# Patient Record
Sex: MALE | Race: WHITE | NOT HISPANIC OR LATINO | Employment: OTHER | URBAN - METROPOLITAN AREA
[De-identification: names, ages, dates, MRNs, and addresses within clinical notes are randomized per-mention and may not be internally consistent; named-entity substitution may affect disease eponyms.]

---

## 2019-10-01 ENCOUNTER — APPOINTMENT (OUTPATIENT)
Dept: RADIOLOGY | Facility: MEDICAL CENTER | Age: 79
End: 2019-10-01
Attending: EMERGENCY MEDICINE
Payer: COMMERCIAL

## 2019-10-01 ENCOUNTER — HOSPITAL ENCOUNTER (EMERGENCY)
Facility: MEDICAL CENTER | Age: 79
End: 2019-10-01
Attending: EMERGENCY MEDICINE
Payer: COMMERCIAL

## 2019-10-01 VITALS
HEIGHT: 73 IN | TEMPERATURE: 98.2 F | RESPIRATION RATE: 16 BRPM | DIASTOLIC BLOOD PRESSURE: 43 MMHG | WEIGHT: 275 LBS | HEART RATE: 70 BPM | SYSTOLIC BLOOD PRESSURE: 100 MMHG | OXYGEN SATURATION: 98 % | BODY MASS INDEX: 36.45 KG/M2

## 2019-10-01 DIAGNOSIS — S51.019A SKIN TEAR OF ELBOW WITHOUT COMPLICATION, UNSPECIFIED LATERALITY, INITIAL ENCOUNTER: ICD-10-CM

## 2019-10-01 DIAGNOSIS — S01.81XA FACIAL LACERATION, INITIAL ENCOUNTER: ICD-10-CM

## 2019-10-01 LAB — POC BREATHALIZER: 0.15 PERCENT (ref 0–0.01)

## 2019-10-01 PROCEDURE — 304217 HCHG IRRIGATION SYSTEM

## 2019-10-01 PROCEDURE — 304999 HCHG REPAIR-SIMPLE/INTERMED LEVEL 1

## 2019-10-01 PROCEDURE — 70450 CT HEAD/BRAIN W/O DYE: CPT

## 2019-10-01 PROCEDURE — 303747 HCHG EXTRA SUTURE

## 2019-10-01 PROCEDURE — 12013 RPR F/E/E/N/L/M 2.6-5.0 CM: CPT

## 2019-10-01 PROCEDURE — 99284 EMERGENCY DEPT VISIT MOD MDM: CPT

## 2019-10-01 PROCEDURE — A9270 NON-COVERED ITEM OR SERVICE: HCPCS

## 2019-10-01 PROCEDURE — 302970 POC BREATHALIZER: Performed by: EMERGENCY MEDICINE

## 2019-10-01 PROCEDURE — 70486 CT MAXILLOFACIAL W/O DYE: CPT

## 2019-10-01 PROCEDURE — 303353 HCHG DERMABOND SKIN ADHESIVE

## 2019-10-01 PROCEDURE — 700102 HCHG RX REV CODE 250 W/ 637 OVERRIDE(OP)

## 2019-10-01 PROCEDURE — 36415 COLL VENOUS BLD VENIPUNCTURE: CPT

## 2019-10-01 PROCEDURE — 700111 HCHG RX REV CODE 636 W/ 250 OVERRIDE (IP): Performed by: EMERGENCY MEDICINE

## 2019-10-01 PROCEDURE — 306637 HCHG MISC ORTHO ITEM RC 0274

## 2019-10-01 PROCEDURE — 90715 TDAP VACCINE 7 YRS/> IM: CPT | Performed by: EMERGENCY MEDICINE

## 2019-10-01 PROCEDURE — 700101 HCHG RX REV CODE 250: Performed by: EMERGENCY MEDICINE

## 2019-10-01 PROCEDURE — 90471 IMMUNIZATION ADMIN: CPT

## 2019-10-01 RX ORDER — AMOXICILLIN 250 MG
2 CAPSULE ORAL DAILY
COMMUNITY
End: 2019-12-23

## 2019-10-01 RX ORDER — ACETAMINOPHEN 500 MG
1000 TABLET ORAL 3 TIMES DAILY PRN
COMMUNITY
End: 2019-12-23

## 2019-10-01 RX ORDER — LEVOTHYROXINE SODIUM 0.05 MG/1
50 TABLET ORAL
Status: SHIPPED | COMMUNITY
End: 2023-04-18

## 2019-10-01 RX ORDER — BUMETANIDE 1 MG/1
1 TABLET ORAL DAILY
COMMUNITY
End: 2019-12-23

## 2019-10-01 RX ORDER — ACETAMINOPHEN 325 MG/1
650 TABLET ORAL ONCE
Status: COMPLETED | OUTPATIENT
Start: 2019-10-01 | End: 2019-10-01

## 2019-10-01 RX ORDER — MAGNESIUM OXIDE 420 MG/1
420 TABLET ORAL 2 TIMES DAILY
COMMUNITY
End: 2021-06-21

## 2019-10-01 RX ORDER — ATORVASTATIN CALCIUM 40 MG/1
40 TABLET, FILM COATED ORAL EVERY EVENING
Status: SHIPPED | COMMUNITY
End: 2023-04-18

## 2019-10-01 RX ORDER — LIDOCAINE HYDROCHLORIDE 20 MG/ML
20 INJECTION, SOLUTION INFILTRATION; PERINEURAL ONCE
Status: COMPLETED | OUTPATIENT
Start: 2019-10-01 | End: 2019-10-01

## 2019-10-01 RX ORDER — SPIRONOLACTONE 25 MG/1
50 TABLET ORAL DAILY
COMMUNITY
End: 2019-12-23

## 2019-10-01 RX ORDER — BACLOFEN 10 MG/1
20 TABLET ORAL 3 TIMES DAILY PRN
Status: ON HOLD | COMMUNITY
End: 2019-10-30

## 2019-10-01 RX ORDER — ALLOPURINOL 100 MG/1
100 TABLET ORAL DAILY
Status: SHIPPED | COMMUNITY
End: 2023-04-18

## 2019-10-01 RX ORDER — TRAZODONE HYDROCHLORIDE 100 MG/1
200 TABLET ORAL
COMMUNITY
End: 2019-12-23

## 2019-10-01 RX ORDER — CLOPIDOGREL BISULFATE 75 MG/1
75 TABLET ORAL DAILY
COMMUNITY
End: 2021-06-21

## 2019-10-01 RX ORDER — PANTOPRAZOLE SODIUM 40 MG/1
40 TABLET, DELAYED RELEASE ORAL DAILY
COMMUNITY
End: 2019-12-23

## 2019-10-01 RX ADMIN — ACETAMINOPHEN 650 MG: 325 TABLET, FILM COATED ORAL at 18:32

## 2019-10-01 RX ADMIN — CLOSTRIDIUM TETANI TOXOID ANTIGEN (FORMALDEHYDE INACTIVATED), CORYNEBACTERIUM DIPHTHERIAE TOXOID ANTIGEN (FORMALDEHYDE INACTIVATED), BORDETELLA PERTUSSIS TOXOID ANTIGEN (GLUTARALDEHYDE INACTIVATED), BORDETELLA PERTUSSIS FILAMENTOUS HEMAGGLUTININ ANTIGEN (FORMALDEHYDE INACTIVATED), BORDETELLA PERTUSSIS PERTACTIN ANTIGEN, AND BORDETELLA PERTUSSIS FIMBRIAE 2/3 ANTIGEN 0.5 ML: 5; 2; 2.5; 5; 3; 5 INJECTION, SUSPENSION INTRAMUSCULAR at 17:52

## 2019-10-01 RX ADMIN — LIDOCAINE HYDROCHLORIDE 20 ML: 20 INJECTION, SOLUTION INFILTRATION; PERINEURAL at 18:54

## 2019-10-01 SDOH — HEALTH STABILITY: MENTAL HEALTH: HOW OFTEN DO YOU HAVE A DRINK CONTAINING ALCOHOL?: 2-3 TIMES A WEEK

## 2019-10-01 SDOH — HEALTH STABILITY: MENTAL HEALTH: HOW MANY STANDARD DRINKS CONTAINING ALCOHOL DO YOU HAVE ON A TYPICAL DAY?: 3 OR 4

## 2019-10-01 SDOH — HEALTH STABILITY: MENTAL HEALTH: HOW OFTEN DO YOU HAVE 6 OR MORE DRINKS ON ONE OCCASION?: NEVER

## 2019-10-01 ASSESSMENT — LIFESTYLE VARIABLES
DO YOU DRINK ALCOHOL: NO
DOES PATIENT WANT TO STOP DRINKING: NO

## 2019-10-02 NOTE — ED NOTES
The patient has been provided with discharge education and information.  The patient was also provided with instructions on follow up care and return precautions.  The patient verbalizes understanding of discharge instructions, follow up care, and return precautions.  All questions have been answered.  .  NAD, A/Ox4, good color and appropriate at time of discharge.  Patient ambulated steady gait out of department.      Wounds dressed and educated on management. Patient verbally understood instructions. Denies any questions.

## 2019-10-02 NOTE — ED TRIAGE NOTES
BIB EMS with   Chief Complaint   Patient presents with   • T-5000 GLF   • Head Laceration   • Arm Pain     bilateral   Tripped and fell leaving a bar. States 3 [place]. Skin tears to bilateral forearms, bilateral hand pain, bilateral shoulder abrasions, and large hematoma/laceration to forehead. Denies neck pain.      On Eliquis and Plavix.

## 2019-10-02 NOTE — ED NOTES
Met with pt at bedside. Pt unable to recall his medications. Placed call to pts home pharmacy to verify  Current medications. No antibiotics noted on profile. Pt  Confirmed last doses  Taken.  Home pharmacy [facility]

## 2019-10-02 NOTE — ED PROVIDER NOTES
ED Provider Note    CHIEF COMPLAINT  Chief Complaint   Patient presents with   • T-5000 GLF   • Head Laceration   • Arm Pain     bilateral       HPI  Kedar Woods is a 79 y.o. male who presents to emerge from today with head laceration, ground-level fall.  Patient states he was in a bar had 3 [place]'s went out of the bar missed a step and fell and in his face causing injury no loss of consciousness he has a laceration to his forehead he is on blood thinner of Eliquis.  He was about to get into his truck to drive home he has skin tears to both hands and left arm.  Facial pain and head pain no neck pain no chest pain or shortness of breath or other complaints at this time.    REVIEW OF SYSTEMS  See HPI for further details. All other systems are negative.      PAST MEDICAL HISTORY  Past Medical History:   Diagnosis Date   • Hypertension        FAMILY HISTORY  History reviewed. No pertinent family history.    SOCIAL HISTORY  Social History     Socioeconomic History   • Marital status:      Spouse name: Not on file   • Number of children: Not on file   • Years of education: Not on file   • Highest education level: Not on file   Occupational History   • Not on file   Social Needs   • Financial resource strain: Not on file   • Food insecurity:     Worry: Not on file     Inability: Not on file   • Transportation needs:     Medical: Not on file     Non-medical: Not on file   Tobacco Use   • Smoking status: Never Smoker   • Smokeless tobacco: Never Used   Substance and Sexual Activity   • Alcohol use: Yes     Frequency: 2-3 times a week     Drinks per session: 3 or 4     Binge frequency: Never   • Drug use: Never   • Sexual activity: Not on file   Lifestyle   • Physical activity:     Days per week: Not on file     Minutes per session: Not on file   • Stress: Not on file   Relationships   • Social connections:     Talks on phone: Not on file     Gets together: Not on file     Attends Pent[employer]alism service:  "Not on file     Active member of club or organization: Not on file     Attends meetings of clubs or organizations: Not on file     Relationship status: Not on file   • Intimate partner violence:     Fear of current or ex partner: Not on file     Emotionally abused: Not on file     Physically abused: Not on file     Forced sexual activity: Not on file   Other Topics Concern   • Not on file   Social History Narrative   • Not on file       SURGICAL HISTORY  No past surgical history on file.    CURRENT MEDICATIONS  Home Medications     Reviewed by Nayan Luna (Pharmacy Tech) on 10/01/19 at 1716  Med List Status: Complete   Medication Last Dose Status   acetaminophen (TYLENOL) 500 MG Tab 9/30/2019 Active   allopurinol (ZYLOPRIM) 100 MG Tab 10/1/2019 Active   apixaban (ELIQUIS) 5mg Tab 10/1/2019 Active   atorvastatin (LIPITOR) 40 MG Tab 10/1/2019 Active   baclofen (LIORESAL) 10 MG Tab 10/1/2019 Active   bumetanide (BUMEX) 1 MG Tab 10/1/2019 Active   clopidogrel (PLAVIX) 75 MG Tab 10/1/2019 Active   cyanocobalamin (VITAMIN B12) 1000 MCG Tab 10/1/2019 Active   levothyroxine (SYNTHROID) 50 MCG Tab 10/1/2019 Active   Magnesium Oxide 420 MG Tab 10/1/2019 Active   metFORMIN (GLUCOPHAGE) 500 MG Tab 10/1/2019 Active   pantoprazole (PROTONIX) 40 MG Tablet Delayed Response 10/1/2019 Active   senna-docusate (PERICOLACE OR SENOKOT S) 8.6-50 MG Tab 10/1/2019 Active   spironolactone (ALDACTONE) 25 MG Tab 10/1/2019 Active   traZODone (DESYREL) 100 MG Tab 9/30/2019 Active   vitamin D (CHOLECALCIFEROL) 1000 UNIT Tab 10/1/2019 Active                ALLERGIES  No Known Allergies    PHYSICAL EXAM  VITAL SIGNS: /43   Pulse 70   Temp 36.8 °C (98.2 °F) (Temporal)   Resp 16   Ht 1.854 m (6' 1\")   Wt 124.7 kg (275 lb)   SpO2 98%   BMI 36.28 kg/m²  Room air O2: 98    Constitutional: GCS of 15  HENT: Forehead stellate laceration noted with bleeding abrasions across the left side of his face  Eyes: PERRLA, EOMI, Conjunctiva " normal, No discharge.   Neck: Soft and supple full range of motion  Cardiovascular: Normal heart rate, Normal rhythm, No murmurs, No rubs, No gallops.   Thorax & Lungs: Normal breath sounds, No respiratory distress, No wheezing, No chest tenderness.   Abdomen: Bowel sounds normal, Soft, No tenderness, No masses, No pulsatile masses.   Skin: Warm, Dry, No erythema, No rash.  4 cm forehead laceration depth this to the subcutaneous tissue stellate in nature.  Multiple skin tears bilateral hands and left arm.  Back: Full range of motion no deformities  Extremities: Intact distal pulses, No edema, No tenderness, No cyanosis, No clubbing.   Musculoskeletal: Patient is amatory moves upper and lower extremities without difficulty  Neurologic: Alert & oriented x 3, Normal motor function, Normal sensory function, No focal deficits noted.     RADIOLOGY/PROCEDURES  CT-HEAD W/O   Final Result      No acute intracranial abnormality is identified.      Atrophy      There are periventricular and subcortical white matter changes present.  This finding is nonspecific and could be from previous small vessel ischemia, demyelination, or gliosis.      Chronic appearing nasal bone fracture on the left.      Frontal scalp hematoma and laceration.      CT-MAXILLOFACIAL W/O   Final Result      Chronic left left nasal bone fracture. No acute maxillofacial fractures.            COURSE & MEDICAL DECISION MAKING  Pertinent Labs & Imaging studies reviewed. (See chart for details) patient is on blood thinner Eliquis with CT scan of the head was obtained there is no evidence of acute bleed or other acute process facial CT scan also done because of injuries that occurred and this was negative for acute process.  Discussed not on use of alcohol with his blood thinner and not driving under the influence of alcohol sutures out in 5 to 7 days discussed signs of infection which include but not limited to redness, swelling, discharge or fever.  Tetanus  was made up-to-date patient given bacitracin ointment to use daily.  Verbalizes understand instructions need for follow-up.  Patient was kept to is awake alert ambulatory and sober.          PROCEDURE NOTE #1; repair 4 cm facial laceration by ER physician; patient injected local anesthetic of 2% Xylocaine total of 3.5 cc.  Wound over forehead stellate was cleansed irrigated with copious muscle sterile saline solution approximately 500 cc by ER physician.  Using sterile technique and under sterile conditions sutures were applied of a 4×0 nylon thread total of 6 interrupted sutures bacitracin dressing.  Patient tolerated procedure well      PROCEDURE NOTE #2; repair of skin tears on bilateral hands and left forearm by ER physician.  Area was cleansed and irrigated dried under sterile conditions Dermabond was placed over the skin tears with good closure of the wounds dressing was applied patient tired procedure well.    FINAL IMPRESSION  1.  Acute 4 cm facial laceration  2.  Multiple skin tears  3.  Multiple abrasions secondary to fall  4.  Alcohol intoxication  5.        Electronically signed by: Elpidio Blanchard, 10/2/2019

## 2019-10-23 ENCOUNTER — HOSPITAL ENCOUNTER (OUTPATIENT)
Facility: MEDICAL CENTER | Age: 79
End: 2019-10-30
Attending: EMERGENCY MEDICINE | Admitting: INTERNAL MEDICINE
Payer: COMMERCIAL

## 2019-10-23 ENCOUNTER — APPOINTMENT (OUTPATIENT)
Dept: RADIOLOGY | Facility: MEDICAL CENTER | Age: 79
End: 2019-10-23
Attending: EMERGENCY MEDICINE
Payer: COMMERCIAL

## 2019-10-23 ENCOUNTER — APPOINTMENT (OUTPATIENT)
Dept: RADIOLOGY | Facility: MEDICAL CENTER | Age: 79
End: 2019-10-23
Attending: STUDENT IN AN ORGANIZED HEALTH CARE EDUCATION/TRAINING PROGRAM
Payer: COMMERCIAL

## 2019-10-23 DIAGNOSIS — R41.82 ALTERED MENTAL STATUS, UNSPECIFIED ALTERED MENTAL STATUS TYPE: ICD-10-CM

## 2019-10-23 DIAGNOSIS — R41.0 DELIRIUM: ICD-10-CM

## 2019-10-23 DIAGNOSIS — R29.6 MULTIPLE FALLS: Primary | ICD-10-CM

## 2019-10-23 DIAGNOSIS — R60.9 PERIPHERAL EDEMA: ICD-10-CM

## 2019-10-23 DIAGNOSIS — R79.89 ELEVATED TROPONIN: ICD-10-CM

## 2019-10-23 DIAGNOSIS — I87.2 CHRONIC VENOUS STASIS DERMATITIS OF BOTH LOWER EXTREMITIES: ICD-10-CM

## 2019-10-23 DIAGNOSIS — R79.89 ELEVATED BRAIN NATRIURETIC PEPTIDE (BNP) LEVEL: ICD-10-CM

## 2019-10-23 DIAGNOSIS — D64.9 ANEMIA, UNSPECIFIED TYPE: ICD-10-CM

## 2019-10-23 PROBLEM — I25.10 CORONARY ARTERY DISEASE INVOLVING NATIVE CORONARY ARTERY: Status: ACTIVE | Noted: 2019-10-23

## 2019-10-23 PROBLEM — I10 ESSENTIAL HYPERTENSION: Status: ACTIVE | Noted: 2019-10-23

## 2019-10-23 PROBLEM — R40.4 ALTERED LEVEL OF CONSCIOUSNESS: Status: ACTIVE | Noted: 2019-10-23

## 2019-10-23 PROBLEM — I48.11 LONGSTANDING PERSISTENT ATRIAL FIBRILLATION (HCC): Status: ACTIVE | Noted: 2019-10-23

## 2019-10-23 PROBLEM — M1A.9XX0 CHRONIC GOUT: Status: ACTIVE | Noted: 2019-10-23

## 2019-10-23 PROBLEM — E11.9 TYPE 2 DIABETES MELLITUS (HCC): Status: ACTIVE | Noted: 2019-10-23

## 2019-10-23 PROBLEM — E78.5 HYPERLIPIDEMIA: Status: ACTIVE | Noted: 2019-10-23

## 2019-10-23 PROBLEM — I27.20 PULMONARY HYPERTENSION (HCC): Status: ACTIVE | Noted: 2019-10-23

## 2019-10-23 PROBLEM — Z95.0 PACEMAKER: Status: ACTIVE | Noted: 2019-10-23

## 2019-10-23 PROBLEM — I35.0 AORTIC VALVE STENOSIS: Status: ACTIVE | Noted: 2019-10-23

## 2019-10-23 PROBLEM — I48.92 ATRIAL FLUTTER, CHRONIC (HCC): Status: ACTIVE | Noted: 2019-10-23

## 2019-10-23 LAB
ALBUMIN SERPL BCP-MCNC: 3.8 G/DL (ref 3.2–4.9)
ALBUMIN/GLOB SERPL: 1.2 G/DL
ALP SERPL-CCNC: 66 U/L (ref 30–99)
ALT SERPL-CCNC: 9 U/L (ref 2–50)
AMMONIA PLAS-SCNC: 26 UMOL/L (ref 11–45)
AMPHET UR QL SCN: NEGATIVE
ANION GAP SERPL CALC-SCNC: 11 MMOL/L (ref 0–11.9)
APPEARANCE UR: CLEAR
AST SERPL-CCNC: 23 U/L (ref 12–45)
BARBITURATES UR QL SCN: NEGATIVE
BASOPHILS # BLD AUTO: 1 % (ref 0–1.8)
BASOPHILS # BLD: 0.08 K/UL (ref 0–0.12)
BENZODIAZ UR QL SCN: NEGATIVE
BILIRUB SERPL-MCNC: 0.6 MG/DL (ref 0.1–1.5)
BILIRUB UR QL STRIP.AUTO: NEGATIVE
BLOOD CULTURE HOLD CXBCH: NORMAL
BUN SERPL-MCNC: 33 MG/DL (ref 8–22)
BZE UR QL SCN: NEGATIVE
CALCIUM SERPL-MCNC: 9.2 MG/DL (ref 8.5–10.5)
CANNABINOIDS UR QL SCN: NEGATIVE
CHLORIDE SERPL-SCNC: 102 MMOL/L (ref 96–112)
CO2 SERPL-SCNC: 26 MMOL/L (ref 20–33)
COLOR UR: YELLOW
CREAT SERPL-MCNC: 1.4 MG/DL (ref 0.5–1.4)
EKG IMPRESSION: NORMAL
EKG IMPRESSION: NORMAL
EOSINOPHIL # BLD AUTO: 0.04 K/UL (ref 0–0.51)
EOSINOPHIL NFR BLD: 0.5 % (ref 0–6.9)
ERYTHROCYTE [DISTWIDTH] IN BLOOD BY AUTOMATED COUNT: 55.5 FL (ref 35.9–50)
ETHANOL BLD-MCNC: 0 G/DL
FOLATE SERPL-MCNC: 7 NG/ML
GLOBULIN SER CALC-MCNC: 3.2 G/DL (ref 1.9–3.5)
GLUCOSE BLD-MCNC: 110 MG/DL (ref 65–99)
GLUCOSE BLD-MCNC: 67 MG/DL (ref 65–99)
GLUCOSE BLD-MCNC: 81 MG/DL (ref 65–99)
GLUCOSE SERPL-MCNC: 97 MG/DL (ref 65–99)
GLUCOSE UR STRIP.AUTO-MCNC: NEGATIVE MG/DL
HCT VFR BLD AUTO: 34 % (ref 42–52)
HGB BLD-MCNC: 10.9 G/DL (ref 14–18)
IMM GRANULOCYTES # BLD AUTO: 0.04 K/UL (ref 0–0.11)
IMM GRANULOCYTES NFR BLD AUTO: 0.5 % (ref 0–0.9)
KETONES UR STRIP.AUTO-MCNC: NEGATIVE MG/DL
LEUKOCYTE ESTERASE UR QL STRIP.AUTO: NEGATIVE
LYMPHOCYTES # BLD AUTO: 1.57 K/UL (ref 1–4.8)
LYMPHOCYTES NFR BLD: 19.5 % (ref 22–41)
MAGNESIUM SERPL-MCNC: 1.7 MG/DL (ref 1.5–2.5)
MCH RBC QN AUTO: 31.3 PG (ref 27–33)
MCHC RBC AUTO-ENTMCNC: 32.1 G/DL (ref 33.7–35.3)
MCV RBC AUTO: 97.7 FL (ref 81.4–97.8)
METHADONE UR QL SCN: NEGATIVE
MICRO URNS: NORMAL
MONOCYTES # BLD AUTO: 0.79 K/UL (ref 0–0.85)
MONOCYTES NFR BLD AUTO: 9.8 % (ref 0–13.4)
NEUTROPHILS # BLD AUTO: 5.52 K/UL (ref 1.82–7.42)
NEUTROPHILS NFR BLD: 68.7 % (ref 44–72)
NITRITE UR QL STRIP.AUTO: NEGATIVE
NRBC # BLD AUTO: 0 K/UL
NRBC BLD-RTO: 0 /100 WBC
NT-PROBNP SERPL IA-MCNC: 6016 PG/ML (ref 0–125)
OPIATES UR QL SCN: NEGATIVE
OXYCODONE UR QL SCN: NEGATIVE
PCP UR QL SCN: NEGATIVE
PH UR STRIP.AUTO: 5 [PH] (ref 5–8)
PLATELET # BLD AUTO: 349 K/UL (ref 164–446)
PMV BLD AUTO: 10.3 FL (ref 9–12.9)
POTASSIUM SERPL-SCNC: 4.7 MMOL/L (ref 3.6–5.5)
PROPOXYPH UR QL SCN: NEGATIVE
PROT SERPL-MCNC: 7 G/DL (ref 6–8.2)
PROT UR QL STRIP: NEGATIVE MG/DL
RBC # BLD AUTO: 3.48 M/UL (ref 4.7–6.1)
RBC UR QL AUTO: NEGATIVE
SODIUM SERPL-SCNC: 139 MMOL/L (ref 135–145)
SP GR UR STRIP.AUTO: 1.01
TREPONEMA PALLIDUM IGG+IGM AB [PRESENCE] IN SERUM OR PLASMA BY IMMUNOASSAY: NON REACTIVE
TROPONIN T SERPL-MCNC: 41 NG/L (ref 6–19)
TROPONIN T SERPL-MCNC: 42 NG/L (ref 6–19)
TROPONIN T SERPL-MCNC: 44 NG/L (ref 6–19)
TSH SERPL DL<=0.005 MIU/L-ACNC: 0.9 UIU/ML (ref 0.38–5.33)
UROBILINOGEN UR STRIP.AUTO-MCNC: 0.2 MG/DL
VIT B12 SERPL-MCNC: 700 PG/ML (ref 211–911)
WBC # BLD AUTO: 8 K/UL (ref 4.8–10.8)

## 2019-10-23 PROCEDURE — 700102 HCHG RX REV CODE 250 W/ 637 OVERRIDE(OP): Performed by: STUDENT IN AN ORGANIZED HEALTH CARE EDUCATION/TRAINING PROGRAM

## 2019-10-23 PROCEDURE — 84425 ASSAY OF VITAMIN B-1: CPT

## 2019-10-23 PROCEDURE — G0378 HOSPITAL OBSERVATION PER HR: HCPCS

## 2019-10-23 PROCEDURE — 83735 ASSAY OF MAGNESIUM: CPT

## 2019-10-23 PROCEDURE — 82962 GLUCOSE BLOOD TEST: CPT

## 2019-10-23 PROCEDURE — 36415 COLL VENOUS BLD VENIPUNCTURE: CPT

## 2019-10-23 PROCEDURE — 99219 PR INITIAL OBSERVATION CARE,LEVL II: CPT | Performed by: INTERNAL MEDICINE

## 2019-10-23 PROCEDURE — 82607 VITAMIN B-12: CPT

## 2019-10-23 PROCEDURE — 82746 ASSAY OF FOLIC ACID SERUM: CPT

## 2019-10-23 PROCEDURE — 93005 ELECTROCARDIOGRAM TRACING: CPT | Performed by: EMERGENCY MEDICINE

## 2019-10-23 PROCEDURE — 80053 COMPREHEN METABOLIC PANEL: CPT

## 2019-10-23 PROCEDURE — 84443 ASSAY THYROID STIM HORMONE: CPT

## 2019-10-23 PROCEDURE — A9270 NON-COVERED ITEM OR SERVICE: HCPCS | Performed by: STUDENT IN AN ORGANIZED HEALTH CARE EDUCATION/TRAINING PROGRAM

## 2019-10-23 PROCEDURE — 71045 X-RAY EXAM CHEST 1 VIEW: CPT

## 2019-10-23 PROCEDURE — 86780 TREPONEMA PALLIDUM: CPT

## 2019-10-23 PROCEDURE — 84484 ASSAY OF TROPONIN QUANT: CPT | Mod: 91

## 2019-10-23 PROCEDURE — 83880 ASSAY OF NATRIURETIC PEPTIDE: CPT

## 2019-10-23 PROCEDURE — 80307 DRUG TEST PRSMV CHEM ANLYZR: CPT

## 2019-10-23 PROCEDURE — 99220 PR INITIAL OBSERVATION CARE,LEVL III: CPT | Mod: GC | Performed by: INTERNAL MEDICINE

## 2019-10-23 PROCEDURE — 700111 HCHG RX REV CODE 636 W/ 250 OVERRIDE (IP): Performed by: EMERGENCY MEDICINE

## 2019-10-23 PROCEDURE — 85025 COMPLETE CBC W/AUTO DIFF WBC: CPT

## 2019-10-23 PROCEDURE — A9270 NON-COVERED ITEM OR SERVICE: HCPCS | Performed by: EMERGENCY MEDICINE

## 2019-10-23 PROCEDURE — 93005 ELECTROCARDIOGRAM TRACING: CPT

## 2019-10-23 PROCEDURE — 81003 URINALYSIS AUTO W/O SCOPE: CPT | Mod: XU

## 2019-10-23 PROCEDURE — 82140 ASSAY OF AMMONIA: CPT

## 2019-10-23 PROCEDURE — 700102 HCHG RX REV CODE 250 W/ 637 OVERRIDE(OP): Performed by: EMERGENCY MEDICINE

## 2019-10-23 PROCEDURE — 70450 CT HEAD/BRAIN W/O DYE: CPT

## 2019-10-23 RX ORDER — OMEPRAZOLE 20 MG/1
40 CAPSULE, DELAYED RELEASE ORAL DAILY
Status: DISCONTINUED | OUTPATIENT
Start: 2019-10-24 | End: 2019-10-30 | Stop reason: HOSPADM

## 2019-10-23 RX ORDER — THIAMINE MONONITRATE (VIT B1) 100 MG
100 TABLET ORAL DAILY
Status: DISCONTINUED | OUTPATIENT
Start: 2019-10-23 | End: 2019-10-30 | Stop reason: HOSPADM

## 2019-10-23 RX ORDER — TRAZODONE HYDROCHLORIDE 50 MG/1
200 TABLET ORAL
Status: DISCONTINUED | OUTPATIENT
Start: 2019-10-23 | End: 2019-10-23

## 2019-10-23 RX ORDER — ALLOPURINOL 100 MG/1
200 TABLET ORAL DAILY
Status: DISCONTINUED | OUTPATIENT
Start: 2019-10-24 | End: 2019-10-30 | Stop reason: HOSPADM

## 2019-10-23 RX ORDER — ACETAMINOPHEN 325 MG/1
650 TABLET ORAL EVERY 6 HOURS PRN
Status: DISCONTINUED | OUTPATIENT
Start: 2019-10-23 | End: 2019-10-25

## 2019-10-23 RX ORDER — CLOPIDOGREL BISULFATE 75 MG/1
75 TABLET ORAL DAILY
Status: DISCONTINUED | OUTPATIENT
Start: 2019-10-24 | End: 2019-10-30 | Stop reason: HOSPADM

## 2019-10-23 RX ORDER — BACLOFEN 10 MG/1
5 TABLET ORAL 3 TIMES DAILY PRN
Status: DISCONTINUED | OUTPATIENT
Start: 2019-10-23 | End: 2019-10-24

## 2019-10-23 RX ORDER — FUROSEMIDE 10 MG/ML
40 INJECTION INTRAMUSCULAR; INTRAVENOUS ONCE
Status: COMPLETED | OUTPATIENT
Start: 2019-10-23 | End: 2019-10-23

## 2019-10-23 RX ORDER — POLYETHYLENE GLYCOL 3350 17 G/17G
1 POWDER, FOR SOLUTION ORAL
Status: DISCONTINUED | OUTPATIENT
Start: 2019-10-23 | End: 2019-10-30 | Stop reason: HOSPADM

## 2019-10-23 RX ORDER — BISACODYL 10 MG
10 SUPPOSITORY, RECTAL RECTAL
Status: DISCONTINUED | OUTPATIENT
Start: 2019-10-23 | End: 2019-10-30 | Stop reason: HOSPADM

## 2019-10-23 RX ORDER — ATORVASTATIN CALCIUM 40 MG/1
40 TABLET, FILM COATED ORAL DAILY
Status: DISCONTINUED | OUTPATIENT
Start: 2019-10-24 | End: 2019-10-30 | Stop reason: HOSPADM

## 2019-10-23 RX ORDER — SPIRONOLACTONE 25 MG/1
50 TABLET ORAL DAILY
Status: DISCONTINUED | OUTPATIENT
Start: 2019-10-24 | End: 2019-10-30 | Stop reason: HOSPADM

## 2019-10-23 RX ORDER — BUMETANIDE 1 MG/1
1 TABLET ORAL DAILY
Status: DISCONTINUED | OUTPATIENT
Start: 2019-10-24 | End: 2019-10-30 | Stop reason: HOSPADM

## 2019-10-23 RX ORDER — AMOXICILLIN 250 MG
2 CAPSULE ORAL 2 TIMES DAILY
Status: DISCONTINUED | OUTPATIENT
Start: 2019-10-23 | End: 2019-10-23

## 2019-10-23 RX ORDER — LEVOTHYROXINE SODIUM 0.05 MG/1
50 TABLET ORAL
Status: DISCONTINUED | OUTPATIENT
Start: 2019-10-24 | End: 2019-10-30 | Stop reason: HOSPADM

## 2019-10-23 RX ORDER — CHOLECALCIFEROL (VITAMIN D3) 125 MCG
1000 CAPSULE ORAL DAILY
Status: DISCONTINUED | OUTPATIENT
Start: 2019-10-24 | End: 2019-10-30 | Stop reason: HOSPADM

## 2019-10-23 RX ADMIN — FUROSEMIDE 40 MG: 10 INJECTION, SOLUTION INTRAMUSCULAR; INTRAVENOUS at 09:12

## 2019-10-23 RX ADMIN — ACETAMINOPHEN 650 MG: 325 TABLET, FILM COATED ORAL at 13:40

## 2019-10-23 RX ADMIN — APIXABAN 5 MG: 5 TABLET, FILM COATED ORAL at 14:57

## 2019-10-23 RX ADMIN — Medication 100 MG: at 13:40

## 2019-10-23 RX ADMIN — NITROGLYCERIN 0.5 INCH: 20 OINTMENT TOPICAL at 09:12

## 2019-10-23 ASSESSMENT — COGNITIVE AND FUNCTIONAL STATUS - GENERAL
WALKING IN HOSPITAL ROOM: A LOT
DRESSING REGULAR UPPER BODY CLOTHING: A LITTLE
SUGGESTED CMS G CODE MODIFIER DAILY ACTIVITY: CK
SUGGESTED CMS G CODE MODIFIER MOBILITY: CK
MOVING FROM LYING ON BACK TO SITTING ON SIDE OF FLAT BED: A LITTLE
CLIMB 3 TO 5 STEPS WITH RAILING: A LOT
HELP NEEDED FOR BATHING: A LITTLE
MOBILITY SCORE: 16
DRESSING REGULAR LOWER BODY CLOTHING: A LITTLE
PERSONAL GROOMING: A LITTLE
DAILY ACTIVITIY SCORE: 19
MOVING TO AND FROM BED TO CHAIR: A LITTLE
STANDING UP FROM CHAIR USING ARMS: A LITTLE
TOILETING: A LITTLE
TURNING FROM BACK TO SIDE WHILE IN FLAT BAD: A LITTLE

## 2019-10-23 ASSESSMENT — ENCOUNTER SYMPTOMS
DIZZINESS: 1
ORTHOPNEA: 0
CONSTIPATION: 0
NAUSEA: 0
TINGLING: 1
DIARRHEA: 0
ABDOMINAL PAIN: 0
EYE PAIN: 0
CLAUDICATION: 0
CHILLS: 0
PALPITATIONS: 0
COUGH: 0
FALLS: 1
FEVER: 0
SHORTNESS OF BREATH: 0
VOMITING: 0
HEARTBURN: 0
BLOOD IN STOOL: 0
HEMOPTYSIS: 0
BRUISES/BLEEDS EASILY: 1
WEAKNESS: 1
SENSORY CHANGE: 1

## 2019-10-23 ASSESSMENT — LIFESTYLE VARIABLES
AVERAGE NUMBER OF DAYS PER WEEK YOU HAVE A DRINK CONTAINING ALCOHOL: 6
CONSUMPTION TOTAL: POSITIVE
HOW MANY TIMES IN THE PAST YEAR HAVE YOU HAD 5 OR MORE DRINKS IN A DAY: 300
TOTAL SCORE: 0
TOTAL SCORE: 0
EVER HAD A DRINK FIRST THING IN THE MORNING TO STEADY YOUR NERVES TO GET RID OF A HANGOVER: NO
TOTAL SCORE: 0
EVER FELT BAD OR GUILTY ABOUT YOUR DRINKING: NO
ALCOHOL_USE: YES
HAVE PEOPLE ANNOYED YOU BY CRITICIZING YOUR DRINKING: NO
EVER_SMOKED: NEVER
HAVE YOU EVER FELT YOU SHOULD CUT DOWN ON YOUR DRINKING: NO
ON A TYPICAL DAY WHEN YOU DRINK ALCOHOL HOW MANY DRINKS DO YOU HAVE: 6

## 2019-10-23 ASSESSMENT — PATIENT HEALTH QUESTIONNAIRE - PHQ9
2. FEELING DOWN, DEPRESSED, IRRITABLE, OR HOPELESS: NOT AT ALL
SUM OF ALL RESPONSES TO PHQ9 QUESTIONS 1 AND 2: 0
1. LITTLE INTEREST OR PLEASURE IN DOING THINGS: NOT AT ALL

## 2019-10-23 NOTE — CONSULTS
"[facility] Consult.   Patient Name: Kedar Woods  Patient Age, Gender: 79 y.o., male  Date of Consult:10/23/2019      Name of Requesting Consultant(s): No att. providers found  Consultant: Tripp Cervantes M.D.   Reason for Consult: Delirium    Chief Complaint:   Chief Complaint   Patient presents with   • Numbness     All over body numbness upon waking up   • Shortness of Breath     Shortness of breath starting this morning upon waking up.   • Dizziness     DIzziness starting this morning    • Hypotension     Hypotensive for REMSA en route to hospital       History of Present Illness:  Kedar is a 79 y.o. male PMH of cardiac disease, bilateral knee osteoarthritis, lower extremity venous stasis with previous lower extremity venous stasis wounds, [facility] presents after his son called EMS due to issues with confusion.  Patient is a very limited historian due to some disorganized thinking however he is very loquacious however repetitive.  History is obtained from medical chart, medical staff.  Speaking with medical staff it appears the patient's neighbor is familiar with him and noted some issues with illogical conversations, repetitive conversations, since the patient had a fall earlier this month.  Upon record review it appears patient had a fall after taking 3 shots from a bar.  Patient does not normally drink, however he does have a history of binge drinking multiple years ago.  At that time he received some stitches for his head laceration and was sent home.  Again the neighbor notes that the patient is not quite been himself since that time and the patient's son, who is in [place] called EMS to have the patient admitted to the hospital.  The patient himself tells me that he is here to get \"sliced and dies\".  He tells me that he is waiting for bilateral knee operation.  He refers to seem to be surgery in [place] for his heart.  He tells me that he has bilateral " "lower extremity numbness from his mid ankle thighs down with the pain getting worse.  He [facility] to me [facility] however he missed his last appointment.  He says that they give him Unna boots.  The patient tells me that he has not chest pain, no lightheadedness with position changes, no shortness of breath, no nausea vomiting, does not feel constipated.  He feels that he is urinated multiple times in attempt to urinate into a urinal so much of her interview.  Does not appear that he was successful.    Other than the fall 1 month ago the patient denies any history of falls, says he can repeat his ADLs and IADLs and continues to be adherent with his medications.  I have discussed with his feet and he tells me that he is due to issues and that the wound therapy people also give him a hard time.  He made multiple comments about how financially well off he is.  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    No light headedness  SH: quit smoking in , 3 packs/day for 14 yrs  EtOH: use to binge drink, denies alcohol use since  int eh begiinning of year month at \"[employer]\" Denies other drug use  FH: father, brother and mother all have heart disease patient could not recall age except dad  at 72 years, father had heart disease     ROS:A 14 sytem ROS is negative other than as stated above in HPI.     Past Medical History:   Diagnosis Date   • Hypertension        Current Facility-Administered Medications:   •  [DISCONTINUED] senna-docusate (PERICOLACE or SENOKOT S) 8.6-50 MG per tablet 2 Tab, 2 Tab, Oral, BID **AND** polyethylene glycol/lytes (MIRALAX) PACKET 1 Packet, 1 Packet, Oral, QDAY PRN **AND** magnesium hydroxide (MILK OF MAGNESIA) suspension 30 mL, 30 mL, Oral, QDAY PRN **AND** bisacodyl (DULCOLAX) suppository 10 mg, 10 mg, Rectal, QDAY PRN, Katelyn HERNANDES" STUART Ramirez  •  acetaminophen (TYLENOL) tablet 650 mg, 650 mg, Oral, Q6HRS PRN, Katelyn Ramirez M.D., 650 mg at 10/23/19 1340  •  [START ON 10/24/2019] allopurinol (ZYLOPRIM) tablet 200 mg, 200 mg, Oral, DAILY, Katelyn Ramirez M.D.  •  [START ON 10/24/2019] atorvastatin (LIPITOR) tablet 40 mg, 40 mg, Oral, DAILY, Katelyn Ramirez M.D.  •  [START ON 10/24/2019] cyanocobalamin (VITAMIN B-12) tablet 1,000 mcg, 1,000 mcg, Oral, DAILY, Katelyn Ramirez M.D.  •  [START ON 10/24/2019] levothyroxine (SYNTHROID) tablet 50 mcg, 50 mcg, Oral, AM ES, Katelyn Ramirez M.D.  •  [START ON 10/24/2019] magnesium oxide (MAG-OX) tablet 400 mg, 400 mg, Oral, DAILY, Katelyn Ramirez M.D.  •  [START ON 10/24/2019] omeprazole (PRILOSEC) capsule 40 mg, 40 mg, Oral, DAILY, Katelyn Ramirez M.D.  •  [START ON 10/24/2019] vitamin D (cholecalciferol) tablet 2,000 Units, 2,000 Units, Oral, DAILY, Katelyn Ramirez M.D.  •  [START ON 10/24/2019] clopidogrel (PLAVIX) tablet 75 mg, 75 mg, Oral, DAILY, Katelyn Ramirez M.D.  •  apixaban (ELIQUIS) tablet 5 mg, 5 mg, Oral, BID, Katelyn Ramirez M.D.  •  thiamine tablet 100 mg, 100 mg, Oral, DAILY, Katelyn Ramirez M.D., 100 mg at 10/23/19 1340  •  insulin lispro (HUMALOG) injection 1-6 Units, 1-6 Units, Subcutaneous, 4X/DAY ACHS **AND** Accu-Chek ACHS, , , Q AC AND BEDTIME(S) **AND** NOTIFY MD and PharmD, , , Once **AND** glucose 4 g chewable tablet 16 g, 16 g, Oral, Q15 MIN PRN **AND** DEXTROSE 10% BOLUS 250 mL, 250 mL, Intravenous, Q15 MIN PRN, Nu Enrique M.D.    Current Outpatient Medications:   •  traZODone (DESYREL) 100 MG Tab, Take 200 mg by mouth every bedtime., Disp: , Rfl:   •  acetaminophen (TYLENOL) 500 MG Tab, Take 1,000 mg by mouth 3 times a day as needed for Mild Pain., Disp: , Rfl:   •  allopurinol (ZYLOPRIM) 100 MG Tab, Take 200 mg by mouth every day., Disp: , Rfl:   •  apixaban (ELIQUIS) 5mg Tab, Take 5 mg by mouth 2 Times a Day., Disp: , Rfl:   •  atorvastatin (LIPITOR) 40 MG Tab, Take 40 mg by  mouth every day., Disp: , Rfl:   •  baclofen (LIORESAL) 10 MG Tab, Take 20 mg by mouth 3 times a day as needed., Disp: , Rfl:   •  bumetanide (BUMEX) 1 MG Tab, Take 1 mg by mouth every day., Disp: , Rfl:   •  vitamin D (CHOLECALCIFEROL) 1000 UNIT Tab, Take 2,000 Units by mouth every day., Disp: , Rfl:   •  clopidogrel (PLAVIX) 75 MG Tab, Take 75 mg by mouth every day., Disp: , Rfl:   •  cyanocobalamin (VITAMIN B12) 1000 MCG Tab, Take 1,000 mcg by mouth every day., Disp: , Rfl:   •  senna-docusate (PERICOLACE OR SENOKOT S) 8.6-50 MG Tab, Take 2 Tabs by mouth every day., Disp: , Rfl:   •  levothyroxine (SYNTHROID) 50 MCG Tab, Take 50 mcg by mouth Every morning on an empty stomach., Disp: , Rfl:   •  Magnesium Oxide 420 MG Tab, Take 420 mg by mouth every day., Disp: , Rfl:   •  metFORMIN (GLUCOPHAGE) 500 MG Tab, Take 500 mg by mouth 2 times a day., Disp: , Rfl:   •  pantoprazole (PROTONIX) 40 MG Tablet Delayed Response, Take 40 mg by mouth every day., Disp: , Rfl:   •  spironolactone (ALDACTONE) 25 MG Tab, Take 50 mg by mouth every day., Disp: , Rfl:   Social History     Socioeconomic History   • Marital status:      Spouse name: Not on file   • Number of children: Not on file   • Years of education: Not on file   • Highest education level: Not on file   Occupational History   • Not on file   Social Needs   • Financial resource strain: Not on file   • Food insecurity:     Worry: Not on file     Inability: Not on file   • Transportation needs:     Medical: Not on file     Non-medical: Not on file   Tobacco Use   • Smoking status: Never Smoker   • Smokeless tobacco: Never Used   Substance and Sexual Activity   • Alcohol use: Yes     Frequency: 2-3 times a week     Drinks per session: 3 or 4     Binge frequency: Never   • Drug use: Never   • Sexual activity: Not on file   Lifestyle   • Physical activity:     Days per week: Not on file     Minutes per session: Not on file   • Stress: Not on file   Relationships   •  "Social connections:     Talks on phone: Not on file     Gets together: Not on file     Attends Jew service: Not on file     Active member of club or organization: Not on file     Attends meetings of clubs or organizations: Not on file     Relationship status: Not on file   • Intimate partner violence:     Fear of current or ex partner: Not on file     Emotionally abused: Not on file     Physically abused: Not on file     Forced sexual activity: Not on file   Other Topics Concern   • Not on file   Social History Narrative   • Not on file     No family history on file.  No past surgical history on file.      Physical Exam:  BP (!) 116/38   Pulse (!) 42   Temp 36.3 °C (97.4 °F) (Temporal)   Resp (!) 21   Ht 1.854 m (6' 1\")   Wt 97.5 kg (215 lb)   SpO2 95%   BMI 28.37 kg/m²   General: No acute distress alert, oriented to self, month, place, not oriented to situation or year  Cardiovascular: Irregular rhythm and rate, 2+ radial and DP pulses bilaterally, no JVD, 1-2+ lower extremity pitting edema,  Extremities: Arthritic changes noted to hands bilaterally, venous stasis of the lower legs bilaterally, feet do not appear well-groomed as there is a black substance, likely from issues throughout most of his toes. Left shoulder painful ROM and a bruise is noted.   Abdomen: Soft nontender nondistended  Lungs: Clear to auscultation bilaterally, normal effort  Delirium Screen: Negative for inattention patient was able see days a week backwards, positive for disorganized thinking, no obvious ALOC or fluctuations  Psych: Tangential, repetitive, normal mood and affect  Neurological: Cranial nerves II through XII intact, no pronator drift, distal lower extremity strength intact, equivical babinski bilaterally.   Head: Appears to be having healing black eyes bilaterally, well-healed midline forehead scar  Vision Screen: Grossly  Hearing Screen: Grossly normal  Back: no pain to C, T or L spine  Labs: CBC, CMP, UDS, " troponin, BNP reviewed  Imaging: Chest x-ray, CT head reviewed  EKG: EKG within the ER chart, reviewed, telemetry within the room reviewed    Assessment: 79-year-old male with a recent history of fall presents with what appears to be an attenuated delirium.      Plan:  Attenuated delirium  -Patient does not meet full DSM-V criteria, during my exam, for delirium as he had showed normal attention, however he is demonstrating marked disorganized thinking with regards to repetitive thoughts, illogical reasoning, and difficulty forming new memories.  Patient was able to know his location, which suggest that he is aware and has insight into his location.  I was unable to convince him about not needing knee surgeries and he was unable to obtain insight into my concerns about his memory or cardiac issues.  -Given abnormal EKG, recent heart procedures would recommend to rule out cardiac event,   -trend nd the troponin, would get echocardiogram and if able [facility]  -checking TSH, this could cause bradycardia if he has not been taking.   -Given what appears to be a primary neuropsychiatric symptoms and the patient's history of cardiovascular disease and seems reasonable to also get an MRI of the brain to rule out a subacute stroke.  -Recommend medication reconciliation, baclofen can cause an interesting withdrawal syndrome and if the patient suffered a postconcussive syndrome and subsequently stopped taking medications we could be seeing a withdrawal syndrome along with postconcussive syndrome  -If the patient does not appear to have an acute cardiovascular event and no obvious medication issues are present, consider EEG as the patient's repetition could be a sign of subclinical seizure.  -Patient does not show any focal signs to suggest acute infection. Not consistent with alcohol withdrawal.  -Given patient's difficulty urinating recommend bladder scan to r/o urinary retention, but also received  diuretic         Interventions to be considered in all patients in order to minimize the risk of delirium.   -do not disturb patient (vitals or lab draws) between the hours of 10 PM and 6 AM.  -ideally the patient should not sleep during the day and we should avoid day time naps.   -up in chair for meals  -ambulate at least three times daily, as able  -watch for constipation  -timed voiding - ask patient is they would like to go to the bathroom q 2-3 hours, except during the do not disturb hours.   -remove all necessary lines (central lines, peripheral IVs, feeding tubes, fuens catheters)  -unless the patient has shown harm to self or others I would recommend against use of restraints - either chemical or physical (antipsychotics)   -minimize polypharmacy, do not dose medication during sleep hours    Medication adherence - questionable due to the above  -INR and/or TEG could help us know if patient was taking his apixiban or clopidogrel, however normal tests does rule in non adherence    SOB  -improved by my exam, CTM    CAD  -[facility]  -on bumex, statin, apixban,   -on nitro patch, consider holding given lack of CP and improved SOB    Hypothyroidism  -checking TSH with reflex t4    LE numbness  -may be related to venous stasis, no spinal process tenderness  -spinal process may be a cause, but no focal signs with other parts of neuro exam  -on b12 making deficiency unlikley  -consider a1c    Left shoulder pain  Bilateral knee pain  -consider xray to assess for osteoarthritis  -recommend APAP 1000 mg TID, consider low dose narcotic 2.5 mg oxycodone if more pain control is needed  -recommend PT/OT    CKD III vs ADINA  -trend creatinine  -got IV diuretic  -bladder scan to r/o bladder outlet obstruction    LE venous stasis  -received some diuretic in ER  -CTM    Gout  -on home allopurinol    DM  -consider a1c  -on SSI, watch for hypoglcyemia      Thank you for this interesting consult. We will continue to  follow this patient along with you.       Tripp Cervantes M.D.  Geriatric Attending  [facility]  Available Monday-Friday 8:00-5:00 (excudling holidays)    This note was partially dictated with voice recognition software, for any confusion please do not hesitate to contact me.       Direct Links to Patient Handouts:    CDC Healthy Aging  NIH National [place] on Aging  [facility] Patient Resources    Links that can be Cut and Paste into your browser:    Healthy Aging  www.healthinaging.org  Staying Active  www.activeandhealthy.[place].gov.au  Geriatrics Online   [email or web address]

## 2019-10-23 NOTE — H&P
Internal Medicine Admitting History and Physical    Note Author: Katelyn Ramirez M.D.       Name Kedar Woods     1940   Age/Sex 79 y.o. male   MRN [id number]   Code Status FULL     After 5PM or if no immediate response to page, please call for cross-coverage  Attending/Team: Dr. Gloria See Patient List for primary contact information  Call [phone] to page    1st Call - Day Intern (R1):   Dr. Ramirez 2nd Call - Day Sr. Resident (R2/R3):   Dr. Enrique       Chief Complaint:   Altered Level of Conciousness    HPI:  Kedar Woods is an 79 y.o. male with a past history of aortic stenosis, PAH, gout, GERD, HLD, hypertension, T2DM, b12 deficiency and Hypothyroidism who was brought into the ED after a fall at home. The patient lives in an apartment by himself. According to his son and neighbor he is usually able to complete his ADLs/IADLs and drive himself around independently. On 10/1/19 he suffered a fall at home in which he hit his head and had to have a laceration sewn up in the ED. CT at that time was negative for an acute bleed. According to the patient's neighbor and friend, since that fall he has had more circular thinking and has been acting not like his usual self. Today he comes into the ED with complaints that he has felt lightheaded and this caused him to fall and hit his head once again. He denies any other complaints, but does have very circular thinking and is focused on [facility] . He denies any other acute complaints.     Review of Systems   Constitutional: Negative for chills and fever.   HENT: Negative for ear pain.    Eyes: Negative for pain.   Respiratory: Negative for cough, hemoptysis and shortness of breath.    Cardiovascular: Positive for chest pain and leg swelling. Negative for palpitations, orthopnea and claudication.   Gastrointestinal: Negative for abdominal pain, blood in stool,  constipation, diarrhea, heartburn, melena, nausea and vomiting.   Genitourinary: Negative for dysuria.   Musculoskeletal: Positive for falls and joint pain.   Skin: Negative for rash.   Neurological: Positive for dizziness, tingling, sensory change and weakness.   Endo/Heme/Allergies: Bruises/bleeds easily.       Past Medical History  Severe aortic stenosis - due for replacement 11/5  Pulmonary artery hypertension   Gout  HLD  HTN  Atrial Fibrillation  T2DM  B12 deficiency   History of alcohol use - clean for many years, but with intermittent drinking     Past Surgical History:  Previous joint surgeries     Current Outpatient Medications:  Home Medications     Reviewed by Nayan De La Torre (Pharmacy Tech) on 10/23/19 at 1032  Med List Status: Complete   Medication Last Dose Status   acetaminophen (TYLENOL) 500 MG Tab 10/23/2019 Active   allopurinol (ZYLOPRIM) 100 MG Tab 10/23/2019 Active   apixaban (ELIQUIS) 5mg Tab 10/23/2019 Active   atorvastatin (LIPITOR) 40 MG Tab 10/23/2019 Active   baclofen (LIORESAL) 10 MG Tab 10/23/2019 Active   bumetanide (BUMEX) 1 MG Tab 10/23/2019 Active   clopidogrel (PLAVIX) 75 MG Tab 10/23/2019 Active   cyanocobalamin (VITAMIN B12) 1000 MCG Tab 10/23/2019 Active   levothyroxine (SYNTHROID) 50 MCG Tab 10/23/2019 Active   Magnesium Oxide 420 MG Tab 10/23/2019 Active   metFORMIN (GLUCOPHAGE) 500 MG Tab 10/23/2019 Active   pantoprazole (PROTONIX) 40 MG Tablet Delayed Response 10/23/2019 Active   senna-docusate (PERICOLACE OR SENOKOT S) 8.6-50 MG Tab 10/23/2019 Active   spironolactone (ALDACTONE) 25 MG Tab 10/23/2019 Active   traZODone (DESYREL) 100 MG Tab 10/22/2019 Active   vitamin D (CHOLECALCIFEROL) 1000 UNIT Tab 10/23/2019 Active              Medication Allergy/Sensitivities:  Allergies   Allergen Reactions   • Codeine Nausea     Family History   Family history of cardiac disease in his brother    Social History   Social History     Socioeconomic History   • Marital status:     Tobacco Use   • Smoking status: Never Smoker   • Smokeless tobacco: Never Used   Substance and Sexual Activity   • Alcohol use: Yes     Frequency: 2-3 times a week     Drinks per session: 3 or 4     Binge frequency: Never   • Drug use: Never     Living situation: Lives by  Himself, has a son nearby and is checked on by a neighbor   PCP : Kb Khan M.D.    Physical Exam     Vitals:    10/23/19 0720 10/23/19 0734 10/23/19 0800 10/23/19 0831   BP: (!) 100/28 (!) 105/28 (!) 121/35 (!) 108/37   Pulse: 69 (!) 42 (!) 40 (!) 49   Resp: 16 13 19   Temp:  36.3 °C (97.4 °F)     TempSrc:  Temporal     SpO2: 94% 99% 91%    Weight:       Height:         Body mass index is 28.37 kg/m².  O2 therapy: Pulse Oximetry: 91 %    Physical Exam   Constitutional:   Disheveled, lying back in bed. No acute distress.    HENT:   Head: Normocephalic.   Mouth/Throat: Oropharynx is clear and moist. No oropharyngeal exudate.   Midline forehead well healing sewn laceration   Eyes: Right eye exhibits no discharge. Left eye exhibits no discharge. No scleral icterus.   Neck: Neck supple. No JVD present. No tracheal deviation present. No thyromegaly present.   Pulmonary/Chest: Effort normal and breath sounds normal. No respiratory distress. He has no wheezes. He has no rales.   Abdominal: Soft. He exhibits no distension. There is no tenderness. There is no rebound and no guarding.   Genitourinary: Rectum normal. Rectal exam shows guaiac negative stool.   Genitourinary Comments: There is bruising to the scrotum and perineum   Musculoskeletal: He exhibits no edema, tenderness or deformity.   Chronic venous stasis changes to BLLE   Lymphadenopathy:     He has no cervical adenopathy.   Neurological: He is alert.   Skin: Skin is warm and dry. No rash noted. No erythema.   Scattered bruises     Data Review   Lab Data Review:  Recent Results (from the past 24 hour(s))   EKG    Collection Time: 10/23/19  7:19 AM   Result Value Ref Range    Report        [facility] Emergency Dept.    Test Date:  2019-10-23  Pt Name:    JAMISON ZAYAS           Department: ER  MRN:        [id number]                      Room:       RD 02  Gender:     Male                         Technician: [id number]  :        1940                   Requested By:ER TRIAGE PROTOCOL  Order #:    [id number]                    Reading MD:    Measurements  Intervals                                Axis  Rate:       57                           P:  LA:                                      QRS:        -44  QRSD:       155                          T:          27  QT:         527  QTc:        514    Interpretive Statements  Atrial fibrillation  RBBB and LAFB  No previous ECG available for comparison     CBC WITH DIFFERENTIAL    Collection Time: 10/23/19  7:28 AM   Result Value Ref Range    WBC 8.0 4.8 - 10.8 K/uL    RBC 3.48 (L) 4.70 - 6.10 M/uL    Hemoglobin 10.9 (L) 14.0 - 18.0 g/dL    Hematocrit 34.0 (L) 42.0 - 52.0 %    MCV 97.7 81.4 - 97.8 fL    MCH 31.3 27.0 - 33.0 pg    MCHC 32.1 (L) 33.7 - 35.3 g/dL    RDW 55.5 (H) 35.9 - 50.0 fL    Platelet Count 349 164 - 446 K/uL    MPV 10.3 9.0 - 12.9 fL    Neutrophils-Polys 68.70 44.00 - 72.00 %    Lymphocytes 19.50 (L) 22.00 - 41.00 %    Monocytes 9.80 0.00 - 13.40 %    Eosinophils 0.50 0.00 - 6.90 %    Basophils 1.00 0.00 - 1.80 %    Immature Granulocytes 0.50 0.00 - 0.90 %    Nucleated RBC 0.00 /100 WBC    Neutrophils (Absolute) 5.52 1.82 - 7.42 K/uL    Lymphs (Absolute) 1.57 1.00 - 4.80 K/uL    Monos (Absolute) 0.79 0.00 - 0.85 K/uL    Eos (Absolute) 0.04 0.00 - 0.51 K/uL    Baso (Absolute) 0.08 0.00 - 0.12 K/uL    Immature Granulocytes (abs) 0.04 0.00 - 0.11 K/uL    NRBC (Absolute) 0.00 K/uL   Comp Metabolic Panel    Collection Time: 10/23/19  7:28 AM   Result Value Ref Range    Sodium 139 135 - 145 mmol/L    Potassium 4.7 3.6 - 5.5 mmol/L    Chloride 102 96 - 112 mmol/L    Co2 26 20 - 33 mmol/L    Anion Gap 11.0 0.0 - 11.9    Glucose  97 65 - 99 mg/dL    Bun 33 (H) 8 - 22 mg/dL    Creatinine 1.40 0.50 - 1.40 mg/dL    Calcium 9.2 8.5 - 10.5 mg/dL    AST(SGOT) 23 12 - 45 U/L    ALT(SGPT) 9 2 - 50 U/L    Alkaline Phosphatase 66 30 - 99 U/L    Total Bilirubin 0.6 0.1 - 1.5 mg/dL    Albumin 3.8 3.2 - 4.9 g/dL    Total Protein 7.0 6.0 - 8.2 g/dL    Globulin 3.2 1.9 - 3.5 g/dL    A-G Ratio 1.2 g/dL   TROPONIN    Collection Time: 10/23/19  7:28 AM   Result Value Ref Range    Troponin T 41 (H) 6 - 19 ng/L   DIAGNOSTIC ALCOHOL    Collection Time: 10/23/19  7:28 AM   Result Value Ref Range    Diagnostic Alcohol 0.00 0.00 g/dL   proBrain Natriuretic Peptide, NT    Collection Time: 10/23/19  7:28 AM   Result Value Ref Range    NT-proBNP 6016 (H) 0 - 125 pg/mL   ESTIMATED GFR    Collection Time: 10/23/19  7:28 AM   Result Value Ref Range    GFR If  59 (A) >60 mL/min/1.73 m 2    GFR If Non African American 49 (A) >60 mL/min/1.73 m 2   FOLATE    Collection Time: 10/23/19  7:28 AM   Result Value Ref Range    Folate -Folic Acid 7.0 >4.0 ng/mL   T.PALLIDUM AB EIA    Collection Time: 10/23/19  7:28 AM   Result Value Ref Range    Syphilis, Treponemal Qual Non Reactive Non Reactive   VITAMIN B12    Collection Time: 10/23/19  7:28 AM   Result Value Ref Range    Vitamin B12 -True Cobalamin 700 211 - 911 pg/mL   Blood Culture,Hold    Collection Time: 10/23/19  7:28 AM   Result Value Ref Range    Blood Culture Hold Collected    URINALYSIS    Collection Time: 10/23/19  8:25 AM   Result Value Ref Range    Color Yellow     Character Clear     Specific Gravity 1.008 <1.035    Ph 5.0 5.0 - 8.0    Glucose Negative Negative mg/dL    Ketones Negative Negative mg/dL    Protein Negative Negative mg/dL    Bilirubin Negative Negative    Urobilinogen, Urine 0.2 Negative    Nitrite Negative Negative    Leukocyte Esterase Negative Negative    Occult Blood Negative Negative    Micro Urine Req see below    URINE DRUG SCREEN    Collection Time: 10/23/19  8:25 AM   Result  Value Ref Range    Amphetamines Urine Negative Negative    Barbiturates Negative Negative    Benzodiazepines Negative Negative    Cocaine Metabolite Negative Negative    Methadone Negative Negative    Opiates Negative Negative    Oxycodone Negative Negative    Phencyclidine -Pcp Negative Negative    Propoxyphene Negative Negative    Cannabinoid Metab Negative Negative   EKG    Collection Time: 10/23/19  9:31 AM   Result Value Ref Range    Report       [facility] Emergency Dept.    Test Date:  2019-10-23  Pt Name:    JAMISON ZAYAS           Department: ER  MRN:        [id number]                      Room:        02  Gender:     Male                         Technician: [id number]  :        1940                   Requested By:LUIS HARVEY  Order #:    [id number]                    Reading MD: LUIS HARVEY DO    Measurements  Intervals                                Axis  Rate:       125                          P:          0  AK:         136                          QRS:        -74  QRSD:       254                          T:          84  QT:         532  QTc:        768    Interpretive Statements  VENTRICULAR-PACED COMPLEXES  NO FURTHER RHYTHM ANALYSIS ATTEMPTED DUE TO PACED RHYTHM  NONSPECIFIC IVCD WITH LAD  Compared to ECG 10/23/2019 07:19:10  Intraventricular conduction delay now present  Atrial fibrillation no longer present  Left anterior fascicular block no longer present  Right bundle-branch block  no longer present    Electronically Signed On 10- 9:33:52 PDT by LUIS HARVEY DO         Imaging/Procedures Review:    Independant Imaging Review: Completed  CT-HEAD W/O   Final Result      1.  Mild cerebral atrophy. Age-appropriate.   2.  White matter lucencies consistent with microvascular ischemic change.   3.  No acute findings and no significant change since 10/1/2019.      DX-CHEST-PORTABLE (1 VIEW)   Final Result      No acute cardiopulmonary disease evident.         EKG:    EKG Independent Review: Completed  QTc: 514, HR: 57, Atrial Fibrillation, BBB, no ST/T changes    Records reviewed and summarized in current documentation :  Yes  [employer] teaching service handout given to patient:  No         Assessment/Plan     * Altered level of consciousness  Assessment & Plan  The patient is presenting with circular thoughts and increased falls that seem to have increased in the past 3 weeks according to his neighbor. CT head has been negative for an acute bleed. We will work him up for reversible causes of confusion and hold medications that may be contributing to his presentation.     Plan  -Geriatric consult, we appreciate their input  -Holding trazodone to see if this is contributing to his symptoms  -Resuming baclofen as this could be a withdrawal syndrome  -TSH, RPR, B12, Folate, UDS, Ammonia  -ETOH and UDS were negative     Pacemaker  Assessment & Plan  He had a MICRA transcatheter RV p[facility] . [facility] the [facility] and showed 52% of the time for his HR to be 40-50 without any arrhythmias or other events at that time.     Plan  -Continue to monitor HR of 40 as this appears to be his baseline    Aortic valve stenosis  Assessment & Plan  Patient with severe aortic valve stenosis. He [facility] which showed the severe stenosis and proximal 90% LAD artery stenosis. Recommendations were to followup at the [facility] for complex stenting and a TAVR. The patient is presumably pending this procedure on 11/5.     Anemia  Assessment & Plan  Slight, with Hgb of 10.9. MCV 97.7. KARISHMA is negative. According to records he does have a history of b12 deficiency anemia which is likely the cause today.     Plan  -Monitoring H&H    Pulmonary hypertension (HCC)- (present on admission)  Assessment & Plan  Moderate pulmonary hypertension [facility] heart [facility] 7[facility]     Plan:  -RV pressure 51/13, PAH pressure 54/18 with a mean of  32    Coronary artery disease involving native coronary artery  Assessment & Plan  Proximal 90% lesion [facility]. Patient is pending complex stenting procedure 11/5/19.    Plan  -We will continue his regular cardiac medications inpatient, his med list has been confrimed  -Troponin slightly elevated with nonischemic EKG, we will trend this due to his disease to ensure it isn't rising   -Echocardiogram  -Telemetry monitoring    Atrial flutter, chronic (HCC)- (present on admission)  Assessment & Plan  Patient with longstanding atrial flutter with slow ventricular response due to intrinsic AV node disease. His HR is usually in the 40s per records.     Plan  -Conitnue Apixiban   -Telemetry      Anticipated Hospital stay: Observation admit    Quality Measures  Quality-Core Measures   Reviewed items::  EKG reviewed, Labs reviewed, Medications reviewed and Radiology images reviewed  Rosales catheter::  No Rosales  DVT: Apixiban.  Ulcer Prophylaxis::  Yes    PCP: Kb Khan M.D.

## 2019-10-23 NOTE — ASSESSMENT & PLAN NOTE
Patient with severe aortic valve stenosis. He [facility] which showed the severe stenosis and proximal 90% LAD artery stenosis. Recommendations were to followup at the [facility] for complex stenting and a TAVR. The patient is presumably pending this procedure on 11/5. This may need to be rescheduled because of his new SNF placement, patient is aware and will make the appropriate arrangements.

## 2019-10-23 NOTE — ASSESSMENT & PLAN NOTE
-Hgb has been stable. Anemia appears chronic. He does have a history of vitamin B12 deficiency and is on replacement. B12 on admission was normal. No further workup indicated at this time as it is stable.

## 2019-10-23 NOTE — ASSESSMENT & PLAN NOTE
Proximal 90% lesion [facility]. Patient is pending complex stenting procedure 11/5/19.    Plan  -We will continue his regular cardiac medications inpatient, his med list has been confrimed  -Troponin trend showed no elevation, EKG without acute ischemic changes    -Echocardiogram is similar to prior when [facility] to [facility]. No acute changes

## 2019-10-23 NOTE — ASSESSMENT & PLAN NOTE
He had a MICRA transcatheter RV pacer [facility] . [facility] check was 9/[facility] the [facility] and showed 52% of the time for his HR to be 40-50 without any arrhythmias or other events at that time.     Plan  -Continue to monitor HR of 40 as this appears to be his baseline

## 2019-10-23 NOTE — ED PROVIDER NOTES
ED Provider Note    ED Provider Note    Primary care provider: Kb Khan M.D.  Means of arrival: [facility]   History obtained from: Patient, chart review, and son Bravo    History limited by: Patient's mental capacity     CHIEF COMPLAINT  Chief Complaint   Patient presents with   • Numbness     All over body numbness upon waking up   • Shortness of Breath     Shortness of breath starting this morning upon waking up.   • Dizziness     DIzziness starting this morning    • Hypotension     Hypotensive for [facility] en route to hospital       HPI  Kedar Woods is a 79 y.o.  who presents to the Emergency Department via [facility] for numbness of his right leg and some shortness of breath. Per chart review, patient had a GLF on 10/1/19, CT head was negative, CT maxillofacial was negative, had a 4 cm facial laceration that was repaired by ED physician. Given that patient is a poor historian, called the son, Bravo, medical power of  ( [phone]) to ask for clarification. Per the son, [facility] and about a month ago had stenting done. Per the son he has been incoherent since his fall earlier this month, not really remembering things he usually remembers and getting confused very easily. Patient is due to have cardiac surgery for valve repair beginning of November. Son also states that he has recently lost a lot of weight. Per the son, Mr Woods lives by himself, cares for his ADLs and IADLs, also drives himself places. Patient reportedly will have commitments and forgets to go. Forgot what he had for dinner last night.       Patient endorses right leg numbness starting at the hip and some shortness of breath. He also endorses one episode of vomiting on the way to the ED while in the ambulance.   He denies any chest pain, nausea, diarrhea, constipation, abdominal pain, dysuria.     REVIEW OF SYSTEMS  ROS    PAST MEDICAL HISTORY   has a past medical history of Hypertension.    SURGICAL  "HISTORY  patient denies any surgical history    SOCIAL HISTORY  Social History     Tobacco Use   • Smoking status: Never Smoker   • Smokeless tobacco: Never Used   Substance Use Topics   • Alcohol use: Yes     Frequency: 2-3 times a week     Drinks per session: 3 or 4     Binge frequency: Never   • Drug use: Never      Social History     Substance and Sexual Activity   Drug Use Never       FAMILY HISTORY  No family history on file.    CURRENT MEDICATIONS  Home Medications    **Home medications have not yet been reviewed for this encounter**         ALLERGIES  No Known Allergies    PHYSICAL EXAM  VITAL SIGNS: BP (!) 108/37   Pulse (!) 49   Temp 36.3 °C (97.4 °F) (Temporal)   Resp 19   Ht 1.854 m (6' 1\")   Wt 97.5 kg (215 lb)   SpO2 91%   BMI 28.37 kg/m²   Vitals reviewed.  Constitutional: Patient is oriented to person, place, and time. NAD, disheveled.    Head: Normocephalic and atraumatic.   Ears: Normal external ears bilaterally.   Mouth/Throat: Oropharynx is clear and moist, no exudates. Very poor dentition.    Eyes: Conjunctivae are mildly icteric. Pupils are equal, round, and reactive to light.   Neck: Normal range of motion. Neck supple.  Cardiovascular:Distant heart sounds, irregular rate and irregular rhythm. Paced.    Pulmonary/Chest: Effort normal and breath sounds normal. No respiratory distress, course breath sounds.   Abdominal: Soft. Bowel sounds are normal. There is no tenderness. No rebound or guarding, or peritoneal signs. No CVA tenderness.  Musculoskeletal: 3+ pitting edema noted BL LE with venous stasis changes from knees down with peeling skin and erythema.   Lymphadenopathy: No cervical adenopathy.   Neurological: Poor historian, no focal deficits.   Skin: Skin is warm and dry. Erythema noted BL LE. No pallor.   Psychiatric: Patient has a normal mood and affect.     LABS  All labs reviewed by me.    EKG Interpretation#1  Interpreted by me    Rhythm: Irregular   Rate: 57  Axis: " normal  Ectopy: PVCs  Conduction:  RBBB, LAFB   ST Segments: no acute change  T Waves: no acute change  Q Waves: none    Clinical Impression: No prior EKG for comparison.  Irregular rhythm, multiple PVCs.  Right bundle branch block.      EKG Interpretation#2    Interpreted by me    Rhythm: normal sinus   Rate: normal  Axis: normal  Ectopy: none  Conduction: normal  ST Segments: no acute change  T Waves: no acute change  Q Waves: none    Clinical Impression: no acute changes and normal EKG      RADIOLOGY  CT-HEAD W/O   Final Result      1.  Mild cerebral atrophy. Age-appropriate.   2.  White matter lucencies consistent with microvascular ischemic change.   3.  No acute findings and no significant change since 10/1/2019.      DX-CHEST-PORTABLE (1 VIEW)    (Results Pending)     The radiologist's interpretation of all radiological studies have been reviewed by me.    COURSE & MEDICAL DECISION MAKING  Pertinent Labs & Imaging studies reviewed. (See chart for details)    Obtained and reviewed past medical records.  Prior to this admission to the hospital, last seen in 2006.    7:29 AM - Patient seen and examined at bedside. Given patient's recent mental status changes, CBC, CMP, CXR, UA, diagnostic alcohol, and CT head were ordered to evaluate his symptoms. The differential diagnoses include but are not limited to: altered mental status, failure to thrive, cardiac etiology given recent stenting with pending valve surgery, encephalopathy     8:40 AM - CBC remarkable for hemoglobin of 10.9, WBC WNL at 8.0, CMP with BUN of 33, GFR of 49, otherwise WNL. Troponin of 41 with BNP of 6016. UA WNL. Ordered a one time dose of lasix 40 mg IV and nitroglycerin ointment.      9:19 AM: CT Head with no acute changes from 10/1/19 CT Head    9:40 AM discussed with R internal medicine residents regarding patient's presenting symptoms as well as my discussion with patient's son and friend who was at the bedside earlier.  Patient presents  with some confusion as apparently, been present over approximately the last 3 to 4 weeks.  CT scan shows no acute changes.  Chest x-ray reviewed by myself shows no overt fluid overload or heart failure although it is not yet formally read by radiology.  Patient has an elevated BNP and elevated troponin.  They are made aware, the patient is due for valve replacement November 5 and [facility].  They have some access to [facility] records and may be able to review this information.  Little historical information is available from the patient.  They agree to admit the patient to their service.    Patient is admitted in stable but guarded condition peer      FINAL IMPRESSION  1. Altered mental status, unspecified altered mental status type    2. Elevated brain natriuretic peptide (BNP) level    3. Elevated troponin    4. Anemia, unspecified type    5. Multiple falls    6. Peripheral edema

## 2019-10-23 NOTE — ED NOTES
Medication reconciliation updated and complete per pt at bedside & pt home pharmacy  Allergies have been verified and updated  No oral ABX within the last 14 days  [facility]

## 2019-10-23 NOTE — ED TRIAGE NOTES
"Patient arrives via [facility]. Initially called out for \"all over body numbness\" starting this morning upon waking up. Patient also reports dizziness, lightheadedness, and shortness of breath all noticed upon waking up this morning. Patient reports he last felt 100% normal last night before bed. Denies chest pain. Patient hypotensive for [facility] dropping from low 100s to 80's. BG 80. Irregular heart rhythm noted on monitor, EKG ordered.         Patient has GLF yesterday and seen here for stitches.         "

## 2019-10-23 NOTE — ASSESSMENT & PLAN NOTE
Moderate pulmonary hypertension [facility]     Plan:  -RV pressure 51/13, PAH pressure 54/18 with a mean of 32

## 2019-10-23 NOTE — ED NOTES
"After pt came back from CT he started acting different, keeps repeating the same phrases, \"I'm going out hard kid, my brother went out hard too,\" and \"I'm not going to make it kid.\"  Repeat EKG completed.  "

## 2019-10-23 NOTE — ASSESSMENT & PLAN NOTE
The patient presented with circular thoughts and increased falls that seem to have increased in the past 3 weeks according to his neighbor and son. Two sets of CT head were negative for an acute / subacute bleed. Workup for reversible causes was unrevealing. No urinary retention, labs have been normal. TSH, RPR, B12, Folate, UDS, ETOH Ammonia are within normal limits. MRI showed only moderate atrophy.     Patient is now more alert with more logical thinking. It is possible that the patient does have underlying dementia and was not taking his medications correctly at home, now being inpatient with scheduled medications he has improved. There also may be a component to a concussion after the patient's fall.     Plan:  -PT/OT recommended SNF placement. Patient has been accepted and will be discharged there today.   -Symptoms are likely chronic as he is now more back to baseline. He will need cognition evaluation for dementia outpatient.

## 2019-10-23 NOTE — H&P
Senior Admit note      This is a 79 years old male [facility].  History mainly obtained from chart review and friend (neighbor).  Patient normally lives alone by himself, does all his ADLs, drives by himself, was in his usual health up until 3 weeks ago when he had a mechanical fall about 3 weeks ago.  Since then his mental status has changed -he is alert partially oriented, but his concentration level/memory seems to have changed.  Patient is a very poor historian -symptoms could not be assessed.     Per chart review, after a GLF on 10/01 CT head was negative, CT maxillofacial was negative, had a 4 cm facial laceration that was repaired by ED physician.  He was also noted to be intoxicated, BAL was slightly elevated -Per patient's neighbor he normally does not drink but that day he had 3 shots.    In the ER today he was noted to be bradycardic, irregular rhythm, soft blood pressure, normal respiratory rate.  Labs are grossly normal except elevated BNP and troponin.  Grossly normal electrolytes, negative BAL. CT scan shows no acute change, chest x-ray did not show any overt fluid overload/acute process.    Off note - per patient's neighbor he has had a negative cath few weeks ago which was done as a preparation of 'valve replacement surgery' which is to be done in November.     Physical Exam   Constitutional:   Elderly male lying flat in bed, able to participate in conversation without any difficulty. Appears incoherent - saying the jarrett thing again and again. States we are going to 'slice and dice' his heart valves.    HENT:   Head: Normocephalic.   Several abrasions, no open wound   Eyes: Pupils are equal, round, and reactive to light. EOM are normal.   Neck: Neck supple.   Cardiovascular: Bradycardia present.   No murmur heard.  Irregular rhythm, with PVCs    Pulmonary/Chest: Effort normal and breath sounds normal. No respiratory distress. He has no wheezes. He has no rales.   Abdominal: Soft. Bowel sounds are normal. He exhibits distension. There is no tenderness. There is no rebound.   Musculoskeletal: He exhibits no edema.   Neurological:   Awake, appears oriented - can tell name / where he is and date but incoherent. Moving all 4 extremity normally   Skin: Skin is warm.   Bilateral venous stasis changes     Assessment and plan:  #Altered mental status  #Elevated troponin:  #Elevated BNP:   79-year-old male with little known past medical history, not obtainable by patient due to current condition, presents with altered mental status which per neighbor started about 3 weeks ago after he had a ground-level fall. CT head this time didn't show any subdural.   - Cause of altered mental status unclear at this point.  Can be metabolic vs cardiac origin given his prior cardiac history vs thiamine/B12 deficiency vs thyroid hormone disorder vs polypharmacy? No obvious source of infection so far.   - Called Geriatrics (Dr Cervantes) for further evaluation of AMS in a 80 y/o patient   - [facility] to gather more information about the patient  - BAL negative on admission, will monitor for withdrawal. However per friend doesn't drink that much.   - Holding Trazodone, Metformin, Baclofen until more information is available   - Monitor in telemetry, will call cardiology if any rhythm changes   - TSH, B12, Thiamine  - Trend Troponin and EKG x 2. Will get Echo.   -  If all negative will consider getting MRI brain     For Full H&P see Dr. 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 He was referred to  for TAVR.

## 2019-10-23 NOTE — ASSESSMENT & PLAN NOTE
Patient with longstanding atrial flutter with slow ventricular response due to intrinsic AV node disease. His HR is usually in the 40s per records.   -Conitnue Apixiban

## 2019-10-24 PROBLEM — I87.2 CHRONIC VENOUS STASIS DERMATITIS OF BOTH LOWER EXTREMITIES: Status: ACTIVE | Noted: 2019-10-24

## 2019-10-24 PROBLEM — R41.82 ALTERED MENTAL STATUS: Status: ACTIVE | Noted: 2019-10-23

## 2019-10-24 LAB
ANION GAP SERPL CALC-SCNC: 9 MMOL/L (ref 0–11.9)
BUN SERPL-MCNC: 30 MG/DL (ref 8–22)
CALCIUM SERPL-MCNC: 9.2 MG/DL (ref 8.5–10.5)
CHLORIDE SERPL-SCNC: 100 MMOL/L (ref 96–112)
CO2 SERPL-SCNC: 28 MMOL/L (ref 20–33)
CREAT SERPL-MCNC: 1.23 MG/DL (ref 0.5–1.4)
ERYTHROCYTE [DISTWIDTH] IN BLOOD BY AUTOMATED COUNT: 55 FL (ref 35.9–50)
EST. AVERAGE GLUCOSE BLD GHB EST-MCNC: 114 MG/DL
GLUCOSE BLD-MCNC: 114 MG/DL (ref 65–99)
GLUCOSE BLD-MCNC: 116 MG/DL (ref 65–99)
GLUCOSE BLD-MCNC: 145 MG/DL (ref 65–99)
GLUCOSE BLD-MCNC: 96 MG/DL (ref 65–99)
GLUCOSE SERPL-MCNC: 97 MG/DL (ref 65–99)
HBA1C MFR BLD: 5.6 % (ref 0–5.6)
HCT VFR BLD AUTO: 34.2 % (ref 42–52)
HGB BLD-MCNC: 11.1 G/DL (ref 14–18)
MCH RBC QN AUTO: 31.1 PG (ref 27–33)
MCHC RBC AUTO-ENTMCNC: 32.5 G/DL (ref 33.7–35.3)
MCV RBC AUTO: 95.8 FL (ref 81.4–97.8)
PLATELET # BLD AUTO: 350 K/UL (ref 164–446)
PMV BLD AUTO: 11.1 FL (ref 9–12.9)
POTASSIUM SERPL-SCNC: 3.9 MMOL/L (ref 3.6–5.5)
RBC # BLD AUTO: 3.57 M/UL (ref 4.7–6.1)
SODIUM SERPL-SCNC: 137 MMOL/L (ref 135–145)
WBC # BLD AUTO: 9.2 K/UL (ref 4.8–10.8)

## 2019-10-24 PROCEDURE — 51798 US URINE CAPACITY MEASURE: CPT

## 2019-10-24 PROCEDURE — 82962 GLUCOSE BLOOD TEST: CPT | Mod: 91

## 2019-10-24 PROCEDURE — 84425 ASSAY OF VITAMIN B-1: CPT

## 2019-10-24 PROCEDURE — 83036 HEMOGLOBIN GLYCOSYLATED A1C: CPT

## 2019-10-24 PROCEDURE — 96365 THER/PROPH/DIAG IV INF INIT: CPT

## 2019-10-24 PROCEDURE — 99285 EMERGENCY DEPT VISIT HI MDM: CPT

## 2019-10-24 PROCEDURE — 96374 THER/PROPH/DIAG INJ IV PUSH: CPT

## 2019-10-24 PROCEDURE — 700102 HCHG RX REV CODE 250 W/ 637 OVERRIDE(OP): Performed by: STUDENT IN AN ORGANIZED HEALTH CARE EDUCATION/TRAINING PROGRAM

## 2019-10-24 PROCEDURE — A9270 NON-COVERED ITEM OR SERVICE: HCPCS | Performed by: STUDENT IN AN ORGANIZED HEALTH CARE EDUCATION/TRAINING PROGRAM

## 2019-10-24 PROCEDURE — 36415 COLL VENOUS BLD VENIPUNCTURE: CPT

## 2019-10-24 PROCEDURE — 96366 THER/PROPH/DIAG IV INF ADDON: CPT

## 2019-10-24 PROCEDURE — 96375 TX/PRO/DX INJ NEW DRUG ADDON: CPT

## 2019-10-24 PROCEDURE — G0378 HOSPITAL OBSERVATION PER HR: HCPCS

## 2019-10-24 PROCEDURE — 97162 PT EVAL MOD COMPLEX 30 MIN: CPT

## 2019-10-24 PROCEDURE — 85027 COMPLETE CBC AUTOMATED: CPT

## 2019-10-24 PROCEDURE — 700111 HCHG RX REV CODE 636 W/ 250 OVERRIDE (IP): Performed by: STUDENT IN AN ORGANIZED HEALTH CARE EDUCATION/TRAINING PROGRAM

## 2019-10-24 PROCEDURE — 80048 BASIC METABOLIC PNL TOTAL CA: CPT

## 2019-10-24 PROCEDURE — 97166 OT EVAL MOD COMPLEX 45 MIN: CPT

## 2019-10-24 PROCEDURE — 99225 PR SUBSEQUENT OBSERVATION CARE,LEVEL II: CPT | Performed by: INTERNAL MEDICINE

## 2019-10-24 PROCEDURE — 99226 PR SUBSEQUENT OBSERVATION CARE,LEVEL III: CPT | Mod: GC | Performed by: INTERNAL MEDICINE

## 2019-10-24 RX ORDER — MAGNESIUM SULFATE HEPTAHYDRATE 40 MG/ML
2 INJECTION, SOLUTION INTRAVENOUS ONCE
Status: COMPLETED | OUTPATIENT
Start: 2019-10-24 | End: 2019-10-24

## 2019-10-24 RX ORDER — TRAZODONE HYDROCHLORIDE 50 MG/1
100 TABLET ORAL
Status: DISCONTINUED | OUTPATIENT
Start: 2019-10-24 | End: 2019-10-26

## 2019-10-24 RX ORDER — BACLOFEN 10 MG/1
5 TABLET ORAL 3 TIMES DAILY
Status: DISCONTINUED | OUTPATIENT
Start: 2019-10-24 | End: 2019-10-26

## 2019-10-24 RX ADMIN — BACLOFEN 5 MG: 10 TABLET ORAL at 17:56

## 2019-10-24 RX ADMIN — SPIRONOLACTONE 50 MG: 25 TABLET ORAL at 05:36

## 2019-10-24 RX ADMIN — MAGNESIUM SULFATE IN WATER 2 G: 40 INJECTION, SOLUTION INTRAVENOUS at 09:32

## 2019-10-24 RX ADMIN — CYANOCOBALAMIN TAB 500 MCG 1000 MCG: 500 TAB at 05:36

## 2019-10-24 RX ADMIN — ALLOPURINOL 200 MG: 100 TABLET ORAL at 05:36

## 2019-10-24 RX ADMIN — ATORVASTATIN CALCIUM 40 MG: 40 TABLET, FILM COATED ORAL at 05:44

## 2019-10-24 RX ADMIN — APIXABAN 5 MG: 5 TABLET, FILM COATED ORAL at 05:36

## 2019-10-24 RX ADMIN — Medication 100 MG: at 05:36

## 2019-10-24 RX ADMIN — OMEPRAZOLE 40 MG: 20 CAPSULE, DELAYED RELEASE ORAL at 05:36

## 2019-10-24 RX ADMIN — VITAMIN D, TAB 1000IU (100/BT) 2000 UNITS: 25 TAB at 05:36

## 2019-10-24 RX ADMIN — CLOPIDOGREL BISULFATE 75 MG: 75 TABLET ORAL at 05:36

## 2019-10-24 RX ADMIN — APIXABAN 5 MG: 5 TABLET, FILM COATED ORAL at 17:56

## 2019-10-24 RX ADMIN — TRAZODONE HYDROCHLORIDE 100 MG: 50 TABLET ORAL at 22:22

## 2019-10-24 RX ADMIN — ACETAMINOPHEN 650 MG: 325 TABLET, FILM COATED ORAL at 05:44

## 2019-10-24 RX ADMIN — BUMETANIDE 1 MG: 1 TABLET ORAL at 05:44

## 2019-10-24 RX ADMIN — Medication 400 MG: at 05:36

## 2019-10-24 RX ADMIN — LEVOTHYROXINE SODIUM 50 MCG: 50 TABLET ORAL at 05:36

## 2019-10-24 RX ADMIN — BACLOFEN 5 MG: 10 TABLET ORAL at 13:08

## 2019-10-24 ASSESSMENT — ENCOUNTER SYMPTOMS
CONSTIPATION: 0
BLOOD IN STOOL: 0
HEMOPTYSIS: 0
FALLS: 1
FEVER: 0
PND: 0
HEADACHES: 0
PALPITATIONS: 0
NECK PAIN: 0
CHILLS: 0
ORTHOPNEA: 0
DIZZINESS: 0
SPUTUM PRODUCTION: 0
ABDOMINAL PAIN: 0
DIARRHEA: 0
NAUSEA: 0
VOMITING: 0
SPEECH CHANGE: 0
COUGH: 0
SHORTNESS OF BREATH: 0
HEARTBURN: 0
CLAUDICATION: 0
EYE PAIN: 0

## 2019-10-24 ASSESSMENT — COGNITIVE AND FUNCTIONAL STATUS - GENERAL
WALKING IN HOSPITAL ROOM: A LITTLE
SUGGESTED CMS G CODE MODIFIER DAILY ACTIVITY: CK
TOILETING: A LITTLE
DRESSING REGULAR UPPER BODY CLOTHING: A LITTLE
STANDING UP FROM CHAIR USING ARMS: A LITTLE
DRESSING REGULAR LOWER BODY CLOTHING: A LOT
SUGGESTED CMS G CODE MODIFIER MOBILITY: CL
CLIMB 3 TO 5 STEPS WITH RAILING: A LOT
MOVING TO AND FROM BED TO CHAIR: UNABLE
DAILY ACTIVITIY SCORE: 17
HELP NEEDED FOR BATHING: A LOT
PERSONAL GROOMING: A LITTLE
TURNING FROM BACK TO SIDE WHILE IN FLAT BAD: A LITTLE
MOVING FROM LYING ON BACK TO SITTING ON SIDE OF FLAT BED: UNABLE
MOBILITY SCORE: 13

## 2019-10-24 ASSESSMENT — GAIT ASSESSMENTS
GAIT LEVEL OF ASSIST: CONTACT GUARD ASSIST
DEVIATION: BRADYKINETIC;DECREASED HEEL STRIKE;DECREASED TOE OFF
ASSISTIVE DEVICE: FRONT WHEEL WALKER
DISTANCE (FEET): 50

## 2019-10-24 ASSESSMENT — ACTIVITIES OF DAILY LIVING (ADL): TOILETING: UNABLE TO DETERMINE AT THIS TIME

## 2019-10-24 NOTE — THERAPY
"Occupational Therapy Evaluation completed.   Functional Status:  Max A LB dressing, Min A UB dressing, Min A standing grooming, Mod A initial sit stand w/ FWW, MIn A toilet txf and functional mobility w/ FWW  Plan of Care: Will benefit from Occupational Therapy 3 times per week  Discharge Recommendations:  Equipment: Will Continue to Assess for Equipment Needs. Post-acute therapy: Recommend post-acute placement for additional occupational therapy services prior to discharge home. Patient can tolerate post-acute therapies at a 5x/week frequency.    See \"Rehab Therapy-Acute\" Patient Summary Report for complete documentation.      Pt is a 80 y/o male who presents to acute due to AMS. Pt very confused w/ poor attention, however, was easily redirectable to functional tasks. Pt required Max a to don shoes at EOB, would initiate but not attend task. Pt performed functional mobility to bathroom where he performed toileting and oral care w/ min A, A primarily for balance. Throughout session pt reports that he wants to give all wound care nurses 3 million dollars for their services. Pt demo'd impaired safety, functional mobility, activity tolerance, attention, and overall cognition impacting functional independence. Will follow for Acute OT services. Recommend post acute placement at this time.   "

## 2019-10-24 NOTE — ASSESSMENT & PLAN NOTE
Patient with chronic venous stasis changes and wounds to BLLE. This was confirmed in his records. [facility]     Plan  -Wound care will continue upon discharge at the [facility]

## 2019-10-24 NOTE — PROGRESS NOTES
"Geriatric Progress Note:  10/24/2019  Chief Complaint   Patient presents with   • Numbness     All over body numbness upon waking up   • Shortness of Breath     Shortness of breath starting this morning upon waking up.   • Dizziness     DIzziness starting this morning    • Hypotension     Hypotensive for [facility] en route to hospital     HPI: 80 y/o Male with a PMH of CVD presents with confusion 3 weeks after a fall a/w with head trauma.     S:He thought I was the anesthesiologist that was going to \"pass the gas\". He was pleasant, was repetitive in many statements including naming his genitals, naming his heart valves and stent.   Bilaeral LE numbness present, has been present for years. Continues to have knee pain, no shoulder pain  +SOB today. Improved on second visit  Restarted on half dose home trazodone and trailing baclofen  Patient comments on his wealth and appearances of nursing staff consistently through the last two visits.     Does not appear to having significant MI, echo pending.     ROS: a 14 pt ROS was negative other than as stated above.     O:BP (!) 163/55   Pulse 60   Temp 36.5 °C (97.7 °F) (Temporal)   Resp 12   Ht 1.854 m (6' 1\")   Wt 105.3 kg (232 lb 2.3 oz)   SpO2 100%   BMI 30.63 kg/m²   Gen: alert, oriented to person, place and date   CV: Riley rate; 2+ radial and DP pulses bilaterally, no JVD  Lungs: normal effort, CAB  Abd: soft non tender non distended  Ext: arthritic changes noted to hand bilaterally.   Delrium screen: negative for inattention - able to say days of week backwards with minimal issue. He does demonstrate disorganized thinking.   Psych: tangential, repetitive with stories    Labs/Imaging/EKG: cbc stable, bmp with mild improvement in Cr, TSH and b12 normal, RPR negative, troponin peaked at 44    Assessment: 80 y/o Male with a PMH of CVD presents with attenuated delirium 3 weeks after a fall a/w with head trauma.     Plan:   Attenuated Delirium  Cognitive Impairment  -no " real changes in cognition from yesterday, continues with some, but limited insight, similar stories to yesterday  -trialing resuming low dose baclofen to assess for withdrawal component, continue only if helps with cognition or symptoms.   -unclear if etoh is playing a role, primary team is getting b1 level.   -unlikely to have other nutritional deficiency resulting in a dementia syndrome such as copper, zinc or niacin though patient endorsed a great deal of weight loss previously.   -getting MRI, echo pending    SOB  -improved on repeat exam. May have mild volume overload? received IV lasix on admission; on home bumex.  -ECHO pending; on anticoagulant    CAD   -on bumex, statin, apixban,     Weight loss  -consider nutrition consult.     Hypothyroidism   -TSH normal, continue supplement    LE numbness   -may be related to venous stasis, no spinal process tenderness   -spinal process may be a cause, but no focal signs with other parts of neuro exam   -on b12 making deficiency unlikley   -consider a1c     Left shoulder pain   Bilateral knee pain   -improving   -consider xray to assess for osteoarthritis   -recommend APAP 1000 mg TID, consider low dose narcotic 2.5 mg oxycodone if more pain control is needed   -recommend PT/OT     CKD III vs ADINA   -trend creatinine with mild improvement  -got IV diuretic   -bladder scan w/o e/o obstruction.     Gout   -on home allopurinol     DM   -consider a1c   -on SSI, watch for hypoglcyemia       We will continue to follow that patient along with you on Monday if the patient is still admitted.   Unfortunately we do not have holiday, weekend or night time coverage. Dr. Lee to be on service    Tripp Cervantes M.D.  Geriatric Physician   Can be reached at extension: [phone]  Monday - Friday 8-5      This note was partially complete completed using voice recognition software.  I did my best to correct errors prior to submitting this note, but for any concerns please do not hesitate to  contact me.

## 2019-10-24 NOTE — PROGRESS NOTES
Received bedside report from flex RN, pt care assumed, VSS, pt assessment complete. Pt AAOx2, pt c/o leg pain at this time. No signs of acute distress noted at this time. POC discussed with pt and verbalizes no questions. Pt denies any additional needs at this time. Bed in lowest position, bed alarm on, pt educated on fall risk and verbalized understanding, call light within reach, hourly rounding initiated.

## 2019-10-24 NOTE — PROGRESS NOTES
FS 67. Pt is asymptomatic. Gary crackers, apple sauce and 2 packets of sugar with OJ given. Follow up BS 81. OJ with 4 packets of sugar given to pt.

## 2019-10-24 NOTE — DISCHARGE PLANNING
"Anticipated Discharge Disposition: TBD.    Action: Assessment done. Pt speaks of grandiose lifestyle, knowing people in the mob, all who have taken care of him are getting $3 million. Pt repeats things over and over. States he has four , etc. This writer spoke to son, Bravo, on the phone, and Bravo is currently driving up here from [place], [place]. Bravo will be staying in [place] and will be at [facility] tomorrow, 10/24/19. This writer told pt his son was driving up from [place], and pt states,\"yes, I am flying him up first class\", this writer said again, \"no, your son is driving in his own vehicle from [place] to [place]\", pt hesitates a few seconds then states, \"yes, you're right\".     Barriers to Discharge: Unknown etiology of altered mental status, medical clearance.    Plan: Notify Team Red of sons planned arrival to [facility] tomorrow.    "

## 2019-10-24 NOTE — PROGRESS NOTES
2 RN skin check with Maddy SAAVEDRA.    Excoriation and redness to bilateral chest folds and periarea. Bruising to sacrum and buttocks. BLE are discolored and red. Multiple small open wounds to LLE. Toenails are dusky, dry and long.

## 2019-10-24 NOTE — CARE PLAN
Problem: Safety  Goal: Will remain free from falls  Outcome: PROGRESSING AS EXPECTED  Patient's risk for injury and falls assessed. Appropriate safety precautions in place. Patient educated to utilize call light for needs. Patient verbalizes understanding.       Problem: Infection  Goal: Will remain free from infection  Outcome: PROGRESSING AS EXPECTED  Patient WBC within normal limits. Open wounds noted on patient, assessed and cleansed. Patient does not complain of SOB. Patient vital signs are stable.

## 2019-10-24 NOTE — PROGRESS NOTES
Internal Medicine Interval Note  Note Author: Katelyn Ramirez M.D.     Name Kedar Woods     1940   Age/Sex 79 y.o. male   MRN [id number]   Code Status FULL     After 5PM or if no immediate response to page, please call for cross-coverage  Attending/Team: Dr. Gloria See Patient List for primary contact information  Call [phone] to page    1st Call - Day Intern (R1):   Dr. Ramirez 2nd Call - Day Sr. Resident (R2/R3):   Dr. Enrique     Reason for interval visit   Altered Mental Status     Interval Problem Daily Status Update   -Patient slept well last night without any acute events   -Continues to have circular thoughts and rambling speech this morning. Now pending brain MRI.   -Due to recent frequent falls, will have PT/OT evaluate for appropriateness of post discharge placement      Review of Systems   Constitutional: Negative for chills and fever.   HENT: Negative for ear pain.    Eyes: Negative for pain.   Respiratory: Negative for cough, hemoptysis, sputum production and shortness of breath.    Cardiovascular: Negative for chest pain, palpitations, orthopnea, claudication and PND.   Gastrointestinal: Negative for abdominal pain, blood in stool, constipation, diarrhea, heartburn, melena, nausea and vomiting.   Genitourinary: Negative for urgency.   Musculoskeletal: Positive for falls. Negative for neck pain.   Skin: Negative for rash.   Neurological: Negative for dizziness, speech change and headaches.     Disposition/Barriers to discharge:   None currently    Consultants/Specialty  Geriatric Consult  PCP: Kb Khan M.D.    Quality Measures  Quality-Core Measures   Rosales catheter::  No Rosales  DVT: Apixiban.  Ulcer Prophylaxis::  No      Physical Exam     Vitals:    10/23/19 1600 10/23/19 2000 10/23/19 2300 10/24/19 0400   BP: 141/48 111/50 130/49 136/48   Pulse: (!) 43 (!) 42 (!) 48 (!) 40   Resp: 14 18 18 18   Temp: 36.6 °C (97.8 °F) 36.3 °C (97.3 °F) 36.4 °C (97.5 °F) 36.5 °C  (97.7 °F)   TempSrc: Temporal Temporal Temporal Temporal   SpO2: 96% 97% 98% 98%   Weight: 103.6 kg (228 lb 6.3 oz) 105.3 kg (232 lb 2.3 oz)     Height:         Body mass index is 30.63 kg/m². Weight: 105.3 kg (232 lb 2.3 oz)     Oxygen Therapy:  Pulse Oximetry: 98 %, O2 (LPM): 0, O2 Delivery: None (Room Air)    Physical Exam   Constitutional: No distress.   Disheveled   HENT:   Head: Normocephalic and atraumatic.   Eyes: Right eye exhibits no discharge. Left eye exhibits no discharge. No scleral icterus.   Neck: No JVD present. No tracheal deviation present.   Cardiovascular: Regular rhythm.   Bradycardic   Pulmonary/Chest: Effort normal and breath sounds normal. No respiratory distress. He has no wheezes. He has no rales.   Abdominal: Soft. He exhibits no distension. There is no tenderness.   Musculoskeletal: He exhibits tenderness (Tenderness where small wounds are on BLLE). He exhibits no edema.   Chronic venous stasis changes   Neurological: He is alert.   Circular thoughts, rambling speech. Talks over you.    Skin: Skin is warm and dry. No rash noted.   Nursing note and vitals reviewed.    Assessment/Plan     * Altered mental status  Assessment & Plan  The patient is presenting with circular thoughts and increased falls that seem to have increased in the past 3 weeks according to his neighbor. CT head has been negative for an acute bleed. Workup for reversible causes thus far has been unrevealing. No urinary retention, labs have been normal. As patient has continued symptoms today, we will order a brain MRI.     Plan:   -Geriatric consult, we appreciate their input  -TSH, RPR, B12, Folate, UDS, ETOH Ammonia are within normal limits  -No change with resuming baclofen or holding trazodone. Will resume trazodone today.   -PT/OT evaluation due to recent falls to assess for the need for post discharge placement    Pacemaker  Assessment & Plan  He had a MICRA transcatheter RV p[facility]  . [facility] check was 9/2019 at the [facility] and showed 52% of the time for his HR to be 40-50 without any arrhythmias or other events at that time.     Plan  -Continue to monitor HR of 40 as this appears to be his baseline    Aortic valve stenosis  Assessment & Plan  Patient with severe aortic valve stenosis. He underwent a heart [facility] in [facility] the [facility] which showed the severe stenosis and proximal 90% LAD artery stenosis. Recommendations were to followup at the [facility] for complex stenting and a TAVR. The patient is presumably pending this procedure on 11/5.     Pulmonary hypertension (HCC)- (present on admission)  Assessment & Plan  Moderate pulmonary hypertension seen on heart cath at the [facility] 7/2019     Plan:  -RV pressure 51/13, PAH pressure 54/18 with a mean of 32    Chronic venous stasis dermatitis of both lower extremities  Assessment & Plan  Patient with chronic venous stasis changes and wounds to BLLE. This was confirmed in his records. [facility].     Plan  -Wound care inpatient  -Monitoring    Anemia  Assessment & Plan  Slight, with Hgb of 10.9 on admission, improved to 11.1 today. MCV high. KARISHMA is negative. According to records he does have a history of b12 deficiency anemia which is likely the cause.     Plan  -Monitoring H&H, it has been stable. Do not suspect acute bleed.     Coronary artery disease involving native coronary artery  Assessment & Plan  Proximal 90% lesion [facility] 7[facility]. Patient is pending complex stenting procedure 11/5/19.    Plan  -We will continue his regular cardiac medications inpatient, his med list has been confrimed  -Troponin slightly elevated with nonischemic EKG, we will trend this due to his disease to ensure it isn't rising   -Echocardiogram  -Telemetry monitoring    Atrial flutter, chronic (HCC)- (present on admission)  Assessment & Plan  Patient with longstanding atrial flutter with slow ventricular response due to intrinsic AV node  disease. His HR is usually in the 40s per records.     Plan  -Conitnue Apixiban   -Telemetry

## 2019-10-24 NOTE — CARE PLAN
Problem: Safety  Goal: Will remain free from falls  Outcome: PROGRESSING AS EXPECTED   RN will provide patient education regarding fall risk  RN will provide patient education on the use of his call light  Patient will demonstrate how to use call light  Patient will verbalize appropriate times to use call light    Problem: Knowledge Deficit  Goal: Knowledge of disease process/condition, treatment plan, diagnostic tests, and medications will improve  Outcome: PROGRESSING AS EXPECTED   Pt updated on POC, tests, and medications. Pt verbalizes understanding and has no further questions at this time. Pt educated on calling for any more questions.

## 2019-10-24 NOTE — PROGRESS NOTES
Bedside report received. Patient sitting up in bed. RN assessed pain; patient stated pain present BLE, no interventions available currently from MAR, RN to offer nonpharmacological pain intervention as appropriate. Call light within reach. Need for bed alarm assessed; patient is at moderate risk for falls, precautions in place as appropriate.

## 2019-10-25 ENCOUNTER — APPOINTMENT (OUTPATIENT)
Dept: CARDIOLOGY | Facility: MEDICAL CENTER | Age: 79
End: 2019-10-25
Attending: STUDENT IN AN ORGANIZED HEALTH CARE EDUCATION/TRAINING PROGRAM
Payer: COMMERCIAL

## 2019-10-25 LAB
ANION GAP SERPL CALC-SCNC: 10 MMOL/L (ref 0–11.9)
BUN SERPL-MCNC: 26 MG/DL (ref 8–22)
CALCIUM SERPL-MCNC: 9.3 MG/DL (ref 8.5–10.5)
CHLORIDE SERPL-SCNC: 100 MMOL/L (ref 96–112)
CO2 SERPL-SCNC: 28 MMOL/L (ref 20–33)
CREAT SERPL-MCNC: 1.2 MG/DL (ref 0.5–1.4)
ERYTHROCYTE [DISTWIDTH] IN BLOOD BY AUTOMATED COUNT: 56.7 FL (ref 35.9–50)
GLUCOSE BLD-MCNC: 100 MG/DL (ref 65–99)
GLUCOSE BLD-MCNC: 120 MG/DL (ref 65–99)
GLUCOSE BLD-MCNC: 123 MG/DL (ref 65–99)
GLUCOSE BLD-MCNC: 97 MG/DL (ref 65–99)
GLUCOSE SERPL-MCNC: 114 MG/DL (ref 65–99)
HCT VFR BLD AUTO: 38.5 % (ref 42–52)
HGB BLD-MCNC: 12 G/DL (ref 14–18)
LV EJECT FRACT  99904: 60
LV EJECT FRACT MOD 2C 99903: 54.23
LV EJECT FRACT MOD 4C 99902: 58.53
LV EJECT FRACT MOD BP 99901: 56.48
MCH RBC QN AUTO: 30.6 PG (ref 27–33)
MCHC RBC AUTO-ENTMCNC: 31.2 G/DL (ref 33.7–35.3)
MCV RBC AUTO: 98.2 FL (ref 81.4–97.8)
PLATELET # BLD AUTO: 358 K/UL (ref 164–446)
PMV BLD AUTO: 10.8 FL (ref 9–12.9)
POTASSIUM SERPL-SCNC: 4 MMOL/L (ref 3.6–5.5)
RBC # BLD AUTO: 3.92 M/UL (ref 4.7–6.1)
SODIUM SERPL-SCNC: 138 MMOL/L (ref 135–145)
WBC # BLD AUTO: 9.9 K/UL (ref 4.8–10.8)

## 2019-10-25 PROCEDURE — 700102 HCHG RX REV CODE 250 W/ 637 OVERRIDE(OP): Performed by: STUDENT IN AN ORGANIZED HEALTH CARE EDUCATION/TRAINING PROGRAM

## 2019-10-25 PROCEDURE — 82962 GLUCOSE BLOOD TEST: CPT

## 2019-10-25 PROCEDURE — 99225 PR SUBSEQUENT OBSERVATION CARE,LEVEL II: CPT | Mod: GC | Performed by: INTERNAL MEDICINE

## 2019-10-25 PROCEDURE — 80048 BASIC METABOLIC PNL TOTAL CA: CPT

## 2019-10-25 PROCEDURE — 36415 COLL VENOUS BLD VENIPUNCTURE: CPT

## 2019-10-25 PROCEDURE — A9270 NON-COVERED ITEM OR SERVICE: HCPCS | Performed by: STUDENT IN AN ORGANIZED HEALTH CARE EDUCATION/TRAINING PROGRAM

## 2019-10-25 PROCEDURE — 93306 TTE W/DOPPLER COMPLETE: CPT

## 2019-10-25 PROCEDURE — 93306 TTE W/DOPPLER COMPLETE: CPT | Mod: 26 | Performed by: INTERNAL MEDICINE

## 2019-10-25 PROCEDURE — G0378 HOSPITAL OBSERVATION PER HR: HCPCS

## 2019-10-25 PROCEDURE — 85027 COMPLETE CBC AUTOMATED: CPT

## 2019-10-25 RX ORDER — ACETAMINOPHEN 325 MG/1
650 TABLET ORAL 3 TIMES DAILY
Status: DISCONTINUED | OUTPATIENT
Start: 2019-10-25 | End: 2019-10-30 | Stop reason: HOSPADM

## 2019-10-25 RX ADMIN — CLOPIDOGREL BISULFATE 75 MG: 75 TABLET ORAL at 05:55

## 2019-10-25 RX ADMIN — LEVOTHYROXINE SODIUM 50 MCG: 50 TABLET ORAL at 05:55

## 2019-10-25 RX ADMIN — BUMETANIDE 1 MG: 1 TABLET ORAL at 06:00

## 2019-10-25 RX ADMIN — ACETAMINOPHEN 650 MG: 325 TABLET, FILM COATED ORAL at 00:19

## 2019-10-25 RX ADMIN — ATORVASTATIN CALCIUM 40 MG: 40 TABLET, FILM COATED ORAL at 05:55

## 2019-10-25 RX ADMIN — Medication 400 MG: at 05:55

## 2019-10-25 RX ADMIN — ACETAMINOPHEN 650 MG: 325 TABLET, FILM COATED ORAL at 17:02

## 2019-10-25 RX ADMIN — APIXABAN 5 MG: 5 TABLET, FILM COATED ORAL at 17:02

## 2019-10-25 RX ADMIN — SPIRONOLACTONE 50 MG: 25 TABLET ORAL at 05:55

## 2019-10-25 RX ADMIN — BACLOFEN 5 MG: 10 TABLET ORAL at 12:58

## 2019-10-25 RX ADMIN — POLYETHYLENE GLYCOL 3350 1 PACKET: 17 POWDER, FOR SOLUTION ORAL at 17:01

## 2019-10-25 RX ADMIN — APIXABAN 5 MG: 5 TABLET, FILM COATED ORAL at 05:55

## 2019-10-25 RX ADMIN — TRAZODONE HYDROCHLORIDE 100 MG: 50 TABLET ORAL at 21:47

## 2019-10-25 RX ADMIN — CYANOCOBALAMIN TAB 500 MCG 1000 MCG: 500 TAB at 05:55

## 2019-10-25 RX ADMIN — Medication 100 MG: at 05:55

## 2019-10-25 RX ADMIN — ALLOPURINOL 200 MG: 100 TABLET ORAL at 05:55

## 2019-10-25 RX ADMIN — ACETAMINOPHEN 650 MG: 325 TABLET, FILM COATED ORAL at 08:59

## 2019-10-25 RX ADMIN — BACLOFEN 5 MG: 10 TABLET ORAL at 05:55

## 2019-10-25 RX ADMIN — VITAMIN D, TAB 1000IU (100/BT) 2000 UNITS: 25 TAB at 05:55

## 2019-10-25 RX ADMIN — BACLOFEN 5 MG: 10 TABLET ORAL at 17:02

## 2019-10-25 RX ADMIN — OMEPRAZOLE 40 MG: 20 CAPSULE, DELAYED RELEASE ORAL at 06:00

## 2019-10-25 ASSESSMENT — ENCOUNTER SYMPTOMS
PALPITATIONS: 0
NECK PAIN: 0
HEMOPTYSIS: 0
PND: 0
HEARTBURN: 0
FALLS: 1
DIARRHEA: 0
SPUTUM PRODUCTION: 0
CLAUDICATION: 0
SPEECH CHANGE: 0
NAUSEA: 0
BLOOD IN STOOL: 0
COUGH: 0
CHILLS: 0
EYE PAIN: 0
ORTHOPNEA: 0
SHORTNESS OF BREATH: 0
DIZZINESS: 0
HEADACHES: 0
FEVER: 0
VOMITING: 0
ABDOMINAL PAIN: 0
CONSTIPATION: 0

## 2019-10-25 NOTE — PROGRESS NOTES
Patient repeatedly getting out of bed without assistance. Bed alarm in place. Lap belt placed - patient demonstrated ability to remove independently. Education provided on falls protocol and to call for assistance.

## 2019-10-25 NOTE — PROGRESS NOTES
Internal Medicine Interval Note  Note Author: Nu Enrique M.D.     Name Kedar Woods     1940   Age/Sex 79 y.o. male   MRN [id number]   Code Status FULL     After 5PM or if no immediate response to page, please call for cross-coverage  Attending/Team: Dr. Gloria See Patient List for primary contact information  Call [phone] to page    1st Call - Day Intern (R1):   Dr. Ramirez 2nd Call - Day Sr. Resident (R2/R3):   Dr. Enrique     Reason for interval visit   Altered Mental Status     Interval Problem Daily Status Update   -No acute overnight events.  Vital signs stable.  Discontinue telemetry.  -MRI brain still pending, thiamine level pending  -Had detailed discussion with son and daughter-in-law today.  Patient had some altered mental status before the fall which happened 3-4 weeks ago however it was not as prominent as it has been after the fall.  His memory is not severely impaired he could remember his name, where he is, recognized family members however could not tell the months backwards, 3 word recall test was 2/3.   -We will continue current management.     Review of Systems   Constitutional: Negative for chills and fever.   HENT: Negative for ear pain.    Eyes: Negative for pain.   Respiratory: Negative for cough, hemoptysis, sputum production and shortness of breath.    Cardiovascular: Negative for chest pain, palpitations, orthopnea, claudication and PND.   Gastrointestinal: Negative for abdominal pain, blood in stool, constipation, diarrhea, heartburn, melena, nausea and vomiting.   Genitourinary: Negative for urgency.   Musculoskeletal: Positive for falls. Negative for neck pain.   Skin: Negative for rash.   Neurological: Negative for dizziness, speech change and headaches.     Disposition/Barriers to discharge:   None currently    Consultants/Specialty  Geriatric Consult  PCP: Kb Khan M.D.    Quality Measures  Quality-Core Measures   Rosales catheter::  No  Bobby  DVT: Apixiban.  Ulcer Prophylaxis::  No      Physical Exam     Vitals:    10/25/19 0019 10/25/19 0400 10/25/19 0800 10/25/19 1156   BP: 128/43 128/55 127/53 126/50   Pulse: (!) 50 82 (!) 50 (!) 50   Resp: 16 18 12 14   Temp: 36.4 °C (97.5 °F) 36.4 °C (97.5 °F) 36.6 °C (97.8 °F) 36.4 °C (97.5 °F)   TempSrc: Temporal Temporal Temporal Temporal   SpO2: 96% 97% 100% 94%   Weight:       Height:         Body mass index is 30.31 kg/m². Weight: 104.2 kg (229 lb 11.5 oz)     Oxygen Therapy:  Pulse Oximetry: 94 %, O2 Delivery: None (Room Air)    Physical Exam   Constitutional: No distress.   Disheveled   HENT:   Head: Normocephalic and atraumatic.   Eyes: Right eye exhibits no discharge. Left eye exhibits no discharge. No scleral icterus.   Neck: No JVD present. No tracheal deviation present.   Cardiovascular: Regular rhythm.   Bradycardic   Pulmonary/Chest: Effort normal and breath sounds normal. No respiratory distress. He has no wheezes. He has no rales.   Abdominal: Soft. He exhibits no distension. There is no tenderness.   Musculoskeletal: He exhibits tenderness (Tenderness where small wounds are on BLLE). He exhibits no edema.   Chronic venous stasis changes   Neurological: He is alert.   Circular thoughts, not completely oriented.  3 word recall test 2/3  No focal signs   Skin: Skin is warm and dry. No rash noted.   Nursing note and vitals reviewed.    Assessment/Plan     * Altered mental status  Assessment & Plan  The patient is presenting with circular thoughts and increased falls that seem to have increased in the past 3 weeks according to his neighbor and son.  Only relatable thing that they can think of is the fall. Two sets of CT head has been negative for an acute / subacute bleed. Workup for reversible causes thus far has been unrevealing. No urinary retention, labs have been normal. TSH, RPR, B12, Folate, UDS, ETOH Ammonia are within normal limits  -MRI brain has been ordered, pending  -Thiamine level  pending  -Geriatric following, we appreciate their input  - PT/OT recommended placement on discharge -will follow.  - If all reversible causes are negative (waiting for MRI and thiamine)- symptoms might have been chronic, became more evident after the fall.  Likely will need cognition evaluation dementia.    Pacemaker  Assessment & Plan  He had a MICRA transcatheter [facility] and showed 52% of the time for his HR to be 40-50 without any arrhythmias or other events at that time.     Plan  -Continue to monitor HR of 40 as this appears to be his baseline    Aortic valve stenosis  Assessment & Plan  Patient with severe aortic valve stenosis. He [facility] which showed the severe stenosis and proximal 90% LAD artery stenosis. Recommendations were to followup at the [facility] for complex stenting and a TAVR. The patient is presumably pending this procedure on 11/5.     Pulmonary hypertension (HCC)- (present on admission)  Assessment & Plan  Moderate pulmonary hypertension seen on heart [facility] 7[facility]2019     Plan:  -RV pressure 51/13, PAH pressure 54/18 with a mean of 32    Chronic venous stasis dermatitis of both lower extremities  Assessment & Plan  Patient with chronic venous stasis changes and wounds to BLLE. This was confirmed in his records. [facility].     Plan  -Wound care inpatient  -Monitoring    Anemia  Assessment & Plan  -Looks like clonic, has history of vitamin B12 deficiency.  However B12 on admission was normal.  -We will continue to monitor    Coronary artery disease involving native coronary artery  Assessment & Plan  Proximal 90% lesion [facility]. Patient is pending complex stenting procedure 11/5/19.    Plan  -We will continue his regular cardiac medications inpatient, his med list has been confrimed  -Troponin slightly elevated with nonischemic EKG, we  will trend this due to his disease to ensure it isn't rising   -Echocardiogram ordered, pending    Atrial flutter, chronic (HCC)- (present on admission)  Assessment & Plan  Patient with longstanding atrial flutter with slow ventricular response due to intrinsic AV node disease. His HR is usually in the 40s per records.   -Conitnue Apixiban

## 2019-10-25 NOTE — DISCHARGE PLANNING
Son, Bravo, here from [place] and gave this writer AD and DPOA papers which [facility] scanned into system. Dr. Brown notified of sons arrival.

## 2019-10-25 NOTE — CARE PLAN
Problem: Safety  Goal: Will remain free from injury  Outcome: PROGRESSING AS EXPECTED  Note:   Educated patient on use of call light, no slip socks on, bed lowest position. All needs attended to. Patient verbalized understanding however does not call for assistance. Will continue to reorient and educate patient. Staff alerted to patient risk, signage in place, bed/chair alarms in use, lap belt in use and patient demonstrates removal.      Problem: Communication  Goal: The ability to communicate needs accurately and effectively will improve  Outcome: PROGRESSING SLOWER THAN EXPECTED  Note:   Patient educated on medications, procedures and use of call light. Patient will continue to verbalize and improve on education and communication in the plan of care. Patient requires frequent reorientation, does not call for assistance. Will continue to reorient and educate patient.

## 2019-10-25 NOTE — CARE PLAN
Problem: Safety  Goal: Will remain free from falls  Outcome: PROGRESSING AS EXPECTED  Patient's risk for injury and falls assessed. Appropriate safety precautions in place. Patient educated to utilize call light for needs. Patient verbalizes understanding.       Problem: Knowledge Deficit  Goal: Knowledge of disease process/condition, treatment plan, diagnostic tests, and medications will improve  Outcome: PROGRESSING AS EXPECTED  Patient is educated of disease process and condition. Treatment team has included patient in plan of care. All medications indications and side effects are explained. Patient is encouraged to ask questions. Patient indicates understanding.

## 2019-10-25 NOTE — DISCHARGE PLANNING
Anticipated Discharge Disposition: TBD.    Action: Chart review complete. Security called during the night for inappropriate behaviors with nursing staff and attemps at getting out of bed.     Barriers to Discharge: Altered mental status, behaviors, medical clearance.    Plan: Page Dr. Enrique when pts son arrives at bedside today from [place], [place].

## 2019-10-25 NOTE — PROGRESS NOTES
Patient a&ox3 all night but showing signs suggesting short term memory loss. Such as asking RN 5 times in 10 minutes what state RN is from.

## 2019-10-25 NOTE — PROGRESS NOTES
"Patient being highly inappropriate with staff. Patient making comments like, \"My dicks name is Romario\" \"The nurses are all blonde with nice asses\"    Security at bedside  "

## 2019-10-26 LAB
GLUCOSE BLD-MCNC: 116 MG/DL (ref 65–99)
GLUCOSE BLD-MCNC: 125 MG/DL (ref 65–99)
GLUCOSE BLD-MCNC: 142 MG/DL (ref 65–99)
GLUCOSE BLD-MCNC: 159 MG/DL (ref 65–99)
GLUCOSE BLD-MCNC: 91 MG/DL (ref 65–99)
VIT B1 BLD-MCNC: 90 NMOL/L (ref 70–180)

## 2019-10-26 PROCEDURE — 97161 PT EVAL LOW COMPLEX 20 MIN: CPT

## 2019-10-26 PROCEDURE — G0378 HOSPITAL OBSERVATION PER HR: HCPCS

## 2019-10-26 PROCEDURE — A9270 NON-COVERED ITEM OR SERVICE: HCPCS | Performed by: STUDENT IN AN ORGANIZED HEALTH CARE EDUCATION/TRAINING PROGRAM

## 2019-10-26 PROCEDURE — 82962 GLUCOSE BLOOD TEST: CPT | Mod: 91

## 2019-10-26 PROCEDURE — 700102 HCHG RX REV CODE 250 W/ 637 OVERRIDE(OP): Performed by: STUDENT IN AN ORGANIZED HEALTH CARE EDUCATION/TRAINING PROGRAM

## 2019-10-26 PROCEDURE — 99225 PR SUBSEQUENT OBSERVATION CARE,LEVEL II: CPT | Mod: GC | Performed by: INTERNAL MEDICINE

## 2019-10-26 PROCEDURE — 29580 STRAPPING UNNA BOOT: CPT

## 2019-10-26 RX ORDER — TRAZODONE HYDROCHLORIDE 50 MG/1
50 TABLET ORAL
Status: DISCONTINUED | OUTPATIENT
Start: 2019-10-26 | End: 2019-10-28

## 2019-10-26 RX ADMIN — ACETAMINOPHEN 650 MG: 325 TABLET, FILM COATED ORAL at 00:09

## 2019-10-26 RX ADMIN — TRAZODONE HYDROCHLORIDE 50 MG: 50 TABLET ORAL at 21:13

## 2019-10-26 RX ADMIN — ALLOPURINOL 200 MG: 100 TABLET ORAL at 05:41

## 2019-10-26 RX ADMIN — BACLOFEN 5 MG: 10 TABLET ORAL at 05:47

## 2019-10-26 RX ADMIN — BUMETANIDE 1 MG: 1 TABLET ORAL at 05:47

## 2019-10-26 RX ADMIN — APIXABAN 5 MG: 5 TABLET, FILM COATED ORAL at 17:09

## 2019-10-26 RX ADMIN — ACETAMINOPHEN 650 MG: 325 TABLET, FILM COATED ORAL at 23:41

## 2019-10-26 RX ADMIN — ACETAMINOPHEN 650 MG: 325 TABLET, FILM COATED ORAL at 15:22

## 2019-10-26 RX ADMIN — Medication 100 MG: at 05:47

## 2019-10-26 RX ADMIN — CLOPIDOGREL BISULFATE 75 MG: 75 TABLET ORAL at 05:47

## 2019-10-26 RX ADMIN — APIXABAN 5 MG: 5 TABLET, FILM COATED ORAL at 05:41

## 2019-10-26 RX ADMIN — VITAMIN D, TAB 1000IU (100/BT) 2000 UNITS: 25 TAB at 05:41

## 2019-10-26 RX ADMIN — OMEPRAZOLE 40 MG: 20 CAPSULE, DELAYED RELEASE ORAL at 05:41

## 2019-10-26 RX ADMIN — LEVOTHYROXINE SODIUM 50 MCG: 50 TABLET ORAL at 05:41

## 2019-10-26 RX ADMIN — Medication 400 MG: at 05:41

## 2019-10-26 RX ADMIN — SPIRONOLACTONE 50 MG: 25 TABLET ORAL at 05:47

## 2019-10-26 RX ADMIN — ACETAMINOPHEN 650 MG: 325 TABLET, FILM COATED ORAL at 07:48

## 2019-10-26 RX ADMIN — ATORVASTATIN CALCIUM 40 MG: 40 TABLET, FILM COATED ORAL at 05:41

## 2019-10-26 RX ADMIN — CYANOCOBALAMIN TAB 500 MCG 1000 MCG: 500 TAB at 05:47

## 2019-10-26 ASSESSMENT — ENCOUNTER SYMPTOMS
HEADACHES: 0
WEAKNESS: 0
FEVER: 0
SHORTNESS OF BREATH: 0
SENSORY CHANGE: 0
VOMITING: 0
CONSTIPATION: 0
NAUSEA: 0
PALPITATIONS: 0
DIZZINESS: 0
DIARRHEA: 0
COUGH: 0
CHILLS: 0
ABDOMINAL PAIN: 0
HEMOPTYSIS: 0
BLOOD IN STOOL: 0
BACK PAIN: 0
EYE PAIN: 0

## 2019-10-26 NOTE — CARE PLAN
Problem: Safety  Goal: Will remain free from injury  Outcome: PROGRESSING AS EXPECTED  Note:   Interventions in place. Educated on risk. Will continue to monitor.      Problem: Infection  Goal: Will remain free from infection  Outcome: PROGRESSING AS EXPECTED  Note:   No signs or symptoms of infection. Will continue to monitor.

## 2019-10-26 NOTE — PROGRESS NOTES
Handoff report received from night shift nurse. Pt care assumed. Pt is currently resting in bed. POC discussed with Pt and Pt verbalizes no questions at this time. Pt is AAOx2-4, on Ra, pt is medical, and VSS. Call light and belongings within reach, bed in lowest and locked position, and Pt educated on use of call light. Will continue to monitor.

## 2019-10-26 NOTE — PROGRESS NOTES
Internal Medicine Interval Note  Note Author: Katelyn Ramirez M.D.     Name Kedar Woods     1940   Age/Sex 79 y.o. male   MRN [id number]   Code Status FULL     After 5PM or if no immediate response to page, please call for cross-coverage  Attending/Team: Dr. Gloria - Red Team See Patient List for primary contact information  Call [phone] to page    1st Call - Day Intern (R1):   Dr. Ramirez 2nd Call - Day Sr. Resident (R2/R3):   Dr. Enrique     Reason for interval visit    Altered Mental Status     Interval Problem Daily Status Update    -No acute overnight events. Vital signs stable.   -Patient continues to have no change in mental status this AM  -MRI brain still pending, thiamine level was WNL  -We will continue current management.     Review of Systems   Constitutional: Negative for chills and fever.   HENT: Negative for ear pain.    Eyes: Negative for pain.   Respiratory: Negative for cough, hemoptysis and shortness of breath.    Cardiovascular: Negative for chest pain, palpitations and leg swelling.   Gastrointestinal: Negative for abdominal pain, blood in stool, constipation, diarrhea, melena, nausea and vomiting.   Genitourinary: Negative for dysuria.   Musculoskeletal: Negative for back pain.   Skin: Negative for rash.   Neurological: Negative for dizziness, sensory change, weakness and headaches.   All other systems reviewed and are negative.      Disposition/Barriers to discharge:   Continued evaluation, no barriers currently    Consultants/Specialty  Geriatrics  PCP: Kb Khan M.D.    Quality Measures  Quality-Core Measures   Rosales catheter::  No Rosales  DVT: Apixiban.  Ulcer Prophylaxis::  No      Physical Exam     Vitals:    10/25/19 1156 10/25/19 1554 10/25/19 1910 10/26/19 0315   BP: 126/50 140/55 110/53 131/50   Pulse: (!) 50 (!) 50 (!) 49 (!) 45   Resp: 14 14 20 18   Temp: 36.4 °C (97.5 °F) 36.6 °C (97.9 °F) 37.1 °C (98.7 °F) 36.4 °C (97.6 °F)   TempSrc: Temporal  Temporal Temporal Temporal   SpO2: 94% 100% 99% 97%   Weight:   104.2 kg (229 lb 11.5 oz)    Height:         Body mass index is 30.31 kg/m². Weight: 104.2 kg (229 lb 11.5 oz)  Oxygen Therapy:  Pulse Oximetry: 97 %, O2 (LPM): 0, O2 Delivery: None (Room Air)    Physical Exam   Constitutional: No distress.   HENT:   Head: Normocephalic and atraumatic.   Well healing midline forehead laceration   Eyes: Right eye exhibits no discharge. Left eye exhibits no discharge. No scleral icterus.   Neck: No JVD present. No tracheal deviation present.   Cardiovascular: Regular rhythm.   No murmur heard.  Bradycardic   Pulmonary/Chest: Effort normal and breath sounds normal. No respiratory distress. He has no wheezes. He has no rales.   Abdominal: Soft. He exhibits no distension. There is no tenderness.   Musculoskeletal: He exhibits no edema.   Neurological: He is alert. No cranial nerve deficit.   Skin: Skin is warm and dry. No rash noted.   Nursing note and vitals reviewed.      Assessment/Plan     * Altered mental status  Assessment & Plan  The patient is presenting with circular thoughts and increased falls that seem to have increased in the past 3 weeks according to his neighbor and son.  Only relatable thing that they can think of is the fall. Two sets of CT head has been negative for an acute / subacute bleed. Workup for reversible causes thus far has been unrevealing. No urinary retention, labs have been normal. TSH, RPR, B12, Folate, UDS, ETOH Ammonia are within normal limits    -MRI brain has been ordered, pending  -Thiamine level WNL  -Geriatric following, we appreciate their input  - PT/OT recommended placement on discharge - will follow.  - If all reversible causes are negative (waiting for MRI) - symptoms might have been chronic, became more evident after the fall.  Likely will need cognition evaluation for dementia.    Chronic venous stasis dermatitis of both lower extremities  Assessment & Plan  Patient with chronic  venous stasis changes and wounds to BLLE. This was confirmed in his records. [facility]     Plan  -Wound care inpatient  -Monitoring    Aortic valve stenosis  Assessment & Plan  Patient with severe aortic valve stenosis. He [facility] which showed the severe stenosis and proximal 90% LAD artery stenosis. Recommendations were to followup at the [facility] for complex stenting and a TAVR. The patient is presumably pending this procedure on 11/5.     Pulmonary hypertension (HCC)- (present on admission)  Assessment & Plan  Moderate pulmonary hypertension seen on heart cath [facility] the [facility] 7[facility]2019     Plan:  -RV pressure 51/13, PAH pressure 54/18 with a mean of 32    Anemia  Assessment & Plan  -Hgb has been stable. Anemia appears chronic. He does have a history of vitamin B12 deficiency and is on replacement. However, B12 on admission was normal.   -We will continue to monitor    Coronary artery disease involving native coronary artery  Assessment & Plan  Proximal 90% lesion [facility]. Patient is pending complex stenting procedure 11/5/19.    Plan  -We will continue his regular cardiac medications inpatient, his med list has been confrimed  -Troponin trend showed no elevation, EKG without acute ischemic changes    -Echocardiogram is similar to prior when [facility] to [facility]. No acute changes     Pacemaker  Assessment & Plan  He had a MICRA transcatheter RV pacer [facility] . [facility] check [facility] and showed 52% of the time for his HR to be 40-50 without any arrhythmias or other events at that time.     Plan  -Continue to monitor HR of 40 as this appears to be his baseline    Atrial flutter, chronic (HCC)- (present on admission)  Assessment & Plan  Patient with longstanding atrial flutter with slow ventricular response due to intrinsic AV node disease. His HR is usually in the 40s per records.   -Conitnue Apixiban

## 2019-10-27 LAB
GLUCOSE BLD-MCNC: 106 MG/DL (ref 65–99)
GLUCOSE BLD-MCNC: 117 MG/DL (ref 65–99)
GLUCOSE BLD-MCNC: 125 MG/DL (ref 65–99)
GLUCOSE BLD-MCNC: 144 MG/DL (ref 65–99)

## 2019-10-27 PROCEDURE — 700102 HCHG RX REV CODE 250 W/ 637 OVERRIDE(OP): Performed by: STUDENT IN AN ORGANIZED HEALTH CARE EDUCATION/TRAINING PROGRAM

## 2019-10-27 PROCEDURE — 82962 GLUCOSE BLOOD TEST: CPT

## 2019-10-27 PROCEDURE — G0378 HOSPITAL OBSERVATION PER HR: HCPCS

## 2019-10-27 PROCEDURE — 99225 PR SUBSEQUENT OBSERVATION CARE,LEVEL II: CPT | Mod: GC | Performed by: INTERNAL MEDICINE

## 2019-10-27 PROCEDURE — 700101 HCHG RX REV CODE 250: Performed by: STUDENT IN AN ORGANIZED HEALTH CARE EDUCATION/TRAINING PROGRAM

## 2019-10-27 PROCEDURE — A9270 NON-COVERED ITEM OR SERVICE: HCPCS | Performed by: STUDENT IN AN ORGANIZED HEALTH CARE EDUCATION/TRAINING PROGRAM

## 2019-10-27 RX ORDER — LIDOCAINE HYDROCHLORIDE 20 MG/ML
JELLY TOPICAL 2 TIMES DAILY PRN
Status: COMPLETED | OUTPATIENT
Start: 2019-10-27 | End: 2019-10-27

## 2019-10-27 RX ORDER — LIDOCAINE HYDROCHLORIDE 20 MG/ML
JELLY TOPICAL 2 TIMES DAILY PRN
Status: DISCONTINUED | OUTPATIENT
Start: 2019-10-27 | End: 2019-10-27

## 2019-10-27 RX ORDER — POLYETHYLENE GLYCOL 3350 17 G/17G
1 POWDER, FOR SOLUTION ORAL 2 TIMES DAILY
Status: DISCONTINUED | OUTPATIENT
Start: 2019-10-27 | End: 2019-10-30 | Stop reason: HOSPADM

## 2019-10-27 RX ADMIN — SPIRONOLACTONE 50 MG: 25 TABLET ORAL at 05:36

## 2019-10-27 RX ADMIN — LEVOTHYROXINE SODIUM 50 MCG: 50 TABLET ORAL at 05:35

## 2019-10-27 RX ADMIN — POLYETHYLENE GLYCOL 3350 1 PACKET: 17 POWDER, FOR SOLUTION ORAL at 17:01

## 2019-10-27 RX ADMIN — ACETAMINOPHEN 650 MG: 325 TABLET, FILM COATED ORAL at 08:27

## 2019-10-27 RX ADMIN — ALLOPURINOL 200 MG: 100 TABLET ORAL at 05:36

## 2019-10-27 RX ADMIN — BUMETANIDE 1 MG: 1 TABLET ORAL at 05:37

## 2019-10-27 RX ADMIN — APIXABAN 5 MG: 5 TABLET, FILM COATED ORAL at 17:01

## 2019-10-27 RX ADMIN — Medication 100 MG: at 05:35

## 2019-10-27 RX ADMIN — Medication 400 MG: at 05:36

## 2019-10-27 RX ADMIN — ATORVASTATIN CALCIUM 40 MG: 40 TABLET, FILM COATED ORAL at 05:36

## 2019-10-27 RX ADMIN — ACETAMINOPHEN 650 MG: 325 TABLET, FILM COATED ORAL at 23:44

## 2019-10-27 RX ADMIN — LIDOCAINE HYDROCHLORIDE 1 APPLICATION: 20 JELLY TOPICAL at 15:25

## 2019-10-27 RX ADMIN — VITAMIN D, TAB 1000IU (100/BT) 2000 UNITS: 25 TAB at 05:36

## 2019-10-27 RX ADMIN — ACETAMINOPHEN 650 MG: 325 TABLET, FILM COATED ORAL at 17:01

## 2019-10-27 RX ADMIN — CYANOCOBALAMIN TAB 500 MCG 1000 MCG: 500 TAB at 05:35

## 2019-10-27 RX ADMIN — TRAZODONE HYDROCHLORIDE 50 MG: 50 TABLET ORAL at 20:24

## 2019-10-27 RX ADMIN — APIXABAN 5 MG: 5 TABLET, FILM COATED ORAL at 05:35

## 2019-10-27 RX ADMIN — POLYETHYLENE GLYCOL 3350 1 PACKET: 17 POWDER, FOR SOLUTION ORAL at 09:45

## 2019-10-27 RX ADMIN — OMEPRAZOLE 40 MG: 20 CAPSULE, DELAYED RELEASE ORAL at 05:37

## 2019-10-27 RX ADMIN — CLOPIDOGREL BISULFATE 75 MG: 75 TABLET ORAL at 05:36

## 2019-10-27 ASSESSMENT — ENCOUNTER SYMPTOMS
HEADACHES: 0
PALPITATIONS: 0
ABDOMINAL PAIN: 0
BACK PAIN: 0
SPUTUM PRODUCTION: 0
COUGH: 0
BLOOD IN STOOL: 0
DIARRHEA: 0
VOMITING: 0
SHORTNESS OF BREATH: 0
EYE PAIN: 0
CHILLS: 0
LOSS OF CONSCIOUSNESS: 0
DIZZINESS: 0
NAUSEA: 0
CONSTIPATION: 0
FEVER: 0

## 2019-10-27 NOTE — DISCHARGE SUMMARY
Internal Medicine Discharge Summary  Note Author: Tyler Lane M.D. / Nu Enrique M.D.       Name Kedar Woods 1940   Age/Sex 79 y.o. male   MRN [id number]         Admit Date:  10/23/2019       Discharge Date:   10/30/2019    Service:   [facility] Internal Medicine Red Team  Attending Physician(s):   Zunilda Gloria       Senior Resident(s):   Domingo / Klaus  [place]coe Resident(s):   James  PCP: Kb Khan M.D.      Primary Diagnosis:   Altered mental status    Secondary Diagnoses:                Principal Problem:    Altered mental status POA: Unknown  Active Problems:    Aortic valve stenosis POA: Unknown    Chronic venous stasis dermatitis of both lower extremities POA: Unknown    Chronic gout POA: Yes    Pulmonary hypertension (HCC) POA: Yes    Hyperlipidemia POA: Yes    Essential hypertension POA: Yes    Type 2 diabetes mellitus (HCC) POA: Unknown    Atrial flutter, chronic (HCC) POA: Yes    Pacemaker POA: Unknown    Coronary artery disease involving native coronary artery POA: Unknown    Anemia POA: Unknown  Resolved Problems:    * No resolved hospital problems. *      Hospital Summary (Brief Narrative):       This is a 79 years old [facility] on 10/24/2019 for altered mental status. On admission he was confused and history was mainly taken per chart review and neighbor. Patient lives alone by himself, normally does all his ADLs, drives by himself, was in his usual health up until 3 weeks ago when he had a mechanical fall about 3 weeks ago. Since then his mental status has changed - he is alert partially oriented, but his concentration level/memory seems to have changed.  CT scan of head after the fall on 10/01 and on this admission on 10/14 didn't show any change / bleeding. Workup for reversible causes has been unrevealing. No urinary retention, electrolytes have been normal. TSH, RPR, Thiamine, B12,  Folate, UDS, ETOH, Ammonia were within normal limits. Echocardiogram showd normal LV function with severe AS (he is scheduled for TAVR in  in November) His home medications were resumed after cross checking with [facility] medication list with few modifications (Baclofen was held, Trazodone was decreased in dose). MRI head showed within normal limits for age. PT/OT evaluated the patient and recommended post-acute placement. His condition is likely due to progressing dementia which got evident after the fall, he might also have post fall concussion.     Patient discharged to [facility].    Patient /Hospital Summary (Details -- Problem Oriented) :          * Altered mental status  Assessment & Plan  The patient presented with circular thoughts and increased falls that seem to have increased in the past 3 weeks according to his neighbor and son. Two sets of CT head were negative for an acute / subacute bleed. Workup for reversible causes was unrevealing. No urinary retention, labs have been normal. TSH, RPR, B12, Folate, UDS, ETOH Ammonia are within normal limits. MRI showed only moderate atrophy.     Patient is now more alert with more logical thinking. It is possible that the patient does have underlying dementia and was not taking his medications correctly at home, now being inpatient with scheduled medications he has improved. There also may be a component to a concussion after the patient's fall.     Plan:  -PT/OT recommended SNF placement. Patient has been accepted and will be discharged there today.   -Symptoms are likely chronic as he is now more back to baseline. He will need cognition evaluation for dementia outpatient.     Chronic venous stasis dermatitis of both lower extremities  Assessment & Plan  Patient with chronic venous stasis changes and wounds to BLLE. This was confirmed in his records. [facility]     Plan  -Wound care will continue upon discharge at the  [facility]    Aortic valve stenosis  Assessment & Plan  Patient with severe aortic valve stenosis. He [facility] which showed the severe stenosis and proximal 90% LAD artery stenosis. Recommendations were to followup at the [facility] for complex stenting and a TAVR. The patient is presumably pending this procedure on 11/5. This may need to be rescheduled because of his new SNF placement, patient is aware and will make the appropriate arrangements.    Pulmonary hypertension (HCC)  Assessment & Plan  Moderate pulmonary hypertension seen on heart [facility] 7[facility]2019     Plan:  -RV pressure 51/13, PAH pressure 54/18 with a mean of 32    Anemia  Assessment & Plan  -Hgb has been stable. Anemia appears chronic. He does have a history of vitamin B12 deficiency and is on replacement. B12 on admission was normal. No further workup indicated at this time as it is stable.       Coronary artery disease involving native coronary artery  Assessment & Plan  Proximal 90% lesion [facility]. Patient is pending complex stenting procedure 11/5/19.    Plan  -We will continue his regular cardiac medications inpatient, his med list has been confrimed  -Troponin trend showed no elevation, EKG without acute ischemic changes    -Echocardiogram is similar to prior when compared to [facility] records. No acute changes     Pacemaker  Assessment & Plan  He had a MICRA transcatheter RV pacer [facility] . Last p[facility] check was 9/[facility] the [facility] and showed 52% of the time for his HR to be 40-50 without any arrhythmias or other events at that time.     Plan  -Continue to monitor HR of 40 as this appears to be his baseline    Atrial flutter, chronic ([facility])  Assessment & Plan  Patient with longstanding atrial flutter with slow ventricular response due to intrinsic AV node disease. His HR is usually in the 40s per records.   -Conitnue Apixiban         Consultants:     Geriatrics    Procedures:         none    Imaging/ Testing:      MR-BRAIN-W/O   Final Result      MRI of the brain without contrast within normal limits for age with moderate atrophy and moderate white matter changes.      EC-ECHOCARDIOGRAM COMPLETE W/O CONT   Final Result      CT-HEAD W/O   Final Result      1.  Mild cerebral atrophy. Age-appropriate.   2.  White matter lucencies consistent with microvascular ischemic change.   3.  No acute findings and no significant change since 10/1/2019.      DX-CHEST-PORTABLE (1 VIEW)   Final Result      No acute cardiopulmonary disease evident.            Discharge Medications:         Medication Reconciliation: Completed       Medication List      CONTINUE taking these medications      Instructions   acetaminophen 500 MG Tabs  Commonly known as:  TYLENOL   Take 1,000 mg by mouth 3 times a day as needed for Mild Pain.  Dose:  1,000 mg     allopurinol 100 MG Tabs  Commonly known as:  ZYLOPRIM   Take 200 mg by mouth every day.  Dose:  200 mg     atorvastatin 40 MG Tabs  Commonly known as:  LIPITOR   Take 40 mg by mouth every day.  Dose:  40 mg     bumetanide 1 MG Tabs  Commonly known as:  BUMEX   Take 1 mg by mouth every day.  Dose:  1 mg     clopidogrel 75 MG Tabs  Commonly known as:  PLAVIX   Take 75 mg by mouth every day.  Dose:  75 mg     cyanocobalamin 1000 MCG Tabs  Commonly known as:  VITAMIN B12   Take 1,000 mcg by mouth every day.  Dose:  1,000 mcg     ELIQUIS 5mg Tabs  Generic drug:  apixaban   Take 5 mg by mouth 2 Times a Day.  Dose:  5 mg     levothyroxine 50 MCG Tabs  Commonly known as:  SYNTHROID   Take 50 mcg by mouth Every morning on an empty stomach.  Dose:  50 mcg     Magnesium Oxide 420 MG Tabs   Take 420 mg by mouth every day.  Dose:  420 mg     metFORMIN 500 MG Tabs  Commonly known as:  GLUCOPHAGE   Take 500 mg by mouth 2 times a day.  Dose:  500 mg     pantoprazole 40 MG Tbec  Commonly known as:  PROTONIX   Take 40 mg by mouth every day.  Dose:  40 mg     senna-docusate 8.6-50 MG  Tabs  Commonly known as:  PERICOLACE or SENOKOT S   Take 2 Tabs by mouth every day.  Dose:  2 Tab     spironolactone 25 MG Tabs  Commonly known as:  ALDACTONE   Take 50 mg by mouth every day.  Dose:  50 mg     traZODone 100 MG Tabs  Commonly known as:  DESYREL   Take 200 mg by mouth every bedtime.  Dose:  200 mg     vitamin D 1000 UNIT Tabs  Commonly known as:  cholecalciferol   Take 2,000 Units by mouth every day.  Dose:  2,000 Units        STOP taking these medications    baclofen 10 MG Tabs  Commonly known as:  LIORESAL            Can use .DISCHARGEMEDSLIST if going to another facility         Disposition:  [facility]    Diet:   Diabetic, as tolerated    Activity:   As tolerated    Instructions:         The patient was instructed to return to the ER in the event of worsening symptoms. I have counseled the patient on the importance of compliance and the patient has agreed to proceed with all medical recommendations and follow up plan indicated above.   The patient understands that all medications come with benefits and risks. Risks may include permanent injury or death and these risks can be minimized with close reassessment and monitoring.        Primary Care Provider:      Discharge summary faxed to primary care provider:  Deferred  Copy of discharge summary given to the patient: Deferred      Follow up appointment details :      No future appointments.  No follow-up provider specified.      Pending Studies:        none    Time spent on discharge day patient visit, preparing discharge paperwork and arranging for patient follow up.    Summary of follow up issues:   -management of chronic conditions    Discharge Time (Minutes) :    50  Hospital Course Type:  Inpatient Stay >2 midnights      Condition on Discharge    ______________________________________________________________________    Interval history/exam for day of discharge:    -No acute overnight events. Vital signs  stable.   -Patient again appears more clear headed this AM. Likely this is his baseline.   -Medically cleared. Has been accepted to the [facility] for SNF care and rehab. Will go today.     Physical Exam   Constitutional: He is oriented to person, place, and time and well-developed, well-nourished, and in no distress. No distress.   HENT:   Head: Normocephalic and atraumatic.   Well healing midline sutured laceration   Eyes: Right eye exhibits no discharge. Left eye exhibits no discharge. No scleral icterus.   Neck: No JVD present. No tracheal deviation present.   Cardiovascular: Normal rate and regular rhythm.   No murmur heard.  Pulmonary/Chest: Effort normal and breath sounds normal. No respiratory distress. He has no wheezes. He has no rales.   Abdominal: Soft. He exhibits no distension. There is no tenderness.   Musculoskeletal: He exhibits no edema.   Neurological: He is alert and oriented to person, place, and time. No cranial nerve deficit.   Skin: Skin is warm and dry. No rash noted.   Nursing note and vitals reviewed.      Most Recent Labs:    Lab Results   Component Value Date/Time    WBC 9.9 10/25/2019 03:24 AM    RBC 3.92 (L) 10/25/2019 03:24 AM    HEMOGLOBIN 12.0 (L) 10/25/2019 03:24 AM    HEMATOCRIT 38.5 (L) 10/25/2019 03:24 AM    MCV 98.2 (H) 10/25/2019 03:24 AM    MCH 30.6 10/25/2019 03:24 AM    MCHC 31.2 (L) 10/25/2019 03:24 AM    MPV 10.8 10/25/2019 03:24 AM    NEUTSPOLYS 68.70 10/23/2019 07:28 AM    LYMPHOCYTES 19.50 (L) 10/23/2019 07:28 AM    MONOCYTES 9.80 10/23/2019 07:28 AM    EOSINOPHILS 0.50 10/23/2019 07:28 AM    BASOPHILS 1.00 10/23/2019 07:28 AM      Lab Results   Component Value Date/Time    SODIUM 134 (L) 10/28/2019 03:37 AM    POTASSIUM 4.5 10/28/2019 03:37 AM    CHLORIDE 99 10/28/2019 03:37 AM    CO2 26 10/28/2019 03:37 AM    GLUCOSE 115 (H) 10/28/2019 03:37 AM    BUN 27 (H) 10/28/2019 03:37 AM    CREATININE 1.01 10/28/2019 03:37 AM      Lab Results   Component Value Date/Time     ALTSGPT 9 10/23/2019 07:28 AM    ASTSGOT 23 10/23/2019 07:28 AM    ALKPHOSPHAT 66 10/23/2019 07:28 AM    TBILIRUBIN 0.6 10/23/2019 07:28 AM    ALBUMIN 3.8 10/23/2019 07:28 AM    GLOBULIN 3.2 10/23/2019 07:28 AM     No results found for: PROTHROMBTM, INR

## 2019-10-27 NOTE — WOUND TEAM
[facility]  Inpatient Services  Initial Wound and Skin Care Evaluation    Admission Date:  10/23/2019   HPI, PMH, SH: Reviewed Pt came to ED with AMS and is being worked up for cause.  Pt has aortic valve stenosis and pulmonary HTN  Unit where seen by Wound Team: [id number]    WOUND CONSULT RELATED TO:  Left leg wound    SUBJECTIVE:   pt reports that [facility].      Self Report / Pain Level:  No report of pain       OBJECTIVE:  Pt in bed, A to lift legs, wound LOWELL    WOUND TYPE, LOCATION, CHARACTERISTICS (Pressure ulcers: location, stage, POA or date identified)        Wound 10/23/19 Venous Ulcer Leg (Active)   Wound Image   10/24/2019  8:00 AM   Site Assessment Clean;Dry;Intact;Pink 10/26/2019  8:00 PM   Tiffanie-wound Assessment Clean;Dry;Intact;Blanchable erythema 10/26/2019  8:00 PM   Margins Defined edges 10/26/2019  8:00 PM   Wound Length (cm) 1.5 cm 10/26/2019  5:00 PM   Wound Width (cm) 0.5 cm 10/26/2019  5:00 PM   Wound Surface Area (cm^2) 0.75 cm^2 10/26/2019  5:00 PM   Tunneling 0 cm 10/26/2019  5:00 PM   Undermining 0 cm 10/26/2019  5:00 PM   Closure Open to air 10/26/2019  8:00 PM   Drainage Amount None 10/26/2019  8:00 PM   Drainage Description Serosanguineous 10/26/2019  5:00 PM   Non-staged Wound Description Partial thickness 10/26/2019  8:00 PM   Treatments Site care;Cleansed 10/26/2019  5:00 PM   Cleansing Approved Wound Cleanser 10/26/2019  5:00 PM   Dressing Options Unna Boot 10/26/2019  5:00 PM   Dressing Changed Changed 10/26/2019  5:00 PM   Dressing Change Frequency Every 72 hrs 10/26/2019  5:00 PM   NEXT Dressing Change  10/29/19 10/26/2019  5:00 PM   NEXT Weekly Photo (Inpatient Only) 11/02/19 10/26/2019  5:00 PM   WOUND NURSE ONLY - Odor None 10/26/2019  5:00 PM   WOUND NURSE ONLY - Pulses Left;2+;DP;PT 10/26/2019  5:00 PM   WOUND NURSE ONLY - Exposed Structures None 10/26/2019  5:00 PM   WOUND NURSE ONLY - Tissue Type and Percentage  dark red 10/26/2019  5:00 PM        Vascular: 10/26/19 by hand held doppler bilateral ankle pulses -     Dorsal Pedal pulses:  B 2+ multiphasic  Posterior tib pulses:  B 2+ multiphasic    DOMENIC:     nt    Lab Values:    WBC:     @LABLAST(WBC)@  AIC:      Lab Results   Component Value Date/Time    HBA1C 5.6 10/24/2019 03:05 AM         Culture:   Completed nt    INTERVENTIONS BY WOUND TEAM:  Wound cleansed with saline.  Legs cleaned with damp cloth.  Unna boot applied loosely to left leg.  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    Factors affecting wound healing:  Cognitive decline, obesity, CVI, hx of wounds.    Goals:  Steady decrease in wound area and depth weekly     NURSING PLAN OF CARE ORDERS (X):    Dressing changes: See Dressing Care orders:   X  Skin care: See Skin Care orders:   Rectal tube care: See Rectal Tube Care orders:   Other orders:    RSKIN: CURRENT (X) ORDERED (O)  Q shift Spike:  X  Q shift pressure point assessments:  X  Pressure redistribution mattress        Waffle overlay O  SIL      Bariatric SIL      Bariatric foam        Heel float boots       Heels floated on pillows  X    Barrier wipes      Barrier Cream      Barrier paste      Sacral silicone dressing      Silicone O2 tubing      Anchorfast      Trach with Optifoam split foam       Waffle cushion      Rectal tube or BMS      Antifungal tx    Turn q 2 hours   X  Up to chair     Ambulate   PT/OT     Dietician      PO     TF   TPN   NPO   # days   Other       WOUND TEAM PLAN OF CARE (X):   NPWT change 3 x week:        Dressing changes by wound team:    X for unna boot on left leg   Follow up as needed:     _ nursing to manager right leg  Other (explain):    Anticipated discharge plans (X):  SNF:      X pt may need wound care for right leg upon discharge     Home Care:            Outpatient Wound Center:            Self Care:            Other:

## 2019-10-27 NOTE — CARE PLAN
Problem: Safety  Goal: Will remain free from injury  Outcome: PROGRESSING AS EXPECTED  Note:   Educated on risks. Interventions in place. Will continue to monitor.      Problem: Pain Management  Goal: Pain level will decrease to patient's comfort goal  Outcome: PROGRESSING AS EXPECTED  Note:   Pain has been in control per patient. Will continue to monitor and medicate appropriately.

## 2019-10-27 NOTE — PROGRESS NOTES
Handoff report received from night shift nurse. Pt care assumed. Pt is currently resting in bed. POC discussed with Pt and Pt verbalizes no questions at this time. Pt is AAOx4 but confused, on Ra, pt is medical, and VSS. Call light and belongings within reach, bed in lowest and locked position, and Pt educated on use of call light. Will continue to monitor.

## 2019-10-27 NOTE — PROGRESS NOTES
Internal Medicine Interval Note  Note Author: Katelyn Ramirez M.D.     Name Kedar Woods     1940   Age/Sex 79 y.o. male   MRN [id number]   Code Status FULL     After 5PM or if no immediate response to page, please call for cross-coverage  Attending/Team: Dr. Gloria - Red Team See Patient List for primary contact information  Call [phone] to page    1st Call - Day Intern (R1):   Dr. Ramirez 2nd Call - Day Sr. Resident (R2/R3):   Dr. Enrique     Reason for interval visit   Altered Mental Status     Interval Problem Daily Status Update   -No acute overnight events. Vital signs stable.   -Patient appears to be me more clear headed this AM with logical thought processes and is without circular thoughts. Remembers feeling confused recently.   -MRI brain scheduled for Monday at 9:30 AM so that pacemaker rep can be there     Review of Systems   Constitutional: Negative for chills and fever.   HENT: Negative for ear pain.    Eyes: Negative for pain.   Respiratory: Negative for cough, sputum production and shortness of breath.    Cardiovascular: Negative for chest pain, palpitations and leg swelling.   Gastrointestinal: Negative for abdominal pain, blood in stool, constipation, diarrhea, melena, nausea and vomiting.   Genitourinary: Negative for dysuria.   Musculoskeletal: Negative for back pain.   Skin: Negative for rash.   Neurological: Negative for dizziness, loss of consciousness and headaches.   All other systems reviewed and are negative.    Disposition/Barriers to discharge:   Continued inpatient evaluation and treatment    Consultants/Specialty  None    PCP: Kb Khan M.D.    Quality Measures  Quality-Core Measures   Rosales catheter::  No Rosales  DVT: Apixiban.  Ulcer Prophylaxis::  No    Physical Exam       Vitals:    10/26/19 0850 10/26/19 1629 10/26/19 2200 10/27/19 0400   BP: 125/51 139/58 123/63 116/59   Pulse: (!) 54 (!) 53 (!) 54 (!) 51   Resp: 18 16 16 16   Temp: 36.6 °C  (97.9 °F) 36.7 °C (98 °F) 36.6 °C (97.9 °F) 36.7 °C (98.1 °F)   TempSrc: Temporal Temporal Temporal Temporal   SpO2: 97% 96% 99% 99%   Weight:   101.7 kg (224 lb 3.3 oz)    Height:         Body mass index is 29.59 kg/m². Weight: 101.7 kg (224 lb 3.3 oz)  Oxygen Therapy:  Pulse Oximetry: 99 %, O2 (LPM): 0, O2 Delivery: None (Room Air)    Physical Exam   Constitutional: No distress.   HENT:   Head: Normocephalic and atraumatic.   Eyes: Right eye exhibits no discharge. Left eye exhibits no discharge. No scleral icterus.   Neck: No JVD present. No tracheal deviation present.   Cardiovascular: Regular rhythm.   No murmur heard.  Bradycardic   Pulmonary/Chest: Effort normal and breath sounds normal. No respiratory distress. He has no wheezes. He has no rales.   Abdominal: Soft. He exhibits no distension. There is no tenderness.   Musculoskeletal: He exhibits no edema.   Neurological: He is alert. No cranial nerve deficit.   Skin: Skin is warm and dry. No rash noted.   Nursing note and vitals reviewed.      Assessment/Plan     * Altered mental status  Assessment & Plan  The patient is presenting with circular thoughts and increased falls that seem to have increased in the past 3 weeks according to his neighbor and son.  Only relatable thing that they can think of is the fall. Two sets of CT head has been negative for an acute / subacute bleed. Workup for reversible causes thus far has been unrevealing. No urinary retention, labs have been normal. TSH, RPR, B12, Folate, UDS, ETOH Ammonia are within normal limits    -MRI brain has been ordered, scheduled for Monday AM so pacemaker rep can be there  -Geriatric following, we appreciate their input  - PT/OT recommended placement on discharge - will follow and have them re-evaluate tomorrow since he is less confused now  - If all reversible causes are negative (waiting for MRI) - symptoms might have been chronic, became more evident after the fall.  Likely will need cognition  evaluation for dementia.    Chronic venous stasis dermatitis of both lower extremities  Assessment & Plan  Patient with chronic venous stasis changes and wounds to BLLE. This was confirmed in his records. [facility]     Plan  -Wound care inpatient  -Monitoring    Aortic valve stenosis  Assessment & Plan  Patient with severe aortic valve stenosis. He underwent a [facility] which showed the severe stenosis and proximal 90% LAD artery stenosis. Recommendations were to followup at the [facility] for complex stenting and a TAVR. The patient is presumably pending this procedure on 11/5.     Pulmonary hypertension (HCC)- (present on admission)  Assessment & Plan  Moderate pulmonary hypertension seen on heart cath [facility] the [facility] 7/2019     Plan:  -RV pressure 51/13, PAH pressure 54/18 with a mean of 32    Anemia  Assessment & Plan  -Hgb has been stable. Anemia appears chronic. He does have a history of vitamin B12 deficiency and is on replacement. However, B12 on admission was normal.   -We will continue to monitor    Coronary artery disease involving native coronary artery  Assessment & Plan  Proximal 90% lesion seen on [facility]. Patient is pending complex stenting procedure 11/5/19.    Plan  -We will continue his regular cardiac medications inpatient, his med list has been confrimed  -Troponin trend showed no elevation, EKG without acute ischemic changes    -Echocardiogram is similar to prior when compared to [facility] records. No acute changes     Pacemaker  Assessment & Plan  He had a MICRA transcatheter RV pacer [facility] . Last pacer check was 9/2019 at the [facility] and showed 52% of the time for his HR to be 40-50 without any arrhythmias or other events at that time.     Plan  -Continue to monitor HR of 40 as this appears to be his baseline    Atrial flutter, chronic (HCC)- (present on admission)  Assessment & Plan  Patient with longstanding atrial flutter  with slow ventricular response due to intrinsic AV node disease. His HR is usually in the 40s per records.   -Conitnue Apixiban

## 2019-10-28 ENCOUNTER — APPOINTMENT (OUTPATIENT)
Dept: RADIOLOGY | Facility: MEDICAL CENTER | Age: 79
End: 2019-10-28
Attending: STUDENT IN AN ORGANIZED HEALTH CARE EDUCATION/TRAINING PROGRAM
Payer: COMMERCIAL

## 2019-10-28 LAB
ANION GAP SERPL CALC-SCNC: 9 MMOL/L (ref 0–11.9)
BUN SERPL-MCNC: 27 MG/DL (ref 8–22)
CALCIUM SERPL-MCNC: 8.8 MG/DL (ref 8.5–10.5)
CHLORIDE SERPL-SCNC: 99 MMOL/L (ref 96–112)
CO2 SERPL-SCNC: 26 MMOL/L (ref 20–33)
CREAT SERPL-MCNC: 1.01 MG/DL (ref 0.5–1.4)
GLUCOSE BLD-MCNC: 115 MG/DL (ref 65–99)
GLUCOSE BLD-MCNC: 121 MG/DL (ref 65–99)
GLUCOSE SERPL-MCNC: 115 MG/DL (ref 65–99)
POTASSIUM SERPL-SCNC: 4.5 MMOL/L (ref 3.6–5.5)
SODIUM SERPL-SCNC: 134 MMOL/L (ref 135–145)
VIT B1 BLD-MCNC: 155 NMOL/L (ref 70–180)

## 2019-10-28 PROCEDURE — A9270 NON-COVERED ITEM OR SERVICE: HCPCS | Performed by: STUDENT IN AN ORGANIZED HEALTH CARE EDUCATION/TRAINING PROGRAM

## 2019-10-28 PROCEDURE — 97116 GAIT TRAINING THERAPY: CPT | Mod: XU

## 2019-10-28 PROCEDURE — 700102 HCHG RX REV CODE 250 W/ 637 OVERRIDE(OP): Performed by: STUDENT IN AN ORGANIZED HEALTH CARE EDUCATION/TRAINING PROGRAM

## 2019-10-28 PROCEDURE — G0378 HOSPITAL OBSERVATION PER HR: HCPCS

## 2019-10-28 PROCEDURE — 97535 SELF CARE MNGMENT TRAINING: CPT | Mod: XE

## 2019-10-28 PROCEDURE — 97530 THERAPEUTIC ACTIVITIES: CPT

## 2019-10-28 PROCEDURE — 80048 BASIC METABOLIC PNL TOTAL CA: CPT

## 2019-10-28 PROCEDURE — 82962 GLUCOSE BLOOD TEST: CPT

## 2019-10-28 PROCEDURE — 36415 COLL VENOUS BLD VENIPUNCTURE: CPT

## 2019-10-28 PROCEDURE — 70551 MRI BRAIN STEM W/O DYE: CPT

## 2019-10-28 PROCEDURE — 99225 PR SUBSEQUENT OBSERVATION CARE,LEVEL II: CPT | Mod: GC | Performed by: INTERNAL MEDICINE

## 2019-10-28 PROCEDURE — 99225 PR SUBSEQUENT OBSERVATION CARE,LEVEL II: CPT | Performed by: INTERNAL MEDICINE

## 2019-10-28 RX ORDER — TRAZODONE HYDROCHLORIDE 50 MG/1
200 TABLET ORAL
Status: DISCONTINUED | OUTPATIENT
Start: 2019-10-28 | End: 2019-10-28

## 2019-10-28 RX ORDER — TRAZODONE HYDROCHLORIDE 50 MG/1
100 TABLET ORAL
Status: DISCONTINUED | OUTPATIENT
Start: 2019-10-28 | End: 2019-10-30 | Stop reason: HOSPADM

## 2019-10-28 RX ADMIN — APIXABAN 5 MG: 5 TABLET, FILM COATED ORAL at 05:31

## 2019-10-28 RX ADMIN — LEVOTHYROXINE SODIUM 50 MCG: 50 TABLET ORAL at 05:31

## 2019-10-28 RX ADMIN — ALLOPURINOL 200 MG: 100 TABLET ORAL at 05:31

## 2019-10-28 RX ADMIN — POLYETHYLENE GLYCOL 3350 1 PACKET: 17 POWDER, FOR SOLUTION ORAL at 18:08

## 2019-10-28 RX ADMIN — CYANOCOBALAMIN TAB 500 MCG 1000 MCG: 500 TAB at 05:32

## 2019-10-28 RX ADMIN — ACETAMINOPHEN 650 MG: 325 TABLET, FILM COATED ORAL at 18:07

## 2019-10-28 RX ADMIN — SPIRONOLACTONE 50 MG: 25 TABLET ORAL at 05:31

## 2019-10-28 RX ADMIN — VITAMIN D, TAB 1000IU (100/BT) 2000 UNITS: 25 TAB at 05:32

## 2019-10-28 RX ADMIN — CLOPIDOGREL BISULFATE 75 MG: 75 TABLET ORAL at 05:31

## 2019-10-28 RX ADMIN — ACETAMINOPHEN 650 MG: 325 TABLET, FILM COATED ORAL at 08:30

## 2019-10-28 RX ADMIN — Medication 100 MG: at 05:32

## 2019-10-28 RX ADMIN — BUMETANIDE 1 MG: 1 TABLET ORAL at 05:32

## 2019-10-28 RX ADMIN — TRAZODONE HYDROCHLORIDE 100 MG: 50 TABLET ORAL at 21:33

## 2019-10-28 RX ADMIN — APIXABAN 5 MG: 5 TABLET, FILM COATED ORAL at 18:08

## 2019-10-28 RX ADMIN — Medication 400 MG: at 05:32

## 2019-10-28 RX ADMIN — ATORVASTATIN CALCIUM 40 MG: 40 TABLET, FILM COATED ORAL at 05:31

## 2019-10-28 RX ADMIN — OMEPRAZOLE 40 MG: 20 CAPSULE, DELAYED RELEASE ORAL at 05:31

## 2019-10-28 RX ADMIN — POLYETHYLENE GLYCOL 3350 1 PACKET: 17 POWDER, FOR SOLUTION ORAL at 05:30

## 2019-10-28 ASSESSMENT — COGNITIVE AND FUNCTIONAL STATUS - GENERAL
TOILETING: A LITTLE
DAILY ACTIVITIY SCORE: 17
MOVING TO AND FROM BED TO CHAIR: UNABLE
MOVING FROM LYING ON BACK TO SITTING ON SIDE OF FLAT BED: UNABLE
SUGGESTED CMS G CODE MODIFIER DAILY ACTIVITY: CK
CLIMB 3 TO 5 STEPS WITH RAILING: A LOT
PERSONAL GROOMING: A LITTLE
STANDING UP FROM CHAIR USING ARMS: A LITTLE
DRESSING REGULAR UPPER BODY CLOTHING: A LITTLE
MOBILITY SCORE: 13
DRESSING REGULAR LOWER BODY CLOTHING: A LOT
TURNING FROM BACK TO SIDE WHILE IN FLAT BAD: A LITTLE
HELP NEEDED FOR BATHING: A LOT
WALKING IN HOSPITAL ROOM: A LITTLE
SUGGESTED CMS G CODE MODIFIER MOBILITY: CL

## 2019-10-28 ASSESSMENT — ENCOUNTER SYMPTOMS
DIZZINESS: 0
CHILLS: 0
EYE PAIN: 0
FEVER: 0
BLOOD IN STOOL: 0
ABDOMINAL PAIN: 0
BACK PAIN: 0
SHORTNESS OF BREATH: 0
VOMITING: 0
HEADACHES: 0
NAUSEA: 0
SPUTUM PRODUCTION: 0
DIARRHEA: 0
COUGH: 0
CONSTIPATION: 0
LOSS OF CONSCIOUSNESS: 0
PALPITATIONS: 0

## 2019-10-28 ASSESSMENT — GAIT ASSESSMENTS
ASSISTIVE DEVICE: FRONT WHEEL WALKER
DISTANCE (FEET): 40
DEVIATION: BRADYKINETIC;SHUFFLED GAIT
GAIT LEVEL OF ASSIST: MINIMAL ASSIST

## 2019-10-28 ASSESSMENT — ACTIVITIES OF DAILY LIVING (ADL): TOILETING: INDEPENDENT

## 2019-10-28 NOTE — PROGRESS NOTES
Report received at bedside from night shift RN. Pt care assumed; bed in lowest position, call light within reach, bed alarm plugged in and on.

## 2019-10-28 NOTE — PROGRESS NOTES
[facility] Geriatrics Consultation  Follow-up Progress Note      Date of consult: 10/28/2019  Requesting physician: Dr. Duarte  Reason for consult: impaired cognition  Chief complaint:  impaired cognition    Interval History/Subjective:    Over weekend, patient got ECHO.  Severe AS with EF 60%.  Pt down at MRI at time of my eval.  Needed to wait until PM tech could be present.      Went and saw him later in the day.  Reports that at times he can get confused.  Was confused when I told him I was taking over for Dr. Cervantes in geriatrics.  Did days of week backwards.  Missed August when saying months backward.      Past Medical History:  HTN  CAD  AS  Venous stasis   H/o fall early Oct 2019   PM  A flutter  Venous stasis    Past Surgical History:   None     Social History:   .   No biological children.   Bravo COULTER is a friend in [place] who is on his [employer] paperwork.   Also reports friend Alistair is a support.   Lives in [facility] alone.  Was doing all ADLs and IADLs prior to fall 3 weeks ago.     Family History:  Could not obtain because of memory issues      Allergies as of 10/23/2019 - Reviewed 10/23/2019   Allergen Reaction Noted   • Codeine Nausea 10/23/2019       Current Facility-Administered Medications   Medication Dose Route Frequency Provider Last Rate Last Dose   • traZODone (DESYREL) tablet 100 mg  100 mg Oral QHS Katelyn Raimrez M.D.       • polyethylene glycol/lytes (MIRALAX) PACKET 1 Packet  1 Packet Oral BID Katelyn Ramirez M.D.   1 Packet at 10/28/19 0530   • acetaminophen (TYLENOL) tablet 650 mg  650 mg Oral TID Nu Enrique M.D.   650 mg at 10/28/19 0830   • polyethylene glycol/lytes (MIRALAX) PACKET 1 Packet  1 Packet Oral QDAY PRN Katelyn Ramirez M.D.   1 Packet at 10/25/19 1701    And   • magnesium hydroxide (MILK OF MAGNESIA) suspension 30 mL  30 mL Oral QDAY PRN Katelyn Ramirez M.D.        And   • bisacodyl (DULCOLAX) suppository 10 mg  10 mg Rectal QDAY PRN Katelyn Ramirez M.D.       • allopurinol  (ZYLOPRIM) tablet 200 mg  200 mg Oral DAILY Katelyn Ramirez M.D.   200 mg at 10/28/19 0531   • atorvastatin (LIPITOR) tablet 40 mg  40 mg Oral DAILY Katelyn Ramirez M.D.   40 mg at 10/28/19 0531   • cyanocobalamin (VITAMIN B-12) tablet 1,000 mcg  1,000 mcg Oral DAILY Katelyn Ramirez M.D.   1,000 mcg at 10/28/19 0532   • levothyroxine (SYNTHROID) tablet 50 mcg  50 mcg Oral AM ES Katelyn Ramirez M.D.   50 mcg at 10/28/19 0531   • magnesium oxide (MAG-OX) tablet 400 mg  400 mg Oral DAILY Katelyn Ramirez M.D.   400 mg at 10/28/19 0532   • omeprazole (PRILOSEC) capsule 40 mg  40 mg Oral DAILY Katelyn Ramirez M.D.   40 mg at 10/28/19 0531   • vitamin D (cholecalciferol) tablet 2,000 Units  2,000 Units Oral DAILY Katelyn Ramirez M.D.   2,000 Units at 10/28/19 0532   • clopidogrel (PLAVIX) tablet 75 mg  75 mg Oral DAILY Katelyn Ramirez M.D.   75 mg at 10/28/19 0531   • apixaban (ELIQUIS) tablet 5 mg  5 mg Oral BID Katelyn Ramirez M.D.   5 mg at 10/28/19 0531   • thiamine tablet 100 mg  100 mg Oral DAILY Katelyn Ramirez M.D.   100 mg at 10/28/19 0532   • [MAR Hold] insulin lispro (HUMALOG) injection 1-6 Units  1-6 Units Subcutaneous 4X/DAY ROSALINA Enrique M.D.   Stopped at 10/23/19 1713    And   • glucose 4 g chewable tablet 16 g  16 g Oral Q15 MIN PRN Nu Enrique M.D.        And   • DEXTROSE 10% BOLUS 250 mL  250 mL Intravenous Q15 MIN PRN Nu Enrique M.D.       • bumetanide (BUMEX) tablet 1 mg  1 mg Oral DAILY Katelyn Ramirez M.D.   1 mg at 10/28/19 0532   • spironolactone (ALDACTONE) tablet 50 mg  50 mg Oral DAILY Katelyn Ramirez M.D.   50 mg at 10/28/19 0531       ROS:   No cp or heart palp  No cough or SOB    Physical Exam  36.4 55 16 121/59 96%    Last BM: 10-28  Is and Os:    General:  Alert and oriented.  No apparent distress.    Eyes:   No scleral icterus. No conjunctival injection.      Ears:  Hearing impaired grossly    ENMT: Moist mucous membranes. Oropharynx clear. No erythema or exudates noted.     Neck:  Supple. Trachea midline.    Resp: Clear to auscultation bilaterally. No rales, rhonchi, or wheezes.    Cardiovascular:   Holosystolic murmur heard throughout precordium  2+ radial pulses    Abdomen:     Musculoskeletal: No clubbing, cyanosis, edema.    Skin: Clear. No rash noted.    Lymph:     Neuro:     Psych: Mood euthymic. Affect congruent.  See HPI  Tangential    Labs/Studies  ECHO 10-25  No prior study is available for comparison.   Left ventricular ejection fraction is visually estimated to be 60%.  Severe aortic stenosis.  Aortic valve area calculated from the continuity equation is 0.6 cm2.    MRI brain 10-28-19  MRI of the brain without contrast within normal limits for age with moderate atrophy and moderate white matter changes.    Assessment and Plan    Attenuated Delirium  Cognitive Impairment  - cogn still an issue with limited insight    - MRI brain shows microvascular changes so there could be a vascular component; in addition, could have a TBI component with fall and head trauma  -unclear if etoh is playing a role,  -last week, Dr. Cervantes said consider trial of resuming low dose baclofen to assess for withdrawal component, continue only if helps with cognition or symptoms.    primary team is getting b1 level.   -unlikely to have other nutritional deficiency resulting in a dementia syndrome such as copper, zinc or niacin though patient endorsed a great deal of weight loss previously  - would make sure that pt's DPOA (per pt, Bravo COULTER) is aware of hospitalization and dispo plan      CAD   -on bumex, statin, apixban      Weight loss  -consider nutrition consult.      Hypothyroidism   -TSH normal, continue supplement     LE numbness   -may be related to venous stasis, no spinal process tenderness   -spinal process may be a cause, but no focal signs with other parts of neuro exam   - b12 level fine  - a1c level fine   - watch for now     Left shoulder pain   Bilateral knee pain   -improving   -consider xray  to assess for osteoarthritis   -recommend APAP 650 mg TID, consider low dose narcotic 2.5 mg oxycodone if more pain control is needed   -recommend PT/OT     CKD III vs ADINA   -trending creatinine with improvement  -got IV diuretic   -bladder scan w/o e/o obstruction.      Gout   -on home allopurinol      DM   -a1c fine   -on SSI, watch for hypoglcyemia           Thank you for this consult.  Please call with any questions or concerns.     We will continue to follow that patient along with you.    Joya Lee M.D.  Geriatrics- [facility]  Please feel free to contact me with any questions.  Extension [phone]   8:00-5:00 Monday - Friday (excluding holidays)

## 2019-10-28 NOTE — DISCHARGE PLANNING
Received Choice form at [phone]  Agency/Facility Name: [facility], [facility]  Referral sent per Choice form @ [phone]

## 2019-10-28 NOTE — PROGRESS NOTES
Pt to MRI by wheelchair with transport.  Pt had a pacemaker placed 6 weeks ago and is here for an MRI.  Pt seen by Bib, from [facility] prior to scan.  Pt placed on EKG monitor and pulse ox.  Pt monitored t/o the scan and documented in flowsheets.  Pt seen by Bib from [facility] after scan complete.  Pt to return to room with transport tech.

## 2019-10-28 NOTE — PROGRESS NOTES
Internal Medicine Interval Note  Note Author: Katelyn Ramirez M.D.     Name Kedar Woods     1940   Age/Sex 79 y.o. male   MRN [id number]   Code Status FULL     After 5PM or if no immediate response to page, please call for cross-coverage  Attending/Team: Dr. Gloria - Red Team See Patient List for primary contact information  Call [phone] to page    1st Call - Day Intern (R1):   Dr. Ramirez 2nd Call - Day Sr. Resident (R2/R3):   Dr. Enrique     Reason for interval visit   Altered Mental Status     Interval Problem Daily Status Update  -No acute overnight events. Vital signs stable.   -Patient again appears to be me more clear headed this AM with logical thought processes and is without circular thoughts.   -MRI brain scheduled this afternoon so that pacemaker rep can be there   -Pending repeat PT/OT evaluation     Review of Systems   Constitutional: Negative for chills and fever.   HENT: Negative for ear pain.    Eyes: Negative for pain.   Respiratory: Negative for cough, sputum production and shortness of breath.    Cardiovascular: Negative for chest pain, palpitations and leg swelling.   Gastrointestinal: Negative for abdominal pain, blood in stool, constipation, diarrhea, melena, nausea and vomiting.   Genitourinary: Negative for dysuria.   Musculoskeletal: Negative for back pain.   Skin: Negative for rash.   Neurological: Negative for dizziness, loss of consciousness and headaches.   All other systems reviewed and are negative.    Disposition/Barriers to discharge:   Discharge will be per PT/OT recommendations    Consultants/Specialty  Geriatrics  PCP: Kb Khan M.D.    Quality Measures  Quality-Core Measures   Rosales catheter::  No Rosales  DVT: Apixiban.  Ulcer Prophylaxis::  No    Physical Exam       Vitals:    10/27/19 0724 10/27/19 1544 10/27/19 2000 10/28/19 0347   BP: 125/49 118/50 117/56 128/53   Pulse: 87 (!) 51 (!) 55 (!) 50   Resp: 18 16 16 16   Temp: 36.4 °C (97.6 °F)  36.2 °C (97.1 °F) 36.5 °C (97.7 °F) 36.7 °C (98.1 °F)   TempSrc: Temporal Temporal Temporal Tympanic   SpO2: 94% 96% 95% 95%   Weight:   101.8 kg (224 lb 6.9 oz)    Height:         Body mass index is 29.62 kg/m². Weight: 101.8 kg (224 lb 6.9 oz)  Oxygen Therapy:  Pulse Oximetry: 95 %, O2 (LPM): 0, O2 Delivery: None (Room Air)    Physical Exam   Constitutional: He is well-developed, well-nourished, and in no distress. No distress.   HENT:   Head: Normocephalic and atraumatic.   Well healing midline laceration   Eyes: Right eye exhibits no discharge. Left eye exhibits no discharge. No scleral icterus.   Neck: No JVD present. No tracheal deviation present.   Cardiovascular: Normal rate and regular rhythm.   No murmur heard.  Pulmonary/Chest: Effort normal and breath sounds normal. No respiratory distress. He has no wheezes. He has no rales.   Abdominal: Soft. He exhibits no distension. There is no tenderness.   Musculoskeletal: He exhibits no edema.   Lymphadenopathy:     He has no cervical adenopathy.   Neurological: He is alert. No cranial nerve deficit.   Skin: Skin is warm and dry. No rash noted.   Nursing note and vitals reviewed.      Assessment/Plan     * Altered mental status  Assessment & Plan  The patient is presenting with circular thoughts and increased falls that seem to have increased in the past 3 weeks according to his neighbor and son. Two sets of CT head has been negative for an acute / subacute bleed. Workup for reversible causes thus far has been unrevealing. No urinary retention, labs have been normal. TSH, RPR, B12, Folate, UDS, ETOH Ammonia are within normal limits. MRI is the last thing pending at this time.     Patient is now more alert with more logical thinking. It is possible that the patient does have underlying dementia and was not taking his medications correctly at home, now being inpatient with scheduled medications he has improved. There also may be a component to a concussion after  the patient's fall.     Plan:  -MRI brain pending today   -Geriatric following, we appreciate their input  -PT/OT recommended placement on discharge - will follow and have them re-evaluate today since he is less confused now  -If all reversible causes are negative (waiting for MRI) - symptoms might have been chronic, became more evident after the fall.  Likely will need cognition evaluation for dementia outpatient.     Chronic venous stasis dermatitis of both lower extremities  Assessment & Plan  Patient with chronic venous stasis changes and wounds to BLLE. This was confirmed in his records. [facility]     Plan  -Wound care inpatient  -Monitoring    Aortic valve stenosis  Assessment & Plan  Patient with severe aortic valve stenosis. [facility] which showed the severe stenosis and proximal 90% LAD artery stenosis. Recommendations were to followup at the [facility] for complex stenting and a TAVR. The patient is presumably pending this procedure on 11/5.     Pulmonary hypertension (HCC)- (present on admission)  Assessment & Plan  Moderate pulmonary hypertension seen on heart [facility] the [facility] 7/2019     Plan:  -RV pressure 51/13, PAH pressure 54/18 with a mean of 32    Anemia  Assessment & Plan  -Hgb has been stable. Anemia appears chronic. He does have a history of vitamin B12 deficiency and is on replacement. However, B12 on admission was normal.   -We will continue to monitor    Coronary artery disease involving native coronary artery  Assessment & Plan  Proximal 90% lesion [facility]. Patient is pending complex stenting procedure 11/5/19.    Plan  -We will continue his regular cardiac medications inpatient, his med list has been confrimed  -Troponin trend showed no elevation, EKG without acute ischemic changes    -Echocardiogram is similar to prior when [facility] to [facility] records. No acute changes     Pacemaker  Assessment & Plan  He had a MICRA  transcatheter RV pacer [facility] . [facility] p[facility] check [facility] and showed 52% of the time for his HR to be 40-50 without any arrhythmias or other events at that time.     Plan  -Continue to monitor HR of 40 as this appears to be his baseline    Atrial flutter, chronic (HCC)- (present on admission)  Assessment & Plan  Patient with longstanding atrial flutter with slow ventricular response due to intrinsic AV node disease. His HR is usually in the 40s per records.   -Conitnue Apixiban

## 2019-10-29 LAB — GLUCOSE BLD-MCNC: 125 MG/DL (ref 65–99)

## 2019-10-29 PROCEDURE — 700102 HCHG RX REV CODE 250 W/ 637 OVERRIDE(OP): Performed by: STUDENT IN AN ORGANIZED HEALTH CARE EDUCATION/TRAINING PROGRAM

## 2019-10-29 PROCEDURE — 90471 IMMUNIZATION ADMIN: CPT

## 2019-10-29 PROCEDURE — 99225 PR SUBSEQUENT OBSERVATION CARE,LEVEL II: CPT | Mod: GC | Performed by: HOSPITALIST

## 2019-10-29 PROCEDURE — 700111 HCHG RX REV CODE 636 W/ 250 OVERRIDE (IP): Performed by: INTERNAL MEDICINE

## 2019-10-29 PROCEDURE — 99225 PR SUBSEQUENT OBSERVATION CARE,LEVEL II: CPT | Performed by: INTERNAL MEDICINE

## 2019-10-29 PROCEDURE — A9270 NON-COVERED ITEM OR SERVICE: HCPCS | Performed by: STUDENT IN AN ORGANIZED HEALTH CARE EDUCATION/TRAINING PROGRAM

## 2019-10-29 PROCEDURE — G0378 HOSPITAL OBSERVATION PER HR: HCPCS

## 2019-10-29 PROCEDURE — 29580 STRAPPING UNNA BOOT: CPT

## 2019-10-29 PROCEDURE — 90662 IIV NO PRSV INCREASED AG IM: CPT | Performed by: INTERNAL MEDICINE

## 2019-10-29 PROCEDURE — 82962 GLUCOSE BLOOD TEST: CPT

## 2019-10-29 RX ADMIN — SPIRONOLACTONE 50 MG: 25 TABLET ORAL at 05:42

## 2019-10-29 RX ADMIN — VITAMIN D, TAB 1000IU (100/BT) 2000 UNITS: 25 TAB at 05:42

## 2019-10-29 RX ADMIN — ATORVASTATIN CALCIUM 40 MG: 40 TABLET, FILM COATED ORAL at 05:42

## 2019-10-29 RX ADMIN — OMEPRAZOLE 40 MG: 20 CAPSULE, DELAYED RELEASE ORAL at 05:52

## 2019-10-29 RX ADMIN — BUMETANIDE 1 MG: 1 TABLET ORAL at 05:42

## 2019-10-29 RX ADMIN — TRAZODONE HYDROCHLORIDE 100 MG: 50 TABLET ORAL at 20:34

## 2019-10-29 RX ADMIN — POLYETHYLENE GLYCOL 3350 1 PACKET: 17 POWDER, FOR SOLUTION ORAL at 05:40

## 2019-10-29 RX ADMIN — ACETAMINOPHEN 650 MG: 325 TABLET, FILM COATED ORAL at 09:40

## 2019-10-29 RX ADMIN — POLYETHYLENE GLYCOL 3350 1 PACKET: 17 POWDER, FOR SOLUTION ORAL at 17:16

## 2019-10-29 RX ADMIN — Medication 400 MG: at 05:42

## 2019-10-29 RX ADMIN — CYANOCOBALAMIN TAB 500 MCG 1000 MCG: 500 TAB at 05:42

## 2019-10-29 RX ADMIN — ACETAMINOPHEN 650 MG: 325 TABLET, FILM COATED ORAL at 17:16

## 2019-10-29 RX ADMIN — ALLOPURINOL 200 MG: 100 TABLET ORAL at 05:41

## 2019-10-29 RX ADMIN — ACETAMINOPHEN 650 MG: 325 TABLET, FILM COATED ORAL at 00:51

## 2019-10-29 RX ADMIN — CLOPIDOGREL BISULFATE 75 MG: 75 TABLET ORAL at 05:42

## 2019-10-29 RX ADMIN — APIXABAN 5 MG: 5 TABLET, FILM COATED ORAL at 17:16

## 2019-10-29 RX ADMIN — INFLUENZA A VIRUS A/MICHIGAN/45/2015 X-275 (H1N1) ANTIGEN (FORMALDEHYDE INACTIVATED), INFLUENZA A VIRUS A/SINGAPORE/INFIMH-16-0019/2016 IVR-186 (H3N2) ANTIGEN (FORMALDEHYDE INACTIVATED), AND INFLUENZA B VIRUS B/MARYLAND/15/2016 BX-69A (A B/COLORADO/6/2017-LIKE VIRUS) ANTIGEN (FORMALDEHYDE INACTIVATED) 0.5 ML: 60; 60; 60 INJECTION, SUSPENSION INTRAMUSCULAR at 17:17

## 2019-10-29 RX ADMIN — LEVOTHYROXINE SODIUM 50 MCG: 50 TABLET ORAL at 05:42

## 2019-10-29 RX ADMIN — ACETAMINOPHEN 650 MG: 325 TABLET, FILM COATED ORAL at 23:55

## 2019-10-29 RX ADMIN — Medication 100 MG: at 05:43

## 2019-10-29 RX ADMIN — APIXABAN 5 MG: 5 TABLET, FILM COATED ORAL at 05:42

## 2019-10-29 ASSESSMENT — ENCOUNTER SYMPTOMS
BRUISES/BLEEDS EASILY: 1
BACK PAIN: 0
COUGH: 0
SPUTUM PRODUCTION: 0
ABDOMINAL PAIN: 0
BLOOD IN STOOL: 0
DEPRESSION: 0
LOSS OF CONSCIOUSNESS: 0
FEVER: 0
EYE PAIN: 0
SHORTNESS OF BREATH: 0
HALLUCINATIONS: 0
MEMORY LOSS: 1
CONSTIPATION: 0
HEADACHES: 0
CHILLS: 0
NAUSEA: 0
DIZZINESS: 0
VOMITING: 0
PALPITATIONS: 0
DOUBLE VISION: 0
DIARRHEA: 0

## 2019-10-29 NOTE — CARE PLAN
Problem: Safety  Goal: Will remain free from injury  Outcome: PROGRESSING AS EXPECTED  Note:   Patient didn't want to get OOB D/T dizziness when ambulating earlier. Non-skid socks in place. Bed alarm in place, educated patient to call for assistance, calls appropriately. Will continue to monitor.      Problem: Respiratory:  Goal: Respiratory status will improve  Outcome: PROGRESSING AS EXPECTED  Note:   Patient denies SOB, no increased WOB noted. LS clear t/o. On RA.

## 2019-10-29 NOTE — DISCHARGE PLANNING
Agency/Facility Name: [facility]  Spoke To: Goldie  Out[facility]: [facility] with [facility]    Agency/Facility Name: [facility]  Outcome: Left message, awaiting call back.    Agency/Facility Name: [facility]  Spoke To: Taiwo  Outcome: [facility]ing for [facility].    @1215  Agency/Facility Name: [facility]  Spoke To: Otoniel  Outcome: Checking insurance.    @1315  Agency/Facility Name: [facility]  Spoke To: Otoniel  Outcome: Accepted.    @1350  Agency/Facility Name: [facility]  Outcome: Left message, awaiting call back.    Agency/Facility Name: [facility]  Outcome: Left message, awaiting call back.

## 2019-10-29 NOTE — CARE PLAN
Problem: Safety  Goal: Will remain free from falls  Outcome: PROGRESSING AS EXPECTED   RN will provide patient education regarding fall risk  RN will provide patient education on the use of his call light  Patient will demonstrate how to use call light  Patient will verbalize appropriate times to use call light    Problem: Skin Integrity  Goal: Risk for impaired skin integrity will decrease  Outcome: PROGRESSING AS EXPECTED  RN will assess for impaired skin integrity throughout shift. RN will implement skin protective strategies as appropriate.

## 2019-10-29 NOTE — THERAPY
"Occupational Therapy Treatment completed with focus on ADLs, ADL transfers and patient education.  Functional Status:  Mod A LB dressing, Min A standing grooming, SPV toileting, Min A functional mobility and toilet txf w/ FWW  Plan of Care: Will benefit from Occupational Therapy 3 times per week  Discharge Recommendations:  Equipment Will Continue to Assess for Equipment Needs. Post-acute therapy Recommend post-acute placement for additional occupational therapy services prior to discharge home. Patient can tolerate post-acute therapies at a 5x/week frequency.     See \"Rehab Therapy-Acute\" Patient Summary Report for complete documentation.     Pt more alert and oriented from previous evaluation. Pt primarily limited by dizziness when performing functional mobility, BP remained stable. Pt reports he does not get in and out of the tub and typically performs sponge bathes at home. Pt continues to have poor safety awareness, impaired balance, functional mobility, and activity tolerance impacting functional independence. Will continue to follow for acute OT services. Recommend post acute placement.   "

## 2019-10-29 NOTE — THERAPY
"Physical Therapy Treatment completed.   Bed Mobility:  Supine to Sit: Minimal Assist  Transfers: Sit to Stand: Minimal Assist  Gait: Level Of Assist: Minimal Assist with Front-Wheel Walker       Plan of Care: Will benefit from Physical Therapy 3 times per week  Discharge Recommendations: Equipment: Will Continue to Assess for Equipment Needs. Post-acute therapy Recommend post-acute placement for continued physical therapy services prior to discharge home. Patient can tolerate post-acute therapies at a 5x/week frequency.       See \"Rehab Therapy-Acute\" Patient Summary Report for complete documentation.       "

## 2019-10-29 NOTE — PROGRESS NOTES
"[facility] Geriatrics Consultation  Follow-up Progress Note    Reason for consult: confusion   Chief complaint: confusion     Interval History/Subjective:    Yesterday, pt got MRI brain + moderate microvascular changes.     Primary team spoke to Bravo (friend who is like a son) and neighbor.  Pt has been having issues taking care of himself for quite some time.  Bravo thinks he might benefit from placement at [facility], group home or home with CG after rehab.  CM is working on placement at SNF places.  [facility].    Spoke to nurse who says pt slept fair and it eating well.     Pt reported fair sleep.  He reports that he has had \"all the tests\" and nothing was found.  Discussed how falling might have led to TBI.  Also discussed MRI brain results of microvascular disease which can affect cognition.     Pt reported to me that when he was at home, he was having VH of red spots, lightening bolts and electrical wire.  Has not happened here in house as cognition has cleared.     Three Chronic Conditions:  HTN, stable  CAD, stable  AS, stable    Past Medical History:  HTN  CAD  AS  Venous stasis   H/o fall early Oct 2019   PM  A flutter  Venous stasis with ulcers    Social History:   .   No biological children.   Bravo COULTER is a friend in [place] who is on his [place] paperwork.   Also reports friend Alistair is a support.   Lives in [facility] alone.  Was doing all ADLs and IADLs prior to fall 3 weeks ago per pt.       Allergies as of 10/23/2019 - Reviewed 10/23/2019   Allergen Reaction Noted   • Codeine Nausea 10/23/2019       Current Facility-Administered Medications   Medication Dose Route Frequency Provider Last Rate Last Dose   • Influenza Vaccine High-Dose pf injection 0.5 mL  0.5 mL Intramuscular Once Florentino Gloria M.D.       • traZODone (DESYREL) tablet 100 mg  100 mg Oral QHS Katelyn Ramirez M.D.   100 mg at 10/28/19 2133   • polyethylene glycol/lytes (MIRALAX) PACKET 1 Packet  1 Packet Oral BID Katelyn Ramirez, " M.D.   1 Packet at 10/29/19 0540   • acetaminophen (TYLENOL) tablet 650 mg  650 mg Oral TID Nu Enrique M.D.   650 mg at 10/29/19 0940   • polyethylene glycol/lytes (MIRALAX) PACKET 1 Packet  1 Packet Oral QDAY PRN Katelyn Ramirez M.D.   Stopped at 10/29/19 0538    And   • magnesium hydroxide (MILK OF MAGNESIA) suspension 30 mL  30 mL Oral QDAY PRN Katelyn Ramirez M.D.        And   • bisacodyl (DULCOLAX) suppository 10 mg  10 mg Rectal QDAY PRN Katelyn Ramirez M.D.       • allopurinol (ZYLOPRIM) tablet 200 mg  200 mg Oral DAILY Katelyn Ramirez M.D.   200 mg at 10/29/19 0541   • atorvastatin (LIPITOR) tablet 40 mg  40 mg Oral DAILY Katelyn Ramirez M.D.   40 mg at 10/29/19 0542   • cyanocobalamin (VITAMIN B-12) tablet 1,000 mcg  1,000 mcg Oral DAILY Katelyn Ramirez M.D.   1,000 mcg at 10/29/19 0542   • levothyroxine (SYNTHROID) tablet 50 mcg  50 mcg Oral AM ES Katelyn Ramirez M.D.   50 mcg at 10/29/19 0542   • magnesium oxide (MAG-OX) tablet 400 mg  400 mg Oral DAILY Katelyn Ramirez M.D.   400 mg at 10/29/19 0542   • omeprazole (PRILOSEC) capsule 40 mg  40 mg Oral DAILY Katelyn Ramirez M.D.   40 mg at 10/29/19 0552   • vitamin D (cholecalciferol) tablet 2,000 Units  2,000 Units Oral DAILY Katelyn Ramirez M.D.   2,000 Units at 10/29/19 0542   • clopidogrel (PLAVIX) tablet 75 mg  75 mg Oral DAILY Katelyn Ramirez M.D.   75 mg at 10/29/19 0542   • apixaban (ELIQUIS) tablet 5 mg  5 mg Oral BID Katelyn Ramirez M.D.   5 mg at 10/29/19 0542   • thiamine tablet 100 mg  100 mg Oral DAILY Katelyn Ramirez M.D.   100 mg at 10/29/19 0543   • insulin lispro (HUMALOG) injection 1-6 Units  1-6 Units Subcutaneous 4X/DAY ROSALINA Enrique M.D.   Stopped at 10/23/19 1713    And   • glucose 4 g chewable tablet 16 g  16 g Oral Q15 MIN PRN Nu Enrique M.D.        And   • DEXTROSE 10% BOLUS 250 mL  250 mL Intravenous Q15 MIN PRN Nu Enrique M.D.       • bumetanide (BUMEX) tablet 1 mg  1 mg Oral DAILY Katelyn Ramirez M.D.   1 mg at  "10/29/19 0542   • spironolactone (ALDACTONE) tablet 50 mg  50 mg Oral DAILY Katelyn Ramirez M.D.   50 mg at 10/29/19 0542       ROS:   No cp or heart palp  No cough or SOB    Physical Exam  /62   Pulse (!) 51   Temp 36.6 °C (97.8 °F) (Temporal)   Resp 18   Ht 1.854 m (6' 1\")   Wt 97.1 kg (214 lb 1.1 oz)   SpO2 95%   BMI 28.25 kg/m²       Last BM: 10-28  Is and Os:    General:  Alert and oriented.  No apparent distress.    Eyes:   No scleral icterus. No conjunctival injection.      Ears:  Hearing impaired grossly    ENMT: Moist mucous membranes. Oropharynx clear. No erythema or exudates noted.     Neck: Supple. Trachea midline.    Resp: Clear to auscultation bilaterally anteriorly. No rales, rhonchi, or wheezes.    Cardiovascular:   Holosystolic murmur heard throughout precordium  2+ radial pulses    Abdomen: S NT ND + BS    Musculoskeletal: No clubbing, cyanosis, edema.  Comp hose R and wrapping on L.    Hands misshapen    Skin:   Echymoses on arms  Healing scab on forehead    Lymph:     Neuro:     Psych: Mood euthymic. Affect congruent.  A little repetitive and tangential  Oriented to person, place, hosp, city, state and date.  Thought it was Wed, not Tues.   Could say 4 numbers in reverse as well as days of week and months of year in reverse    Labs/Studies  ECHO 10-25  No prior study is available for comparison.   Left ventricular ejection fraction is visually estimated to be 60%.  Severe aortic stenosis.  Aortic valve area calculated from the continuity equation is 0.6 cm2.    MRI brain 10-28-19  MRI of the brain without contrast within normal limits for age with moderate atrophy and moderate white matter changes.    10-27-19  Na 134  crn 1.0    Assessment and Plan    Delirium, resolving   Today, delirium screen was negative  Likely was multifactorial related to possible med non compliance, possible alcohol use, and fall  Sensory impairment, recent fall, recent hospitalization, and baseline cognitive " impairment could have put him at risk    Impaired cognition, appears to be at baseline per friends/neighbors  - cogn still an issue with limited insight    - MRI brain shows microvascular changes so there could be a vascular component; in addition, could have a TBI component with fall and head trauma  -unclear if etoh use prior to hosp stay is playing a role,    Frailty/debility   Personal history of fall presenting hazards to health  - consider orthostatics if not done  - consider checking vitamin D level  - on vitamin D supplementation   - quals for SNF level rehab; CM working on placement  - Bravo pt's DPOA, is in agreement with plan     CAD  A fib   -on bumetinide, statin, clopidogrel, apixban      Weight loss  -consider nutrition consult  -would ensure weight stabilizes as outpatient with adequate PO intake      Hypothyroidism   -TSH normal  -continue supplement     LE numbness   -may be related to venous stasis, no spinal process tenderness   -spinal process may be a cause, but no focal signs with other parts of neuro exam   - b12 level fine  - a1c level fine   - watch for now  - address as outpatient     Left shoulder pain   Bilateral knee pain   -improving   -consider xray to assess for osteoarthritis   -recommend APAP 650 mg TID, consider low dose narcotic 2.5 mg oxycodone if more pain control is needed   -recommend PT/OT     CKD III vs ADINA   -trending creatinine with improvement  -got IV diuretic   -bladder scan w/o e/o obstruction     Gout   -on home allopurinol      DM2  -a1c fine   -on SSI, so watch for hypoglcyemia     Interventions to be considered in all patients in order to minimize the risk of delirium.   -do not disturb patient (vitals or lab draws) between the hours of 10 PM and 6 AM if medically feasible.  -ideally the patient should not sleep during the day and we should avoid day time naps.   -up in chair for meals  -ambulate or up in chair at least three times daily, as able  -watch for  constipation  -timed voiding - ask patient is he would like to go to the bathroom q 2-3 hours, except during the do not disturb hours.   -remove all unnecessary lines (central lines, peripheral IVs, feeding tubes, funes catheters)  -would recommend against use of restraints - either chemical (antipsychotics) or physical - unless patient is a danger to self or others  -minimize polypharmacy; if possible, medications should not be dosed during sleep hours    Thank you for this consult.  Please call with any questions or concerns.     We will continue to follow that patient along with you.    I dedicated over 35 minutes to the patient encounter of which over 50% of time was in counseling and/or care coordination with the patient, family, friends, and/or healthcare staff.  Spoke to nursing, CM, and primary team.  Topics included cognition and placement.     Joya Lee M.D.  Geriatrics- [facility]  Please feel free to contact me with any questions.  Extension [phone]   8:00-5:00 Monday - Friday (excluding holidays)

## 2019-10-29 NOTE — PROGRESS NOTES
Internal Medicine Interval Note  Note Author: Katelyn Ramirez M.D.     Name Kedar Woods     1940   Age/Sex 79 y.o. male   MRN [id number]   Code Status FULL     After 5PM or if no immediate response to page, please call for cross-coverage  Attending/Team: Dr. Mauro - Red Team See Patient List for primary contact information  Call [phone] to page    1st Call - Day Intern (R1):   Dr. Ramirez 2nd Call - Day Sr. Resident (R2/R3):   Dr. Lane     Reason for interval visit   Altered Mental Status    Interval Problem Daily Status Update  -No acute overnight events. Vital signs stable.   -Patient again appears more clear headed this AM. Likely this is his baseline after talking to family.   -MRI with moderate atrophy only  -Now medically cleared. Patient to discharge to SNF once accepted.     Review of Systems   Constitutional: Negative for chills and fever.   HENT: Negative for ear pain and tinnitus.    Eyes: Negative for double vision and pain.   Respiratory: Negative for cough, sputum production and shortness of breath.    Cardiovascular: Negative for chest pain, palpitations and leg swelling.   Gastrointestinal: Negative for abdominal pain, blood in stool, constipation, diarrhea, melena, nausea and vomiting.   Genitourinary: Negative for dysuria.   Musculoskeletal: Negative for back pain.   Skin: Negative for rash.   Neurological: Negative for dizziness, loss of consciousness and headaches.   Endo/Heme/Allergies: Negative for environmental allergies. Bruises/bleeds easily.   Psychiatric/Behavioral: Positive for memory loss. Negative for depression and hallucinations.   All other systems reviewed and are negative.    Disposition/Barriers to discharge:   Now medically clear. Pending SNF acceptance.     Consultants/Specialty  Geriatrics   PCP: Kb Khan M.D.    Quality Measures  Quality-Core Measures   Rosales catheter::  No Rosales  DVT: Apixiban.  Ulcer Prophylaxis::  No      Physical Exam      Vitals:    10/28/19 1536 10/28/19 2000 10/29/19 0400 10/29/19 0500   BP: 121/73 126/43 122/49    Pulse: 82 (!) 51 (!) 52    Resp: 16 18 18    Temp: 36.4 °C (97.6 °F) 36.4 °C (97.6 °F) 36.6 °C (97.9 °F)    TempSrc: Temporal Temporal Temporal    SpO2: 95% 96% 94%    Weight:    97.1 kg (214 lb 1.1 oz)   Height:         Body mass index is 28.25 kg/m². Weight: 97.1 kg (214 lb 1.1 oz)  Oxygen Therapy:  Pulse Oximetry: 94 %, O2 (LPM): 0, O2 Delivery: None (Room Air)    Physical Exam   Constitutional: He is well-developed, well-nourished, and in no distress. No distress.   HENT:   Head: Normocephalic and atraumatic.   Eyes: Right eye exhibits no discharge. Left eye exhibits no discharge. No scleral icterus.   Neck: No JVD present. No tracheal deviation present.   Cardiovascular: Normal rate and regular rhythm.   No murmur heard.  Pulmonary/Chest: Effort normal and breath sounds normal. No respiratory distress. He has no wheezes. He has no rales.   Abdominal: Soft. He exhibits no distension. There is no tenderness.   Musculoskeletal: He exhibits no edema.   Chronic venous stasis changes to the BLLE   Neurological: He is alert. No cranial nerve deficit.   Skin: Skin is warm and dry. No rash noted.   Nursing note and vitals reviewed.      Assessment/Plan     * Altered mental status  Assessment & Plan  The patient is presenting with circular thoughts and increased falls that seem to have increased in the past 3 weeks according to his neighbor and son. Two sets of CT head has been negative for an acute / subacute bleed. Workup for reversible causes thus far has been unrevealing. No urinary retention, labs have been normal. TSH, RPR, B12, Folate, UDS, ETOH Ammonia are within normal limits. MRI showed moderate atrophy.     Patient is now more alert with more logical thinking. It is possible that the patient does have underlying dementia and was not taking his medications correctly at home, now being inpatient with scheduled  medications he has improved. There also may be a component to a concussion after the patient's fall.     Plan:  -MRI shows moderate atrophy   -Geriatric following, we appreciate their input  -PT/OT recommended SNF placement. Awaiting acceptance now.   -Symptoms are likely chronic as he is now more back to baseline. He will need cognition evaluation for dementia outpatient.     Chronic venous stasis dermatitis of both lower extremities  Assessment & Plan  Patient with chronic venous stasis changes and wounds to BLLE. This was confirmed in his records. [facility]     Plan  -Wound care inpatient  -Monitoring    Aortic valve stenosis  Assessment & Plan  Patient with severe aortic valve stenosis. He [facility] which showed the severe stenosis and proximal 90% LAD artery stenosis. Recommendations were to followup at the [facility] for complex stenting and a TAVR. The patient is presumably pending this procedure on 11/5.     Pulmonary hypertension (HCC)- (present on admission)  Assessment & Plan  Moderate pulmonary hypertension seen on heart cath [facility] the [facility] 7/2019     Plan:  -RV pressure 51/13, PAH pressure 54/18 with a mean of 32    Anemia  Assessment & Plan  -Hgb has been stable. Anemia appears chronic. He does have a history of vitamin B12 deficiency and is on replacement. However, B12 on admission was normal.   -We will continue to monitor    Coronary artery disease involving native coronary artery  Assessment & Plan  Proximal 90% lesion [facility]. Patient is pending complex stenting procedure 11/5/19.    Plan  -We will continue his regular cardiac medications inpatient, his med list has been confrimed  -Troponin trend showed no elevation, EKG without acute ischemic changes    -Echocardiogram is similar to prior when compared to [facility] records. No acute changes     Pacemaker  Assessment & Plan  He had a MICRA transcatheter RV pacer [facility]/2019 [facility] [place]. Last pacer check was 9/2019 at the [facility] and showed 52% of the time for his HR to be 40-50 without any arrhythmias or other events at that time.     Plan  -Continue to monitor HR of 40 as this appears to be his baseline    Atrial flutter, chronic (HCC)- (present on admission)  Assessment & Plan  Patient with longstanding atrial flutter with slow ventricular response due to intrinsic AV node disease. His HR is usually in the 40s per records.   -Conitnue Apixiban

## 2019-10-29 NOTE — WOUND TEAM
"[facility]  Inpatient Services  Wound and Skin Care Progress Note    Admission Date: 10/23/2019       HPI, PMH, SH: Reviewed    Unit where seen by Wound Team: [id number]     WOUND CONSULT RELATED TO:  Follow up for unna's boot     SUBJECTIVE:  \"[facility] was doing [facility] and then they just stopped\"      Self Report / Pain Level:  Tender while cleansing       OBJECTIVE:  LLE unna's boot intact, RLE with tubi  F    WOUND TYPE, LOCATION, CHARACTERISTICS (Pressure Injuries: location, stage, POA or date identified)    Wound 10/23/19 Venous Ulcer Leg (Active)   Wound Image       Site Assessment Painful;Red    Tiffanie-wound Assessment Clean;Dry;Intact;Yellow-brown    Margins Defined edges    Wound Length (cm) 1.7 cm    Wound Width (cm) 0.7 cm    Wound Surface Area (cm^2) 1.19 cm^2    Tunneling 0 cm    Undermining 0 cm    Closure Open to air    Drainage Amount Small    Drainage Description Serosanguineous    Non-staged Wound Description Partial thickness    Treatments Cleansed;Site care    Cleansing Approved Wound Cleanser    Periwound Protectant Not Applicable    Dressing Options Unna Boot    Dressing Cleansing/Solutions Not Applicable    Dressing Changed Changed    Dressing Status Clean;Dry;Intact    Dressing Change Frequency Every 72 hrs    NEXT Dressing Change  11/01/19    NEXT Weekly Photo (Inpatient Only) 11/05/19    WOUND NURSE ONLY - Odor None    WOUND NURSE ONLY - Pulses Left;DP;PT;2+    WOUND NURSE ONLY - Exposed Structures None    WOUND NURSE ONLY - Tissue Type and Percentage dark red    WOUND NURSE ONLY - Time Spent with Patient (mins) 60      Vascular:    Dorsal Pedal pulses:  Palpable  Posterior tib pulses:   Palpable    DOMENIC:      NA    ECHO:                                   No results found for this or any previous visit.     Lab Values:    Lab Results   Component Value Date/Time    WBC 9.9 10/25/2019 03:24 AM    RBC 3.92 (L) 10/25/2019 03:24 AM    HEMOGLOBIN 12.0 (L) 10/25/2019 03:24 AM    " HEMATOCRIT 38.5 (L) 10/25/2019 03:24 AM                    Lab Results   Component Value Date/Time    HBA1C 5.6 10/24/2019 03:05 AM           Culture:   NA    INTERVENTIONS BY WOUND TEAM:  Chart reviewed. This RN in to assess. LLE dressing cut off. Leg cleansed with no rinse 4in1 foam soap and moist warm washcloth. Wound to posterior aspect assessed, measured and photographed. Sween 24h moisturizing cream applied to LE. Unna's boot then applied. (Viscopaste layer followed by Coban layer). RLE tubi  F then removed. Leg cleansed, assessed, and photographed. Sween 24h moisturizing cream applied, tubi  F reapplied.     Dressing Selection:       RLE:  [id number] moisturizing cream and Tubi  F     LLE:   [id number] moisturizing cream, Unna's boot (Viscopaste and coban)      Interdisciplinary consultation: Patient, Bedside RN (Kylah)    EVALUATION: Pt with CVI related wound on the left leg that should resolved with compression to manage edema and moisture balance.     Factors affecting wound healing: Cognitive decline, obesity, CVI   Goals: Steady decrease in wound area and depth weekly.    NURSING PLAN OF CARE ORDERS (X):    Dressing changes: See Dressing Care orders: X  Skin care: See Skin Care orders: X  Rectal tube care: See Rectal Tube Care orders:   Other orders: X, CMS Checks    RSKIN: CURRENT (X) ORDERED (O):   Q shift Spike:  X  Q shift pressure point assessments:  X  Pressure redistribution mattress X           Low Airloss          Bariatric SIL         Bariatric foam           Heel float boots     Heel Silicone dressing        Float Heels off Bed with Pillows               Barrier wipes         Barrier Cream         Barrier paste          Sacral silicone dressing         Silicone O2 tubing         Anchorfast         Cannula fixation Device (Tender )          Gray Foam Ear protectors           Trach with Optifoam split foam                 Waffle cushion        Waffle Overlay X        Rectal tube  or BMS    Purwick/Condom Cath          Antifungal tx      Interdry          Reposition q 2 hours X     Up to chair  X      Ambulate  X    PT/OT        Dietician        Diabetes Education      PO  X   TF     TPN     NPO   # days   Other        WOUND TEAM PLAN OF CARE (X):   NPWT change 3 x week:        Dressing changes by wound team:  X to LLE for Unna's boot     Follow up as needed:   X, Nursing to manage RLE    Other (explain):     Anticipated discharge plans (X):   [facility], [facility]          Home Care:           Outpatient Wound Center:            Self Care:            Other:

## 2019-10-29 NOTE — PROGRESS NOTES
Assumed care of patient. Patient A&Ox4, resting in bed. Bedside report conducted. No needs expressed at this time. Will continue to monitor.

## 2019-10-29 NOTE — PROGRESS NOTES
Bedside report received. Patient resting in bed. RN assessed pain; patient stated pain present bilateral lower legs, 8/10. Patient has scheduled tylenol due at 0830. Per patient okay to wait for that dose. Call light within reach. Need for bed alarm assessed; patient is at high risk for falls, precautions in place as appropriate.

## 2019-10-30 ENCOUNTER — PATIENT OUTREACH (OUTPATIENT)
Dept: HEALTH INFORMATION MANAGEMENT | Facility: OTHER | Age: 79
End: 2019-10-30

## 2019-10-30 VITALS
SYSTOLIC BLOOD PRESSURE: 135 MMHG | TEMPERATURE: 97.4 F | RESPIRATION RATE: 18 BRPM | HEART RATE: 75 BPM | DIASTOLIC BLOOD PRESSURE: 54 MMHG | HEIGHT: 73 IN | BODY MASS INDEX: 28.4 KG/M2 | OXYGEN SATURATION: 96 % | WEIGHT: 214.29 LBS

## 2019-10-30 PROCEDURE — 99217 PR OBSERVATION CARE DISCHARGE: CPT | Mod: GC | Performed by: HOSPITALIST

## 2019-10-30 PROCEDURE — A9270 NON-COVERED ITEM OR SERVICE: HCPCS | Performed by: STUDENT IN AN ORGANIZED HEALTH CARE EDUCATION/TRAINING PROGRAM

## 2019-10-30 PROCEDURE — G0378 HOSPITAL OBSERVATION PER HR: HCPCS

## 2019-10-30 PROCEDURE — 99224 PR SUBSEQUENT OBSERVATION CARE,LEVEL I: CPT | Performed by: INTERNAL MEDICINE

## 2019-10-30 PROCEDURE — 700102 HCHG RX REV CODE 250 W/ 637 OVERRIDE(OP): Performed by: STUDENT IN AN ORGANIZED HEALTH CARE EDUCATION/TRAINING PROGRAM

## 2019-10-30 RX ADMIN — ATORVASTATIN CALCIUM 40 MG: 40 TABLET, FILM COATED ORAL at 05:25

## 2019-10-30 RX ADMIN — POLYETHYLENE GLYCOL 3350 1 PACKET: 17 POWDER, FOR SOLUTION ORAL at 05:24

## 2019-10-30 RX ADMIN — APIXABAN 5 MG: 5 TABLET, FILM COATED ORAL at 05:25

## 2019-10-30 RX ADMIN — SPIRONOLACTONE 50 MG: 25 TABLET ORAL at 05:25

## 2019-10-30 RX ADMIN — Medication 400 MG: at 05:25

## 2019-10-30 RX ADMIN — ALLOPURINOL 200 MG: 100 TABLET ORAL at 05:25

## 2019-10-30 RX ADMIN — OMEPRAZOLE 40 MG: 20 CAPSULE, DELAYED RELEASE ORAL at 05:25

## 2019-10-30 RX ADMIN — CYANOCOBALAMIN TAB 500 MCG 1000 MCG: 500 TAB at 05:25

## 2019-10-30 RX ADMIN — ACETAMINOPHEN 650 MG: 325 TABLET, FILM COATED ORAL at 09:30

## 2019-10-30 RX ADMIN — CLOPIDOGREL BISULFATE 75 MG: 75 TABLET ORAL at 05:25

## 2019-10-30 RX ADMIN — Medication 100 MG: at 05:25

## 2019-10-30 RX ADMIN — BUMETANIDE 1 MG: 1 TABLET ORAL at 05:25

## 2019-10-30 RX ADMIN — LEVOTHYROXINE SODIUM 50 MCG: 50 TABLET ORAL at 05:25

## 2019-10-30 RX ADMIN — VITAMIN D, TAB 1000IU (100/BT) 2000 UNITS: 25 TAB at 05:25

## 2019-10-30 ASSESSMENT — ENCOUNTER SYMPTOMS
HEADACHES: 0
PALPITATIONS: 0
BACK PAIN: 0
DIZZINESS: 0
ABDOMINAL PAIN: 0
DIARRHEA: 0
FEVER: 0
CONSTIPATION: 0
BRUISES/BLEEDS EASILY: 1
SPUTUM PRODUCTION: 0
EYE PAIN: 0
CHILLS: 0
PHOTOPHOBIA: 0
NAUSEA: 0
VOMITING: 0
BLOOD IN STOOL: 0
DEPRESSION: 0
LOSS OF CONSCIOUSNESS: 0
SHORTNESS OF BREATH: 0
COUGH: 0
NECK PAIN: 0

## 2019-10-30 NOTE — DISCHARGE INSTRUCTIONS
Discharge Instructions    Discharged to other by medical transportation with escort. Discharged via wheelchair, hospital escort: Yes.  Special equipment needed: Not Applicable    Be sure to schedule a follow-up appointment with your primary care doctor or any specialists as instructed.     Discharge Plan:   Diet Plan: Discussed  Activity Level: Discussed  Confirmed Follow up Appointment: Patient to Call and Schedule Appointment  Confirmed Symptoms Management: Discussed  Medication Reconciliation Updated: Yes  Influenza Vaccine Indication: Not indicated: Previously immunized this influenza season and > 8 years of age  Influenza Vaccine Given - only chart on this line when given: Influenza Vaccine Given (See MAR)    I understand that a diet low in cholesterol, fat, and sodium is recommended for good health. Unless I have been given specific instructions below for another diet, I accept this instruction as my diet prescription.   Other diet: heart healthy    Special Instructions: None    · Is patient discharged on Warfarin / Coumadin?   No     Depression / Suicide Risk    As you are discharged from this [facility] facility, it is important to learn how to keep safe from harming yourself.    Recognize the warning signs:  · Abrupt changes in personality, positive or negative- including increase in energy   · Giving away possessions  · Change in eating patterns- significant weight changes-  positive or negative  · Change in sleeping patterns- unable to sleep or sleeping all the time   · Unwillingness or inability to communicate  · Depression  · Unusual sadness, discouragement and loneliness  · Talk of wanting to die  · Neglect of personal appearance   · Rebelliousness- reckless behavior  · Withdrawal from people/activities they love  · Confusion- inability to concentrate     If you or a loved one observes any of these behaviors or has concerns about self-harm, here's what you can do:  · Talk about it- your feelings and  reasons for harming yourself  · Remove any means that you might use to hurt yourself (examples: pills, rope, extension cords, firearm)  · Get professional help from the community (Mental Health, Substance Abuse, psychological counseling)  · Do not be alone:Call your Safe Contact- someone whom you trust who will be there for you.  · Call your local CRISIS HOTLINE 000-8697 or 085-845-8320  · Call your local Children's Mobile Crisis Response Team [place] (673) 196-9359 or wwwAnimating Touch  · Call the toll free National Suicide Prevention Hotlines   · National Suicide Prevention Lifeline 372-975-ZJCS (8595)  · [place] Line Network 800-SUICIDE (001-3859)          Altered Mental Status  Altered mental status most often refers to an abnormal change in your responsiveness and awareness. It can affect your speech, thought, mobility, memory, attention span, or alertness. It can range from slight confusion to complete unresponsiveness (coma). Altered mental status can be a sign of a serious underlying medical condition. Rapid evaluation and medical treatment is necessary for patients having an altered mental status.  CAUSES   · Low blood sugar (hypoglycemia) or diabetes.  · Severe loss of body fluids (dehydration) or a body salt (electrolyte) imbalance.  · A stroke or other neurologic problem, such as dementia or delirium.  · A head injury or tumor.  · A drug or alcohol overdose.  · Exposure to toxins or poisons.  · Depression, anxiety, and stress.  · A low oxygen level (hypoxia).  · An infection.  · Blood loss.  · Twitching or shaking (seizure).  · Heart problems, such as heart attack or heart rhythm problems (arrhythmias).  · A body temperature that is too low or too high (hypothermia or hyperthermia).  DIAGNOSIS   A diagnosis is based on your history, symptoms, physical and neurologic examinations, and diagnostic tests. Diagnostic tests may include:  · Measurement of your blood pressure, pulse, breathing,  and oxygen levels (vital signs).  · Blood tests.  · Urine tests.  · X-ray exams.  · A computerized magnetic scan (magnetic resonance imaging, MRI).  · A computerized X-ray scan (computed tomography, CT scan).  TREATMENT   Treatment will depend on the cause. Treatment may include:  · Management of an underlying medical or mental health condition.  · Critical care or support in the hospital.  HOME CARE INSTRUCTIONS   · Only take over-the-counter or prescription medicines for pain, discomfort, or fever as directed by your caregiver.  · Manage underlying conditions as directed by your caregiver.  · Eat a healthy, well-balanced diet to maintain strength.  · Join a support group or prevention program to cope with the condition or trauma that caused the altered mental status. Ask your caregiver to help choose a program that works for you.  · Follow up with your caregiver for further examination, therapy, or testing as directed.  SEEK MEDICAL CARE IF:   · You feel unwell or have chills.  · You or your family notice a change in your behavior or your alertness.  · You have trouble following your caregiver's treatment plan.  · You have questions or concerns.  SEEK IMMEDIATE MEDICAL CARE IF:   · You have a rapid heartbeat or have chest pain.  · You have difficulty breathing.  · You have a fever.  · You have a headache with a stiff neck.  · You cough up blood.  · You have blood in your urine or stool.  · You have severe agitation or confusion.  MAKE SURE YOU:   · Understand these instructions.  · Will watch your condition.  · Will get help right away if you are not doing well or get worse.     This information is not intended to replace advice given to you by your health care provider. Make sure you discuss any questions you have with your health care provider.     Document Released: 06/07/2011 Document Revised: 03/11/2013 Document Reviewed: 02/11/2016  [employer] Interactive Patient Education ©2016 [employer]

## 2019-10-30 NOTE — DISCHARGE PLANNING
Agency/Facility Name: [facility]  Spoke To: Otoniel  Outcome: Has bed and calling transport, will call back.    Received Transport Form @ 1000  Spoke to Nasir @ [employer]    Transport is scheduled for 10/30 @1300 going to [facility].    Agency/Facility Name: [facility]  Outcome: Left message informing of transport time.    Discharge summary sent.

## 2019-10-30 NOTE — CARE PLAN
Problem: Venous Thromboembolism (VTW)/Deep Vein Thrombosis (DVT) Prevention:  Goal: Patient will participate in Venous Thrombosis (VTE)/Deep Vein Thrombosis (DVT)Prevention Measures  Outcome: PROGRESSING AS EXPECTED  Note:   Patient on PO eliquis.      Problem: Mobility  Goal: Risk for activity intolerance will decrease  Outcome: PROGRESSING AS EXPECTED  Note:   Patient able to participate in care, encouraged patient to increase physical activity as tolerated.

## 2019-10-30 NOTE — PROGRESS NOTES
Assumed care of patient. Patient A&Ox4, sitting up in bed eating dinner. Bedside report conducted. No needs expressed at this time. Will continue to monitor.

## 2019-10-30 NOTE — PROGRESS NOTES
Bedside report received. Patient resting in bed. RN assessed pain; patient requesting tylenol at this time, RN to provide scheduled tylenol at 0830. Call light within reach. Need for bed alarm assessed; patient is at moderate risk for falls, precautions in place as appropriate.

## 2019-10-30 NOTE — DISCHARGE PLANNING
"SW contacted patient's son, \"Bravo Kapoorwily\" by phone, [phone]. Left message that father/patient will be transferred to [facility] at 1:00pm  "

## 2019-10-30 NOTE — PROGRESS NOTES
Patient discharged to SNF. A&Ox4. IV's taken out, med escort provided wheelchair and hospital escort. Monitor taken off; monitor room notified. Patient belongings discharged with patient. Discharge summary done with patient. RN provided education regarding follow up care, appointments, and medications. Rn  Also provided education regarding when to call doctor and when to call 911.

## 2019-10-30 NOTE — PROGRESS NOTES
[facility] Geriatric[facility] Consultation  Follow-up Progress Note    Reason for consult: confusion   Chief complaint: confusion     Interval History/Subjective:    Spoke to pt and nurse.  Did well overnight.  Slept ok.  Eating ok.  Last BM 10-28.  CM found placement at [facility] for rehab.      Three Chronic Conditions:  HTN, stable  CAD, stable  AS, stable    Past Medical History:  HTN  CAD  AS  Venous stasis   H/o fall early Oct 2019   PM  A flutter  Venous stasis with ulcers    Social History:   .   No biological children.   Bravo COULTER is a friend in [place] who is on his Friend Traveler paperwork.   Also reports friend Alistair is a support.   Lives in [place] alone.  Was doing all ADLs and IADLs prior to fall 3 weeks ago per pt.       Allergies as of 10/23/2019 - Reviewed 10/23/2019   Allergen Reaction Noted   • Codeine Nausea 10/23/2019       Current Facility-Administered Medications   Medication Dose Route Frequency Provider Last Rate Last Dose   • traZODone (DESYREL) tablet 100 mg  100 mg Oral QHS Katelyn Ramirez M.D.   100 mg at 10/29/19 2034   • polyethylene glycol/lytes (MIRALAX) PACKET 1 Packet  1 Packet Oral BID Katelyn Ramirez M.D.   1 Packet at 10/30/19 0524   • acetaminophen (TYLENOL) tablet 650 mg  650 mg Oral TID Nu Enrique M.D.   650 mg at 10/30/19 0930   • polyethylene glycol/lytes (MIRALAX) PACKET 1 Packet  1 Packet Oral QDAY PRN Katelyn Ramirez M.D.   Stopped at 10/29/19 0538    And   • magnesium hydroxide (MILK OF MAGNESIA) suspension 30 mL  30 mL Oral QDAY PRN Katelyn Ramirez M.D.        And   • bisacodyl (DULCOLAX) suppository 10 mg  10 mg Rectal QDAY PRN Katelyn Ramirez M.D.       • allopurinol (ZYLOPRIM) tablet 200 mg  200 mg Oral DAILY Katelyn Ramirez M.D.   200 mg at 10/30/19 0525   • atorvastatin (LIPITOR) tablet 40 mg  40 mg Oral DAILY Katelyn Ramirez M.D.   40 mg at 10/30/19 0525   • cyanocobalamin (VITAMIN B-12) tablet 1,000 mcg  1,000 mcg Oral DAILY Katelyn Ramirez M.D.   1,000 mcg at 10/30/19 0525   •  "levothyroxine (SYNTHROID) tablet 50 mcg  50 mcg Oral AM ES Katelyn Ramirez M.D.   50 mcg at 10/30/19 0525   • magnesium oxide (MAG-OX) tablet 400 mg  400 mg Oral DAILY Katelyn Ramirez M.D.   400 mg at 10/30/19 0525   • omeprazole (PRILOSEC) capsule 40 mg  40 mg Oral DAILY Katelyn Ramirez M.D.   40 mg at 10/30/19 0525   • vitamin D (cholecalciferol) tablet 2,000 Units  2,000 Units Oral DAILY Katelyn Ramirez M.D.   2,000 Units at 10/30/19 0525   • clopidogrel (PLAVIX) tablet 75 mg  75 mg Oral DAILY Katelyn Ramirez M.D.   75 mg at 10/30/19 0525   • apixaban (ELIQUIS) tablet 5 mg  5 mg Oral BID Katelyn Ramirez M.D.   5 mg at 10/30/19 0525   • thiamine tablet 100 mg  100 mg Oral DAILY Katelyn Ramirez M.D.   100 mg at 10/30/19 0525   • bumetanide (BUMEX) tablet 1 mg  1 mg Oral DAILY Katelyn Ramirez M.D.   1 mg at 10/30/19 0525   • spironolactone (ALDACTONE) tablet 50 mg  50 mg Oral DAILY Katelyn Ramirez M.D.   50 mg at 10/30/19 0525       ROS:   No cp or heart palp  No cough or SOB    Physical Exam  /54   Pulse 75   Temp 36.3 °C (97.4 °F) (Temporal)   Resp 18   Ht 1.854 m (6' 1\")   Wt 97.2 kg (214 lb 4.6 oz)   SpO2 96%   BMI 28.28 kg/m²       Last BM: 10-28  Is and Os:    General:  Alert and oriented.  No apparent distress.    Eyes:   No scleral icterus. No conjunctival injection.      Ears:  Hearing impaired grossly    ENMT: Moist mucous membranes. Oropharynx clear. No erythema or exudates noted.     Neck: Supple. Trachea midline.    Resp: Clear to auscultation bilaterally anteriorly. No rales, rhonchi, or wheezes.    Cardiovascular:   Holosystolic murmur heard throughout precordium  2+ radial pulses    Abdomen: S NT ND + BS    Musculoskeletal: No clubbing, cyanosis, edema.  Comp hose R and wrapping on L.    Hands misshapen    Skin:   Echymoses on arms  Healing scab on forehead    Lymph:     Neuro:     Psych: Mood euthymic. Affect congruent.  A little repetitive and tangential  Oriented to person, place, hosp, city, " state and date.    Knew full date.   Could say days of week in reverse    Labs/Studies  ECHO 10-25  No prior study is available for comparison.   Left ventricular ejection fraction is visually estimated to be 60%.  Severe aortic stenosis.  Aortic valve area calculated from the continuity equation is 0.6 cm2.    MRI brain 10-28-19  MRI of the brain without contrast within normal limits for age with moderate atrophy and moderate white matter changes.    10-27-19  Na 134  crn 1.0    Assessment and Plan    Delirium, resolving   Today, delirium screen was negative  Likely was multifactorial related to possible med non compliance, possible alcohol use, and fall  Sensory impairment, recent fall, recent hospitalization, and baseline cognitive impairment could have put him at risk    Impaired cognition, appears to be at baseline per friends/neighbors  - cogn still an issue with limited insight    - MRI brain shows microvascular changes so there could be a vascular component; in addition, could have a TBI component with fall and head trauma  -unclear if etoh use prior to hosp stay is playing a role,    Frailty/debility   Personal history of fall presenting hazards to health  - consider orthostatics if not done  - consider checking vitamin D level  - on vitamin D supplementation   - quals for SNF level rehab; CM found placement  - Bravo, pt's DPOA, is in agreement with plan     CAD  A fib   -on bumetinide, statin, clopidogrel, apixban      Weight loss  -consider nutrition consult  -would ensure weight stabilizes as outpatient with adequate PO intake      Hypothyroidism   -TSH normal  -continue supplement     LE numbness   -may be related to venous stasis, no spinal process tenderness   -spinal process may be a cause, but no focal signs with other parts of neuro exam   - b12 level fine  - a1c level fine   - watch for now  - address as outpatient     Left shoulder pain   Bilateral knee pain   -improving   -consider xray to assess  for osteoarthritis   -recommend APAP 650 mg TID, consider low dose narcotic 2.5 mg oxycodone if more pain control is needed   -recommend PT/OT     CKD III vs ADINA   -trending creatinine with improvement  -got IV diuretic   -bladder scan w/o e/o obstruction     Gout   -on home allopurinol      DM2  -a1c fine   -on SSI, so watch for hypoglcyemia     Interventions to be considered in all patients in order to minimize the risk of delirium.   -do not disturb patient (vitals or lab draws) between the hours of 10 PM and 6 AM if medically feasible.  -ideally the patient should not sleep during the day and we should avoid day time naps.   -up in chair for meals  -ambulate or up in chair at least three times daily, as able  -watch for constipation  -timed voiding - ask patient is he would like to go to the bathroom q 2-3 hours, except during the do not disturb hours.   -remove all unnecessary lines (central lines, peripheral IVs, feeding tubes, funes catheters)  -would recommend against use of restraints - either chemical (antipsychotics) or physical - unless patient is a danger to self or others  -minimize polypharmacy; if possible, medications should not be dosed during sleep hours    Thank you for this consult.  Please call with any questions or concerns.     We will continue to follow that patient along with you.    Joya Lee M.D.  Geriatrics- [facility]  Please feel free to contact me with any questions.  Extension [phone]   8:00-5:00 Monday - Friday (excluding holidays)

## 2019-10-30 NOTE — CARE PLAN
Problem: Discharge Barriers/Planning  Goal: Patient's continuum of care needs will be met  Outcome: PROGRESSING AS EXPECTED   RN will assess potential discharge barriers throughout patient's hospital stay.     Problem: Skin Integrity  Goal: Risk for impaired skin integrity will decrease  Outcome: PROGRESSING AS EXPECTED  RN will assess patient for impaired skin integrity.

## 2019-10-30 NOTE — PROGRESS NOTES
Internal Medicine Interval Note  Note Author: Katelyn Ramirez M.D.     Name Kedar Woods     1940   Age/Sex 79 y.o. male   MRN [id number]   Code Status FULL     After 5PM or if no immediate response to page, please call for cross-coverage  Attending/Team: Dr. Mauro - Red Team See Patient List for primary contact information  Call [phone] to page    1st Call - Day Intern (R1):   Dr. Ramirez 2nd Call - Day Sr. Resident (R2/R3):   Dr. Lane     Reason for interval visit    Altered mental status    Interval Problem Daily Status Update   -No acute overnight events. Vital signs stable.   -Patient again appears more clear headed this AM. Likely this is his baseline.   -Medically cleared. Has been accepted to the [facility] for SNF care and rehab. Will go today.     Review of Systems   Constitutional: Negative for chills and fever.   HENT: Negative for ear pain and tinnitus.    Eyes: Negative for photophobia and pain.   Respiratory: Negative for cough, sputum production and shortness of breath.    Cardiovascular: Negative for chest pain, palpitations and leg swelling.   Gastrointestinal: Negative for abdominal pain, blood in stool, constipation, diarrhea, melena, nausea and vomiting.   Genitourinary: Negative for dysuria and urgency.   Musculoskeletal: Negative for back pain and neck pain.   Skin: Negative for rash.   Neurological: Negative for dizziness, loss of consciousness and headaches.   Endo/Heme/Allergies: Negative for environmental allergies. Bruises/bleeds easily.   Psychiatric/Behavioral: Negative for depression and suicidal ideas.   All other systems reviewed and are negative.    Disposition/Barriers to discharge:   Medically cleared for discharge. Pending placement acceptance.     Consultants/Specialty  Geriatrics   PCP: Kb Khan M.D.    Quality Measures  Quality-Core Measures   Rosales catheter::  No Rosales  DVT: Apixiban.  Ulcer Prophylaxis::  No      Physical Exam        Vitals:    10/29/19 1600 10/29/19 1952 10/30/19 0400 10/30/19 0500   BP: 123/63 102/50 121/61    Pulse: 69 (!) 48 (!) 47    Resp: 18 17 16    Temp: 36.5 °C (97.7 °F) 37.1 °C (98.7 °F) 37.2 °C (98.9 °F)    TempSrc: Temporal Temporal Temporal    SpO2: 97% 96% 97%    Weight:    97.2 kg (214 lb 4.6 oz)   Height:         Body mass index is 28.28 kg/m². Weight: 97.2 kg (214 lb 4.6 oz)  Oxygen Therapy:  Pulse Oximetry: 97 %, O2 (LPM): 0, O2 Delivery: None (Room Air)    Physical Exam   Constitutional: He is oriented to person, place, and time and well-developed, well-nourished, and in no distress. No distress.   HENT:   Head: Normocephalic and atraumatic.   Well healing midline sutured laceration   Eyes: Right eye exhibits no discharge. Left eye exhibits no discharge. No scleral icterus.   Neck: No JVD present. No tracheal deviation present.   Cardiovascular: Normal rate and regular rhythm.   No murmur heard.  Pulmonary/Chest: Effort normal and breath sounds normal. No respiratory distress. He has no wheezes. He has no rales.   Abdominal: Soft. He exhibits no distension. There is no tenderness.   Musculoskeletal: He exhibits no edema.   Neurological: He is alert and oriented to person, place, and time. No cranial nerve deficit.   Skin: Skin is warm and dry. No rash noted.   Nursing note and vitals reviewed.      Assessment/Plan     * Altered mental status  Assessment & Plan  The patient presented with circular thoughts and increased falls that seem to have increased in the past 3 weeks according to his neighbor and son. Two sets of CT head were negative for an acute / subacute bleed. Workup for reversible causes was unrevealing. No urinary retention, labs have been normal. TSH, RPR, B12, Folate, UDS, ETOH Ammonia are within normal limits. MRI showed only moderate atrophy.     Patient is now more alert with more logical thinking. It is possible that the patient does have underlying dementia and was not taking his  medications correctly at home, now being inpatient with scheduled medications he has improved. There also may be a component to a concussion after the patient's fall.     Plan:  -PT/OT recommended SNF placement. Patient has been accepted and will be discharged there today.   -Symptoms are likely chronic as he is now more back to baseline. He will need cognition evaluation for dementia outpatient.     Chronic venous stasis dermatitis of both lower extremities  Assessment & Plan  Patient with chronic venous stasis changes and wounds to BLLE. This was confirmed in his records. [facility]     Plan  -Wound care will continue upon discharge at the [facility]    Aortic valve stenosis  Assessment & Plan  Patient with severe aortic valve stenosis. [facility] which showed the severe stenosis and proximal 90% LAD artery stenosis. Recommendations were to followup at the [facility] for complex stenting and a TAVR. The patient is presumably pending this procedure on 11/5. This may need to be rescheduled because of his new SNF placement, patient is aware and will make the appropriate arrangements.    Pulmonary hypertension (HCC)- (present on admission)  Assessment & Plan  Moderate pulmonary hypertension seen on heart cath [facility] the [facility] 7/2019     Plan:  -RV pressure 51/13, PAH pressure 54/18 with a mean of 32    Anemia  Assessment & Plan  -Hgb has been stable. Anemia appears chronic. He does have a history of vitamin B12 deficiency and is on replacement. B12 on admission was normal. No further workup indicated at this time as it is stable.       Coronary artery disease involving native coronary artery  Assessment & Plan  Proximal 90% lesion [facility]. Patient is pending complex stenting procedure 11/5/19.    Plan  -We will continue his regular cardiac medications inpatient, his med list has been confrimed  -Troponin trend showed no elevation, EKG without acute ischemic  changes    -Echocardiogram is similar to prior when [facility] to [facility] records. No acute changes     Pacemaker  Assessment & Plan  He had a MICRA transcatheter RV pacer [facility] in . Last p[facility] check was [facility] at the [facility] and showed 52% of the time for his HR to be 40-50 without any arrhythmias or other events at that time.     Plan  -Continue to monitor HR of 40 as this appears to be his baseline    Atrial flutter, chronic (HCC)- (present on admission)  Assessment & Plan  Patient with longstanding atrial flutter with slow ventricular response due to intrinsic AV node disease. His HR is usually in the 40s per records.   -Conitnue Apixiban

## 2019-12-23 ENCOUNTER — APPOINTMENT (OUTPATIENT)
Dept: RADIOLOGY | Facility: MEDICAL CENTER | Age: 79
End: 2019-12-23
Attending: EMERGENCY MEDICINE
Payer: COMMERCIAL

## 2019-12-23 ENCOUNTER — HOSPITAL ENCOUNTER (OUTPATIENT)
Facility: MEDICAL CENTER | Age: 79
End: 2019-12-30
Attending: EMERGENCY MEDICINE | Admitting: INTERNAL MEDICINE
Payer: COMMERCIAL

## 2019-12-23 DIAGNOSIS — R07.9 CHEST PAIN, UNSPECIFIED TYPE: ICD-10-CM

## 2019-12-23 DIAGNOSIS — I35.0 AORTIC STENOSIS, SEVERE: ICD-10-CM

## 2019-12-23 DIAGNOSIS — R41.89 COGNITIVE DECLINE: ICD-10-CM

## 2019-12-23 DIAGNOSIS — I87.2 CHRONIC VENOUS STASIS DERMATITIS OF BOTH LOWER EXTREMITIES: ICD-10-CM

## 2019-12-23 DIAGNOSIS — F03.90 DEMENTIA WITHOUT BEHAVIORAL DISTURBANCE, UNSPECIFIED DEMENTIA TYPE: ICD-10-CM

## 2019-12-23 DIAGNOSIS — I25.10 CORONARY ARTERY DISEASE INVOLVING NATIVE HEART WITHOUT ANGINA PECTORIS, UNSPECIFIED VESSEL OR LESION TYPE: ICD-10-CM

## 2019-12-23 PROBLEM — E03.9 HYPOTHYROIDISM: Status: ACTIVE | Noted: 2019-12-23

## 2019-12-23 LAB
ALBUMIN SERPL BCP-MCNC: 3.3 G/DL (ref 3.2–4.9)
ALBUMIN/GLOB SERPL: 1.1 G/DL
ALP SERPL-CCNC: 73 U/L (ref 30–99)
ALT SERPL-CCNC: 9 U/L (ref 2–50)
ANION GAP SERPL CALC-SCNC: 10 MMOL/L (ref 0–11.9)
APPEARANCE UR: CLEAR
AST SERPL-CCNC: 16 U/L (ref 12–45)
BASOPHILS # BLD AUTO: 0.7 % (ref 0–1.8)
BASOPHILS # BLD: 0.06 K/UL (ref 0–0.12)
BILIRUB SERPL-MCNC: 0.5 MG/DL (ref 0.1–1.5)
BILIRUB UR QL STRIP.AUTO: NEGATIVE
BUN SERPL-MCNC: 20 MG/DL (ref 8–22)
CALCIUM SERPL-MCNC: 8.7 MG/DL (ref 8.5–10.5)
CHLORIDE SERPL-SCNC: 107 MMOL/L (ref 96–112)
CO2 SERPL-SCNC: 24 MMOL/L (ref 20–33)
COLOR UR: YELLOW
CREAT SERPL-MCNC: 0.87 MG/DL (ref 0.5–1.4)
EKG IMPRESSION: NORMAL
EOSINOPHIL # BLD AUTO: 0.18 K/UL (ref 0–0.51)
EOSINOPHIL NFR BLD: 2.1 % (ref 0–6.9)
ERYTHROCYTE [DISTWIDTH] IN BLOOD BY AUTOMATED COUNT: 59.7 FL (ref 35.9–50)
GLOBULIN SER CALC-MCNC: 2.9 G/DL (ref 1.9–3.5)
GLUCOSE SERPL-MCNC: 92 MG/DL (ref 65–99)
GLUCOSE UR STRIP.AUTO-MCNC: NEGATIVE MG/DL
HCT VFR BLD AUTO: 38 % (ref 42–52)
HGB BLD-MCNC: 11.8 G/DL (ref 14–18)
IMM GRANULOCYTES # BLD AUTO: 0.03 K/UL (ref 0–0.11)
IMM GRANULOCYTES NFR BLD AUTO: 0.3 % (ref 0–0.9)
KETONES UR STRIP.AUTO-MCNC: NEGATIVE MG/DL
LEUKOCYTE ESTERASE UR QL STRIP.AUTO: NEGATIVE
LYMPHOCYTES # BLD AUTO: 1.72 K/UL (ref 1–4.8)
LYMPHOCYTES NFR BLD: 19.8 % (ref 22–41)
MCH RBC QN AUTO: 30.9 PG (ref 27–33)
MCHC RBC AUTO-ENTMCNC: 31.1 G/DL (ref 33.7–35.3)
MCV RBC AUTO: 99.5 FL (ref 81.4–97.8)
MICRO URNS: NORMAL
MONOCYTES # BLD AUTO: 0.9 K/UL (ref 0–0.85)
MONOCYTES NFR BLD AUTO: 10.3 % (ref 0–13.4)
NEUTROPHILS # BLD AUTO: 5.81 K/UL (ref 1.82–7.42)
NEUTROPHILS NFR BLD: 66.8 % (ref 44–72)
NITRITE UR QL STRIP.AUTO: NEGATIVE
NRBC # BLD AUTO: 0 K/UL
NRBC BLD-RTO: 0 /100 WBC
PH UR STRIP.AUTO: 5.5 [PH] (ref 5–8)
PLATELET # BLD AUTO: 264 K/UL (ref 164–446)
PMV BLD AUTO: 10.5 FL (ref 9–12.9)
POTASSIUM SERPL-SCNC: 4.2 MMOL/L (ref 3.6–5.5)
PROT SERPL-MCNC: 6.2 G/DL (ref 6–8.2)
PROT UR QL STRIP: NEGATIVE MG/DL
RBC # BLD AUTO: 3.82 M/UL (ref 4.7–6.1)
RBC UR QL AUTO: NEGATIVE
SODIUM SERPL-SCNC: 141 MMOL/L (ref 135–145)
SP GR UR STRIP.AUTO: 1.01
TROPONIN T SERPL-MCNC: 30 NG/L (ref 6–19)
TROPONIN T SERPL-MCNC: 32 NG/L (ref 6–19)
UROBILINOGEN UR STRIP.AUTO-MCNC: 0.2 MG/DL
WBC # BLD AUTO: 8.7 K/UL (ref 4.8–10.8)

## 2019-12-23 PROCEDURE — 94760 N-INVAS EAR/PLS OXIMETRY 1: CPT

## 2019-12-23 PROCEDURE — 84484 ASSAY OF TROPONIN QUANT: CPT | Mod: 91

## 2019-12-23 PROCEDURE — 71045 X-RAY EXAM CHEST 1 VIEW: CPT

## 2019-12-23 PROCEDURE — 700102 HCHG RX REV CODE 250 W/ 637 OVERRIDE(OP): Performed by: INTERNAL MEDICINE

## 2019-12-23 PROCEDURE — G0378 HOSPITAL OBSERVATION PER HR: HCPCS

## 2019-12-23 PROCEDURE — A9270 NON-COVERED ITEM OR SERVICE: HCPCS | Performed by: INTERNAL MEDICINE

## 2019-12-23 PROCEDURE — 80053 COMPREHEN METABOLIC PANEL: CPT

## 2019-12-23 PROCEDURE — 70450 CT HEAD/BRAIN W/O DYE: CPT

## 2019-12-23 PROCEDURE — 99285 EMERGENCY DEPT VISIT HI MDM: CPT

## 2019-12-23 PROCEDURE — 99218 PR INITIAL OBSERVATION CARE,LEVL I: CPT | Mod: GC | Performed by: INTERNAL MEDICINE

## 2019-12-23 PROCEDURE — 85025 COMPLETE CBC W/AUTO DIFF WBC: CPT

## 2019-12-23 PROCEDURE — 81003 URINALYSIS AUTO W/O SCOPE: CPT

## 2019-12-23 PROCEDURE — 36415 COLL VENOUS BLD VENIPUNCTURE: CPT

## 2019-12-23 PROCEDURE — 93005 ELECTROCARDIOGRAM TRACING: CPT | Performed by: EMERGENCY MEDICINE

## 2019-12-23 PROCEDURE — 93005 ELECTROCARDIOGRAM TRACING: CPT

## 2019-12-23 RX ORDER — LEVOTHYROXINE SODIUM 0.03 MG/1
50 TABLET ORAL
Status: DISCONTINUED | OUTPATIENT
Start: 2019-12-23 | End: 2019-12-30 | Stop reason: HOSPADM

## 2019-12-23 RX ORDER — LABETALOL HYDROCHLORIDE 5 MG/ML
10 INJECTION, SOLUTION INTRAVENOUS EVERY 4 HOURS PRN
Status: DISCONTINUED | OUTPATIENT
Start: 2019-12-23 | End: 2019-12-30 | Stop reason: HOSPADM

## 2019-12-23 RX ORDER — BACLOFEN 10 MG/1
15 TABLET ORAL 2 TIMES DAILY
Status: DISCONTINUED | OUTPATIENT
Start: 2019-12-23 | End: 2019-12-30 | Stop reason: HOSPADM

## 2019-12-23 RX ORDER — ATORVASTATIN CALCIUM 40 MG/1
40 TABLET, FILM COATED ORAL DAILY
Status: DISCONTINUED | OUTPATIENT
Start: 2019-12-23 | End: 2019-12-24

## 2019-12-23 RX ORDER — ALLOPURINOL 100 MG/1
100 TABLET ORAL DAILY
Status: DISCONTINUED | OUTPATIENT
Start: 2019-12-23 | End: 2019-12-30 | Stop reason: HOSPADM

## 2019-12-23 RX ORDER — LIDOCAINE HYDROCHLORIDE 20 MG/ML
JELLY TOPICAL
Status: DISCONTINUED | OUTPATIENT
Start: 2019-12-23 | End: 2019-12-30 | Stop reason: HOSPADM

## 2019-12-23 RX ORDER — OMEPRAZOLE 20 MG/1
20 CAPSULE, DELAYED RELEASE ORAL DAILY
Status: DISCONTINUED | OUTPATIENT
Start: 2019-12-23 | End: 2019-12-30 | Stop reason: HOSPADM

## 2019-12-23 RX ORDER — AMOXICILLIN 250 MG
2 CAPSULE ORAL 2 TIMES DAILY
Status: DISCONTINUED | OUTPATIENT
Start: 2019-12-23 | End: 2019-12-30 | Stop reason: HOSPADM

## 2019-12-23 RX ORDER — BISACODYL 10 MG
10 SUPPOSITORY, RECTAL RECTAL
Status: DISCONTINUED | OUTPATIENT
Start: 2019-12-23 | End: 2019-12-30 | Stop reason: HOSPADM

## 2019-12-23 RX ORDER — ACETAMINOPHEN 325 MG/1
650 TABLET ORAL EVERY 6 HOURS PRN
Status: DISCONTINUED | OUTPATIENT
Start: 2019-12-23 | End: 2019-12-30 | Stop reason: HOSPADM

## 2019-12-23 RX ORDER — BACLOFEN 10 MG/1
15 TABLET ORAL 2 TIMES DAILY
COMMUNITY
End: 2021-06-21

## 2019-12-23 RX ORDER — POLYETHYLENE GLYCOL 3350 17 G/17G
1 POWDER, FOR SOLUTION ORAL
Status: DISCONTINUED | OUTPATIENT
Start: 2019-12-23 | End: 2019-12-30 | Stop reason: HOSPADM

## 2019-12-23 RX ORDER — TRAZODONE HYDROCHLORIDE 100 MG/1
200 TABLET ORAL NIGHTLY
Status: ON HOLD | COMMUNITY
End: 2021-06-23

## 2019-12-23 RX ORDER — TRAZODONE HYDROCHLORIDE 50 MG/1
50 TABLET ORAL NIGHTLY
Status: DISCONTINUED | OUTPATIENT
Start: 2019-12-23 | End: 2019-12-30 | Stop reason: HOSPADM

## 2019-12-23 RX ORDER — CLOPIDOGREL BISULFATE 75 MG/1
75 TABLET ORAL DAILY
Status: DISCONTINUED | OUTPATIENT
Start: 2019-12-23 | End: 2019-12-30 | Stop reason: HOSPADM

## 2019-12-23 RX ORDER — PANTOPRAZOLE SODIUM 20 MG/1
20 TABLET, DELAYED RELEASE ORAL DAILY
COMMUNITY
End: 2023-04-18

## 2019-12-23 RX ORDER — ACETAMINOPHEN 500 MG
1000 TABLET ORAL 3 TIMES DAILY
Status: ON HOLD | COMMUNITY
End: 2021-06-23

## 2019-12-23 RX ADMIN — OMEPRAZOLE 20 MG: 20 CAPSULE, DELAYED RELEASE ORAL at 14:01

## 2019-12-23 RX ADMIN — ALLOPURINOL 100 MG: 100 TABLET ORAL at 14:01

## 2019-12-23 RX ADMIN — MELATONIN 1000 UNITS: at 14:01

## 2019-12-23 RX ADMIN — ATORVASTATIN CALCIUM 40 MG: 40 TABLET, FILM COATED ORAL at 14:01

## 2019-12-23 RX ADMIN — BACLOFEN 15 MG: 10 TABLET ORAL at 20:42

## 2019-12-23 RX ADMIN — Medication 400 MG: at 20:42

## 2019-12-23 RX ADMIN — METFORMIN HYDROCHLORIDE 500 MG: 500 TABLET ORAL at 14:01

## 2019-12-23 RX ADMIN — TRAZODONE HYDROCHLORIDE 50 MG: 50 TABLET ORAL at 20:43

## 2019-12-23 RX ADMIN — APIXABAN 5 MG: 5 TABLET, FILM COATED ORAL at 20:42

## 2019-12-23 RX ADMIN — CLOPIDOGREL BISULFATE 75 MG: 75 TABLET ORAL at 14:01

## 2019-12-23 ASSESSMENT — COGNITIVE AND FUNCTIONAL STATUS - GENERAL
MOVING TO AND FROM BED TO CHAIR: A LITTLE
MOVING FROM LYING ON BACK TO SITTING ON SIDE OF FLAT BED: A LITTLE
WALKING IN HOSPITAL ROOM: A LITTLE
MOBILITY SCORE: 18
TURNING FROM BACK TO SIDE WHILE IN FLAT BAD: A LITTLE
DRESSING REGULAR UPPER BODY CLOTHING: A LITTLE
EATING MEALS: A LITTLE
SUGGESTED CMS G CODE MODIFIER DAILY ACTIVITY: CK
SUGGESTED CMS G CODE MODIFIER MOBILITY: CK
DRESSING REGULAR LOWER BODY CLOTHING: A LITTLE
HELP NEEDED FOR BATHING: A LITTLE
STANDING UP FROM CHAIR USING ARMS: A LITTLE
PERSONAL GROOMING: A LITTLE
CLIMB 3 TO 5 STEPS WITH RAILING: A LITTLE
DAILY ACTIVITIY SCORE: 18
TOILETING: A LITTLE

## 2019-12-23 ASSESSMENT — ENCOUNTER SYMPTOMS
EYE REDNESS: 0
PALPITATIONS: 0
SINUS PAIN: 0
NAUSEA: 0
ABDOMINAL PAIN: 0
COUGH: 0
SPUTUM PRODUCTION: 0
DEPRESSION: 0
FLANK PAIN: 0
BRUISES/BLEEDS EASILY: 0
STRIDOR: 0
DIZZINESS: 0
PND: 0
SHORTNESS OF BREATH: 0
DOUBLE VISION: 0
FOCAL WEAKNESS: 0
CHILLS: 0
SENSORY CHANGE: 0
VOMITING: 0
WEAKNESS: 0
NERVOUS/ANXIOUS: 0
FALLS: 1
TREMORS: 0
HEADACHES: 0
FEVER: 0
ORTHOPNEA: 0
DIAPHORESIS: 0
EYE PAIN: 0
EYE DISCHARGE: 0

## 2019-12-23 ASSESSMENT — LIFESTYLE VARIABLES
SUBSTANCE_ABUSE: 0
HOW MANY TIMES IN THE PAST YEAR HAVE YOU HAD 5 OR MORE DRINKS IN A DAY: 0
EVER HAD A DRINK FIRST THING IN THE MORNING TO STEADY YOUR NERVES TO GET RID OF A HANGOVER: NO
ON A TYPICAL DAY WHEN YOU DRINK ALCOHOL HOW MANY DRINKS DO YOU HAVE: 0
ALCOHOL_USE: NO
TOTAL SCORE: 0
CONSUMPTION TOTAL: NEGATIVE
TOTAL SCORE: 0
TOTAL SCORE: 0
HAVE PEOPLE ANNOYED YOU BY CRITICIZING YOUR DRINKING: NO
HAVE YOU EVER FELT YOU SHOULD CUT DOWN ON YOUR DRINKING: NO
AVERAGE NUMBER OF DAYS PER WEEK YOU HAVE A DRINK CONTAINING ALCOHOL: 0
EVER FELT BAD OR GUILTY ABOUT YOUR DRINKING: NO

## 2019-12-23 ASSESSMENT — PATIENT HEALTH QUESTIONNAIRE - PHQ9
1. LITTLE INTEREST OR PLEASURE IN DOING THINGS: NOT AT ALL
2. FEELING DOWN, DEPRESSED, IRRITABLE, OR HOPELESS: NOT AT ALL
SUM OF ALL RESPONSES TO PHQ9 QUESTIONS 1 AND 2: 0

## 2019-12-23 ASSESSMENT — CHA2DS2 SCORE
CHA2DS2 VASC SCORE: 4
CHF OR LEFT VENTRICULAR DYSFUNCTION: NO
AGE 75 OR GREATER: YES
AGE 65 TO 74: NO
HYPERTENSION: YES
SEX: MALE
DIABETES: YES
VASCULAR DISEASE: NO
PRIOR STROKE OR TIA OR THROMBOEMBOLISM: NO

## 2019-12-23 NOTE — H&P
Internal Medicine Admitting History and Physical    Note Author: Kelechi Burr M.D.       Name Kedar Woods     1940   Age/Sex 79 y.o. male   MRN [id number]   Code Status      After 5PM or if no immediate response to page, please call for cross-coverage  Attending/Team: Dr. Manriquez/Blue See Patient List for primary contact information  Call [phone] to page    1st Call - Day Intern (R1):   Dr. Burr 2nd Call - Day Sr. Resident (R2/R3):   Dr. Alvarez       Chief Complaint:   Chest pain     HPI:  79 year old male with a PMH of aortic valve stenosis status post valve replacement in 2019, coronary artery disease status post stent placement in 2019, pulmonary hypertension from chart review., chronic venous stasis, pacemaker [MICRA] placement in 2019, chronic atrial flutter, hypothyroidism, borderline diabetes who was recently admitted in October for altered mental status work-up negative who presents on this admission with chest pain and having had a fall on Saturday, 2019.  He is however not able to recollect details of the chest pain.  States that he still has some chest pain exacerbated with palpation and is located on left mid chest.  Patient also reports a fall on 2019. Denies any loss of consciousness following that and states that it was a mechanical fall were he tripped over something.   Patient was recently discharged form [facility] and as per ED physician's note, they were able to talk to his power of  who states that he has worsening dementia.   He denies any palpitations/orthopnea/dyspnea, PND.   He states that he lives alone and his able to do all ADLs and IADLs .     At the Ed his vitals were stable with a blood pressure of 162/58, SC of 75, RR of 10?=16 later, temperature of 97.8.   His labs showed normocytic anemia with MCV on the higher side.   First troponin was 32  Cxray wnl   CT head showed diffuse white matter changes but no  acute process.    Review of Systems   Constitutional: Negative for chills, diaphoresis, fever and malaise/fatigue.   HENT: Negative for hearing loss and sinus pain.    Eyes: Negative for double vision, pain, discharge and redness.        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 Unable to say which one and states that the other eye compensated for it.    Respiratory: Negative for cough, sputum production, shortness of breath and stridor.    Cardiovascular: Positive for chest pain and leg swelling. Negative for palpitations, orthopnea and PND.   Gastrointestinal: Negative for abdominal pain, nausea and vomiting.   Genitourinary: Negative for dysuria, flank pain, frequency, hematuria and urgency.   Musculoskeletal: Positive for falls.   Skin: Negative for itching and rash.   Neurological: Negative for dizziness, tremors, sensory change, focal weakness, weakness and headaches.   Endo/Heme/Allergies: Negative for environmental allergies. Does not bruise/bleed easily.   Psychiatric/Behavioral: Negative for depression and substance abuse. The patient is not nervous/anxious.              Past Medical History (Chronic medical problem, known complications and current treatment)    Aortic valve stenosis status post valve replacement in November 2019,   Coronary artery disease status post stent placement in November 2019,  Pulmonary hypertension from chart review.,  Chronic venous stasis, pacemaker [MICRA] placement in February 2019,  Chronic atrial flutter,   Hypothyroidism on levothyroxine    Borderline diabetes on metformin. -last Hba1C= 5.6 in 10/2019    Past Surgical History:  No past surgical history on file.   CAD stent placement  Aortic valve replacement    Current Outpatient Medications:  Home Medications     Reviewed by Nayan Hutson (Pharmacy Tech) on 12/23/19 at 1213  Med List Status: Complete   Medication Last Dose Status   acetaminophen (TYLENOL) 325 MG Tab unknown Active   allopurinol (ZYLOPRIM) 100 MG Tab  unknown Active   apixaban (ELIQUIS) 5mg Tab unknown Active   atorvastatin (LIPITOR) 40 MG Tab unknown Active   baclofen (LIORESAL) 10 MG Tab unknown Active   clopidogrel (PLAVIX) 75 MG Tab unknown Active   levothyroxine (SYNTHROID) 50 MCG Tab unknown Active   Magnesium Oxide 420 MG Tab unknown Active   metFORMIN (GLUCOPHAGE) 500 MG Tab unknown Active   pantoprazole (PROTONIX) 20 MG tablet unknown Active   traZODone (DESYREL) 50 MG Tab unknown Active   vitamin D (CHOLECALCIFEROL) 1000 UNIT Tab unknown Active                Medication Allergy/Sensitivities:  Allergies   Allergen Reactions   • Codeine Nausea         Family History (mandatory)   No family history on file.    Social History (mandatory)   Social History     Socioeconomic History   • Marital status:      Spouse name: Not on file   • Number of children: Not on file   • Years of education: Not on file   • Highest education level: Not on file   Occupational History   • Not on file   Social Needs   • Financial resource strain: Not on file   • Food insecurity:     Worry: Not on file     Inability: Not on file   • Transportation needs:     Medical: Not on file     Non-medical: Not on file   Tobacco Use   • Smoking status: Never Smoker   • Smokeless tobacco: Never Used   Substance and Sexual Activity   • Alcohol use: Yes     Frequency: 2-3 times a week     Drinks per session: 3 or 4     Binge frequency: Never   • Drug use: Never   • Sexual activity: Not on file   Lifestyle   • Physical activity:     Days per week: Not on file     Minutes per session: Not on file   • Stress: Not on file   Relationships   • Social connections:     Talks on phone: Not on file     Gets together: Not on file     Attends Che[employer] service: Not on file     Active member of club or organization: Not on file     Attends meetings of clubs or organizations: Not on file     Relationship status: Not on file   • Intimate partner violence:     Fear of current or ex partner: Not on file      Emotionally abused: Not on file     Physically abused: Not on file     Forced sexual activity: Not on file   Other Topics Concern   • Not on file   Social History Narrative   • Not on file     Living situation: By himself.   PCP : Pcp Pt States None    Physical Exam     Vitals:    12/23/19 0948 12/23/19 0950 12/23/19 1132 12/23/19 1201   BP: (!) 162/58  152/57 147/54   Pulse: 75  60 60   Resp: (!) 10  (!) 10 16   Temp:  36.6 °C (97.8 °F)     TempSrc:  Temporal     SpO2: 98%  100% 99%   Weight:       Height:         Body mass index is 29.69 kg/m².  O2 therapy: Pulse Oximetry: 99 %    Physical Exam   Constitutional: He is well-developed, well-nourished, and in no distress. No distress.   Overweight gentle man lying comfortably on bed.    HENT:   Head: Normocephalic and atraumatic.   Mouth/Throat: No oropharyngeal exudate.   Poor oral hygiene.   Eyes: Pupils are equal, round, and reactive to light. Conjunctivae and EOM are normal. Right eye exhibits no discharge. Left eye exhibits no discharge. No scleral icterus.   Neck: Normal range of motion. Neck supple. No thyromegaly present.   Cardiovascular: Normal rate, regular rhythm, normal heart sounds and intact distal pulses.   No murmur heard.  Pulmonary/Chest: Effort normal and breath sounds normal. No stridor. No respiratory distress. He has no wheezes. He has no rales.   Abdominal: Soft. Bowel sounds are normal. He exhibits no distension. There is no tenderness. There is no rebound and no guarding.   Musculoskeletal:         General: Edema present. No tenderness or deformity.      Comments: B/L pedal edema with erythema and venous stasis skin changes upto knee. Compression stockings in place and patient has painful ROM of legs.    Neurological: He is alert. No cranial nerve deficit. He exhibits normal muscle tone.   Patient oriented to person and place. Stated that year is 1920, however knows who the president is.      Skin: Skin is warm and dry. No rash noted. He  is not diaphoretic. There is erythema.   Erythema present on B/L foot.    Psychiatric: Affect normal.         Data Review       Old Records Request:   Deferred  Current Records review/summary: Completed    Lab Data Review:  Recent Results (from the past 24 hour(s))   EKG (NOW)    Collection Time: 19  9:46 AM   Result Value Ref Range    Report       [facility]    Test Date:  2019  Pt Name:    JAMISON ZAYAS           Department: ER  MRN:        [id number]                      Room:        01  Gender:     Male                         Technician: [id number]  :        1940                   Requested By:ER TRIAGE PROTOCOL  Order #:    [id number]                    Reading MD: SANDIP ZUNIGA MD    Measurements  Intervals                                Axis  Rate:       60                           P:          0  WI:         59                           QRS:        121  QRSD:       163                          T:          45  QT:         535  QTc:        535    Interpretive Statements  Ventricular-paced complexes  No further analysis attempted due to paced rhythm  Compared to ECG 10/23/2019 09:31:19  Intraventricular conduction delay no longer present  Electronically Signed On 2019 10:08:31 PST by SANDIP ZUNIGA MD     CBC with Differential    Collection Time: 19  9:50 AM   Result Value Ref Range    WBC 8.7 4.8 - 10.8 K/uL    RBC 3.82 (L) 4.70 - 6.10 M/uL    Hemoglobin 11.8 (L) 14.0 - 18.0 g/dL    Hematocrit 38.0 (L) 42.0 - 52.0 %    MCV 99.5 (H) 81.4 - 97.8 fL    MCH 30.9 27.0 - 33.0 pg    MCHC 31.1 (L) 33.7 - 35.3 g/dL    RDW 59.7 (H) 35.9 - 50.0 fL    Platelet Count 264 164 - 446 K/uL    MPV 10.5 9.0 - 12.9 fL    Neutrophils-Polys 66.80 44.00 - 72.00 %    Lymphocytes 19.80 (L) 22.00 - 41.00 %    Monocytes 10.30 0.00 - 13.40 %    Eosinophils 2.10 0.00 - 6.90 %    Basophils 0.70 0.00 - 1.80 %    Immature Granulocytes 0.30 0.00 - 0.90 %    Nucleated  RBC 0.00 /100 WBC    Neutrophils (Absolute) 5.81 1.82 - 7.42 K/uL    Lymphs (Absolute) 1.72 1.00 - 4.80 K/uL    Monos (Absolute) 0.90 (H) 0.00 - 0.85 K/uL    Eos (Absolute) 0.18 0.00 - 0.51 K/uL    Baso (Absolute) 0.06 0.00 - 0.12 K/uL    Immature Granulocytes (abs) 0.03 0.00 - 0.11 K/uL    NRBC (Absolute) 0.00 K/uL   Complete Metabolic Panel (CMP)    Collection Time: 12/23/19  9:50 AM   Result Value Ref Range    Sodium 141 135 - 145 mmol/L    Potassium 4.2 3.6 - 5.5 mmol/L    Chloride 107 96 - 112 mmol/L    Co2 24 20 - 33 mmol/L    Anion Gap 10.0 0.0 - 11.9    Glucose 92 65 - 99 mg/dL    Bun 20 8 - 22 mg/dL    Creatinine 0.87 0.50 - 1.40 mg/dL    Calcium 8.7 8.5 - 10.5 mg/dL    AST(SGOT) 16 12 - 45 U/L    ALT(SGPT) 9 2 - 50 U/L    Alkaline Phosphatase 73 30 - 99 U/L    Total Bilirubin 0.5 0.1 - 1.5 mg/dL    Albumin 3.3 3.2 - 4.9 g/dL    Total Protein 6.2 6.0 - 8.2 g/dL    Globulin 2.9 1.9 - 3.5 g/dL    A-G Ratio 1.1 g/dL   Troponin    Collection Time: 12/23/19  9:50 AM   Result Value Ref Range    Troponin T 32 (H) 6 - 19 ng/L   ESTIMATED GFR    Collection Time: 12/23/19  9:50 AM   Result Value Ref Range    GFR If African American >60 >60 mL/min/1.73 m 2    GFR If Non African American >60 >60 mL/min/1.73 m 2   URINALYSIS,CULTURE IF INDICATED    Collection Time: 12/23/19 12:35 PM   Result Value Ref Range    Color Yellow     Character Clear     Specific Gravity 1.010 <1.035    Ph 5.5 5.0 - 8.0    Glucose Negative Negative mg/dL    Ketones Negative Negative mg/dL    Protein Negative Negative mg/dL    Bilirubin Negative Negative    Urobilinogen, Urine 0.2 Negative    Nitrite Negative Negative    Leukocyte Esterase Negative Negative    Occult Blood Negative Negative    Micro Urine Req see below    Troponin in four (4) hours    Collection Time: 12/23/19  4:34 PM   Result Value Ref Range    Troponin T 30 (H) 6 - 19 ng/L       Imaging/Procedures Review:    Independant Imaging Review: Completed  CT-HEAD W/O   Final Result       1.  Diffuse atrophy and white matter changes.   2.  No acute intracranial hemorrhage or territorial infarct.         DX-CHEST-PORTABLE (1 VIEW)   Final Result         1. No acute cardiopulmonary abnormalities are identified.               EKG:   EKG Independent Review: Completed  QTc:535, HR: 60, Normal Sinus Rhythm, no ST/T changes     Records reviewed and summarized in current documentation :  No  [employer] teaching service handout given to patient:  No         Assessment/Plan     * Chest pain likely musculoskeletal  Assessment & Plan  Unable to obtain details of the chest pain.   Tender to palpation at left mid chest near 4-5 th intercostal space.  Troponin 32  EKG : V- Paced rhythm     Plan :  -Serial EKG  -Trend trop  -Lidocaine gel.   -Telemetry monitoring.  -Continue statin, plavix and apixaban .  -Lipid panel    Chronic venous stasis dermatitis of both lower extremities- (present on admission)  Assessment & Plan  Chronic venous stasis, compression stockings in place.     Plan :  Wound care     Aortic stenosis, severe  Assessment & Plan  S/P TAVR  In November 2019    Plan :  Will get records from [facility]    Cognitive decline  Assessment & Plan  States that he lives alone and performs all ADLs and IADLs     Plan :  Will Get PT/OT to assess if patient can be discharged home or requires placement.     Hypothyroidism  Assessment & Plan  Patient is on levothyroxine. Last TSH from 10/2019 was 0.9.     Plan :  Will continue home thyroxine dose.     Normocytic anemia- (present on admission)  Assessment & Plan  With MCV of 99. 5. At hi sbaseline.   B12 and Folate from 10/2019 was wnl.     Plan :  Will continue to monitor.     CAD (coronary artery disease)  Assessment & Plan  S/p stent placement at [facility] in November.     Plan :  --May need to be on beta blocker -Will get records from [facility].  -Lipid panel   - Continue statin, plavix , apixaban        Atrial flutter (HCC)  Assessment & Plan  Patient is in V paced rhythm on  admission.     Plan :  Telemetry monitoring   Continue anticoagulation      Anticipated Hospital stay: Observation admit        Quality Measures  Quality-Core Measures  PCP: Pcp Pt States None

## 2019-12-23 NOTE — SENIOR ADMIT NOTE
Senior Admit Note    Pertinent History  Kedar Woods is a 79 y.o. male Black with a past medical history of severe aortic stenosis status post TAVR, coronary artery disease status post stent, chronic lower extremity venous stasis related wounds, RV pacer and atrial flutter who presented to the hospital via EMS for chest pain. Of note, patient was admitted in October for altered mental status and was discharged to [facility]. Patient reports that since then he has been to [place] where he had his TAVR and stent placed without complications.     Patient is a poor historian and reports that he had chest pain and a mechanical fall earlier today. He reports his chest pain was substernally and unsure of how and when it started. He reports that the pain has since resolved and slightly tender at this time.     In the ED, patient was noted to be hemodynamically stable with BP of 162/58. Patient was noted to have troponin of 32 and EKG showed Vpaced rhythm similar to his previous EKGs. Chest x-ray and CT head were unremarkable.     Pertinent Physical Exam:   General: Laying in bed in no acute distress   Heart: RRR  Lungs: CTA   Chest: Tender to palpation at left 5-6 intercostal space without overlying edema or erythema   Extremities: 1+ pitting edema with erythema, wrapped in bandage       Assessment & Plan  # Chest pain, likely costochondritis   # Chronic anemia   # Coronary artery disease status post stent  # Severe aortic stenosis status post TAVR  # Chronic lower extremity wounds   # Atrial flutter     Plan:   Patient's chest pain is reproducible on exam and may be related to costochondritis. Pain does not seem cardiogenic, although history is limited. Initial troponins and EKG are unrevealing.   - Admit for observation to telemetry  - Obtain outside records   - Patient is not on a beta blocker or aspirin for unknown reason   - Trend troponin and EKGs   - Check Lipid panel   - Continue  statin, plavix and apixaban   - Lidocaine gel prn  - PT/OT for discharge planning    Please see Dr. Burr's H&P for full details

## 2019-12-23 NOTE — ED PROVIDER NOTES
ED Provider Note    CHIEF COMPLAINT  Chief Complaint   Patient presents with   • Chest Pain        HPI  Kedar Woods is a 79 y.o. male who presents to the ED with complaints of chest pain.  However, the patient does have a significant history of dementia.  [facility]  Today apparently he spoke to his neighbor and stated that he was having chest pain so an ambulance was called and the patient was brought to the emergency department for evaluation.  Upon arrival here the patient is alert to person place but not time or events he currently is asymptomatic has no chest pain but is very confused.  We did speak with his power of  who is in [place] and apparently his dementia is progressively worsening he is much worse today according to a phone call then he has been he [facility] just [facility].  At this point it seems that the concern of the [facility] as well as the power of  as the patient cannot care for himself.  At this point the patient has no other concerns he has no complaints but very difficult historian secondary to his dementia.    REVIEW OF SYSTEMS  See HPI for further details. All other systems are negative.  However cannot truly be obtained secondary to the patient's confusion and dementia    PAST MEDICAL HISTORY  Past Medical History:   Diagnosis Date   • Hypertension      Dementia  FAMILY HISTORY  No family history on file.      SOCIAL HISTORY  Social History     Socioeconomic History   • Marital status:      Spouse name: Not on file   • Number of children: Not on file   • Years of education: Not on file   • Highest education level: Not on file   Occupational History   • Not on file   Social Needs   • Financial resource strain: Not on file   • Food insecurity:     Worry: Not on file     Inability: Not on file   • Transportation needs:     Medical: Not on file     Non-medical: Not on file   Tobacco Use   • Smoking status: Never  Smoker   • Smokeless tobacco: Never Used   Substance and Sexual Activity   • Alcohol use: Yes     Frequency: 2-3 times a week     Drinks per session: 3 or 4     Binge frequency: Never   • Drug use: Never   • Sexual activity: Not on file   Lifestyle   • Physical activity:     Days per week: Not on file     Minutes per session: Not on file   • Stress: Not on file   Relationships   • Social connections:     Talks on phone: Not on file     Gets together: Not on file     Attends Quaker service: Not on file     Active member of club or organization: Not on file     Attends meetings of clubs or organizations: Not on file     Relationship status: Not on file   • Intimate partner violence:     Fear of current or ex partner: Not on file     Emotionally abused: Not on file     Physically abused: Not on file     Forced sexual activity: Not on file   Other Topics Concern   • Not on file   Social History Narrative   • Not on file      No primary care provider on file.      SURGICAL HISTORY  No past surgical history on file.    CURRENT MEDICATIONS  Home Medications     Reviewed by Nayan Hutson (Pharmacy Tech) on 12/23/19 at 1213  Med List Status: Complete   Medication Last Dose Status   acetaminophen (TYLENOL) 325 MG Tab unknown Active   allopurinol (ZYLOPRIM) 100 MG Tab unknown Active   apixaban (ELIQUIS) 5mg Tab unknown Active   atorvastatin (LIPITOR) 40 MG Tab unknown Active   baclofen (LIORESAL) 10 MG Tab unknown Active   clopidogrel (PLAVIX) 75 MG Tab unknown Active   levothyroxine (SYNTHROID) 50 MCG Tab unknown Active   Magnesium Oxide 420 MG Tab unknown Active   metFORMIN (GLUCOPHAGE) 500 MG Tab unknown Active   pantoprazole (PROTONIX) 20 MG tablet unknown Active   traZODone (DESYREL) 50 MG Tab unknown Active   vitamin D (CHOLECALCIFEROL) 1000 UNIT Tab unknown Active              No current facility-administered medications on file prior to encounter.      Current Outpatient Medications on File Prior to Encounter  "  Medication Sig Dispense Refill   • acetaminophen (TYLENOL) 325 MG Tab Take 650 mg by mouth every 6 hours as needed. Indications: Pain     • pantoprazole (PROTONIX) 20 MG tablet Take 20 mg by mouth every day.     • traZODone (DESYREL) 50 MG Tab Take 50 mg by mouth every evening.     • baclofen (LIORESAL) 10 MG Tab Take 15 mg by mouth 2 Times a Day.     • allopurinol (ZYLOPRIM) 100 MG Tab Take 100 mg by mouth every day.     • apixaban (ELIQUIS) 5mg Tab Take 5 mg by mouth 2 Times a Day.     • atorvastatin (LIPITOR) 40 MG Tab Take 40 mg by mouth every day.     • vitamin D (CHOLECALCIFEROL) 1000 UNIT Tab Take 1,000 Units by mouth every day.     • clopidogrel (PLAVIX) 75 MG Tab Take 75 mg by mouth every day.     • levothyroxine (SYNTHROID) 50 MCG Tab Take 50 mcg by mouth Every morning on an empty stomach.     • Magnesium Oxide 420 MG Tab Take 420 mg by mouth 2 Times a Day.     • metFORMIN (GLUCOPHAGE) 500 MG Tab Take 500 mg by mouth every day.         ALLERGIES  Allergies   Allergen Reactions   • Codeine Nausea       PHYSICAL EXAM  VITAL SIGNS: /54   Pulse 60   Temp 36.6 °C (97.8 °F) (Temporal)   Resp 16   Ht 1.854 m (6' 1\")   Wt 102.1 kg (225 lb)   SpO2 99%   BMI 29.69 kg/m²   Pulse Oximetry was obtained. It showed a reading of Pulse Oximetry: 98 %.  I interpreted this as non-hypoxic.   Constitutional: Well developed, Well nourished, No acute distress, Non-toxic appearance.   HENT: Head is normal without signs of trauma bilateral TMs are normal oropharynx dry mucous membranes  Eyes: Pupils are 3 mm equal round react light extraocular motions are intact  Neck: Normal range of motion, No tenderness, Supple, No stridor.   Cardiovascular: Normal heart rate, Normal rhythm, No murmurs, No rubs, No gallops. There are no carotid bruits.   Pulmonary: Normal breath sounds, No respiratory distress, No wheezing, No chest tenderness.   Abdomen: Bowel sounds normal, Soft, No tenderness, No masses, No pulsatile masses. "   Skin: Warm, Dry, No erythema, No rash.   Back: No tenderness, No CVA tenderness.   Extremities: Intact distal pulses, No edema, No tenderness, No cyanosis, No clubbing.  Patient has Unna boots on both lower extremities  Neurologic: Patient is moving all 4 extremities.  DTRs are 2+ and equal throughout however the patient is alert to person place but not time or events he thinks is 1919 slightly confused but otherwise moving all 4 extremities no focality  Psychiatric: As above    EKG  Interpreted below by myself    RADIOLOGY/PROCEDURES  CT-HEAD W/O   Final Result      1.  Diffuse atrophy and white matter changes.   2.  No acute intracranial hemorrhage or territorial infarct.         DX-CHEST-PORTABLE (1 VIEW)   Final Result         1. No acute cardiopulmonary abnormalities are identified.            Results for orders placed or performed during the hospital encounter of 12/23/19   CBC with Differential   Result Value Ref Range    WBC 8.7 4.8 - 10.8 K/uL    RBC 3.82 (L) 4.70 - 6.10 M/uL    Hemoglobin 11.8 (L) 14.0 - 18.0 g/dL    Hematocrit 38.0 (L) 42.0 - 52.0 %    MCV 99.5 (H) 81.4 - 97.8 fL    MCH 30.9 27.0 - 33.0 pg    MCHC 31.1 (L) 33.7 - 35.3 g/dL    RDW 59.7 (H) 35.9 - 50.0 fL    Platelet Count 264 164 - 446 K/uL    MPV 10.5 9.0 - 12.9 fL    Neutrophils-Polys 66.80 44.00 - 72.00 %    Lymphocytes 19.80 (L) 22.00 - 41.00 %    Monocytes 10.30 0.00 - 13.40 %    Eosinophils 2.10 0.00 - 6.90 %    Basophils 0.70 0.00 - 1.80 %    Immature Granulocytes 0.30 0.00 - 0.90 %    Nucleated RBC 0.00 /100 WBC    Neutrophils (Absolute) 5.81 1.82 - 7.42 K/uL    Lymphs (Absolute) 1.72 1.00 - 4.80 K/uL    Monos (Absolute) 0.90 (H) 0.00 - 0.85 K/uL    Eos (Absolute) 0.18 0.00 - 0.51 K/uL    Baso (Absolute) 0.06 0.00 - 0.12 K/uL    Immature Granulocytes (abs) 0.03 0.00 - 0.11 K/uL    NRBC (Absolute) 0.00 K/uL   Complete Metabolic Panel (CMP)   Result Value Ref Range    Sodium 141 135 - 145 mmol/L    Potassium 4.2 3.6 - 5.5 mmol/L     Chloride 107 96 - 112 mmol/L    Co2 24 20 - 33 mmol/L    Anion Gap 10.0 0.0 - 11.9    Glucose 92 65 - 99 mg/dL    Bun 20 8 - 22 mg/dL    Creatinine 0.87 0.50 - 1.40 mg/dL    Calcium 8.7 8.5 - 10.5 mg/dL    AST(SGOT) 16 12 - 45 U/L    ALT(SGPT) 9 2 - 50 U/L    Alkaline Phosphatase 73 30 - 99 U/L    Total Bilirubin 0.5 0.1 - 1.5 mg/dL    Albumin 3.3 3.2 - 4.9 g/dL    Total Protein 6.2 6.0 - 8.2 g/dL    Globulin 2.9 1.9 - 3.5 g/dL    A-G Ratio 1.1 g/dL   Troponin   Result Value Ref Range    Troponin T 32 (H) 6 - 19 ng/L   URINALYSIS,CULTURE IF INDICATED   Result Value Ref Range    Color Yellow     Character Clear     Specific Gravity 1.010 <1.035    Ph 5.5 5.0 - 8.0    Glucose Negative Negative mg/dL    Ketones Negative Negative mg/dL    Protein Negative Negative mg/dL    Bilirubin Negative Negative    Urobilinogen, Urine 0.2 Negative    Nitrite Negative Negative    Leukocyte Esterase Negative Negative    Occult Blood Negative Negative    Micro Urine Req see below    ESTIMATED GFR   Result Value Ref Range    GFR If African American >60 >60 mL/min/1.73 m 2    GFR If Non African American >60 >60 mL/min/1.73 m 2   EKG (NOW)   Result Value Ref Range    Report       [facility]    Test Date:  2019  Pt Name:    JAMISON ZAYAS           Department: ER  MRN:        [id number]                      Room:       RD 01  Gender:     Male                         Technician: [id number]  :        1940                   Requested By:ER TRIAGE PROTOCOL  Order #:    [id number]                    Reading MD: SANDIP ZUNIGA MD    Measurements  Intervals                                Axis  Rate:       60                           P:          0  IL:         59                           QRS:        121  QRSD:       163                          T:          45  QT:         535  QTc:        535    Interpretive Statements  Ventricular-paced complexes  No further analysis attempted due to paced  rhythm  Compared to ECG 10/23/2019 09:31:19  Intraventricular conduction delay no longer present  Electronically Signed On 12- 10:08:31 PST by CHIP JIMENEZ MD           COURSE & MEDICAL DECISION MAKING  Pertinent Labs & Imaging studies reviewed. (See chart for details)  Patient presents for evaluation.  Clinically the patient is very confused.  EKG is relatively unchanged from prior.  Troponin is slightly elevated however we did speak to his power of  and because of his worsening dementia they were going [facility] have [facility] into an assisted living facility at this point we will need to admit the patient for further stratification for acute coronary syndrome and as far as placement as well because of his dementia.    FINAL IMPRESSION  1. Chest pain, unspecified type     2. Dementia without behavioral disturbance, unspecified dementia type (HCC)                 Electronically signed by: Chip Jimenez, 12/23/2019 9:58 AM

## 2019-12-23 NOTE — ASSESSMENT & PLAN NOTE
(stable)  Patient is on levothyroxine. Last TSH from 10/2019 was 0.9.     Plan :  Will continue home thyroxine dose.

## 2019-12-23 NOTE — ED NOTES
Called pt's POA: [facility] for confusion. POA told dementia. Pt living alone, friends called today concerned pt more confused. Pt having difficulty ambulating and having frequent falls. Per POA [facility] was supposed to work on possible assisted living placement.

## 2019-12-23 NOTE — ED NOTES
[facility]. Pt doesn't know his home regimen. Pt doesn't know when he last took medications.

## 2019-12-23 NOTE — ED TRIAGE NOTES
Pt bib ems. Pt c/o chest pain pta, given 324 ASA. Pt denies cp at this time. Pt hx of dementia. Oriented to self and place.  Pt with chronic wound to RLE.

## 2019-12-23 NOTE — ASSESSMENT & PLAN NOTE
With MCV of 99. 5. At hi sbaseline.   B12 and Folate from 10/2019 was wnl.     Plan :  Will continue to monitor.

## 2019-12-24 LAB
ALBUMIN SERPL BCP-MCNC: 2.8 G/DL (ref 3.2–4.9)
ALBUMIN/GLOB SERPL: 1.1 G/DL
ALP SERPL-CCNC: 56 U/L (ref 30–99)
ALT SERPL-CCNC: 8 U/L (ref 2–50)
ANION GAP SERPL CALC-SCNC: 6 MMOL/L (ref 0–11.9)
AST SERPL-CCNC: 16 U/L (ref 12–45)
BASOPHILS # BLD AUTO: 0.6 % (ref 0–1.8)
BASOPHILS # BLD: 0.04 K/UL (ref 0–0.12)
BILIRUB SERPL-MCNC: 0.4 MG/DL (ref 0.1–1.5)
BUN SERPL-MCNC: 15 MG/DL (ref 8–22)
CALCIUM SERPL-MCNC: 8.4 MG/DL (ref 8.5–10.5)
CHLORIDE SERPL-SCNC: 111 MMOL/L (ref 96–112)
CHOLEST SERPL-MCNC: 159 MG/DL (ref 100–199)
CO2 SERPL-SCNC: 26 MMOL/L (ref 20–33)
CREAT SERPL-MCNC: 0.81 MG/DL (ref 0.5–1.4)
EOSINOPHIL # BLD AUTO: 0.29 K/UL (ref 0–0.51)
EOSINOPHIL NFR BLD: 4.3 % (ref 0–6.9)
ERYTHROCYTE [DISTWIDTH] IN BLOOD BY AUTOMATED COUNT: 58.3 FL (ref 35.9–50)
GLOBULIN SER CALC-MCNC: 2.5 G/DL (ref 1.9–3.5)
GLUCOSE SERPL-MCNC: 81 MG/DL (ref 65–99)
HCT VFR BLD AUTO: 35.6 % (ref 42–52)
HDLC SERPL-MCNC: 25 MG/DL
HGB BLD-MCNC: 11.2 G/DL (ref 14–18)
IMM GRANULOCYTES # BLD AUTO: 0.02 K/UL (ref 0–0.11)
IMM GRANULOCYTES NFR BLD AUTO: 0.3 % (ref 0–0.9)
LDLC SERPL CALC-MCNC: 106 MG/DL
LYMPHOCYTES # BLD AUTO: 1.87 K/UL (ref 1–4.8)
LYMPHOCYTES NFR BLD: 27.9 % (ref 22–41)
MCH RBC QN AUTO: 30.9 PG (ref 27–33)
MCHC RBC AUTO-ENTMCNC: 31.5 G/DL (ref 33.7–35.3)
MCV RBC AUTO: 98.3 FL (ref 81.4–97.8)
MONOCYTES # BLD AUTO: 0.82 K/UL (ref 0–0.85)
MONOCYTES NFR BLD AUTO: 12.2 % (ref 0–13.4)
NEUTROPHILS # BLD AUTO: 3.66 K/UL (ref 1.82–7.42)
NEUTROPHILS NFR BLD: 54.7 % (ref 44–72)
NRBC # BLD AUTO: 0 K/UL
NRBC BLD-RTO: 0 /100 WBC
PLATELET # BLD AUTO: 241 K/UL (ref 164–446)
PMV BLD AUTO: 10.3 FL (ref 9–12.9)
POTASSIUM SERPL-SCNC: 4 MMOL/L (ref 3.6–5.5)
PROT SERPL-MCNC: 5.3 G/DL (ref 6–8.2)
RBC # BLD AUTO: 3.62 M/UL (ref 4.7–6.1)
SODIUM SERPL-SCNC: 143 MMOL/L (ref 135–145)
TRIGL SERPL-MCNC: 139 MG/DL (ref 0–149)
WBC # BLD AUTO: 6.7 K/UL (ref 4.8–10.8)

## 2019-12-24 PROCEDURE — 97165 OT EVAL LOW COMPLEX 30 MIN: CPT

## 2019-12-24 PROCEDURE — 80061 LIPID PANEL: CPT

## 2019-12-24 PROCEDURE — 97161 PT EVAL LOW COMPLEX 20 MIN: CPT

## 2019-12-24 PROCEDURE — 85025 COMPLETE CBC W/AUTO DIFF WBC: CPT

## 2019-12-24 PROCEDURE — 36415 COLL VENOUS BLD VENIPUNCTURE: CPT

## 2019-12-24 PROCEDURE — 97166 OT EVAL MOD COMPLEX 45 MIN: CPT

## 2019-12-24 PROCEDURE — 99226 PR SUBSEQUENT OBSERVATION CARE,LEVEL III: CPT | Mod: GC | Performed by: INTERNAL MEDICINE

## 2019-12-24 PROCEDURE — 700102 HCHG RX REV CODE 250 W/ 637 OVERRIDE(OP): Performed by: INTERNAL MEDICINE

## 2019-12-24 PROCEDURE — 80053 COMPREHEN METABOLIC PANEL: CPT

## 2019-12-24 PROCEDURE — A9270 NON-COVERED ITEM OR SERVICE: HCPCS | Performed by: INTERNAL MEDICINE

## 2019-12-24 PROCEDURE — G0378 HOSPITAL OBSERVATION PER HR: HCPCS

## 2019-12-24 RX ORDER — ATORVASTATIN CALCIUM 40 MG/1
80 TABLET, FILM COATED ORAL DAILY
Status: DISCONTINUED | OUTPATIENT
Start: 2019-12-25 | End: 2019-12-30 | Stop reason: HOSPADM

## 2019-12-24 RX ADMIN — OMEPRAZOLE 20 MG: 20 CAPSULE, DELAYED RELEASE ORAL at 05:38

## 2019-12-24 RX ADMIN — ALLOPURINOL 100 MG: 100 TABLET ORAL at 05:38

## 2019-12-24 RX ADMIN — ACETAMINOPHEN 650 MG: 325 TABLET, FILM COATED ORAL at 07:44

## 2019-12-24 RX ADMIN — APIXABAN 5 MG: 5 TABLET, FILM COATED ORAL at 05:38

## 2019-12-24 RX ADMIN — BACLOFEN 15 MG: 10 TABLET ORAL at 17:39

## 2019-12-24 RX ADMIN — TRAZODONE HYDROCHLORIDE 50 MG: 50 TABLET ORAL at 21:03

## 2019-12-24 RX ADMIN — Medication 400 MG: at 05:38

## 2019-12-24 RX ADMIN — Medication 400 MG: at 17:39

## 2019-12-24 RX ADMIN — CLOPIDOGREL BISULFATE 75 MG: 75 TABLET ORAL at 05:38

## 2019-12-24 RX ADMIN — SENNOSIDES AND DOCUSATE SODIUM 2 TABLET: 8.6; 5 TABLET ORAL at 17:39

## 2019-12-24 RX ADMIN — BACLOFEN 15 MG: 10 TABLET ORAL at 05:37

## 2019-12-24 RX ADMIN — APIXABAN 5 MG: 5 TABLET, FILM COATED ORAL at 17:39

## 2019-12-24 RX ADMIN — METFORMIN HYDROCHLORIDE 500 MG: 500 TABLET ORAL at 05:38

## 2019-12-24 RX ADMIN — LEVOTHYROXINE SODIUM 50 MCG: 25 TABLET ORAL at 05:38

## 2019-12-24 RX ADMIN — MELATONIN 1000 UNITS: at 05:38

## 2019-12-24 RX ADMIN — SENNOSIDES AND DOCUSATE SODIUM 2 TABLET: 8.6; 5 TABLET ORAL at 05:37

## 2019-12-24 RX ADMIN — ATORVASTATIN CALCIUM 40 MG: 40 TABLET, FILM COATED ORAL at 05:38

## 2019-12-24 ASSESSMENT — COGNITIVE AND FUNCTIONAL STATUS - GENERAL
SUGGESTED CMS G CODE MODIFIER MOBILITY: CJ
CLIMB 3 TO 5 STEPS WITH RAILING: A LITTLE
DRESSING REGULAR UPPER BODY CLOTHING: A LITTLE
STANDING UP FROM CHAIR USING ARMS: A LITTLE
HELP NEEDED FOR BATHING: A LOT
PERSONAL GROOMING: A LITTLE
SUGGESTED CMS G CODE MODIFIER DAILY ACTIVITY: CK
DRESSING REGULAR LOWER BODY CLOTHING: A LOT
TOILETING: A LITTLE
WALKING IN HOSPITAL ROOM: A LITTLE
DAILY ACTIVITIY SCORE: 17
MOBILITY SCORE: 21

## 2019-12-24 ASSESSMENT — ENCOUNTER SYMPTOMS
WEIGHT LOSS: 0
EYE DISCHARGE: 0
DOUBLE VISION: 0
TINGLING: 0
EYE PAIN: 0
TREMORS: 0
ABDOMINAL PAIN: 0
ORTHOPNEA: 0
SPUTUM PRODUCTION: 0
BLURRED VISION: 0
NAUSEA: 0
CHILLS: 0
SEIZURES: 0
SHORTNESS OF BREATH: 0
COUGH: 0
FEVER: 0
PALPITATIONS: 0
HEADACHES: 0
CLAUDICATION: 0
DIAPHORESIS: 0
VOMITING: 0
DIZZINESS: 0
PSYCHIATRIC NEGATIVE: 1
CONSTIPATION: 0
DIARRHEA: 0
HEMOPTYSIS: 0
MYALGIAS: 0
PND: 0

## 2019-12-24 ASSESSMENT — CHA2DS2 SCORE
AGE 65 TO 74: NO
CHA2DS2 VASC SCORE: 4
CHF OR LEFT VENTRICULAR DYSFUNCTION: NO
VASCULAR DISEASE: YES
HYPERTENSION: YES
PRIOR STROKE OR TIA OR THROMBOEMBOLISM: NO
AGE 75 OR GREATER: YES
SEX: MALE

## 2019-12-24 ASSESSMENT — GAIT ASSESSMENTS
ASSISTIVE DEVICE: SINGLE POINT CANE
DISTANCE (FEET): 150
GAIT LEVEL OF ASSIST: SUPERVISED

## 2019-12-24 ASSESSMENT — ACTIVITIES OF DAILY LIVING (ADL): TOILETING: INDEPENDENT

## 2019-12-24 NOTE — PROGRESS NOTES
2 RN skin check completed with Tolu SAAVEDRA.  Devices in place n/a.  Skin assessed under devices n/a.  Confirmed pressure ulcers found on n/a.  New potential pressure ulcers noted on RIGHT lower leg/calf. Wound consult placed, yes.    Patient with history of chronic venous stasis to bilateral lower legs/feet. Follows wound clinic for weekly dressing changes. Dressings removed and legs assessed. Evidence of venous stasis, red/purple/dusky bilateral from knees to feet. Pt states legs with chronic drainage, but none noted at this time. Potential pressure ulcer to right upper calf from previous dressing being too tight. Right 3rd toe with purple blister. Small ulcer/laceration to left calf. Peeling skin to left inner foot. Small skin tear/laceration to left abdominal fold. Small laceration to top of head from recent fall, open to air. Photos taken of all areas and uploaded to chart. Legs rewrapped with ABD pads and kerlex. Wound consult already placed.    Interventions in place: Waffle mattress applied to bed. Heels floated on pillows and frequent position changes encouraged.

## 2019-12-24 NOTE — PROGRESS NOTES
Bedside report received. POC discussed with pt; all questions answered at this time. Patient needs addressed.

## 2019-12-24 NOTE — ASSESSMENT & PLAN NOTE
S/p stent placement at [facility] in November.     Plan :  -May need to be on beta blocker -Will get records from [facility].  -Continue statin, plavix , apixaban

## 2019-12-24 NOTE — ASSESSMENT & PLAN NOTE
(resolved)   Tender to palpation at left mid chest near 4-5 th intercostal space, around pacemaker site.  Troponin x2  : 32 and 30  EKG : V- Paced rhythm   .    Plan :  -Lidocaine gel.   -Continue statin, plavix and apixaban .  -Will inc Lipitor to 80 mg daily

## 2019-12-24 NOTE — ED NOTES
Report received, assumed care of patient. VS recheck, brought patient water and socks. No other needs at this time. Call light within reach.    decreased weight-shifting ability/decreased edgar/decreased step length/decreased stride length

## 2019-12-24 NOTE — ASSESSMENT & PLAN NOTE
States that he lives alone and performs all ADLs and IADLs     Plan :  - Patient has a DPOA in place.  - Per DPOA patient recently not been able to take care of himself and increased incidence of falls. Will benefit from PATRICIA /Group home placement.
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
## 2019-12-24 NOTE — PROGRESS NOTES
Internal Medicine Interval Note  Note Author: Madhu Goodwin M.D.     Name Kedar Woods     1940   Age/Sex 79 y.o. male   MRN [id number]   Code Status Full     After 5PM or if no immediate response to page, please call for cross-coverage  Attending/Team: Russ / LEATHA See Patient List for primary contact information  Call [phone] to page    1st Call - Day Intern (R1):   Dr. Goodwin 2nd Call - Day Sr. Resident (R2/R3):   Dr. Vance         Reason for interval visit  (Principal Problem)   78 y/o Mr. Woods with a pmh of AS s/p TAVR (2019) , CAD s/p stent placement (2019) , Pul HTN , Chronic b/l venous stasis , Atrial flutter , Pacemaker placed 2019 was admitted for chest pain after a GLF on 2019.       Interval Problem Daily Status Update  (24 hours, problem oriented, brief subjective history, new lab/imaging data pertinent to that problem)     -Troponin x2 trended 32 and 30. No changes on EKG noted when compared to previous EKG.  - No acute overnight events reported. Patient this morning does not recall why he was admitted last night. States he remembers the fall on Saturday and that he inured his head.  -When questioned about chest pain , he responds that he does not feel any chest pain currently. Although mentions that its reproducible on palpation around the pacemaker site.   -Denies pain radiating to his neck ,shoulder or back , diaphoresis , nausea, vomiting.  -Patient apparently has cognitive impairment. Will get in touch with his DPOA listed today.      Review of Systems   Constitutional: Negative for chills, diaphoresis, fever, malaise/fatigue and weight loss.   HENT: Negative for ear discharge, ear pain and hearing loss.    Eyes: Negative for blurred vision, double vision, pain and discharge.   Respiratory: Negative for cough, hemoptysis, sputum production and shortness of breath.    Cardiovascular: Positive for leg swelling. Negative for chest pain,  palpitations, orthopnea, claudication and PND.   Gastrointestinal: Negative for abdominal pain, constipation, diarrhea, melena, nausea and vomiting.   Genitourinary: Negative for dysuria and urgency.   Musculoskeletal: Negative for myalgias.   Skin: Negative for rash.   Neurological: Negative for dizziness, tingling, tremors, seizures and headaches.   Psychiatric/Behavioral: Negative.        Disposition/Barriers to discharge:   Will be evaluated by PT/OT for functional status assessment.    Consultants/Specialty  none  PCP: Pcp Pt States None      Quality Measures  Quality-Core Measures   Reviewed items::  EKG reviewed, Labs reviewed, Medications reviewed and Radiology images reviewed  Rosales catheter::  No Rosales  DVT: On Elliquis.          Physical Exam       Vitals:    12/23/19 2202 12/23/19 2343 12/24/19 0408 12/24/19 0755   BP: 142/54 141/53 146/62 141/58   Pulse: 61 62 61 60   Resp: 16 16 16 17   Temp: 36.6 °C (97.8 °F) 36.6 °C (97.8 °F) 36.4 °C (97.5 °F) 36.7 °C (98.1 °F)   TempSrc: Temporal Temporal Temporal Temporal   SpO2: 97% 96% 97% 96%   Weight:       Height:         Body mass index is 30.6 kg/m². Weight: 105.2 kg (231 lb 14.8 oz)  Oxygen Therapy:  Pulse Oximetry: 96 %, O2 (LPM): 0, O2 Delivery: None (Room Air)    Physical Exam   Constitutional:   Morbidly obese gentleman , in NAD   HENT:   Head: Normocephalic and atraumatic.   Mouth/Throat: Oropharynx is clear and moist.   Eyes: Pupils are equal, round, and reactive to light. Conjunctivae and EOM are normal. No scleral icterus.   Neck: Normal range of motion. No JVD present.   Cardiovascular: Normal rate, regular rhythm, normal heart sounds and intact distal pulses.   Pulmonary/Chest: No stridor. No respiratory distress. He has no wheezes. He has no rales. He exhibits tenderness (tender to palpation left 3-4 intercotal space ).   Abdominal: He exhibits distension (due to obesity). He exhibits no mass. There is no tenderness. There is no rebound and no  guarding.   Musculoskeletal:         General: Edema (b/l lower extremities) present.      Comments: Chronic venous stasis with skin pigmentation   Neurological: He is alert. No cranial nerve deficit. GCS score is 15.   Skin: Skin is warm. No rash noted. No erythema. No pallor.             Assessment/Plan     * Chest pain likely musculoskeletal  Assessment & Plan  Unable to obtain details of the chest pain.   Tender to palpation at left mid chest near 4-5 th intercostal space.  Troponin x2  : 32 and 30  EKG : V- Paced rhythm   .    Plan :  -Lidocaine gel.   -As chest pain unlikely cardiac etiology and more musculoskeletal will transfer patient from telemetry to general medicine floor.  -Continue statin, plavix and apixaban .  -Will inc Lipitor to 80 mg daily      Chronic venous stasis dermatitis of both lower extremities- (present on admission)  Assessment & Plan  Chronic venous stasis, compression stockings in place.     Plan :  Wound care     Aortic stenosis, severe  Assessment & Plan  S/P TAVR  In November 2019    Plan :  -Continue elliquis  -Will get records from [facility]    Cognitive decline  Assessment & Plan  States that he lives alone and performs all ADLs and IADLs     Plan :  -Cognitive eval.  -OT to continue working with the patient.      Hypothyroidism  Assessment & Plan  Patient is on levothyroxine. Last TSH from 10/2019 was 0.9.     Plan :  Will continue home thyroxine dose.     Normocytic anemia- (present on admission)  Assessment & Plan  With MCV of 99. 5. At hi sbaseline.   B12 and Folate from 10/2019 was wnl.     Plan :  Will continue to monitor.     CAD (coronary artery disease)  Assessment & Plan  S/p stent placement at [facility] in November.     Plan :  -May need to be on beta blocker -Will get records from [facility].  -Continue statin, plavix , apixaban        Atrial flutter (HCC)  Assessment & Plan  Patient is in V paced rhythm on admission.     Plan :  Telemetry monitoring   Continue  anticoagulation

## 2019-12-24 NOTE — CARE PLAN
Problem: Communication  Goal: The ability to communicate needs accurately and effectively will improve  Outcome: PROGRESSING AS EXPECTED  Note:   Pt educated regarding plan of care and medications. All questions answered.       Problem: Safety  Goal: Will remain free from injury  Outcome: PROGRESSING AS EXPECTED  Note:   Fall precautions in place. Bed in lowest position. Non-skid socks in place. Personal possessions within reach. Mobility sign on door. Bed-alarm on. Call light within reach. Pt educated regarding fall prevention and states understanding.    Goal: Will remain free from falls  Outcome: PROGRESSING AS EXPECTED  Note:   Fall precautions in place. Bed in lowest position. Non-skid socks in place. Personal possessions within reach. Mobility sign on door. Bed-alarm on. Call light within reach. Pt educated regarding fall prevention and states understanding.       Problem: Knowledge Deficit  Goal: Knowledge of disease process/condition, treatment plan, diagnostic tests, and medications will improve  Outcome: PROGRESSING AS EXPECTED  Note:   Pt educated regarding plan of care and medications. All questions answered.    Goal: Knowledge of the prescribed therapeutic regimen will improve  Outcome: PROGRESSING AS EXPECTED  Note:   Pt educated regarding plan of care and medications. All questions answered.

## 2019-12-24 NOTE — DISCHARGE PLANNING
RN CM attempted to assess patient at bedside. After asking multiple questions it is unclear whether or not patient's responses are true for present day. Discussed  with Dr. Goodwin that cognitive eval may be appropriate.    RN CM attempted to reach Bravo listed as emergency contact for further information but getting busy dial tone each time.  Per patient Bravo is a friend, not his son.

## 2019-12-24 NOTE — CARE PLAN
Problem: Safety  Goal: Will remain free from injury  Outcome: PROGRESSING AS EXPECTED  Goal: Will remain free from falls  Outcome: PROGRESSING AS EXPECTED     Problem: Knowledge Deficit  Goal: Knowledge of disease process/condition, treatment plan, diagnostic tests, and medications will improve  Outcome: PROGRESSING AS EXPECTED  Goal: Knowledge of the prescribed therapeutic regimen will improve  Outcome: PROGRESSING AS EXPECTED     Problem: Urinary Elimination:  Goal: Ability to reestablish a normal urinary elimination pattern will improve  Outcome: PROGRESSING AS EXPECTED     Problem: Discharge Barriers/Planning  Goal: Patient's continuum of care needs will be met  Note:   Long term care placement needed due to worsening dementia

## 2019-12-25 PROCEDURE — G0378 HOSPITAL OBSERVATION PER HR: HCPCS

## 2019-12-25 PROCEDURE — 700102 HCHG RX REV CODE 250 W/ 637 OVERRIDE(OP): Performed by: STUDENT IN AN ORGANIZED HEALTH CARE EDUCATION/TRAINING PROGRAM

## 2019-12-25 PROCEDURE — 700102 HCHG RX REV CODE 250 W/ 637 OVERRIDE(OP): Performed by: INTERNAL MEDICINE

## 2019-12-25 PROCEDURE — 99224 PR SUBSEQUENT OBSERVATION CARE,LEVEL I: CPT | Mod: GC | Performed by: INTERNAL MEDICINE

## 2019-12-25 PROCEDURE — A9270 NON-COVERED ITEM OR SERVICE: HCPCS | Performed by: INTERNAL MEDICINE

## 2019-12-25 PROCEDURE — A9270 NON-COVERED ITEM OR SERVICE: HCPCS | Performed by: STUDENT IN AN ORGANIZED HEALTH CARE EDUCATION/TRAINING PROGRAM

## 2019-12-25 RX ADMIN — ACETAMINOPHEN 650 MG: 325 TABLET, FILM COATED ORAL at 05:10

## 2019-12-25 RX ADMIN — BACLOFEN 15 MG: 10 TABLET ORAL at 16:10

## 2019-12-25 RX ADMIN — ACETAMINOPHEN 650 MG: 325 TABLET, FILM COATED ORAL at 21:02

## 2019-12-25 RX ADMIN — Medication 400 MG: at 16:11

## 2019-12-25 RX ADMIN — CLOPIDOGREL BISULFATE 75 MG: 75 TABLET ORAL at 05:11

## 2019-12-25 RX ADMIN — LEVOTHYROXINE SODIUM 50 MCG: 25 TABLET ORAL at 05:13

## 2019-12-25 RX ADMIN — ALLOPURINOL 100 MG: 100 TABLET ORAL at 05:10

## 2019-12-25 RX ADMIN — SENNOSIDES AND DOCUSATE SODIUM 2 TABLET: 8.6; 5 TABLET ORAL at 05:11

## 2019-12-25 RX ADMIN — APIXABAN 5 MG: 5 TABLET, FILM COATED ORAL at 05:10

## 2019-12-25 RX ADMIN — METFORMIN HYDROCHLORIDE 500 MG: 500 TABLET ORAL at 05:12

## 2019-12-25 RX ADMIN — APIXABAN 5 MG: 5 TABLET, FILM COATED ORAL at 16:10

## 2019-12-25 RX ADMIN — TRAZODONE HYDROCHLORIDE 50 MG: 50 TABLET ORAL at 21:03

## 2019-12-25 RX ADMIN — BACLOFEN 15 MG: 10 TABLET ORAL at 05:12

## 2019-12-25 RX ADMIN — OMEPRAZOLE 20 MG: 20 CAPSULE, DELAYED RELEASE ORAL at 05:09

## 2019-12-25 RX ADMIN — Medication 400 MG: at 05:11

## 2019-12-25 RX ADMIN — ATORVASTATIN CALCIUM 80 MG: 80 TABLET, FILM COATED ORAL at 05:10

## 2019-12-25 RX ADMIN — MELATONIN 1000 UNITS: at 05:12

## 2019-12-25 ASSESSMENT — ENCOUNTER SYMPTOMS
BLURRED VISION: 0
SHORTNESS OF BREATH: 0
NAUSEA: 0
EYE PAIN: 0
EYE DISCHARGE: 0
SPUTUM PRODUCTION: 0
PND: 0
PALPITATIONS: 0
DIARRHEA: 0
TREMORS: 0
DOUBLE VISION: 0
HEADACHES: 0
PSYCHIATRIC NEGATIVE: 1
VOMITING: 0
ORTHOPNEA: 0
ABDOMINAL PAIN: 0
MYALGIAS: 0
HEMOPTYSIS: 0
CHILLS: 0
DIZZINESS: 0
TINGLING: 0
FEVER: 0
CONSTIPATION: 0
WEIGHT LOSS: 0
DIAPHORESIS: 0
COUGH: 0
SEIZURES: 0
CLAUDICATION: 0

## 2019-12-25 NOTE — DISCHARGE PLANNING
Received Choice form at 0827  Agency/Facility Name: [facility]  Referral sent per Choice form @ [id number]

## 2019-12-25 NOTE — THERAPY
"Physical Therapy Evaluation completed.   Bed Mobility:  Supine to Sit: Supervised (with bed features, patient reported hospital bed at home)  Transfers: Sit to Stand: Supervised  Gait: Level Of Assist: Supervised with Single Point Cane       Plan of Care: Patient with no further skilled PT needs in the acute care setting at this time  Discharge Recommendations: Equipment: No Equipment Needed. Post-acute therapy: see below.    See \"Rehab Therapy-Acute\" Patient Summary Report for complete documentation.    Patient is a pleasant 80 YO male that was admitted following complaint of chest pain. PMHx significant for aortic valve stenosis s/p valve replacement, CAD s/p stent, pulmonary HTN, PPM, Aflutter, hypothyroidism. He ambulated approximately 150ft with SPC with supervision and performed bed mobility and transfers with supervision. He appears to be near baseline functional mobility. Recommend continued mobilization with RN staff as able: up to chair for meals and ambulation 3x/day. Patient reported fear of returning home alone and reported he is having increasing difficulty taking care of himself. Per chart review patient's social supports concerned about progressing cognitive deficits. Will defer DC recommendations to other disciplines given primary concerns are cognition and ability for self care. Recommend SLP cog eval. Patient will not be actively followed for physical therapy services at this time, however may be seen if requested by physician for 1 more visit within 30 days to address any discharge or equipment needs.    "

## 2019-12-25 NOTE — PROGRESS NOTES
Internal Medicine Interval Note  Note Author: Madhu Goodwin M.D.     Name Kdear Woods     1940   Age/Sex 79 y.o. male   MRN [id number]   Code Status Full     After 5PM or if no immediate response to page, please call for cross-coverage  Attending/Team: Russ / LEATHA See Patient List for primary contact information  Call [phone] to page    1st Call - Day Intern (R1):   Dr. Goodwin 2nd Call - Day Sr. Resident (R2/R3):   Dr. Vance         Reason for interval visit  (Principal Problem)   80 y/o Mr. Woods with a pmh of AS s/p TAVR (2019) , CAD s/p stent placement (2019) , Pul HTN , Chronic b/l venous stasis , Atrial flutter , Pacemaker placed 2019 was admitted for chest pain after a GLF on 2019.       Interval Problem Daily Status Update  (24 hours, problem oriented, brief subjective history, new lab/imaging data pertinent to that problem)     -No Acute overnight events.  -Patient states he feels well. SW in the room prior to encounter. Per patient he is agreebale to go to SNF ([facility] or [facility]), as long as it is a [place]'s place.  -Patient understands he is unable to take care of himself at home. States lately he has been falling frequently and its getting hard for him.  -The writer talked to pt's DPOA yesterday to ask about pt's code starus , per him will continue with what's mentioned in the AD. He also stated that pt would benefit from SNF placement.      Review of Systems   Constitutional: Negative for chills, diaphoresis, fever, malaise/fatigue and weight loss.   HENT: Negative for ear discharge, ear pain and hearing loss.    Eyes: Negative for blurred vision, double vision, pain and discharge.   Respiratory: Negative for cough, hemoptysis, sputum production and shortness of breath.    Cardiovascular: Negative for chest pain, palpitations, orthopnea, claudication, leg swelling and PND.   Gastrointestinal: Negative for abdominal pain, constipation, diarrhea,  melena, nausea and vomiting.   Genitourinary: Negative for dysuria and urgency.   Musculoskeletal: Negative for myalgias.   Skin: Negative for rash.   Neurological: Negative for dizziness, tingling, tremors, seizures and headaches.   Psychiatric/Behavioral: Negative.        Disposition/Barriers to discharge:   SW and CM working on placement to [facility] or [facility].     Consultants/Specialty  none  PCP: Pcp Pt States None      Quality Measures  Quality-Core Measures   Reviewed items::  EKG reviewed, Labs reviewed, Medications reviewed and Radiology images reviewed  Rosales catheter::  No Rosales  DVT: On Elliquis.          Physical Exam       Vitals:    12/24/19 0755 12/24/19 1640 12/24/19 2010 12/25/19 0429   BP: 141/58 135/53 125/56 128/57   Pulse: 60 60 60 62   Resp: 17 16 16 16   Temp: 36.7 °C (98.1 °F) 36.7 °C (98 °F) 36.6 °C (97.9 °F) 36.2 °C (97.2 °F)   TempSrc: Temporal Temporal Temporal Temporal   SpO2: 96% 98% 95% 98%   Weight:   104.9 kg (231 lb 4.2 oz)    Height:         Body mass index is 30.51 kg/m². Weight: 104.9 kg (231 lb 4.2 oz)  Oxygen Therapy:  Pulse Oximetry: 98 %, O2 (LPM): 0, O2 Delivery: None (Room Air)    Physical Exam   Constitutional:   Morbidly obese gentleman , in NAD   HENT:   Head: Normocephalic and atraumatic.   Mouth/Throat: Oropharynx is clear and moist.   Eyes: Pupils are equal, round, and reactive to light. Conjunctivae and EOM are normal. No scleral icterus.   Neck: Normal range of motion. No JVD present.   Cardiovascular: Normal rate, regular rhythm, normal heart sounds and intact distal pulses.   Pulmonary/Chest: No stridor. No respiratory distress. He has no wheezes. He has no rales. He exhibits tenderness (tender to palpation left 3-4 intercotal space ).   Abdominal: He exhibits distension (due to obesity). He exhibits no mass. There is no tenderness. There is no rebound and no guarding.   Musculoskeletal:         General: Edema (trace b/l lower extremities) present.      Comments: Chronic  venous stasis with skin pigmentation   Neurological: He is alert. No cranial nerve deficit. GCS score is 15.   Skin: Skin is warm. No rash noted. No erythema. No pallor.             Assessment/Plan     * Chest pain likely musculoskeletal  Assessment & Plan  (resolved)   Tender to palpation at left mid chest near 4-5 th intercostal space, around pacemaker site.  Troponin x2  : 32 and 30  EKG : V- Paced rhythm   .    Plan :  -Lidocaine gel.   -As chest pain unlikely cardiac etiology and more musculoskeletal will transfer patient from telemetry to general medicine floor.  -Continue statin, plavix and apixaban .  -Will inc Lipitor to 80 mg daily      Chronic venous stasis dermatitis of both lower extremities- (present on admission)  Assessment & Plan  Chronic venous stasis, compression stockings in place.     Plan :  Wound care     Aortic stenosis, severe  Assessment & Plan  S/P TAVR  In November 2019    Plan :  -Continue elliquis      Cognitive decline  Assessment & Plan  States that he lives alone and performs all ADLs and IADLs     Plan :  -Cognitive eval.  -OT to continue working with the patient.      Hypothyroidism  Assessment & Plan  Patient is on levothyroxine. Last TSH from 10/2019 was 0.9.     Plan :  Will continue home thyroxine dose.     Normocytic anemia- (present on admission)  Assessment & Plan  With MCV of 99. 5. At hi sbaseline.   B12 and Folate from 10/2019 was wnl.     Plan :  Will continue to monitor.     CAD (coronary artery disease)  Assessment & Plan  S/p stent placement at [facility] in November.     Plan :  -May need to be on beta blocker -Will get records from [facility].  -Continue statin, plavix , apixaban        Atrial flutter (HCC)  Assessment & Plan  Patient is in V paced rhythm on admission.     Plan :  Telemetry monitoring   Continue anticoagulation

## 2019-12-25 NOTE — DISCHARGE PLANNING
"Anticipated Discharge Disposition:   · SNF     Action:   · LSW met with pt at bedside to discuss medical recommendation of continued skilled services upon discharge from acute hospital. Pt agreeable with placement and reported [facility] Pt reported he would want to be with other veterans and completed CHOICE for the CLC. LSW discussed new 's Home and discussed if he would be interested in having referral sent there. Pt agreeable stating, \"as long as it's for veterans, I want to be with people like me.\" LSW confirmed that home is for veterans but discussed he pending on insurance he may or may not be covered. He voiced understanding and completed CHOICE 1) CLC and 2) [facility].   · LSW faxed CHOICE to [facility] ext [phone]    Barriers to Discharge:   · SNF acceptance    Plan:   · Care coordination will continue to follow up and provide assistance with discharge plans/barriers.     "

## 2019-12-26 PROBLEM — Z02.9 DISCHARGE PLANNING ISSUES: Status: ACTIVE | Noted: 2019-12-26

## 2019-12-26 LAB — EKG IMPRESSION: NORMAL

## 2019-12-26 PROCEDURE — 700102 HCHG RX REV CODE 250 W/ 637 OVERRIDE(OP): Performed by: INTERNAL MEDICINE

## 2019-12-26 PROCEDURE — 99224 PR SUBSEQUENT OBSERVATION CARE,LEVEL I: CPT | Mod: GC | Performed by: INTERNAL MEDICINE

## 2019-12-26 PROCEDURE — G0378 HOSPITAL OBSERVATION PER HR: HCPCS

## 2019-12-26 PROCEDURE — A9270 NON-COVERED ITEM OR SERVICE: HCPCS | Performed by: STUDENT IN AN ORGANIZED HEALTH CARE EDUCATION/TRAINING PROGRAM

## 2019-12-26 PROCEDURE — 93010 ELECTROCARDIOGRAM REPORT: CPT | Performed by: INTERNAL MEDICINE

## 2019-12-26 PROCEDURE — A9270 NON-COVERED ITEM OR SERVICE: HCPCS | Performed by: INTERNAL MEDICINE

## 2019-12-26 PROCEDURE — 92523 SPEECH SOUND LANG COMPREHEN: CPT

## 2019-12-26 PROCEDURE — 93005 ELECTROCARDIOGRAM TRACING: CPT | Performed by: STUDENT IN AN ORGANIZED HEALTH CARE EDUCATION/TRAINING PROGRAM

## 2019-12-26 PROCEDURE — 700102 HCHG RX REV CODE 250 W/ 637 OVERRIDE(OP): Performed by: STUDENT IN AN ORGANIZED HEALTH CARE EDUCATION/TRAINING PROGRAM

## 2019-12-26 RX ADMIN — ATORVASTATIN CALCIUM 80 MG: 80 TABLET, FILM COATED ORAL at 05:18

## 2019-12-26 RX ADMIN — Medication 400 MG: at 16:15

## 2019-12-26 RX ADMIN — SENNOSIDES AND DOCUSATE SODIUM 2 TABLET: 8.6; 5 TABLET ORAL at 05:19

## 2019-12-26 RX ADMIN — APIXABAN 5 MG: 5 TABLET, FILM COATED ORAL at 16:14

## 2019-12-26 RX ADMIN — LEVOTHYROXINE SODIUM 50 MCG: 25 TABLET ORAL at 05:18

## 2019-12-26 RX ADMIN — APIXABAN 5 MG: 5 TABLET, FILM COATED ORAL at 05:18

## 2019-12-26 RX ADMIN — ACETAMINOPHEN 650 MG: 325 TABLET, FILM COATED ORAL at 20:38

## 2019-12-26 RX ADMIN — TRAZODONE HYDROCHLORIDE 50 MG: 50 TABLET ORAL at 20:37

## 2019-12-26 RX ADMIN — Medication 400 MG: at 05:18

## 2019-12-26 RX ADMIN — METFORMIN HYDROCHLORIDE 500 MG: 500 TABLET ORAL at 05:18

## 2019-12-26 RX ADMIN — BACLOFEN 15 MG: 10 TABLET ORAL at 05:18

## 2019-12-26 RX ADMIN — CLOPIDOGREL BISULFATE 75 MG: 75 TABLET ORAL at 05:18

## 2019-12-26 RX ADMIN — MELATONIN 1000 UNITS: at 05:19

## 2019-12-26 RX ADMIN — ALLOPURINOL 100 MG: 100 TABLET ORAL at 05:18

## 2019-12-26 RX ADMIN — OMEPRAZOLE 20 MG: 20 CAPSULE, DELAYED RELEASE ORAL at 05:18

## 2019-12-26 RX ADMIN — BACLOFEN 15 MG: 10 TABLET ORAL at 16:15

## 2019-12-26 ASSESSMENT — ENCOUNTER SYMPTOMS
BLURRED VISION: 0
DIAPHORESIS: 0
PALPITATIONS: 0
EYE PAIN: 0
HEMOPTYSIS: 0
SHORTNESS OF BREATH: 0
DOUBLE VISION: 0
VOMITING: 0
WEIGHT LOSS: 0
FEVER: 0
SPUTUM PRODUCTION: 0
TINGLING: 0
ABDOMINAL PAIN: 0
ORTHOPNEA: 0
COUGH: 0
EYE DISCHARGE: 0
TREMORS: 0
HEADACHES: 0
SEIZURES: 0
CONSTIPATION: 0
PND: 0
CLAUDICATION: 0
CHILLS: 0
DIARRHEA: 0
MYALGIAS: 0
PSYCHIATRIC NEGATIVE: 1
NAUSEA: 0
DIZZINESS: 0

## 2019-12-26 ASSESSMENT — CHA2DS2 SCORE
AGE 75 OR GREATER: YES
PRIOR STROKE OR TIA OR THROMBOEMBOLISM: NO
DIABETES: YES
SEX: MALE
CHF OR LEFT VENTRICULAR DYSFUNCTION: NO
HYPERTENSION: YES
VASCULAR DISEASE: YES
AGE 65 TO 74: NO
CHA2DS2 VASC SCORE: 5

## 2019-12-26 NOTE — THERAPY
"80 y/o admitted on 12/23 for chest pain. Not seen by SLP before. CT of head on 12/23 found \"Diffuse atrophy and white matter changes, No acute intracranial hemorrhage or territorial infarct.\"      Speech Language Therapy Evaluation completed to address cognition  Functional Status:  Pt seen on this date for a clinical swallow evaluation. Pt awake and agreeable to therapy. The memory portion of the Cognitive-Linguistic Quick Test (CLQT) was administered and pt received a score of 139 which correlates to within normal limits (141 -185). Non-standardized measures were used to assess an array of cognitive domains which found moderate deficits in problem solving and reading comprehension and minimal deficits in reasoning. Auditory comprehension, thought organization, attention, following written directions, and medication management task within functional limits. Pt reports that he takes ~40 pills a day, but doesn't worry if he forgets to take some as it \"takes 7 days to get into your system\". Poor safety insight with medication. Pt reported that if he were to accidentally lock himself out of his home he would break a window and enter in which is concerning for pt's safety given hx of frequent falls. Of note, pt denied hx of frequent falls. Pt recalled historical events and long-term memories appropriately. Pt reported that he independently manages his finances, but has assistance to make sure that he has paid all bills on time. According to RN case manager note on 12/26, pt's POA and friends are concerned with pt's safety at home due to his dementia. At this time, pt would benefit from cognitive speech therapy in the acute care setting and a next level of care. Pt may also benefit from 24 hour supervision given min-mod cognitive deficits found during the evaluation, safety concerns, concerns from POA, and hx of frequent falls. SLP to follow.    Recommendations:  Cognitive speech therapy in the acute care setting and at " "next level of care, may also benefit from 24 hour supervision given min-mod cognitive deficits found during the evaluation, safety concerns, concerns from POA returning home independently, and hx of frequent falls. SLP to follow.  Plan of Care: Will benefit from Speech Therapy 3 times per week  Post-Acute Therapy: Recommend inpatient transitional care services for continued speech therapy services vs Recommend outpatient or home health transitional care services for continued speech therapy services        See \"Rehab Therapy-Acute\" Patient Summary Report for complete documentation.       "

## 2019-12-26 NOTE — CARE PLAN
Problem: Communication  Goal: The ability to communicate needs accurately and effectively will improve  Outcome: PROGRESSING AS EXPECTED  Intervention: Rayne patient and significant other/support system to call light to alert staff of needs  Note:   Patient oriented to call light system and has been able to communicate needs accurately and effectively.      Problem: Safety  Goal: Will remain free from falls  Outcome: PROGRESSING AS EXPECTED  Intervention: Assess risk factors for falls  Note:   Patient has been assessed for fall risks and fall precautions have been put in place.

## 2019-12-26 NOTE — DISCHARGE PLANNING
Agency/Facility Name: [facility]  Spoke To: Otoniel   Outcome: Referral is under review. They are pending service connection results.     Agency/Facility Name: [facility]  Outcome: Left a message in regards to patient's referral.

## 2019-12-26 NOTE — DISCHARGE PLANNING
RN CM left message for [facility] whom Bravo, patient's DPOA said was assisting in looking into possible long-term placement for patient.

## 2019-12-26 NOTE — DISCHARGE PLANNING
[facility]  Spoke To: Tran  Outcome: Patient declined due to not having rehab needs. Per Tran, they informed him on 12/19/19 that he will need to find longterm placement because they are not a longterm care facility. Per Tran, he will need a group home or other living entities.
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
## 2019-12-26 NOTE — PROGRESS NOTES
Bedside report recieved. No overnight events per NOC RN. Patient A&O x 4. No complaints of pain this AM. POC discussed with pt, verbalized understanding. Call light and belongings within reach. Bed locked and in lowest position, alarm and fall precautions in place.

## 2019-12-26 NOTE — PROGRESS NOTES
Internal Medicine Interval Note  Note Author: Дмитрий Alvarez D.O.     Name Kedar Woods     1940   Age/Sex 79 y.o. male   MRN [id number]   Code Status Full     After 5PM or if no immediate response to page, please call for cross-coverage  Attending/Team: Russ / LEATHA See Patient List for primary contact information  Call [phone] to page    1st Call - Day Intern (R1):   Dr. Goodwin 2nd Call - Day Sr. Resident (R2/R3):   Dr. Vance         Reason for interval visit  (Principal Problem)   80 y/o Mr. Woods with a pmh of AS s/p TAVR (2019) , CAD s/p stent placement (2019) , Pul HTN , Chronic b/l venous stasis , Atrial flutter , Pacemaker placed 2019 was admitted for chest pain after a GLF on 2019.  Medically stable     Interval Problem Daily Status Update  (24 hours, problem oriented, brief subjective history, new lab/imaging data pertinent to that problem)     - No Acute overnight events. Vitals remain stable.   - Patient reports he enjoyed his dinner last night.   - Patient continues to report musculoskeletal chest pressure at rest that lasts a few minutes without shortness of breath, nausea, vomiting or radiation. EKG obtained, similar to previous.    - Care coordinators are on board. Will need to touch base and see if a patient can go to SNF before going to halfway.       Review of Systems   Constitutional: Negative for chills, diaphoresis, fever, malaise/fatigue and weight loss.   HENT: Negative for ear discharge, ear pain and hearing loss.    Eyes: Negative for blurred vision, double vision, pain and discharge.   Respiratory: Negative for cough, hemoptysis, sputum production and shortness of breath.    Cardiovascular: Negative for chest pain, palpitations, orthopnea, claudication, leg swelling and PND.   Gastrointestinal: Negative for abdominal pain, constipation, diarrhea, melena, nausea and vomiting.   Genitourinary: Negative for dysuria and urgency.    Musculoskeletal: Negative for myalgias.   Skin: Negative for rash.   Neurological: Negative for dizziness, tingling, tremors, seizures and headaches.   Psychiatric/Behavioral: Negative.        Disposition/Barriers to discharge:   Medically stable     Consultants/Specialty  none  PCP: Pcp Pt States None      Quality Measures  Quality-Core Measures   Reviewed items::  EKG reviewed, Labs reviewed and Medications reviewed  Rosales catheter::  No Rosales  DVT: On Eliquis.          Physical Exam       Vitals:    12/25/19 0429 12/25/19 1624 12/25/19 2020 12/26/19 0430   BP: 128/57 122/52 128/80 119/55   Pulse: 62 62 61 62   Resp: 16 18 16 16   Temp: 36.2 °C (97.2 °F) 36.8 °C (98.2 °F) 36.7 °C (98.1 °F) 36.8 °C (98.2 °F)   TempSrc: Temporal Temporal Temporal Temporal   SpO2: 98% 96% 96% 96%   Weight:   104 kg (229 lb 4.5 oz)    Height:         Body mass index is 30.25 kg/m². Weight: 104 kg (229 lb 4.5 oz)  Oxygen Therapy:  Pulse Oximetry: 96 %, O2 (LPM): 0, O2 Delivery: None (Room Air)    Physical Exam   Constitutional:   Morbidly obese gentleman. Reclined in bed eating breakfast.    HENT:   Head: Normocephalic and atraumatic.   Mouth/Throat: Oropharynx is clear and moist.   Eyes: EOM are normal. No scleral icterus.   Neck: Normal range of motion.   Cardiovascular: Normal rate, regular rhythm, normal heart sounds and intact distal pulses.   Pulmonary/Chest: No respiratory distress. He has no wheezes. He has no rales. He exhibits tenderness (tender to palpation left 3-4 intercostal space as well as right subcostal area).   Abdominal: Soft. He exhibits no distension (rotunded) and no mass. There is no tenderness. There is no rebound and no guarding.   Musculoskeletal:         General: Edema (trace b/l lower extremities) present.      Comments: Chronic venous stasis with skin pigmentation   Neurological: He is alert. No cranial nerve deficit. GCS score is 15.   Skin: Skin is warm. No rash noted. No erythema. No pallor.              Assessment/Plan     * Chest pain likely musculoskeletal  Assessment & Plan  (resolved)   Tender to palpation at left mid chest near 4-5 th intercostal space, around pacemaker site.  Troponin x2  : 32 and 30  EKG : V- Paced rhythm   .    Plan :  -Lidocaine gel.   -Continue statin, plavix and apixaban .  -Will inc Lipitor to 80 mg daily      Discharge planning issues  Assessment & Plan  Pending SNF   - Case management on board, appreciate their assistance    Chronic venous stasis dermatitis of both lower extremities- (present on admission)  Assessment & Plan  Chronic venous stasis, compression stockings in place.     Plan :  Wound care     Aortic stenosis, severe- (present on admission)  Assessment & Plan  S/P TAVR  In November 2019    - Currently stable      Cognitive decline  Assessment & Plan  States that he lives alone and performs all ADLs and IADLs     Plan :  - Patient has a DPOA in place    Hypothyroidism- (present on admission)  Assessment & Plan  Patient is on levothyroxine. Last TSH from 10/2019 was 0.9.     Plan :  Will continue home thyroxine dose.     Normocytic anemia- (present on admission)  Assessment & Plan  With MCV of 99. 5. At hi sbaseline.   B12 and Folate from 10/2019 was wnl.     Plan :  Will continue to monitor.     CAD (coronary artery disease)  Assessment & Plan  S/p stent placement at [facility] in November.     Plan :  -May need to be on beta blocker -Will get records from [facility].  -Continue statin, plavix , apixaban        Atrial flutter (HCC)  Assessment & Plan  Patient is in V paced rhythm on admission.     Plan :  Telemetry monitoring   Continue anticoagulation

## 2019-12-27 PROCEDURE — 99224 PR SUBSEQUENT OBSERVATION CARE,LEVEL I: CPT | Mod: GC | Performed by: INTERNAL MEDICINE

## 2019-12-27 PROCEDURE — 86480 TB TEST CELL IMMUN MEASURE: CPT

## 2019-12-27 PROCEDURE — 97535 SELF CARE MNGMENT TRAINING: CPT

## 2019-12-27 PROCEDURE — 36415 COLL VENOUS BLD VENIPUNCTURE: CPT

## 2019-12-27 PROCEDURE — A9270 NON-COVERED ITEM OR SERVICE: HCPCS

## 2019-12-27 PROCEDURE — G0378 HOSPITAL OBSERVATION PER HR: HCPCS

## 2019-12-27 PROCEDURE — A9270 NON-COVERED ITEM OR SERVICE: HCPCS | Performed by: INTERNAL MEDICINE

## 2019-12-27 PROCEDURE — A9270 NON-COVERED ITEM OR SERVICE: HCPCS | Performed by: STUDENT IN AN ORGANIZED HEALTH CARE EDUCATION/TRAINING PROGRAM

## 2019-12-27 PROCEDURE — 700102 HCHG RX REV CODE 250 W/ 637 OVERRIDE(OP): Performed by: STUDENT IN AN ORGANIZED HEALTH CARE EDUCATION/TRAINING PROGRAM

## 2019-12-27 PROCEDURE — 700102 HCHG RX REV CODE 250 W/ 637 OVERRIDE(OP): Performed by: INTERNAL MEDICINE

## 2019-12-27 PROCEDURE — 700102 HCHG RX REV CODE 250 W/ 637 OVERRIDE(OP)

## 2019-12-27 RX ORDER — AMMONIUM LACTATE 12 G/100G
LOTION TOPICAL 2 TIMES DAILY
Status: DISCONTINUED | OUTPATIENT
Start: 2019-12-27 | End: 2019-12-30 | Stop reason: HOSPADM

## 2019-12-27 RX ADMIN — CLOPIDOGREL BISULFATE 75 MG: 75 TABLET ORAL at 04:56

## 2019-12-27 RX ADMIN — TRAZODONE HYDROCHLORIDE 50 MG: 50 TABLET ORAL at 20:43

## 2019-12-27 RX ADMIN — BACLOFEN 15 MG: 10 TABLET ORAL at 04:56

## 2019-12-27 RX ADMIN — Medication: at 18:43

## 2019-12-27 RX ADMIN — ALLOPURINOL 100 MG: 100 TABLET ORAL at 04:58

## 2019-12-27 RX ADMIN — SENNOSIDES AND DOCUSATE SODIUM 2 TABLET: 8.6; 5 TABLET ORAL at 18:42

## 2019-12-27 RX ADMIN — LEVOTHYROXINE SODIUM 50 MCG: 25 TABLET ORAL at 04:58

## 2019-12-27 RX ADMIN — APIXABAN 5 MG: 5 TABLET, FILM COATED ORAL at 18:42

## 2019-12-27 RX ADMIN — Medication 400 MG: at 04:56

## 2019-12-27 RX ADMIN — BACLOFEN 15 MG: 10 TABLET ORAL at 18:42

## 2019-12-27 RX ADMIN — METFORMIN HYDROCHLORIDE 500 MG: 500 TABLET ORAL at 04:58

## 2019-12-27 RX ADMIN — Medication 400 MG: at 18:42

## 2019-12-27 RX ADMIN — ATORVASTATIN CALCIUM 80 MG: 80 TABLET, FILM COATED ORAL at 04:56

## 2019-12-27 RX ADMIN — OMEPRAZOLE 20 MG: 20 CAPSULE, DELAYED RELEASE ORAL at 04:57

## 2019-12-27 RX ADMIN — MELATONIN 1000 UNITS: at 04:57

## 2019-12-27 RX ADMIN — ACETAMINOPHEN 650 MG: 325 TABLET, FILM COATED ORAL at 20:43

## 2019-12-27 RX ADMIN — APIXABAN 5 MG: 5 TABLET, FILM COATED ORAL at 04:58

## 2019-12-27 ASSESSMENT — ENCOUNTER SYMPTOMS
DIARRHEA: 0
EYE PAIN: 0
COUGH: 0
PALPITATIONS: 0
DOUBLE VISION: 0
TREMORS: 0
SEIZURES: 0
TINGLING: 0
PND: 0
VOMITING: 0
WEIGHT LOSS: 0
SPUTUM PRODUCTION: 0
ABDOMINAL PAIN: 0
CHILLS: 0
DIZZINESS: 0
NAUSEA: 0
FEVER: 0
EYE DISCHARGE: 0
CLAUDICATION: 0
PSYCHIATRIC NEGATIVE: 1
HEMOPTYSIS: 0
CONSTIPATION: 0
MYALGIAS: 0
HEADACHES: 0
SHORTNESS OF BREATH: 0
ORTHOPNEA: 0
BLURRED VISION: 0
DIAPHORESIS: 0

## 2019-12-27 ASSESSMENT — COGNITIVE AND FUNCTIONAL STATUS - GENERAL
SUGGESTED CMS G CODE MODIFIER DAILY ACTIVITY: CI
DAILY ACTIVITIY SCORE: 23
HELP NEEDED FOR BATHING: A LITTLE

## 2019-12-27 NOTE — PROGRESS NOTES
Pt being transferred to [address]. Report given to QUENTIN Gutierrez. Pt's son notified of room transfer and new room number.

## 2019-12-27 NOTE — CARE PLAN
Problem: Communication  Goal: The ability to communicate needs accurately and effectively will improve  Outcome: PROGRESSING AS EXPECTED  Note:   Pt will be able to communicate needs effectively during stay, pt is encouraged to voice questions and concerns. Able to make needs known verbally.      Problem: Pain Management  Goal: Pain level will decrease to patient's comfort goal  Outcome: PROGRESSING AS EXPECTED  Note:   Pt pain level will be managed during stay, pt is able to voice out pain, administered PRN APAP to help relieve pain.

## 2019-12-27 NOTE — CARE PLAN
Problem: Safety  Goal: Will remain free from falls  Outcome: PROGRESSING AS EXPECTED  Pt uses call light appropriately to notify RN/CNA of need to use restroom and verbalizes importance of fall prevention protocol.     Problem: Infection  Goal: Will remain free from infection  Outcome: PROGRESSING AS EXPECTED   Pt verbalizes importance of frequent handwashing to prevent infection.

## 2019-12-27 NOTE — CARE PLAN
Problem: Bowel/Gastric:  Goal: Normal bowel function is maintained or improved  Outcome: PROGRESSING AS EXPECTED  Flowsheets (Taken 12/26/2019 1920 by AUSTIN EvansNRIZWAN)  Last BM: 12/26/19  Note:   Pt reports regular BM,s

## 2019-12-27 NOTE — PROGRESS NOTES
Internal Medicine Interval Note  Note Author: Madhu Goodwin M.D.     Name Kedar Woods     1940   Age/Sex 79 y.o. male   MRN [id number]   Code Status Full     After 5PM or if no immediate response to page, please call for cross-coverage  Attending/Team: Russ / LEATHA See Patient List for primary contact information  Call [phone] to page    1st Call - Day Intern (R1):   Dr. Goodwin 2nd Call - Day Sr. Resident (R2/R3):   Dr. Vance         Reason for interval visit  (Principal Problem)   80 y/o Mr. Woods with a pmh of AS s/p TAVR (2019) , CAD s/p stent placement (2019) , Pul HTN , Chronic b/l venous stasis , Atrial flutter , Pacemaker placed 2019 was admitted for chest pain after a GLF on 2019.  Medically stable     Interval Problem Daily Status Update  (24 hours, problem oriented, brief subjective history, new lab/imaging data pertinent to that problem)     - No Acute overnight events. Vitals remain stable.   - Patient reports he enjoyed his dinner last night.   - Patient continues to report left sided chest pain reproducible with palpation around the pacemaker insitu site. Denies SOB , pain radiating to neck/shoulder , diaphoresis.   - Care coordinators are on board. Will need to touch base and see this AM if a patient can go to SNF for short term before going to [facility].       Review of Systems   Constitutional: Negative for chills, diaphoresis, fever, malaise/fatigue and weight loss.   HENT: Negative for ear discharge, ear pain and hearing loss.    Eyes: Negative for blurred vision, double vision, pain and discharge.   Respiratory: Negative for cough, hemoptysis, sputum production and shortness of breath.    Cardiovascular: Negative for chest pain, palpitations, orthopnea, claudication, leg swelling and PND.   Gastrointestinal: Negative for abdominal pain, constipation, diarrhea, melena, nausea and vomiting.   Genitourinary: Negative for dysuria and urgency.    Musculoskeletal: Negative for myalgias.   Skin: Negative for rash.   Neurological: Negative for dizziness, tingling, tremors, seizures and headaches.   Psychiatric/Behavioral: Negative.        Disposition/Barriers to discharge:   Medically stable     Consultants/Specialty  none  PCP: Pcp Pt States None      Quality Measures  Quality-Core Measures   Reviewed items::  EKG reviewed, Labs reviewed and Medications reviewed  Rosales catheter::  No Rosales  DVT: On Eliquis.          Physical Exam       Vitals:    12/26/19 1608 12/26/19 1920 12/27/19 0400 12/27/19 0800   BP: 146/62 133/64 119/50 130/45   Pulse: 61 60 60 (!) 59   Resp: 20 18 17 18   Temp: 36.6 °C (97.9 °F) 36.8 °C (98.2 °F) 36.6 °C (97.8 °F) 36.4 °C (97.6 °F)   TempSrc: Temporal Temporal Temporal Temporal   SpO2: 96% 93% 95% 93%   Weight:  104.6 kg (230 lb 9.6 oz)     Height:         Body mass index is 30.42 kg/m². Weight: 104.6 kg (230 lb 9.6 oz)  Oxygen Therapy:  Pulse Oximetry: 93 %, O2 (LPM): 0, O2 Delivery: None (Room Air)    Physical Exam   Constitutional:   Morbidly obese gentleman. Reclined in bed eating breakfast.    HENT:   Head: Normocephalic and atraumatic.   Mouth/Throat: Oropharynx is clear and moist.   Eyes: EOM are normal. No scleral icterus.   Neck: Normal range of motion.   Cardiovascular: Normal rate, regular rhythm, normal heart sounds and intact distal pulses.   Pulmonary/Chest: No respiratory distress. He has no wheezes. He has no rales. He exhibits tenderness (tender to palpation left 3-4 intercostal space as well as right subcostal area).   Abdominal: Soft. He exhibits no distension (rotunded) and no mass. There is no tenderness. There is no rebound and no guarding.   Musculoskeletal:         General: Edema (trace b/l lower extremities) present.      Comments: Chronic venous stasis with skin pigmentation   Neurological: He is alert. No cranial nerve deficit. GCS score is 15.   Skin: Skin is warm. No rash noted. No erythema. No pallor.              Assessment/Plan     * Chest pain likely musculoskeletal  Assessment & Plan  (resolved)   Tender to palpation at left mid chest near 4-5 th intercostal space, around pacemaker site.  Troponin x2  : 32 and 30  EKG : V- Paced rhythm   .    Plan :  -Lidocaine gel.   -Continue statin, plavix and apixaban .  -Will inc Lipitor to 80 mg daily      Discharge planning issues  Assessment & Plan  Pending SNF   - Case management on board, appreciate their assistance    Chronic venous stasis dermatitis of both lower extremities- (present on admission)  Assessment & Plan  Chronic venous stasis, compression stockings in place.     Plan :  Wound care     Aortic stenosis, severe- (present on admission)  Assessment & Plan  S/P TAVR  In November 2019    - Currently stable      Cognitive decline  Assessment & Plan  States that he lives alone and performs all ADLs and IADLs     Plan :  - Patient has a DPOA in place    Hypothyroidism- (present on admission)  Assessment & Plan  Patient is on levothyroxine. Last TSH from 10/2019 was 0.9.     Plan :  Will continue home thyroxine dose.     Normocytic anemia- (present on admission)  Assessment & Plan  With MCV of 99. 5. At hi sbaseline.   B12 and Folate from 10/2019 was wnl.     Plan :  Will continue to monitor.     CAD (coronary artery disease)  Assessment & Plan  S/p stent placement at [facility] in November.     Plan :  -May need to be on beta blocker -Will get records from [facility].  -Continue statin, plavix , apixaban        Atrial flutter (HCC)  Assessment & Plan  Patient is in V paced rhythm on admission.     Plan :  Telemetry monitoring   Continue anticoagulation

## 2019-12-27 NOTE — THERAPY
"Occupational Therapy Treatment completed   Functional Status:  Pt seen for OT tx today, demonstrating improved function and balance since last OT session. Pt performed bed mobility with supervision, UB/LB dressing with sup, functional t/f's and toileting task with supervision, standing h/g tasks with supervision. Noted, per cognition evaluation pt has min to moderated cognitive deficits and 24/7 supervision recommended. Pt does not present the need for acute OT services further as able to complete ADLs with fair safety and balance w/ supervision and plan is discharge to long term placement where he will have 24/7 and assist with IADls.   Plan of Care: Patient with no further skilled OT needs in the acute care setting at this time  Discharge Recommendations:  Equipment Shower Chair. Post-acute therapy will drop to monitor status to assist with d/c planning. Recommend GLF, residential etc.. level long term placement at this time.       See \"Rehab Therapy-Acute\" Patient Summary Report for complete documentation.     "

## 2019-12-27 NOTE — PROGRESS NOTES
Report received from QUENTIN Gutierrez. Assumed care at 1930, assessment complete. Pt is A & O x 4.  Pt states he has 8/10 L knee pain and wants to wait for scheduled trazodone & PRN APAP till later. Fall precautions and appropriate signs in place. Pt oriented to unit routine, call light/phone system and RN extension number provided. Pt educated regarding fall precautions. Bed alarm in use. Pt denies any additional needs at this time. Call light within reach.   Pt is able to make needs known, asks questions.

## 2019-12-27 NOTE — PROGRESS NOTES
· 2 RN skin check completed with QUENTIN Beckett.   · Devices in place gauze and abd pad on BLE.  · Skin assessed under devices yes.  · Confirmed pressure ulcers found on n/a.  · New potential pressure ulcers noted on n/a. Wound consult placed? Already done by previous RN. Photo uploaded? Done by previous RN.   · The following interventions are in place waffle cushion, pt turns self, q shift skin assessments, mepilex & moisturizer.    Pt has redness to saccrum & coccyx area, slowly blancing. On pt L lower buttock it is purple. Pt has a small moisture fissure on L pannus. Pt transferred over with BLE wrapped in gauze and ABD pads, RN removed old dsg to assess skin underneath, BLE are dusky red, small tears/dark spots, no weeping. BLE were cleansed with NS irrigation, ABD pads placed, and gauze to secure. Generalized bruising.

## 2019-12-28 PROCEDURE — G0378 HOSPITAL OBSERVATION PER HR: HCPCS

## 2019-12-28 PROCEDURE — A9270 NON-COVERED ITEM OR SERVICE: HCPCS | Performed by: STUDENT IN AN ORGANIZED HEALTH CARE EDUCATION/TRAINING PROGRAM

## 2019-12-28 PROCEDURE — 700102 HCHG RX REV CODE 250 W/ 637 OVERRIDE(OP): Performed by: STUDENT IN AN ORGANIZED HEALTH CARE EDUCATION/TRAINING PROGRAM

## 2019-12-28 PROCEDURE — 700102 HCHG RX REV CODE 250 W/ 637 OVERRIDE(OP): Performed by: INTERNAL MEDICINE

## 2019-12-28 PROCEDURE — 99224 PR SUBSEQUENT OBSERVATION CARE,LEVEL I: CPT | Mod: GC | Performed by: INTERNAL MEDICINE

## 2019-12-28 PROCEDURE — A9270 NON-COVERED ITEM OR SERVICE: HCPCS | Performed by: INTERNAL MEDICINE

## 2019-12-28 RX ADMIN — TRAZODONE HYDROCHLORIDE 50 MG: 50 TABLET ORAL at 20:26

## 2019-12-28 RX ADMIN — BACLOFEN 15 MG: 10 TABLET ORAL at 05:03

## 2019-12-28 RX ADMIN — BACLOFEN 15 MG: 10 TABLET ORAL at 17:41

## 2019-12-28 RX ADMIN — ALLOPURINOL 100 MG: 100 TABLET ORAL at 05:05

## 2019-12-28 RX ADMIN — Medication 400 MG: at 17:41

## 2019-12-28 RX ADMIN — Medication 1 APPLICATION: at 17:40

## 2019-12-28 RX ADMIN — APIXABAN 5 MG: 5 TABLET, FILM COATED ORAL at 05:06

## 2019-12-28 RX ADMIN — MELATONIN 1000 UNITS: at 05:04

## 2019-12-28 RX ADMIN — Medication: at 05:06

## 2019-12-28 RX ADMIN — OMEPRAZOLE 20 MG: 20 CAPSULE, DELAYED RELEASE ORAL at 05:02

## 2019-12-28 RX ADMIN — CLOPIDOGREL BISULFATE 75 MG: 75 TABLET ORAL at 05:05

## 2019-12-28 RX ADMIN — Medication 400 MG: at 05:04

## 2019-12-28 RX ADMIN — ATORVASTATIN CALCIUM 80 MG: 80 TABLET, FILM COATED ORAL at 05:05

## 2019-12-28 RX ADMIN — ACETAMINOPHEN 650 MG: 325 TABLET, FILM COATED ORAL at 20:27

## 2019-12-28 RX ADMIN — APIXABAN 5 MG: 5 TABLET, FILM COATED ORAL at 17:41

## 2019-12-28 RX ADMIN — SENNOSIDES AND DOCUSATE SODIUM 2 TABLET: 8.6; 5 TABLET ORAL at 17:41

## 2019-12-28 RX ADMIN — SENNOSIDES AND DOCUSATE SODIUM 2 TABLET: 8.6; 5 TABLET ORAL at 05:04

## 2019-12-28 RX ADMIN — LEVOTHYROXINE SODIUM 50 MCG: 25 TABLET ORAL at 05:02

## 2019-12-28 RX ADMIN — METFORMIN HYDROCHLORIDE 500 MG: 500 TABLET ORAL at 05:06

## 2019-12-28 ASSESSMENT — ENCOUNTER SYMPTOMS
FEVER: 0
PSYCHIATRIC NEGATIVE: 1
NAUSEA: 0
TINGLING: 0
MYALGIAS: 0
DOUBLE VISION: 0
ORTHOPNEA: 0
CLAUDICATION: 0
BLURRED VISION: 0
VOMITING: 0
SEIZURES: 0
PND: 0
WEIGHT LOSS: 0
DIZZINESS: 0
ABDOMINAL PAIN: 0
PALPITATIONS: 0
CONSTIPATION: 0
DIAPHORESIS: 0
DIARRHEA: 0
CHILLS: 0
TREMORS: 0
EYE DISCHARGE: 0
HEMOPTYSIS: 0
EYE PAIN: 0
SPUTUM PRODUCTION: 0
SHORTNESS OF BREATH: 0
COUGH: 0
HEADACHES: 0

## 2019-12-28 NOTE — PROGRESS NOTES
"Assumed care of patient at 0700 . Received report from RN. Patient is AOX4 . Assessment complete. Labs reviewed.Patient and RN discussed plan of care. Patient questions answered. Patient needs are met at this time. Bed in lowest and locked position. Upper bed rails up.  Call light is within reach. Hourly rounding in place. /56   Pulse 60   Temp 36.5 °C (97.7 °F) (Temporal)   Resp 16   Ht 1.854 m (6' 1\")   Wt 105.4 kg (232 lb 5.8 oz)   SpO2 96%   BMI 30.66 kg/m²     "

## 2019-12-28 NOTE — PROGRESS NOTES
OHF/MB w/ one cross bar installed and adjusted to pt's comfort. Please call traction at [phone] for any further assistance.

## 2019-12-28 NOTE — CARE PLAN
Problem: Safety  Goal: Will remain free from injury  Outcome: PROGRESSING AS EXPECTED  Note:   Bed in lowest position, call light within reach. Bed alarm on. Patient educated regarding safety precautions. Room free of clutter.       Problem: Infection  Goal: Will remain free from infection  Outcome: PROGRESSING AS EXPECTED  Note:   Educated patient on proper handwashing techniques with soap and water. Explained the importance of washing hands after using the restroom, before and after eating. Hand  is available on the walls of the rooms for use when hands are not visibly soiled.       I personally saw the patient with the ACP, and completed the key components of the history and physical exam.  I then discussed the management plan with the ACP.     Patient with history of asthma presents to ED c/o SOB and difficulty breathing x 2 days.  ?CHF  Plan: XR, labs, diurese as indicated, admit

## 2019-12-28 NOTE — PROGRESS NOTES
Report received from QUENTIN Gutierrez. Assumed care at 1900, assessment complete. Pt is A & O x 4.  Pt c/o 5/10 BLE pain, pain meds given see MAR. Fall precautions and appropriate signs in place. Pt oriented to unit routine, call light/phone system and RN extension number provided. Pt educated regarding fall precautions. Bed alarm not in use, pt refused b/c it goes off when adjusting self w/ trapeze, pt educated. Pt denies any additional needs at this time. Call light within reach.   Able to make needs known verbally.

## 2019-12-28 NOTE — CARE PLAN
Problem: Communication  Goal: The ability to communicate needs accurately and effectively will improve  Outcome: PROGRESSING AS EXPECTED  Note:   Pt will communicate needs effectively during stay, pt is able to make needs known verbally, and demonstrates appropriate call light use.      Problem: Knowledge Deficit  Goal: Knowledge of disease process/condition, treatment plan, diagnostic tests, and medications will improve  Outcome: PROGRESSING AS EXPECTED  Note:   Pt will understand tx/care plan, encouraged to voice questions/concerns, pt is incorporated into care as much as possible.

## 2019-12-28 NOTE — PROGRESS NOTES
Internal Medicine Interval Note  Note Author: Madhu Goodwin M.D.     Name Kedar Woods     1940   Age/Sex 79 y.o. male   MRN [id number]   Code Status Full     After 5PM or if no immediate response to page, please call for cross-coverage  Attending/Team: Russ / LEATHA See Patient List for primary contact information  Call [phone] to page    1st Call - Day Intern (R1):   Dr. Goodwin 2nd Call - Day Sr. Resident (R2/R3):   Dr. Vance         Reason for interval visit  (Principal Problem)   80 y/o Mr. Woods with a pmh of AS s/p TAVR (2019) , CAD s/p stent placement (2019) , Pul HTN , Chronic b/l venous stasis , Atrial flutter , Pacemaker placed 2019 was admitted for chest pain after a GLF on 2019.  Medically stable     Interval Problem Daily Status Update  (24 hours, problem oriented, brief subjective history, new lab/imaging data pertinent to that problem)     - No Acute overnight events. Vitals remain stable.   - Patient continues to report left sided chest pain reproducible with palpation around the pacemaker insitu site. Denies SOB , pain radiating to neck/shoulder , diaphoresis.   -Wound care signed off. No advanced wound care needed per them. Xeroform and gauze ordered for 4th toe to protect blister.  - Care coordinators are on board. Will need to touch base and see this AM if a patient can go to SNF for short term before going to [facility].       Review of Systems   Constitutional: Negative for chills, diaphoresis, fever, malaise/fatigue and weight loss.   HENT: Negative for ear discharge, ear pain and hearing loss.    Eyes: Negative for blurred vision, double vision, pain and discharge.   Respiratory: Negative for cough, hemoptysis, sputum production and shortness of breath.    Cardiovascular: Negative for chest pain, palpitations, orthopnea, claudication, leg swelling and PND.   Gastrointestinal: Negative for abdominal pain, constipation, diarrhea, melena,  nausea and vomiting.   Genitourinary: Negative for dysuria and urgency.   Musculoskeletal: Negative for myalgias.   Skin: Negative for rash.   Neurological: Negative for dizziness, tingling, tremors, seizures and headaches.   Psychiatric/Behavioral: Negative.        Disposition/Barriers to discharge:   Medically stable   Placement to [facility]     Consultants/Specialty  none  PCP: Pcp Pt States None      Quality Measures  Quality-Core Measures   Reviewed items::  EKG reviewed, Labs reviewed and Medications reviewed  Rosales catheter::  No Rosales  DVT: On Eliquis.          Physical Exam       Vitals:    12/27/19 2005 12/28/19 0326 12/28/19 0735 12/28/19 0815   BP: 112/46 122/56 129/51    Pulse: 61 60 60    Resp: 16 16 16    Temp: 36.7 °C (98.1 °F) 36.5 °C (97.7 °F) 36.7 °C (98.1 °F)    TempSrc: Temporal Temporal Temporal    SpO2: 96% 96% 94%    Weight:  105.4 kg (232 lb 5.8 oz)  99.3 kg (219 lb)   Height:         Body mass index is 28.89 kg/m². Weight: 99.3 kg (219 lb)  Oxygen Therapy:  Pulse Oximetry: 94 %, O2 (LPM): 0, O2 Delivery: None (Room Air)    Physical Exam   Constitutional:   Morbidly obese gentleman. Reclined in bed eating breakfast.    HENT:   Head: Normocephalic and atraumatic.   Mouth/Throat: Oropharynx is clear and moist.   Eyes: EOM are normal. No scleral icterus.   Neck: Normal range of motion.   Cardiovascular: Normal rate, regular rhythm, normal heart sounds and intact distal pulses.   Pulmonary/Chest: No respiratory distress. He has no wheezes. He has no rales. He exhibits tenderness (tender to palpation left 3-4 intercostal space as well as right subcostal area).   Abdominal: Soft. He exhibits no distension (rotunded) and no mass. There is no tenderness. There is no rebound and no guarding.   Musculoskeletal:         General: Edema (trace b/l lower extremities) present.      Comments: Chronic venous stasis with skin pigmentation   Neurological: He is alert. No cranial nerve deficit. GCS score is 15.    Skin: Skin is warm. No rash noted. No erythema. No pallor.             Assessment/Plan     * Chest pain likely musculoskeletal  Assessment & Plan  (resolved)   Tender to palpation at left mid chest near 4-5 th intercostal space, around pacemaker site.  Troponin x2  : 32 and 30  EKG : V- Paced rhythm   .    Plan :  -Lidocaine gel.   -Continue statin, plavix and apixaban .  -Will inc Lipitor to 80 mg daily      Discharge planning issues  Assessment & Plan  Pending SNF   - Case management on board, appreciate their assistance    Chronic venous stasis dermatitis of both lower extremities- (present on admission)  Assessment & Plan  Chronic venous stasis, compression stockings in place.     Plan :  Wound care     Aortic stenosis, severe- (present on admission)  Assessment & Plan  S/P TAVR  In November 2019    - Currently stable      Cognitive decline  Assessment & Plan  States that he lives alone and performs all ADLs and IADLs     Plan :  - Patient has a DPOA in place    Hypothyroidism- (present on admission)  Assessment & Plan  Patient is on levothyroxine. Last TSH from 10/2019 was 0.9.     Plan :  Will continue home thyroxine dose.     Normocytic anemia- (present on admission)  Assessment & Plan  With MCV of 99. 5. At hi sbaseline.   B12 and Folate from 10/2019 was wnl.     Plan :  Will continue to monitor.     CAD (coronary artery disease)  Assessment & Plan  S/p stent placement at [facility] in November.     Plan :  -May need to be on beta blocker -Will get records from [facility].  -Continue statin, plavix , apixaban        Atrial flutter (HCC)  Assessment & Plan  Patient is in V paced rhythm on admission.     Plan :  Telemetry monitoring   Continue anticoagulation

## 2019-12-28 NOTE — PROGRESS NOTES
Previous note from ortho technician is describing the over-bed trapeze installed for pt to reposition self in bed.

## 2019-12-28 NOTE — DISCHARGE SUMMARY
Internal Medicine Discharge Summary  Note Author: Michael Norton MD      Name Kedar Woods     1940   Age/Sex 79 y.o. male   MRN [id number]         Admit Date:  2019       Discharge Date:   2019    Service:   [facility] Internal Medicine Gray Team  Attending Physician(s):   Bronson       Senior Resident(s):   Cierra  [place] Resident(s):   Buddy  PCP: Pcp Pt States None      Primary Diagnosis:   Musculoskeletal chest pain    Secondary Diagnoses:                Principal Problem:    Chest pain likely musculoskeletal POA: Unknown  Active Problems:    Discharge planning issues POA: Unknown    Aortic stenosis, severe POA: Yes    Chronic venous stasis dermatitis of both lower extremities POA: Yes    Hypothyroidism POA: Yes    Cognitive decline POA: Unknown    Atrial flutter (HCC) POA: Unknown    CAD (coronary artery disease) POA: Unknown    Normocytic anemia POA: Yes  Resolved Problems:    * No resolved hospital problems. *      Hospital Summary (Brief Narrative):       Mr. Woods is a 79-year old male  with past medical history of severe aortic stenosis status post TAVR, coronary artery disease status post stent, chronic lower extremity venous stasis related wound, status post RV pacer and atrial flutter who presented to the hospital with chest pain. Patient recently, within the last couple of months, had a catheterization for TAVR (records requested from [facility], but not obtained). At the time of presentation, patient's chest pain had resolved without EKG findings of acute ischemia. Patient was monitored on telemetry and his troponins remained stable. Chest pain was found to be reproducible on exam and thought to be secondary to musculoskeletal pain.     Therapy services evaluated patient and recommended post acute therapy. Patient's DPOA was contacted who was agreeable to discharge to skilled nursing facility. Patient is discharged to [facility] for  continued physical therapy services.    Patient /Hospital Summary (Details -- Problem Oriented) :          Discharge planning issues  Assessment & Plan  Pending SNF   - Case management on board, appreciate their assistance    * Chest pain likely musculoskeletal  Assessment & Plan  (resolved)   Tender to palpation at left mid chest near 4-5 th intercostal space, around pacemaker site.  Troponin x2  : 32 and 30  EKG : V- Paced rhythm   .    Plan :  -Lidocaine gel.   -Continue statin, plavix and apixaban .  -Will inc Lipitor to 80 mg daily      Chronic venous stasis dermatitis of both lower extremities  Assessment & Plan  Chronic venous stasis, compression stockings in place.     Plan :  Wound care     Aortic stenosis, severe  Assessment & Plan  S/P TAVR  In November 2019    - Currently stable      Cognitive decline  Assessment & Plan  States that he lives alone and performs all ADLs and IADLs     Plan :  - Patient has a DPOA in place.  - Per DPOA patient recently not been able to take care of himself and increased incidence of falls. Will benefit from PATRICIA /Group home placement.    Hypothyroidism  Assessment & Plan  (stable)  Patient is on levothyroxine. Last TSH from 10/2019 was 0.9.     Plan :  Will continue home thyroxine dose.     Normocytic anemia  Assessment & Plan  With MCV of 99. 5. At hi sbaseline.   B12 and Folate from 10/2019 was wnl.     Plan :  Will continue to monitor.     CAD (coronary artery disease)  Assessment & Plan  S/p stent placement at [facility] in November.     Plan :  -May need to be on beta blocker -Will get records from [facility].  -Continue statin, plavix , apixaban        Atrial flutter (HCC)  Assessment & Plan  Patient is in V paced rhythm on admission.     Plan :  Continue anticoagulation      Consultants:     none    Procedures:        none    Imaging/ Testing:      Chest x ray 12/23/2019  CT head w/o 12/23/2019    Discharge Medications:         Medication Reconciliation: Completed        Medication List      ASK your doctor about these medications      Instructions   acetaminophen 325 MG Tabs  Commonly known as:  TYLENOL  Ask about: Which instructions should I use?   Take 650 mg by mouth every 6 hours as needed. Indications: Pain  Dose:  650 mg     allopurinol 100 MG Tabs  Commonly known as:  ZYLOPRIM   Take 100 mg by mouth every day.  Dose:  100 mg     atorvastatin 40 MG Tabs  Commonly known as:  LIPITOR   Take 40 mg by mouth every day.  Dose:  40 mg     baclofen 10 MG Tabs  Commonly known as:  LIORESAL   Take 15 mg by mouth 2 Times a Day.  Dose:  15 mg     clopidogrel 75 MG Tabs  Commonly known as:  PLAVIX   Take 75 mg by mouth every day.  Dose:  75 mg     ELIQUIS 5mg Tabs  Generic drug:  apixaban   Take 5 mg by mouth 2 Times a Day.  Dose:  5 mg     levothyroxine 50 MCG Tabs  Commonly known as:  SYNTHROID   Take 50 mcg by mouth Every morning on an empty stomach.  Dose:  50 mcg     Magnesium Oxide 420 MG Tabs   Take 420 mg by mouth 2 Times a Day.  Dose:  420 mg     metFORMIN 500 MG Tabs  Commonly known as:  GLUCOPHAGE   Take 500 mg by mouth every day.  Dose:  500 mg     PROTONIX 20 MG tablet  Generic drug:  pantoprazole  Ask about: Which instructions should I use?   Take 20 mg by mouth every day.  Dose:  20 mg     traZODone 50 MG Tabs  Commonly known as:  DESYREL  Ask about: Which instructions should I use?   Take 50 mg by mouth every evening.  Dose:  50 mg     vitamin D 1000 UNIT Tabs  Commonly known as:  cholecalciferol   Take 1,000 Units by mouth every day.  Dose:  1,000 Units            Can use .DISCHARGEMEDSLIST if going to another facility         Disposition:   Discharge to [facility] [place]    Diet:   cardiac    Activity:   As directed by physical therapy    Instructions:         The patient was instructed to return to the ER in the event of worsening symptoms. I have counseled the patient on the importance of compliance and the patient has agreed to proceed with all medical  recommendations and follow up plan indicated above.   The patient understands that all medications come with benefits and risks. Risks may include permanent injury or death and these risks can be minimized with close reassessment and monitoring.        Primary Care Provider:      Discharge summary faxed to primary care provider:  Deferred  Copy of discharge summary given to the patient: Deferred      Follow up appointment details :      No future appointments.  No follow-up provider specified.  Follow up with PCP 1-2 weeks after discharge from SNF/[facility]    Pending Studies:        none    Time spent on discharge day patient visit, preparing discharge paperwork and arranging for patient follow up.    Summary of follow up issues:   none    Discharge Time (Minutes) :    32 minutes  Hospital Course Type:  Inpatient Stay >2 midnights      Condition on Discharge    ______________________________________________________________________    Interval history/exam for day of discharge:     No acute events overnight, patient without complaints. Denies chest pain, plan for discharge to [facility]'s home today.    Physical Exam   Constitutional:   Morbidly obese gentleman. Reclined in bed eating breakfast.    HENT:   Head: Normocephalic and atraumatic.   Mouth/Throat: Oropharynx is clear and moist.   Eyes: EOM are normal. No scleral icterus.   Neck: Normal range of motion.   Cardiovascular: Normal rate, regular rhythm, normal heart sounds and intact distal pulses.   Pulmonary/Chest: No respiratory distress. He has no wheezes. He has no rales. He exhibits tenderness (tender to palpation left 3-4 intercostal space as well as right subcostal area).   Abdominal: Soft. He exhibits no distension (rotunded) and no mass. There is no tenderness. There is no rebound and no guarding.   Musculoskeletal:         General: Edema (trace b/l lower extremities) present.      Comments: Chronic venous stasis with skin pigmentation   Neurological: He is  alert. No cranial nerve deficit. GCS score is 15.   Skin: Skin is warm. No rash noted. No erythema. No pallor.     Most Recent Labs:    Lab Results   Component Value Date/Time    WBC 6.7 12/24/2019 02:50 AM    RBC 3.62 (L) 12/24/2019 02:50 AM    HEMOGLOBIN 11.2 (L) 12/24/2019 02:50 AM    HEMATOCRIT 35.6 (L) 12/24/2019 02:50 AM    MCV 98.3 (H) 12/24/2019 02:50 AM    MCH 30.9 12/24/2019 02:50 AM    MCHC 31.5 (L) 12/24/2019 02:50 AM    MPV 10.3 12/24/2019 02:50 AM    NEUTSPOLYS 54.70 12/24/2019 02:50 AM    LYMPHOCYTES 27.90 12/24/2019 02:50 AM    MONOCYTES 12.20 12/24/2019 02:50 AM    EOSINOPHILS 4.30 12/24/2019 02:50 AM    BASOPHILS 0.60 12/24/2019 02:50 AM      Lab Results   Component Value Date/Time    SODIUM 143 12/24/2019 02:50 AM    POTASSIUM 4.0 12/24/2019 02:50 AM    CHLORIDE 111 12/24/2019 02:50 AM    CO2 26 12/24/2019 02:50 AM    GLUCOSE 81 12/24/2019 02:50 AM    BUN 15 12/24/2019 02:50 AM    CREATININE 0.81 12/24/2019 02:50 AM      Lab Results   Component Value Date/Time    ALTSGPT 8 12/24/2019 02:50 AM    ASTSGOT 16 12/24/2019 02:50 AM    ALKPHOSPHAT 56 12/24/2019 02:50 AM    TBILIRUBIN 0.4 12/24/2019 02:50 AM    ALBUMIN 2.8 (L) 12/24/2019 02:50 AM    GLOBULIN 2.5 12/24/2019 02:50 AM     No results found for: PROTHROMBTM, INR

## 2019-12-28 NOTE — WOUND TEAM
"[facility]  Inpatient Services  Initial Wound and Skin Care Evaluation    Admission Date: 12/23/2019     Consult Date: 12/27/19  HPI, PMH, SH: Reviewed    Unit where seen by Wound Team: [id number]     WOUND CONSULT RELATED TO:  \"BLE Redness, R 3rd Toe blister, Sacrum Redness\"    Self Report / Pain Level:  No c/o pain     OBJECTIVE:  Pt in bed, legs elevated. Kerlix dressings to BLE.    WOUND TYPE, LOCATION, CHARACTERISTICS (Pressure Injuries: location, stage, POA or date identified)    Pressure Injury 12/27/19 Pretibial Linear DTI to Right Medial Proximal Lower Leg (Active)   Wound Image   12/27/2019   Pressure Injury Stage DTPI    State of Healing Epithelialized    Site Assessment Red;Dark edges;Intact    Tiffanie-wound Assessment Clean;Dry;Intact;Pink    Margins Attached edges;Defined edges;Other (Intact Skin)    Wound Length (cm) 1.2 cm    Wound Width (cm) 3.5 cm    Wound Depth (cm) 0 cm    Wound Surface Area (cm^2) 4.2 cm^2    Closure Open to air;Other (Intact Skin)    Drainage Amount None    Non-staged Wound Description Not applicable    Treatments Cleansed    Cleansing Approved Wound Cleanser    Periwound Protectant Not Applicable    Dressing Options Open to Air    Dressing Cleansing/Solutions Not Applicable    Dressing Changed LOWELL    Dressing Status LOWELL    NEXT Weekly Photo (Inpatient Only) 01/03/20    WOUND NURSE ONLY - Odor None    WOUND NURSE ONLY - Pulses Right;2+;DP    WOUND NURSE ONLY - Exposed Structures None    WOUND NURSE ONLY - Tissue Type and Percentage Intact Skin        Pressure Injury 12/27/19 Pretibial Linear DTI to Left Medial Proximal Lower Leg (Active)   Wound Image   12/27/2019     Pressure Injury Stage DTPI    State of Healing Epithelialized    Site Assessment Intact;Red;Dark edges    Tiffanie-wound Assessment Clean;Dry;Intact;Pink    Margins Attached edges;Defined edges;Other (Intact Skin)    Wound Length (cm) 1 cm    Wound Width (cm) 3.8 cm    Wound Depth (cm) 0 cm    Wound Surface " Area (cm^2) 3.8 cm^2    Closure Open to air;Other (Intact Skin)    Drainage Amount None    Non-staged Wound Description Not applicable    Treatments Cleansed    Cleansing Approved Wound Cleanser    Periwound Protectant Not Applicable    Dressing Options Open to Air    Dressing Cleansing/Solutions Not Applicable    NEXT Weekly Photo (Inpatient Only) 01/03/20    WOUND NURSE ONLY - Odor None    WOUND NURSE ONLY - Pulses Left;2+;DP    WOUND NURSE ONLY - Exposed Structures None         Wound 12/23/19 Other (comment) Toe (Comment which one) Intact Blister to R 4th Toe, Medially (Active)   Wound Image   12/27/2019     Site Assessment Pink;Intact;Brown    Tiffanie-wound Assessment Clean;Dry;Intact    Margins Attached edges;Other (Intact Skin)    Wound Length (cm) 1.5 cm    Wound Width (cm) 0.7 cm    Wound Depth (cm) 0 cm    Wound Surface Area (cm^2) 1.05 cm^2    Closure Other (Intact Skin)    Drainage Amount None    Drainage Description RAFAT    Treatments Cleansed;Site care    Cleansing Approved Wound Cleanser    Periwound Protectant Not Applicable    Dressing Options Nonadherent Contact Layer;Dry Gauze;Hypafix Tape    Dressing Cleansing/Solutions Not Applicable    Dressing Changed New    Dressing Status Clean;Dry;Intact    Dressing Change Frequency Every 72 hrs    NEXT Dressing Change  12/30/19    NEXT Weekly Photo (Inpatient Only) 01/03/20    WOUND NURSE ONLY - Odor Mild    WOUND NURSE ONLY - Pulses 2+;Right;Left;DP    WOUND NURSE ONLY - Exposed Structures None    WOUND NURSE ONLY - Tissue Type and Percentage Intact Skin Over Dark Fluid Filled Blister            Vascular:    Dorsal Pedal pulses:  2+  Posterior tib pulses:   2+    DOMENIC:      none done     Lab Values:    Lab Results   Component Value Date/Time    WBC 6.7 12/24/2019 02:50 AM    RBC 3.62 (L) 12/24/2019 02:50 AM    HEMOGLOBIN 11.2 (L) 12/24/2019 02:50 AM    HEMATOCRIT 35.6 (L) 12/24/2019 02:50 AM        Lab Results   Component Value Date/Time    HBA1C 5.6 10/24/2019  03:05 AM       Culture: n/a      INTERVENTIONS BY WOUND TEAM:  Removed bilateral lower extremity dressings, cleansed bilateral lower legs with wash cloth and warm water. Legs are intact - dry scaly skin noted around proximal lower leg and heels. DP 2+ bilaterally, tubi  E placed to bilateral lower legs - per pt uses chronic Unnaboots.  Pt has 2 linear DTIs to proximal medial lower legs from previous Unnaboot compression, skin is intact, surrounding tissue unremarkable.  R foot 4th toe has an intact blister along the medial aspect, non-adherent dressing placed. Sacrum dark pink, blanching. Previous moisture fissure to L pannus, healed.    Interdisciplinary consultation: Patient, Bedside RN    EVALUATION: No advanced wound care needed at this time. Xeroform and gauze ordered for 4th toe to protect blister, nonadherent dressing.    Goals: Steady decrease in wound area and depth weekly.    NURSING PLAN OF CARE ORDERS (X):    Dressing changes: See Dressing Care orders: X  Skin care: See Skin Care orders: X  Rectal tube care: See Rectal Tube Care orders:   Other orders:    RSKIN:   CURRENTLY IN PLACE (X), APPLIED THIS VISIT (A), ORDERED (O):   Q shift Spike:  X  Q shift pressure point assessments:  X  Pressure redistribution mattress         X   Low Airloss          Bariatric SIL         Bariatric foam           Heel float boots     Heel Silicone dressing        Float Heels off Bed with Pillows  X             Barrier wipes         Barrier Cream         Barrier paste          Sacral silicone dressing         Silicone O2 tubing         Anchorfast         Cannula fixation Device (Tender )          Gray Foam Ear protectors           Trach with Optifoam split foam                 Waffle cushion        Waffle Overlay         Rectal tube or BMS    Purwick/Condom Cath          Antifungal tx      Interdry          Reposition q 2 hours        Up to chair        Ambulate      PT/OT        Dietician        Diabetes  Education      PO   X  TF     TPN     NPO   # days   Other        WOUND TEAM PLAN OF CARE (X):   Dressing changes by wound team:   Follow up 1 - 2 times weekly:    NPWT change 3x weekly:     Follow up as needed: X      Other (explain):  Please consult wound care with any worsening wounds, wound care signing off.     Anticipated discharge plans (X):   LTACH:  SNF/Rehab:   X        Home Care:           Outpatient Wound Center:            Self Care:            Other:

## 2019-12-29 PROCEDURE — A9270 NON-COVERED ITEM OR SERVICE: HCPCS | Performed by: INTERNAL MEDICINE

## 2019-12-29 PROCEDURE — 700102 HCHG RX REV CODE 250 W/ 637 OVERRIDE(OP): Performed by: STUDENT IN AN ORGANIZED HEALTH CARE EDUCATION/TRAINING PROGRAM

## 2019-12-29 PROCEDURE — 99224 PR SUBSEQUENT OBSERVATION CARE,LEVEL I: CPT | Mod: GC | Performed by: INTERNAL MEDICINE

## 2019-12-29 PROCEDURE — 700102 HCHG RX REV CODE 250 W/ 637 OVERRIDE(OP): Performed by: INTERNAL MEDICINE

## 2019-12-29 PROCEDURE — G0378 HOSPITAL OBSERVATION PER HR: HCPCS

## 2019-12-29 PROCEDURE — A9270 NON-COVERED ITEM OR SERVICE: HCPCS | Performed by: STUDENT IN AN ORGANIZED HEALTH CARE EDUCATION/TRAINING PROGRAM

## 2019-12-29 RX ADMIN — SENNOSIDES AND DOCUSATE SODIUM 2 TABLET: 8.6; 5 TABLET ORAL at 04:54

## 2019-12-29 RX ADMIN — METFORMIN HYDROCHLORIDE 500 MG: 500 TABLET ORAL at 04:55

## 2019-12-29 RX ADMIN — TRAZODONE HYDROCHLORIDE 50 MG: 50 TABLET ORAL at 21:47

## 2019-12-29 RX ADMIN — OMEPRAZOLE 20 MG: 20 CAPSULE, DELAYED RELEASE ORAL at 04:54

## 2019-12-29 RX ADMIN — LEVOTHYROXINE SODIUM 50 MCG: 25 TABLET ORAL at 04:55

## 2019-12-29 RX ADMIN — ATORVASTATIN CALCIUM 80 MG: 80 TABLET, FILM COATED ORAL at 04:54

## 2019-12-29 RX ADMIN — APIXABAN 5 MG: 5 TABLET, FILM COATED ORAL at 16:41

## 2019-12-29 RX ADMIN — Medication: at 16:41

## 2019-12-29 RX ADMIN — APIXABAN 5 MG: 5 TABLET, FILM COATED ORAL at 04:56

## 2019-12-29 RX ADMIN — SENNOSIDES AND DOCUSATE SODIUM 2 TABLET: 8.6; 5 TABLET ORAL at 16:48

## 2019-12-29 RX ADMIN — Medication 400 MG: at 04:53

## 2019-12-29 RX ADMIN — BACLOFEN 15 MG: 10 TABLET ORAL at 04:54

## 2019-12-29 RX ADMIN — ALLOPURINOL 100 MG: 100 TABLET ORAL at 04:54

## 2019-12-29 RX ADMIN — Medication: at 04:56

## 2019-12-29 RX ADMIN — Medication 400 MG: at 16:41

## 2019-12-29 RX ADMIN — CLOPIDOGREL BISULFATE 75 MG: 75 TABLET ORAL at 04:53

## 2019-12-29 RX ADMIN — BACLOFEN 15 MG: 10 TABLET ORAL at 16:41

## 2019-12-29 RX ADMIN — MELATONIN 1000 UNITS: at 04:55

## 2019-12-29 RX ADMIN — ACETAMINOPHEN 650 MG: 325 TABLET, FILM COATED ORAL at 21:47

## 2019-12-29 ASSESSMENT — ENCOUNTER SYMPTOMS
SHORTNESS OF BREATH: 0
FEVER: 0
CHILLS: 0
PALPITATIONS: 0
TINGLING: 0
WEIGHT LOSS: 0
ABDOMINAL PAIN: 0
PND: 0
DIARRHEA: 0
CLAUDICATION: 0
SEIZURES: 0
VOMITING: 0
DIZZINESS: 0
DIAPHORESIS: 0
BLURRED VISION: 0
SPUTUM PRODUCTION: 0
CONSTIPATION: 0
DOUBLE VISION: 0
PSYCHIATRIC NEGATIVE: 1
COUGH: 0
MYALGIAS: 0
EYE PAIN: 0
NAUSEA: 0
EYE DISCHARGE: 0
ORTHOPNEA: 0
HEADACHES: 0
TREMORS: 0
HEMOPTYSIS: 0

## 2019-12-29 NOTE — CARE PLAN
Problem: Communication  Goal: The ability to communicate needs accurately and effectively will improve  12/29/2019 1148 by Nancy Pulido R.N.  Outcome: PROGRESSING AS EXPECTED  12/29/2019 1148 by Nancy Pulido R.N.  Outcome: PROGRESSING SLOWER THAN EXPECTED     Problem: Safety  Goal: Will remain free from injury  12/29/2019 1148 by Nancy Pulido R.N.  Outcome: PROGRESSING AS EXPECTED  12/29/2019 1148 by Nancy Pulido R.N.  Outcome: PROGRESSING SLOWER THAN EXPECTED  Goal: Will remain free from falls  12/29/2019 1148 by Nancy Pulido R.N.  Outcome: PROGRESSING AS EXPECTED  12/29/2019 1148 by Nancy Pulido R.N.  Outcome: PROGRESSING SLOWER THAN EXPECTED     Problem: Infection  Goal: Will remain free from infection  12/29/2019 1148 by Nancy Pulido R.N.  Outcome: PROGRESSING AS EXPECTED  12/29/2019 1148 by Nancy Pulido R.N.  Outcome: PROGRESSING SLOWER THAN EXPECTED     Problem: Venous Thromboembolism (VTW)/Deep Vein Thrombosis (DVT) Prevention:  Goal: Patient will participate in Venous Thrombosis (VTE)/Deep Vein Thrombosis (DVT)Prevention Measures  12/29/2019 1148 by Nancy Pulido R.N.  Outcome: PROGRESSING AS EXPECTED  12/29/2019 1148 by Nancy Pulido R.N.  Outcome: PROGRESSING SLOWER THAN EXPECTED     Problem: Bowel/Gastric:  Goal: Normal bowel function is maintained or improved  12/29/2019 1148 by Nancy Pulido R.N.  Outcome: PROGRESSING AS EXPECTED  12/29/2019 1148 by Nancy Pulido R.N.  Outcome: PROGRESSING SLOWER THAN EXPECTED  Goal: Will not experience complications related to bowel motility  12/29/2019 1148 by Nancy Pulido R.N.  Outcome: PROGRESSING AS EXPECTED  12/29/2019 1148 by Nancy Pulido R.N.  Outcome: PROGRESSING SLOWER THAN EXPECTED     Problem: Knowledge Deficit  Goal: Knowledge of disease process/condition, treatment plan, diagnostic tests, and medications will improve  12/29/2019 1148 by Nancy Pulido  JESSICA  Outcome: PROGRESSING AS EXPECTED  12/29/2019 1148 by Nancy Pulido R.N.  Outcome: PROGRESSING SLOWER THAN EXPECTED  Goal: Knowledge of the prescribed therapeutic regimen will improve  12/29/2019 1148 by Nancy Pulido R.N.  Outcome: PROGRESSING AS EXPECTED  12/29/2019 1148 by Nancy Pulido R.N.  Outcome: PROGRESSING SLOWER THAN EXPECTED     Problem: Discharge Barriers/Planning  Goal: Patient's continuum of care needs will be met  12/29/2019 1148 by Nancy Pulido R.N.  Outcome: PROGRESSING AS EXPECTED  12/29/2019 1148 by Nancy Pulido R.N.  Outcome: PROGRESSING SLOWER THAN EXPECTED     Problem: Urinary Elimination:  Goal: Ability to reestablish a normal urinary elimination pattern will improve  12/29/2019 1148 by Nancy Pulido R.N.  Outcome: PROGRESSING AS EXPECTED  12/29/2019 1148 by Nancy Pulido R.N.  Outcome: PROGRESSING SLOWER THAN EXPECTED     Problem: Pain Management  Goal: Pain level will decrease to patient's comfort goal  12/29/2019 1148 by Nancy Pulido R.N.  Outcome: PROGRESSING AS EXPECTED  12/29/2019 1148 by Nancy Pulido R.N.  Outcome: PROGRESSING SLOWER THAN EXPECTED

## 2019-12-29 NOTE — CARE PLAN
Problem: Communication  Goal: The ability to communicate needs accurately and effectively will improve  Outcome: PROGRESSING AS EXPECTED  Note:   Pt will communicate needs effectively during stay, pt is able to make needs known verbally. Pt demonstrates appropriate call light use and waits for assistance.      Problem: Venous Thromboembolism (VTW)/Deep Vein Thrombosis (DVT) Prevention:  Goal: Patient will participate in Venous Thrombosis (VTE)/Deep Vein Thrombosis (DVT)Prevention Measures  Outcome: PROGRESSING AS EXPECTED  Note:   Pt will remain free from DVTs, pt is administered scheduled apixaban and plavix, pt is able to ambulate with standby assist and cane. Understands importance.

## 2019-12-29 NOTE — CARE PLAN
Problem: Communication  Goal: The ability to communicate needs accurately and effectively will improve  Outcome: PROGRESSING SLOWER THAN EXPECTED     Problem: Safety  Goal: Will remain free from injury  Outcome: PROGRESSING SLOWER THAN EXPECTED  Goal: Will remain free from falls  Outcome: PROGRESSING SLOWER THAN EXPECTED     Problem: Infection  Goal: Will remain free from infection  Outcome: PROGRESSING SLOWER THAN EXPECTED     Problem: Venous Thromboembolism (VTW)/Deep Vein Thrombosis (DVT) Prevention:  Goal: Patient will participate in Venous Thrombosis (VTE)/Deep Vein Thrombosis (DVT)Prevention Measures  Outcome: PROGRESSING SLOWER THAN EXPECTED     Problem: Bowel/Gastric:  Goal: Normal bowel function is maintained or improved  Outcome: PROGRESSING SLOWER THAN EXPECTED  Goal: Will not experience complications related to bowel motility  Outcome: PROGRESSING SLOWER THAN EXPECTED     Problem: Knowledge Deficit  Goal: Knowledge of disease process/condition, treatment plan, diagnostic tests, and medications will improve  Outcome: PROGRESSING SLOWER THAN EXPECTED  Goal: Knowledge of the prescribed therapeutic regimen will improve  Outcome: PROGRESSING SLOWER THAN EXPECTED     Problem: Discharge Barriers/Planning  Goal: Patient's continuum of care needs will be met  Outcome: PROGRESSING SLOWER THAN EXPECTED     Problem: Urinary Elimination:  Goal: Ability to reestablish a normal urinary elimination pattern will improve  Outcome: PROGRESSING SLOWER THAN EXPECTED     Problem: Pain Management  Goal: Pain level will decrease to patient's comfort goal  Outcome: PROGRESSING SLOWER THAN EXPECTED

## 2019-12-29 NOTE — PROGRESS NOTES
Report received from QUENTIN Zepeda. Assumed care at 1900, assessment complete. Pt is A & O x 4.  Pt c/o 7/10 Bilat knee pain, pain meds given see MAR. Fall precautions and appropriate signs in place. Pt oriented to unit routine, call light/phone system and RN extension number provided. Pt educated regarding fall precautions. Bed alarm not in use, refused. Pt denies any additional needs at this time. Call light within reach.   Pt is sitting up in bed, no s/s of distress, able to make needs known verbally.

## 2019-12-29 NOTE — PROGRESS NOTES
Internal Medicine Interval Note  Note Author: Madhu Goodwin M.D.     Name Kedar Woods     1940   Age/Sex 79 y.o. male   MRN [id number]   Code Status Full     After 5PM or if no immediate response to page, please call for cross-coverage  Attending/Team: Russ / LEATHA See Patient List for primary contact information  Call [phone] to page    1st Call - Day Intern (R1):   Dr. Goodwin 2nd Call - Day Sr. Resident (R2/R3):   Dr. Vance         Reason for interval visit  (Principal Problem)   80 y/o Mr. Woods with a pmh of AS s/p TAVR (2019) , CAD s/p stent placement (2019) , Pul HTN , Chronic b/l venous stasis , Atrial flutter , Pacemaker placed 2019 was admitted for chest pain after a GLF on 2019.  Medically stable     Interval Problem Daily Status Update  (24 hours, problem oriented, brief subjective history, new lab/imaging data pertinent to that problem)     - No Acute overnight events. Vitals remain stable.   - Intermittent left sided chest pain , reproducible with palpation around the pacemaker site.   - Denies SOB , pain radiating to neck/shoulder , diaphoresis.   - Care coordinators are on board. Will need to touch base and see this AM if a patient can go to SNF for short term before going to assisted.         Review of Systems   Constitutional: Negative for chills, diaphoresis, fever, malaise/fatigue and weight loss.   HENT: Negative for ear discharge, ear pain and hearing loss.    Eyes: Negative for blurred vision, double vision, pain and discharge.   Respiratory: Negative for cough, hemoptysis, sputum production and shortness of breath.    Cardiovascular: Negative for chest pain, palpitations, orthopnea, claudication, leg swelling and PND.   Gastrointestinal: Negative for abdominal pain, constipation, diarrhea, melena, nausea and vomiting.   Genitourinary: Negative for dysuria and urgency.   Musculoskeletal: Negative for myalgias.   Skin: Negative for rash.    Neurological: Negative for dizziness, tingling, tremors, seizures and headaches.   Psychiatric/Behavioral: Negative.        Disposition/Barriers to discharge:   (Medically stable)   Placement to [facility]     Consultants/Specialty  none  PCP: Pcp Pt States None      Quality Measures  Quality-Core Measures   Reviewed items::  EKG reviewed, Labs reviewed and Medications reviewed  Rosales catheter::  No Rosales  DVT: On Eliquis.          Physical Exam       Vitals:    12/28/19 1600 12/28/19 2000 12/29/19 0400 12/29/19 0725   BP: 111/47 108/46 109/52 117/60   Pulse: 60 60 60 60   Resp: 18 18 18 16   Temp: 36.9 °C (98.5 °F) 36.8 °C (98.2 °F) 36.6 °C (97.8 °F) 36.7 °C (98.1 °F)   TempSrc: Temporal Temporal Temporal Temporal   SpO2: 97% 95% 96% 98%   Weight:       Height:         Body mass index is 28.89 kg/m².    Oxygen Therapy:  Pulse Oximetry: 98 %, O2 (LPM): 0, O2 Delivery: None (Room Air)    Physical Exam   Constitutional:   Morbidly obese gentleman. Reclined in bed eating breakfast.    HENT:   Head: Normocephalic and atraumatic.   Mouth/Throat: Oropharynx is clear and moist.   Eyes: EOM are normal. No scleral icterus.   Neck: Normal range of motion.   Cardiovascular: Normal rate, regular rhythm, normal heart sounds and intact distal pulses.   Pulmonary/Chest: No respiratory distress. He has no wheezes. He has no rales. He exhibits tenderness (tender to palpation left 3-4 intercostal space as well as right subcostal area).   Abdominal: Soft. He exhibits no distension (rotunded) and no mass. There is no tenderness. There is no rebound and no guarding.   Musculoskeletal:         General: Edema (trace b/l lower extremities) present.      Comments: Chronic venous stasis with skin pigmentation   Neurological: He is alert. No cranial nerve deficit. GCS score is 15.   Skin: Skin is warm. No rash noted. No erythema. No pallor.             Assessment/Plan     * Chest pain likely musculoskeletal  Assessment & Plan  (resolved)   Tender  to palpation at left mid chest near 4-5 th intercostal space, around pacemaker site.  Troponin x2  : 32 and 30  EKG : V- Paced rhythm   .    Plan :  -Lidocaine gel.   -Continue statin, plavix and apixaban .  -Will inc Lipitor to 80 mg daily      Discharge planning issues  Assessment & Plan  Pending SNF   - Case management on board, appreciate their assistance    Chronic venous stasis dermatitis of both lower extremities- (present on admission)  Assessment & Plan  Chronic venous stasis, compression stockings in place.     Plan :  Wound care     Aortic stenosis, severe- (present on admission)  Assessment & Plan  S/P TAVR  In November 2019    - Currently stable      Cognitive decline  Assessment & Plan  States that he lives alone and performs all ADLs and IADLs     Plan :  - Patient has a DPOA in place.  - Per DPOA patient recently not been able to take care of himself and increased incidence of falls. Will benefit from correction /Group home placement.    Hypothyroidism- (present on admission)  Assessment & Plan  (stable)  Patient is on levothyroxine. Last TSH from 10/2019 was 0.9.     Plan :  Will continue home thyroxine dose.     Normocytic anemia- (present on admission)  Assessment & Plan  With MCV of 99. 5. At hi sbaseline.   B12 and Folate from 10/2019 was wnl.     Plan :  Will continue to monitor.     CAD (coronary artery disease)  Assessment & Plan  S/p stent placement at [facility] in November.     Plan :  -May need to be on beta blocker -Will get records from [facility].  -Continue statin, plavix , apixaban        Atrial flutter (HCC)  Assessment & Plan  Patient is in V paced rhythm on admission.     Plan :  Telemetry monitoring   Continue anticoagulation

## 2019-12-30 VITALS
HEART RATE: 68 BPM | RESPIRATION RATE: 17 BRPM | SYSTOLIC BLOOD PRESSURE: 124 MMHG | OXYGEN SATURATION: 94 % | HEIGHT: 73 IN | TEMPERATURE: 98.3 F | DIASTOLIC BLOOD PRESSURE: 48 MMHG | BODY MASS INDEX: 29.03 KG/M2 | WEIGHT: 219 LBS

## 2019-12-30 PROBLEM — Z02.9 DISCHARGE PLANNING ISSUES: Status: RESOLVED | Noted: 2019-12-26 | Resolved: 2019-12-30

## 2019-12-30 LAB
GAMMA INTERFERON BACKGROUND BLD IA-ACNC: 0.04 IU/ML
M TB IFN-G BLD-IMP: NEGATIVE
M TB IFN-G CD4+ BCKGRND COR BLD-ACNC: 0 IU/ML
MITOGEN IGNF BCKGRD COR BLD-ACNC: >10 IU/ML
QFT TB2 - NIL TBQ2: 0 IU/ML

## 2019-12-30 PROCEDURE — A9270 NON-COVERED ITEM OR SERVICE: HCPCS | Performed by: STUDENT IN AN ORGANIZED HEALTH CARE EDUCATION/TRAINING PROGRAM

## 2019-12-30 PROCEDURE — G0378 HOSPITAL OBSERVATION PER HR: HCPCS

## 2019-12-30 PROCEDURE — 700102 HCHG RX REV CODE 250 W/ 637 OVERRIDE(OP): Performed by: STUDENT IN AN ORGANIZED HEALTH CARE EDUCATION/TRAINING PROGRAM

## 2019-12-30 PROCEDURE — 700102 HCHG RX REV CODE 250 W/ 637 OVERRIDE(OP): Performed by: INTERNAL MEDICINE

## 2019-12-30 PROCEDURE — A9270 NON-COVERED ITEM OR SERVICE: HCPCS | Performed by: INTERNAL MEDICINE

## 2019-12-30 PROCEDURE — 99217 PR OBSERVATION CARE DISCHARGE: CPT | Mod: GC | Performed by: HOSPITALIST

## 2019-12-30 RX ADMIN — APIXABAN 5 MG: 5 TABLET, FILM COATED ORAL at 05:14

## 2019-12-30 RX ADMIN — Medication 400 MG: at 05:14

## 2019-12-30 RX ADMIN — BACLOFEN 15 MG: 10 TABLET ORAL at 05:13

## 2019-12-30 RX ADMIN — CLOPIDOGREL BISULFATE 75 MG: 75 TABLET ORAL at 05:13

## 2019-12-30 RX ADMIN — SENNOSIDES AND DOCUSATE SODIUM 2 TABLET: 8.6; 5 TABLET ORAL at 05:13

## 2019-12-30 RX ADMIN — LEVOTHYROXINE SODIUM 50 MCG: 25 TABLET ORAL at 05:13

## 2019-12-30 RX ADMIN — ATORVASTATIN CALCIUM 80 MG: 80 TABLET, FILM COATED ORAL at 05:13

## 2019-12-30 RX ADMIN — MELATONIN 1000 UNITS: at 05:14

## 2019-12-30 RX ADMIN — ALLOPURINOL 100 MG: 100 TABLET ORAL at 05:13

## 2019-12-30 RX ADMIN — Medication: at 05:16

## 2019-12-30 RX ADMIN — METFORMIN HYDROCHLORIDE 500 MG: 500 TABLET ORAL at 05:13

## 2019-12-30 RX ADMIN — OMEPRAZOLE 20 MG: 20 CAPSULE, DELAYED RELEASE ORAL at 05:13

## 2019-12-30 NOTE — DISCHARGE INSTRUCTIONS
Discharge Instructions    Discharged to group home by medical transportation with escort. Discharged via wheelchair, hospital escort: Yes.  Special equipment needed: Cane    Be sure to schedule a follow-up appointment with your primary care doctor or any specialists as instructed.     Discharge Plan:   Diet Plan: Discussed  Activity Level: Discussed  Confirmed Follow up Appointment: Patient to Call and Schedule Appointment  Confirmed Symptoms Management: Discussed  Medication Reconciliation Updated: Yes  Influenza Vaccine Indication: Not indicated: Previously immunized this influenza season and > 8 years of age    I understand that a diet low in cholesterol, fat, and sodium is recommended for good health. Unless I have been given specific instructions below for another diet, I accept this instruction as my diet prescription.   Other diet: Cardiac    Special Instructions: None    · Is patient discharged on Warfarin / Coumadin?   No     Depression / Suicide Risk    As you are discharged from this [facility] facility, it is important to learn how to keep safe from harming yourself.    Recognize the warning signs:  · Abrupt changes in personality, positive or negative- including increase in energy   · Giving away possessions  · Change in eating patterns- significant weight changes-  positive or negative  · Change in sleeping patterns- unable to sleep or sleeping all the time   · Unwillingness or inability to communicate  · Depression  · Unusual sadness, discouragement and loneliness  · Talk of wanting to die  · Neglect of personal appearance   · Rebelliousness- reckless behavior  · Withdrawal from people/activities they love  · Confusion- inability to concentrate     If you or a loved one observes any of these behaviors or has concerns about self-harm, here's what you can do:  · Talk about it- your feelings and reasons for harming yourself  · Remove any means that you might use to hurt yourself (examples: pills,  rope, extension cords, firearm)  · Get professional help from the community (Mental Health, Substance Abuse, psychological counseling)  · Do not be alone:Call your Safe Contact- someone whom you trust who will be there for you.  · Call your local CRISIS HOTLINE 091-4698 or 076-855-1628  · Call your local Children's Mobile Crisis Response Team [place] (725) 896-0618 or www.Revokom  · Call the toll free National Suicide Prevention Hotlines   · National Suicide Prevention Lifeline 523-276-VUOA (2104)  · [employer] Hope Line Network 800-SUICIDE (431-2547)            Nonspecific Chest Pain  Chest pain can be caused by many different conditions. There is a chance that your pain could be related to something serious, such as a heart attack or a blood clot in your lungs. Chest pain can also be caused by conditions that are not life-threatening. If you have chest pain, it is very important to follow up with your doctor.  Follow these instructions at home:  Medicines  · If you were prescribed an antibiotic medicine, take it as told by your doctor. Do not stop taking the antibiotic even if you start to feel better.  · Take over-the-counter and prescription medicines only as told by your doctor.  Lifestyle  · Do not use any products that contain nicotine or tobacco, such as cigarettes and e-cigarettes. If you need help quitting, ask your doctor.  · Do not drink alcohol.  · Make lifestyle changes as told by your doctor. These may include:  ¨ Getting regular exercise. Ask your doctor for some activities that are safe for you.  ¨ Eating a heart-healthy diet. A diet specialist (dietitian) can help you to learn healthy eating options.  ¨ Staying at a healthy weight.  ¨ Managing diabetes, if needed.  ¨ Lowering your stress, as with deep breathing or spending time in nature.  General instructions  · Avoid any activities that make you feel chest pain.  · If your chest pain is because of heartburn:  ¨ Raise (elevate) the  head of your bed about 6 inches (15 cm). You can do this by putting blocks under the bed legs at the head of the bed.  ¨ Do not sleep with extra pillows under your head. That does not help heartburn.  · Keep all follow-up visits as told by your doctor. This is important. This includes any further testing if your chest pain does not go away.  Contact a doctor if:  · Your chest pain does not go away.  · You have a rash with blisters on your chest.  · You have a fever.  · You have chills.  Get help right away if:  · Your chest pain is worse.  · You have a cough that gets worse, or you cough up blood.  · You have very bad (severe) pain in your belly (abdomen).  · You are very weak.  · You pass out (faint).  · You have either of these for no clear reason:  ¨ Sudden chest discomfort.  ¨ Sudden discomfort in your arms, back, neck, or jaw.  · You have shortness of breath at any time.  · You suddenly start to sweat, or your skin gets clammy.  · You feel sick to your stomach (nauseous).  · You throw up (vomit).  · You suddenly feel light-headed or dizzy.  · Your heart starts to beat fast, or it feels like it is skipping beats.  These symptoms may be an emergency. Do not wait to see if the symptoms will go away. Get medical help right away. Call your local emergency services (911 in the [place] Do not drive yourself to the hospital.   This information is not intended to replace advice given to you by your health care provider. Make sure you discuss any questions you have with your health care provider.  Document Released: 06/05/2009 Document Revised: 09/11/2017 Document Reviewed: 09/11/2017  [employer] Interactive Patient Education © 2017 [employer]

## 2019-12-30 NOTE — DISCHARGE PLANNING
Received Transport Form @ 4714  Spoke to Jigar @ [facility]    Transport is scheduled for 12/30 @1400 going to [facility].      @1027  Agency/Facility Name: [facility]  Spoke To: Otoniel  Outcome: Accepted.    Agency/Facility Name: [facility]  Outcome: Left message, awaiting call back.    Agency/Facility Name: [facility]  Outcome: Left message, awaiting call back.

## 2019-12-30 NOTE — CARE PLAN
Problem: Communication  Goal: The ability to communicate needs accurately and effectively will improve  Outcome: MET     Problem: Safety  Goal: Will remain free from injury  Outcome: MET  Goal: Will remain free from falls  Outcome: MET     Problem: Infection  Goal: Will remain free from infection  Outcome: MET     Problem: Venous Thromboembolism (VTW)/Deep Vein Thrombosis (DVT) Prevention:  Goal: Patient will participate in Venous Thrombosis (VTE)/Deep Vein Thrombosis (DVT)Prevention Measures  Outcome: MET     Problem: Bowel/Gastric:  Goal: Normal bowel function is maintained or improved  Outcome: MET  Goal: Will not experience complications related to bowel motility  Outcome: MET     Problem: Knowledge Deficit  Goal: Knowledge of disease process/condition, treatment plan, diagnostic tests, and medications will improve  Outcome: MET  Goal: Knowledge of the prescribed therapeutic regimen will improve  Outcome: MET     Problem: Discharge Barriers/Planning  Goal: Patient's continuum of care needs will be met  Outcome: MET     Problem: Urinary Elimination:  Goal: Ability to reestablish a normal urinary elimination pattern will improve  Outcome: MET     Problem: Pain Management  Goal: Pain level will decrease to patient's comfort goal  Outcome: MET

## 2019-12-30 NOTE — PROGRESS NOTES
Received change of shift report from day-shift RN and assumed care of patient at 1900. Assessment performed. Patient is alert and oriented x4. Ambulating with SBA and cane to bathroom. PRN pain medications given per MAR. Pt able to sleep after medication administration. Patient call light within reach, personal possessions nearby, bed in low position and locked, hourly rounding in practice, and non-skid socks in place.

## 2019-12-30 NOTE — DISCHARGE PLANNING
Anticipated Discharge Disposition: Skilled    Action: Met at bedside with pt to review that he has been accepted to [facility]. Transportation form faxed to Kevon MCDANIEL to arrange transport via [employer] van.  TT Dr Goodwin to do dc summary. COBRA signed by pt. Chart copy and COBRA to be placed on chart.   Transportation is booked up for regular van but approved services completed for $40 to cover  [employer]. Faxed copy to [facility]. Only time slot available is 14:00.   BSN aware.   Has PASRR.   Left VM for EDANN Sauceda.     Barriers to Discharge:     Plan: to [facility] at 14:00.

## 2019-12-30 NOTE — CARE PLAN
Problem: Safety  Goal: Will remain free from falls  Outcome: PROGRESSING AS EXPECTED  Intervention: Implement fall precautions  Note:   Pt refusing bed alarm despite given education. Pt calls appropriately for assistance.      Problem: Bowel/Gastric:  Goal: Normal bowel function is maintained or improved  Outcome: PROGRESSING AS EXPECTED  Intervention: Educate patient and significant other/support system about diet, fluid intake, medications and activity to promote bowel function  Note:   Pt had a large BM.

## 2019-12-30 NOTE — DISCHARGE PLANNING
LSW LM for pt's [facility] Ale[facility] ([phone]) to discuss pt and discharge options.     Update: LSW received return call from Aleta. LSW informed Aleta that pt was accepted to [facility] and pt agreeable to go. Aleta agreed with discharge plan and stated she would be available to assist if needed.

## 2019-12-30 NOTE — PROGRESS NOTES
Assumed care of patient at 0700. Report received from QUENTIN Cruz. Patient A&Ox4, denies pain at this time, other vitals stable on room oxygen. Patient is standby assist with cane and able to turn self. No complaints. Call light within reach; will continue to monitor.

## 2020-02-13 ENCOUNTER — HOSPITAL ENCOUNTER (OUTPATIENT)
Dept: RADIOLOGY | Facility: MEDICAL CENTER | Age: 80
End: 2020-02-13
Attending: STUDENT IN AN ORGANIZED HEALTH CARE EDUCATION/TRAINING PROGRAM
Payer: COMMERCIAL

## 2020-02-13 VITALS — HEART RATE: 60 BPM | OXYGEN SATURATION: 95 %
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
## 2020-02-13 PROCEDURE — 70551 MRI BRAIN STEM W/O DYE: CPT

## 2020-02-13 NOTE — FLOWSHEET NOTE
Pt's PPM set to MRI safe mode by rep Crowe prior to MRI. During MRI scan, pt monitored with ECG and SPO2. Pt tolerated scan well. Discharged ambulatory with cane.

## 2021-01-14 DIAGNOSIS — Z23 NEED FOR VACCINATION: ICD-10-CM

## 2021-06-21 ENCOUNTER — HOSPITAL ENCOUNTER (OUTPATIENT)
Facility: MEDICAL CENTER | Age: 81
End: 2021-06-23
Attending: EMERGENCY MEDICINE | Admitting: HOSPITALIST
Payer: COMMERCIAL

## 2021-06-21 DIAGNOSIS — R26.2 UNABLE TO AMBULATE: ICD-10-CM

## 2021-06-21 DIAGNOSIS — R53.81 DEBILITY: ICD-10-CM

## 2021-06-21 DIAGNOSIS — R29.898 WEAKNESS OF BOTH LOWER EXTREMITIES: ICD-10-CM

## 2021-06-21 DIAGNOSIS — Z91.81 AT MODERATE RISK FOR FALL: ICD-10-CM

## 2021-06-21 DIAGNOSIS — R53.1 GENERALIZED WEAKNESS: ICD-10-CM

## 2021-06-21 DIAGNOSIS — E86.0 DEHYDRATION: ICD-10-CM

## 2021-06-21 DIAGNOSIS — I87.2 CHRONIC VENOUS STASIS DERMATITIS OF BOTH LOWER EXTREMITIES: ICD-10-CM

## 2021-06-21 PROBLEM — I50.812 CHRONIC RIGHT-SIDED HEART FAILURE (HCC): Status: ACTIVE | Noted: 2021-06-21

## 2021-06-21 LAB
ALBUMIN SERPL BCP-MCNC: 3.4 G/DL (ref 3.2–4.9)
ALBUMIN/GLOB SERPL: 1.1 G/DL
ALP SERPL-CCNC: 107 U/L (ref 30–99)
ALT SERPL-CCNC: 10 U/L (ref 2–50)
ANION GAP SERPL CALC-SCNC: 12 MMOL/L (ref 7–16)
AST SERPL-CCNC: 15 U/L (ref 12–45)
BASOPHILS # BLD AUTO: 0.7 % (ref 0–1.8)
BASOPHILS # BLD: 0.07 K/UL (ref 0–0.12)
BILIRUB SERPL-MCNC: 0.4 MG/DL (ref 0.1–1.5)
BUN SERPL-MCNC: 31 MG/DL (ref 8–22)
CALCIUM SERPL-MCNC: 9 MG/DL (ref 8.5–10.5)
CHLORIDE SERPL-SCNC: 103 MMOL/L (ref 96–112)
CO2 SERPL-SCNC: 20 MMOL/L (ref 20–33)
CREAT SERPL-MCNC: 1.21 MG/DL (ref 0.5–1.4)
EKG IMPRESSION: NORMAL
EOSINOPHIL # BLD AUTO: 0.27 K/UL (ref 0–0.51)
EOSINOPHIL NFR BLD: 2.5 % (ref 0–6.9)
ERYTHROCYTE [DISTWIDTH] IN BLOOD BY AUTOMATED COUNT: 53.8 FL (ref 35.9–50)
EST. AVERAGE GLUCOSE BLD GHB EST-MCNC: 143 MG/DL
GLOBULIN SER CALC-MCNC: 3.2 G/DL (ref 1.9–3.5)
GLUCOSE SERPL-MCNC: 116 MG/DL (ref 65–99)
HBA1C MFR BLD: 6.6 % (ref 4–5.6)
HCT VFR BLD AUTO: 37.2 % (ref 42–52)
HGB BLD-MCNC: 12 G/DL (ref 14–18)
IMM GRANULOCYTES # BLD AUTO: 0.1 K/UL (ref 0–0.11)
IMM GRANULOCYTES NFR BLD AUTO: 0.9 % (ref 0–0.9)
LYMPHOCYTES # BLD AUTO: 2.02 K/UL (ref 1–4.8)
LYMPHOCYTES NFR BLD: 19 % (ref 22–41)
MCH RBC QN AUTO: 30.9 PG (ref 27–33)
MCHC RBC AUTO-ENTMCNC: 32.3 G/DL (ref 33.7–35.3)
MCV RBC AUTO: 95.9 FL (ref 81.4–97.8)
MONOCYTES # BLD AUTO: 1.3 K/UL (ref 0–0.85)
MONOCYTES NFR BLD AUTO: 12.2 % (ref 0–13.4)
NEUTROPHILS # BLD AUTO: 6.89 K/UL (ref 1.82–7.42)
NEUTROPHILS NFR BLD: 64.7 % (ref 44–72)
NRBC # BLD AUTO: 0 K/UL
NRBC BLD-RTO: 0 /100 WBC
NT-PROBNP SERPL IA-MCNC: 1272 PG/ML (ref 0–125)
PLATELET # BLD AUTO: 262 K/UL (ref 164–446)
PMV BLD AUTO: 10.5 FL (ref 9–12.9)
POTASSIUM SERPL-SCNC: 4.3 MMOL/L (ref 3.6–5.5)
PROT SERPL-MCNC: 6.6 G/DL (ref 6–8.2)
RBC # BLD AUTO: 3.88 M/UL (ref 4.7–6.1)
SODIUM SERPL-SCNC: 135 MMOL/L (ref 135–145)
T4 FREE SERPL-MCNC: 1.07 NG/DL (ref 0.93–1.7)
TROPONIN T SERPL-MCNC: 35 NG/L (ref 6–19)
TSH SERPL DL<=0.005 MIU/L-ACNC: 0.8 UIU/ML (ref 0.38–5.33)
WBC # BLD AUTO: 10.7 K/UL (ref 4.8–10.8)

## 2021-06-21 PROCEDURE — 93005 ELECTROCARDIOGRAM TRACING: CPT | Performed by: EMERGENCY MEDICINE

## 2021-06-21 PROCEDURE — G0378 HOSPITAL OBSERVATION PER HR: HCPCS

## 2021-06-21 PROCEDURE — 84443 ASSAY THYROID STIM HORMONE: CPT

## 2021-06-21 PROCEDURE — A9270 NON-COVERED ITEM OR SERVICE: HCPCS | Performed by: HOSPITALIST

## 2021-06-21 PROCEDURE — 93005 ELECTROCARDIOGRAM TRACING: CPT

## 2021-06-21 PROCEDURE — 85025 COMPLETE CBC W/AUTO DIFF WBC: CPT

## 2021-06-21 PROCEDURE — 83880 ASSAY OF NATRIURETIC PEPTIDE: CPT

## 2021-06-21 PROCEDURE — 36415 COLL VENOUS BLD VENIPUNCTURE: CPT

## 2021-06-21 PROCEDURE — 83036 HEMOGLOBIN GLYCOSYLATED A1C: CPT

## 2021-06-21 PROCEDURE — U0003 INFECTIOUS AGENT DETECTION BY NUCLEIC ACID (DNA OR RNA); SEVERE ACUTE RESPIRATORY SYNDROME CORONAVIRUS 2 (SARS-COV-2) (CORONAVIRUS DISEASE [COVID-19]), AMPLIFIED PROBE TECHNIQUE, MAKING USE OF HIGH THROUGHPUT TECHNOLOGIES AS DESCRIBED BY CMS-2020-01-R: HCPCS

## 2021-06-21 PROCEDURE — 80053 COMPREHEN METABOLIC PANEL: CPT

## 2021-06-21 PROCEDURE — U0005 INFEC AGEN DETEC AMPLI PROBE: HCPCS

## 2021-06-21 PROCEDURE — 99220 PR INITIAL OBSERVATION CARE,LEVL III: CPT | Performed by: HOSPITALIST

## 2021-06-21 PROCEDURE — 84439 ASSAY OF FREE THYROXINE: CPT

## 2021-06-21 PROCEDURE — 700102 HCHG RX REV CODE 250 W/ 637 OVERRIDE(OP): Performed by: HOSPITALIST

## 2021-06-21 PROCEDURE — 700105 HCHG RX REV CODE 258: Performed by: EMERGENCY MEDICINE

## 2021-06-21 PROCEDURE — 99285 EMERGENCY DEPT VISIT HI MDM: CPT

## 2021-06-21 PROCEDURE — 84484 ASSAY OF TROPONIN QUANT: CPT

## 2021-06-21 RX ORDER — SENNOSIDES A AND B 8.6 MG/1
8.6 TABLET, FILM COATED ORAL 2 TIMES DAILY
Status: DISCONTINUED | OUTPATIENT
Start: 2021-06-21 | End: 2021-06-21

## 2021-06-21 RX ORDER — TRAZODONE HYDROCHLORIDE 100 MG/1
200 TABLET ORAL NIGHTLY
Status: DISCONTINUED | OUTPATIENT
Start: 2021-06-21 | End: 2021-06-23 | Stop reason: HOSPADM

## 2021-06-21 RX ORDER — POLYETHYLENE GLYCOL 3350 17 G/17G
1 POWDER, FOR SOLUTION ORAL
Status: DISCONTINUED | OUTPATIENT
Start: 2021-06-21 | End: 2021-06-23 | Stop reason: HOSPADM

## 2021-06-21 RX ORDER — DEXTROSE MONOHYDRATE 25 G/50ML
50 INJECTION, SOLUTION INTRAVENOUS
Status: DISCONTINUED | OUTPATIENT
Start: 2021-06-21 | End: 2021-06-23 | Stop reason: HOSPADM

## 2021-06-21 RX ORDER — BUMETANIDE 1 MG/1
1 TABLET ORAL DAILY
Status: DISCONTINUED | OUTPATIENT
Start: 2021-06-22 | End: 2021-06-23 | Stop reason: HOSPADM

## 2021-06-21 RX ORDER — ONDANSETRON 2 MG/ML
4 INJECTION INTRAMUSCULAR; INTRAVENOUS EVERY 4 HOURS PRN
Status: DISCONTINUED | OUTPATIENT
Start: 2021-06-21 | End: 2021-06-21

## 2021-06-21 RX ORDER — SENNOSIDES A AND B 8.6 MG/1
8.6 TABLET, FILM COATED ORAL 2 TIMES DAILY PRN
Status: ON HOLD | COMMUNITY
End: 2023-04-26

## 2021-06-21 RX ORDER — GABAPENTIN 100 MG/1
100 CAPSULE ORAL 3 TIMES DAILY
Status: DISCONTINUED | OUTPATIENT
Start: 2021-06-21 | End: 2021-06-23 | Stop reason: HOSPADM

## 2021-06-21 RX ORDER — MIDODRINE HYDROCHLORIDE 5 MG/1
2.5 TABLET ORAL 2 TIMES DAILY
Status: DISCONTINUED | OUTPATIENT
Start: 2021-06-21 | End: 2021-06-23 | Stop reason: HOSPADM

## 2021-06-21 RX ORDER — LIDOCAINE 50 MG/G
1 PATCH TOPICAL PRN
COMMUNITY
End: 2023-04-18

## 2021-06-21 RX ORDER — VITAMIN B COMPLEX
1000 TABLET ORAL DAILY
Status: DISCONTINUED | OUTPATIENT
Start: 2021-06-22 | End: 2021-06-23 | Stop reason: HOSPADM

## 2021-06-21 RX ORDER — OMEPRAZOLE 20 MG/1
20 CAPSULE, DELAYED RELEASE ORAL DAILY
Status: DISCONTINUED | OUTPATIENT
Start: 2021-06-22 | End: 2021-06-23 | Stop reason: HOSPADM

## 2021-06-21 RX ORDER — INSULIN LISPRO 100 [IU]/ML
1-6 INJECTION, SOLUTION INTRAVENOUS; SUBCUTANEOUS
Status: DISCONTINUED | OUTPATIENT
Start: 2021-06-22 | End: 2021-06-21

## 2021-06-21 RX ORDER — ACETAMINOPHEN 325 MG/1
650 TABLET ORAL EVERY 4 HOURS PRN
Status: DISCONTINUED | OUTPATIENT
Start: 2021-06-21 | End: 2021-06-23 | Stop reason: HOSPADM

## 2021-06-21 RX ORDER — ATENOLOL 25 MG/1
12.5 TABLET ORAL DAILY
COMMUNITY
End: 2023-04-18

## 2021-06-21 RX ORDER — POLYETHYLENE GLYCOL 3350 17 G/17G
17 POWDER, FOR SOLUTION ORAL
Status: ON HOLD | COMMUNITY
End: 2023-04-26

## 2021-06-21 RX ORDER — BISACODYL 10 MG
10 SUPPOSITORY, RECTAL RECTAL
Status: DISCONTINUED | OUTPATIENT
Start: 2021-06-21 | End: 2021-06-23 | Stop reason: HOSPADM

## 2021-06-21 RX ORDER — MIDODRINE HYDROCHLORIDE 2.5 MG/1
2.5 TABLET ORAL 2 TIMES DAILY
COMMUNITY
End: 2023-04-18

## 2021-06-21 RX ORDER — ATENOLOL 25 MG/1
12.5 TABLET ORAL DAILY
Status: DISCONTINUED | OUTPATIENT
Start: 2021-06-22 | End: 2021-06-23 | Stop reason: HOSPADM

## 2021-06-21 RX ORDER — LEVOTHYROXINE SODIUM 0.05 MG/1
50 TABLET ORAL
Status: DISCONTINUED | OUTPATIENT
Start: 2021-06-22 | End: 2021-06-23 | Stop reason: HOSPADM

## 2021-06-21 RX ORDER — POLYETHYLENE GLYCOL 3350 17 G/17G
1 POWDER, FOR SOLUTION ORAL DAILY
Status: DISCONTINUED | OUTPATIENT
Start: 2021-06-22 | End: 2021-06-23 | Stop reason: HOSPADM

## 2021-06-21 RX ORDER — POTASSIUM CHLORIDE 20 MEQ/1
20 TABLET, EXTENDED RELEASE ORAL DAILY
Status: ON HOLD | COMMUNITY
End: 2023-04-26

## 2021-06-21 RX ORDER — POTASSIUM CHLORIDE 20 MEQ/1
20 TABLET, EXTENDED RELEASE ORAL DAILY
Status: DISCONTINUED | OUTPATIENT
Start: 2021-06-22 | End: 2021-06-23 | Stop reason: HOSPADM

## 2021-06-21 RX ORDER — LIDOCAINE 50 MG/G
1 PATCH TOPICAL PRN
Status: DISCONTINUED | OUTPATIENT
Start: 2021-06-21 | End: 2021-06-23 | Stop reason: HOSPADM

## 2021-06-21 RX ORDER — ALLOPURINOL 100 MG/1
100 TABLET ORAL DAILY
Status: DISCONTINUED | OUTPATIENT
Start: 2021-06-22 | End: 2021-06-23 | Stop reason: HOSPADM

## 2021-06-21 RX ORDER — BUMETANIDE 0.5 MG/1
0.5 TABLET ORAL DAILY
Status: ON HOLD | COMMUNITY
End: 2023-05-02

## 2021-06-21 RX ORDER — AMOXICILLIN 250 MG
2 CAPSULE ORAL 2 TIMES DAILY
Status: DISCONTINUED | OUTPATIENT
Start: 2021-06-22 | End: 2021-06-23 | Stop reason: HOSPADM

## 2021-06-21 RX ORDER — ONDANSETRON 4 MG/1
4 TABLET, ORALLY DISINTEGRATING ORAL EVERY 4 HOURS PRN
Status: DISCONTINUED | OUTPATIENT
Start: 2021-06-21 | End: 2021-06-21

## 2021-06-21 RX ORDER — POLYETHYLENE GLYCOL 3350 17 G/17G
17 POWDER, FOR SOLUTION ORAL DAILY
COMMUNITY
End: 2023-04-18

## 2021-06-21 RX ORDER — INSULIN LISPRO 100 [IU]/ML
0.12 INJECTION, SOLUTION INTRAVENOUS; SUBCUTANEOUS
Status: DISCONTINUED | OUTPATIENT
Start: 2021-06-22 | End: 2021-06-21

## 2021-06-21 RX ORDER — GABAPENTIN 300 MG/1
600 CAPSULE ORAL 3 TIMES DAILY
COMMUNITY

## 2021-06-21 RX ORDER — CHOLECALCIFEROL (VITAMIN D3) 125 MCG
5-10 CAPSULE ORAL
Status: DISCONTINUED | OUTPATIENT
Start: 2021-06-21 | End: 2021-06-23 | Stop reason: HOSPADM

## 2021-06-21 RX ORDER — ATORVASTATIN CALCIUM 40 MG/1
40 TABLET, FILM COATED ORAL EVERY EVENING
Status: DISCONTINUED | OUTPATIENT
Start: 2021-06-21 | End: 2021-06-23 | Stop reason: HOSPADM

## 2021-06-21 RX ORDER — SODIUM CHLORIDE 9 MG/ML
1000 INJECTION, SOLUTION INTRAVENOUS ONCE
Status: COMPLETED | OUTPATIENT
Start: 2021-06-21 | End: 2021-06-21

## 2021-06-21 RX ADMIN — TRAZODONE HYDROCHLORIDE 200 MG: 100 TABLET ORAL at 22:19

## 2021-06-21 RX ADMIN — APIXABAN 5 MG: 5 TABLET, FILM COATED ORAL at 18:20

## 2021-06-21 RX ADMIN — GABAPENTIN 100 MG: 100 CAPSULE ORAL at 18:21

## 2021-06-21 RX ADMIN — MIDODRINE HYDROCHLORIDE 2.5 MG: 5 TABLET ORAL at 18:20

## 2021-06-21 RX ADMIN — Medication 10 MG: at 22:19

## 2021-06-21 RX ADMIN — ACETAMINOPHEN 650 MG: 325 TABLET, FILM COATED ORAL at 23:57

## 2021-06-21 RX ADMIN — ATORVASTATIN CALCIUM 40 MG: 40 TABLET, FILM COATED ORAL at 18:20

## 2021-06-21 RX ADMIN — SODIUM CHLORIDE 1000 ML: 9 INJECTION, SOLUTION INTRAVENOUS at 16:27

## 2021-06-21 ASSESSMENT — COGNITIVE AND FUNCTIONAL STATUS - GENERAL
DRESSING REGULAR UPPER BODY CLOTHING: A LITTLE
TURNING FROM BACK TO SIDE WHILE IN FLAT BAD: A LOT
MOVING TO AND FROM BED TO CHAIR: A LOT
MOBILITY SCORE: 14
TOILETING: A LITTLE
HELP NEEDED FOR BATHING: A LITTLE
SUGGESTED CMS G CODE MODIFIER MOBILITY: CL
MOVING FROM LYING ON BACK TO SITTING ON SIDE OF FLAT BED: A LOT
STANDING UP FROM CHAIR USING ARMS: A LOT
CLIMB 3 TO 5 STEPS WITH RAILING: A LOT

## 2021-06-21 ASSESSMENT — CHA2DS2 SCORE
SEX: MALE
PRIOR STROKE OR TIA OR THROMBOEMBOLISM: NO
VASCULAR DISEASE: NO
HYPERTENSION: NO
CHA2DS2 VASC SCORE: 2
CHF OR LEFT VENTRICULAR DYSFUNCTION: NO
AGE 75 OR GREATER: YES
AGE 65 TO 74: NO
DIABETES: NO

## 2021-06-21 ASSESSMENT — LIFESTYLE VARIABLES: DO YOU DRINK ALCOHOL: NO

## 2021-06-21 ASSESSMENT — FIBROSIS 4 INDEX: FIB4 SCORE: 1.9

## 2021-06-21 ASSESSMENT — PAIN DESCRIPTION - PAIN TYPE
TYPE: CHRONIC PAIN
TYPE: CHRONIC PAIN

## 2021-06-21 NOTE — ED NOTES
Med rec updated and complete  Allergies reviewed  Pt was not sure the names and strengths of his medications, [facility].  [facility] @ [phone] to [facility] all medications.   Asked pt last time he took his medications, per pt reports that he is not taking ASPIRIN 81MG  Pts pharmacy reports no antibiotics in the last 2 weeks

## 2021-06-21 NOTE — ED TRIAGE NOTES
"Chief Complaint   Patient presents with   • Weakness     generalized x1 hour     Patient bib EMS from home; PTA PIV placed, FSBG 149.  12 lead L BBB PTA.    Patient A&O, reports eating/drinking per usual, denies acute pain.. Patient reports cardiac history \"theres a little robot in my heart - not a pacemaker\", takes medication as prescribed. Patient denies CP.    Chart up for ERP.  "

## 2021-06-21 NOTE — ED PROVIDER NOTES
"ED Provider Note    Scribed for Kin Dillard M.D. by Mary Ann Fortune. 6/21/2021  2:23 PM    Primary care provider: JESUSITA Hahn  Means of arrival: EMS  History obtained from: Patient  History limited by: None    CHIEF COMPLAINT  Chief Complaint   Patient presents with   • Weakness     generalized x1 hour       HPI  Kedar Woods is a 81 y.o. male with a history of acute arthritis who presents to the Emergency Department via EMS for evaluation of weakness onset one hour. Patient felt as though he was going to fall due to his leg pain. He describes his fall as secondary to his \"body not working\". Patient was unable to feed self or go to the bathroom. He lives alone at this time. Patient is on Lasix and had a drastic weight loss from 444 lbs. Patient has four arthritis patches with two on his knees and two on his back which he wears for 12 hours. He admits to associated symptoms of knee pain, but denies lightheadedness, chills, or fever. No alleviating factors were reported.       REVIEW OF SYSTEMS  Pertinent negatives include no lightheadedness, chills, or fever. As above, all other systems reviewed and are negative.   See HPI for further details.     PAST MEDICAL HISTORY   has a past medical history of Hypertension.    SURGICAL HISTORY  patient denies any surgical history    SOCIAL HISTORY  Social History     Tobacco Use   • Smoking status: Never Smoker   • Smokeless tobacco: Never Used   Vaping Use   • Vaping Use: Never used   Substance Use Topics   • Alcohol use: Yes   • Drug use: Never      Social History     Substance and Sexual Activity   Drug Use Never       FAMILY HISTORY  History reviewed. No pertinent family history.    CURRENT MEDICATIONS  Home Medications     Reviewed by Nayan Caceres (Pharmacy Tech) on 06/21/21 at 1603  Med List Status: Complete   Medication Last Dose Status   acetaminophen (TYLENOL) 500 MG Tab 6/21/2021 Active   allopurinol (ZYLOPRIM) 100 MG Tab 6/21/2021 " "Active   apixaban (ELIQUIS) 5mg Tab 6/21/2021 Active   atenolol (TENORMIN) 25 MG Tab 6/21/2021 Active   atorvastatin (LIPITOR) 40 MG Tab 6/20/2021 Active   bumetanide (BUMEX) 1 MG Tab 6/21/2021 Active   diclofenac sodium 1 % Gel > 3 days Active   gabapentin (NEURONTIN) 100 MG Cap 6/21/2021 Active   levothyroxine (SYNTHROID) 50 MCG Tab 6/21/2021 Active   lidocaine (LIDODERM) 5 % Patch 6/21/2021 Active   Melatonin 3 MG Cap 6/20/2021 Active   midodrine (PROAMATINE) 2.5 MG Tab 6/21/2021 Active   pantoprazole (PROTONIX) 20 MG tablet 6/21/2021 Active   polyethylene glycol/lytes (MIRALAX) 17 g Pack 6/21/2021 Active   polyethylene glycol/lytes (MIRALAX) 17 g Pack 6/21/2021 Active   potassium chloride SA (KDUR) 20 MEQ Tab CR 6/21/2021 Active   sennosides (SENOKOT) 8.6 MG Tab 6/21/2021 Active   traZODone (DESYREL) 100 MG Tab 6/20/2021 Active   vitamin D (CHOLECALCIFEROL) 1000 UNIT Tab 6/21/2021 Active                ALLERGIES  Allergies   Allergen Reactions   • Codeine Nausea       PHYSICAL EXAM  VITAL SIGNS: /61   Pulse 75   Temp 36.2 °C (97.2 °F) (Temporal)   Resp 16   Ht 1.803 m (5' 11\")   Wt 117 kg (258 lb)   SpO2 93%   BMI 35.98 kg/m²   Constitutional: Morbidly obese, disheveled,  No acute distress, Non-toxic appearance.   HENT: Normocephalic, Atraumatic, Bilateral external ears normal, Dry mucous membranes, No oral exudates or nasal discharge.   Eyes: Pupils are equal round and reactive, EOMI, Conjunctiva normal, No discharge.   Neck: Normal range of motion, No tenderness, Supple, No stridor. No meningismus.  Lymphatic: No lymphadenopathy noted.   Cardiovascular: Regular rate and rhythm without murmur rub or gallop.  Thorax & Lungs: Clear breath sounds bilaterally without wheezes, rhonchi or rales. There is no chest wall tenderness.   Abdomen: Soft non-tender non-distended. There is no rebound or guarding. No organomegaly is appreciated. Bowel sounds are normal.  Skin: Discolored bilateral feet with some " sloughing of skin about the toes without rash.   Back: No CVA or spinal tenderness.   Extremities: Bilateral erythema of lower extremities with bolus formation consistent with venous insuffiencey, Intact distal pulses, No clubbing.  Musculoskeletal: Good range of motion in all major joints. No tenderness to palpation or major deformities noted.   Neurologic: Alert & oriented x 3, Normal motor function, Normal sensory function, No focal deficits noted. Reflexes are normal.  Psychiatric: Affect normal, Judgment normal, Mood normal. There is no suicidal ideation or patient reported hallucinations.       DIAGNOSTIC STUDIES / PROCEDURES    LABS  Labs Reviewed   CBC WITH DIFFERENTIAL - Abnormal; Notable for the following components:       Result Value    RBC 3.88 (*)     Hemoglobin 12.0 (*)     Hematocrit 37.2 (*)     MCHC 32.3 (*)     RDW 53.8 (*)     Lymphocytes 19.00 (*)     Monos (Absolute) 1.30 (*)     All other components within normal limits   COMP METABOLIC PANEL - Abnormal; Notable for the following components:    Glucose 116 (*)     Bun 31 (*)     Alkaline Phosphatase 107 (*)     All other components within normal limits   TROPONIN - Abnormal; Notable for the following components:    Troponin T 35 (*)     All other components within normal limits   ESTIMATED GFR - Abnormal; Notable for the following components:    GFR If Non  58 (*)     All other components within normal limits   SARS-COV-2, PCR (IN-HOUSE)    Narrative:     Have you been in close contact with a person who is suspected  or known to be positive for COVID-19 within the last 30 days  (e.g. last seen that person < 30 days ago)->No   FREE THYROXINE   TSH      All labs reviewed by me.    EKG Interpretation:  Results for orders placed or performed during the hospital encounter of 06/21/21   EKG   Result Value Ref Range    Report       [facility] Emergency Dept.    Test Date:  2021-06-21  Pt Name:    JAMISON ZAYAS            Department: ER  MRN:        [id number]                      Room:       RD 02  Gender:     Male                         Technician: [id number]  :        1940                   Requested By:ER TRIAGE PROTOCOL  Order #:    [id number]                    Reading MD: SHON NOVOA MD    Measurements  Intervals                                Axis  Rate:       74                           P:          255  MD:         148                          QRS:        53  QRSD:       154                          T:          76  QT:         476  QTc:        529    Interpretive Statements  ECTOPIC ATRIAL RHYTHM  IVCD, CONSIDER ATYPICAL LBBB  BASELINE WANDER IN LEAD(S) V1  Compared to ECG 2019 10:14:25  Ectopic atrial rhythm now present  Ventricular-paced complex(es) or rhythm no longer present  Atrial flutter no longer present  Electronically Signed On 2021 15:42:31 PDT by SHON SANTIAGO MD       COURSE & MEDICAL DECISION MAKING  Nursing notes, VS, PMSFHx reviewed in chart.    2:23 PM Patient seen and examined at bedside. Ordered for EKG and labs to evaluate. \    EKG demonstrates no evidence of acute ischemic changes or dysrhythmia.  Atrial flutter is no longer present as compared to past EKG.    Laboratory evaluation reveals no leukocytosis, shift does have mild anemia with a hemoglobin of 12.  Does not report any GI bleeding however.  Does have some dehydration with a BUN/creatinine of 31 and 1.21.    4:24 PM Ordered NS infusion 1 L to treat patient.  This was secondary to dehydration and the patient did seem to improve after fluid    4:34 PM Paged Hospitalist    4:42 PM I discussed the patient's case and the above findings with Dr. Rascon (Hospitalist) who agrees to evaluate the patient.  Patient will likely need placement if he cannot mobilize with some PT and OT therapy    HYDRATION: Based on the patient's presentation of Acute Diarrhea the patient was given IV fluids. IV Hydration was used because oral hydration  was not adequate alone. Upon recheck following hydration, the patient was improved.      DISPOSITION:  Patient will be hospitalized by Dr. Rascon in guarded condition.    FINAL IMPRESSION  1. Generalized weakness    2. Unable to ambulate    3. At moderate risk for fall    4. Dehydration          Mary Ann ALCANTAR (Scribe), am scribing for, and in the presence of, Kin Dillard M.D..    Electronically signed by: Mary Ann Fortune (Scribe), 6/21/2021    Kin ALCANTAR M.D. personally performed the services described in this documentation, as scribed by Mary Ann Fortune in my presence, and it is both accurate and complete. C    The note accurately reflects work and decisions made by me.  Kin Dillard M.D.  6/21/2021  5:11 PM

## 2021-06-21 NOTE — ED NOTES
Pillows for elevation to BLE patient in position of comfort.  Hot meal tray provided to patient at bedside.

## 2021-06-22 PROBLEM — R29.898 WEAKNESS OF BOTH LOWER EXTREMITIES: Status: ACTIVE | Noted: 2021-06-22

## 2021-06-22 LAB
ALBUMIN SERPL BCP-MCNC: 2.8 G/DL (ref 3.2–4.9)
ALBUMIN/GLOB SERPL: 0.9 G/DL
ALP SERPL-CCNC: 92 U/L (ref 30–99)
ALT SERPL-CCNC: 7 U/L (ref 2–50)
ANION GAP SERPL CALC-SCNC: 10 MMOL/L (ref 7–16)
AST SERPL-CCNC: 12 U/L (ref 12–45)
BILIRUB SERPL-MCNC: 0.4 MG/DL (ref 0.1–1.5)
BUN SERPL-MCNC: 29 MG/DL (ref 8–22)
CALCIUM SERPL-MCNC: 8.8 MG/DL (ref 8.5–10.5)
CHLORIDE SERPL-SCNC: 105 MMOL/L (ref 96–112)
CO2 SERPL-SCNC: 23 MMOL/L (ref 20–33)
CREAT SERPL-MCNC: 1.17 MG/DL (ref 0.5–1.4)
ERYTHROCYTE [DISTWIDTH] IN BLOOD BY AUTOMATED COUNT: 54.1 FL (ref 35.9–50)
GLOBULIN SER CALC-MCNC: 3.1 G/DL (ref 1.9–3.5)
GLUCOSE SERPL-MCNC: 115 MG/DL (ref 65–99)
HCT VFR BLD AUTO: 34.8 % (ref 42–52)
HGB BLD-MCNC: 11 G/DL (ref 14–18)
MAGNESIUM SERPL-MCNC: 1.9 MG/DL (ref 1.5–2.5)
MCH RBC QN AUTO: 30.9 PG (ref 27–33)
MCHC RBC AUTO-ENTMCNC: 31.6 G/DL (ref 33.7–35.3)
MCV RBC AUTO: 97.8 FL (ref 81.4–97.8)
PLATELET # BLD AUTO: 234 K/UL (ref 164–446)
PMV BLD AUTO: 10.7 FL (ref 9–12.9)
POTASSIUM SERPL-SCNC: 4.2 MMOL/L (ref 3.6–5.5)
PROT SERPL-MCNC: 5.9 G/DL (ref 6–8.2)
RBC # BLD AUTO: 3.56 M/UL (ref 4.7–6.1)
SARS-COV-2 RNA RESP QL NAA+PROBE: NOTDETECTED
SODIUM SERPL-SCNC: 138 MMOL/L (ref 135–145)
SPECIMEN SOURCE: NORMAL
WBC # BLD AUTO: 10.5 K/UL (ref 4.8–10.8)

## 2021-06-22 PROCEDURE — A9270 NON-COVERED ITEM OR SERVICE: HCPCS | Performed by: NURSE PRACTITIONER

## 2021-06-22 PROCEDURE — 700102 HCHG RX REV CODE 250 W/ 637 OVERRIDE(OP): Performed by: HOSPITALIST

## 2021-06-22 PROCEDURE — 99225 PR SUBSEQUENT OBSERVATION CARE,LEVEL II: CPT | Performed by: NURSE PRACTITIONER

## 2021-06-22 PROCEDURE — 99204 OFFICE O/P NEW MOD 45 MIN: CPT | Performed by: INTERNAL MEDICINE

## 2021-06-22 PROCEDURE — 97161 PT EVAL LOW COMPLEX 20 MIN: CPT

## 2021-06-22 PROCEDURE — 83735 ASSAY OF MAGNESIUM: CPT

## 2021-06-22 PROCEDURE — 97166 OT EVAL MOD COMPLEX 45 MIN: CPT

## 2021-06-22 PROCEDURE — 80053 COMPREHEN METABOLIC PANEL: CPT

## 2021-06-22 PROCEDURE — 700102 HCHG RX REV CODE 250 W/ 637 OVERRIDE(OP): Performed by: NURSE PRACTITIONER

## 2021-06-22 PROCEDURE — 36415 COLL VENOUS BLD VENIPUNCTURE: CPT

## 2021-06-22 PROCEDURE — G0378 HOSPITAL OBSERVATION PER HR: HCPCS

## 2021-06-22 PROCEDURE — A9270 NON-COVERED ITEM OR SERVICE: HCPCS | Performed by: HOSPITALIST

## 2021-06-22 PROCEDURE — 85027 COMPLETE CBC AUTOMATED: CPT

## 2021-06-22 RX ORDER — METHOCARBAMOL 500 MG/1
500 TABLET, FILM COATED ORAL 4 TIMES DAILY
Status: DISCONTINUED | OUTPATIENT
Start: 2021-06-22 | End: 2021-06-23 | Stop reason: HOSPADM

## 2021-06-22 RX ORDER — MICONAZOLE NITRATE 20 MG/G
CREAM TOPICAL 2 TIMES DAILY
Status: DISCONTINUED | OUTPATIENT
Start: 2021-06-22 | End: 2021-06-23 | Stop reason: HOSPADM

## 2021-06-22 RX ADMIN — GABAPENTIN 100 MG: 100 CAPSULE ORAL at 05:26

## 2021-06-22 RX ADMIN — TRAZODONE HYDROCHLORIDE 200 MG: 100 TABLET ORAL at 21:20

## 2021-06-22 RX ADMIN — METHOCARBAMOL 500 MG: 500 TABLET ORAL at 14:30

## 2021-06-22 RX ADMIN — MICONAZOLE NITRATE: 20 CREAM TOPICAL at 21:34

## 2021-06-22 RX ADMIN — ACETAMINOPHEN 650 MG: 325 TABLET, FILM COATED ORAL at 21:27

## 2021-06-22 RX ADMIN — OMEPRAZOLE 20 MG: 20 CAPSULE, DELAYED RELEASE ORAL at 05:28

## 2021-06-22 RX ADMIN — MIDODRINE HYDROCHLORIDE 2.5 MG: 5 TABLET ORAL at 16:56

## 2021-06-22 RX ADMIN — METHOCARBAMOL 500 MG: 500 TABLET ORAL at 21:20

## 2021-06-22 RX ADMIN — LEVOTHYROXINE SODIUM 50 MCG: 0.05 TABLET ORAL at 05:27

## 2021-06-22 RX ADMIN — GABAPENTIN 100 MG: 100 CAPSULE ORAL at 12:10

## 2021-06-22 RX ADMIN — MIDODRINE HYDROCHLORIDE 2.5 MG: 5 TABLET ORAL at 05:25

## 2021-06-22 RX ADMIN — Medication 1000 UNITS: at 05:27

## 2021-06-22 RX ADMIN — Medication 10 MG: at 21:20

## 2021-06-22 RX ADMIN — POTASSIUM CHLORIDE 20 MEQ: 1500 TABLET, EXTENDED RELEASE ORAL at 05:25

## 2021-06-22 RX ADMIN — APIXABAN 5 MG: 5 TABLET, FILM COATED ORAL at 05:27

## 2021-06-22 RX ADMIN — ALLOPURINOL 100 MG: 100 TABLET ORAL at 05:26

## 2021-06-22 RX ADMIN — GABAPENTIN 100 MG: 100 CAPSULE ORAL at 16:57

## 2021-06-22 RX ADMIN — APIXABAN 5 MG: 5 TABLET, FILM COATED ORAL at 16:56

## 2021-06-22 RX ADMIN — ATORVASTATIN CALCIUM 40 MG: 40 TABLET, FILM COATED ORAL at 16:56

## 2021-06-22 ASSESSMENT — PAIN DESCRIPTION - PAIN TYPE: TYPE: ACUTE PAIN

## 2021-06-22 ASSESSMENT — LIFESTYLE VARIABLES
ON A TYPICAL DAY WHEN YOU DRINK ALCOHOL HOW MANY DRINKS DO YOU HAVE: 0
AVERAGE NUMBER OF DAYS PER WEEK YOU HAVE A DRINK CONTAINING ALCOHOL: 0
CONSUMPTION TOTAL: NEGATIVE
TOTAL SCORE: 0
HAVE PEOPLE ANNOYED YOU BY CRITICIZING YOUR DRINKING: NO
EVER HAD A DRINK FIRST THING IN THE MORNING TO STEADY YOUR NERVES TO GET RID OF A HANGOVER: NO
DOES PATIENT WANT TO STOP DRINKING: NO
TOTAL SCORE: 0
EVER FELT BAD OR GUILTY ABOUT YOUR DRINKING: NO
TOTAL SCORE: 0
ALCOHOL_USE: YES
HOW MANY TIMES IN THE PAST YEAR HAVE YOU HAD 5 OR MORE DRINKS IN A DAY: 0
HAVE YOU EVER FELT YOU SHOULD CUT DOWN ON YOUR DRINKING: NO

## 2021-06-22 ASSESSMENT — COGNITIVE AND FUNCTIONAL STATUS - GENERAL
SUGGESTED CMS G CODE MODIFIER DAILY ACTIVITY: CI
WALKING IN HOSPITAL ROOM: A LITTLE
CLIMB 3 TO 5 STEPS WITH RAILING: A LITTLE
MOVING FROM LYING ON BACK TO SITTING ON SIDE OF FLAT BED: A LOT
MOVING TO AND FROM BED TO CHAIR: A LOT
SUGGESTED CMS G CODE MODIFIER MOBILITY: CK
DAILY ACTIVITIY SCORE: 23
STANDING UP FROM CHAIR USING ARMS: A LITTLE
DRESSING REGULAR LOWER BODY CLOTHING: A LITTLE
MOBILITY SCORE: 15
TURNING FROM BACK TO SIDE WHILE IN FLAT BAD: A LOT

## 2021-06-22 ASSESSMENT — ENCOUNTER SYMPTOMS
MYALGIAS: 1
FOCAL WEAKNESS: 0
ABDOMINAL PAIN: 0
VOMITING: 0
HEADACHES: 1
DIAPHORESIS: 0
SENSORY CHANGE: 0
COUGH: 0
SPEECH CHANGE: 0
FEVER: 0
BACK PAIN: 1
WEAKNESS: 1
NAUSEA: 0
WHEEZING: 0
NECK PAIN: 0
SHORTNESS OF BREATH: 0
DIZZINESS: 1

## 2021-06-22 ASSESSMENT — ACTIVITIES OF DAILY LIVING (ADL)
TOILETING: INDEPENDENT
TOILETING: INDEPENDENT

## 2021-06-22 ASSESSMENT — GAIT ASSESSMENTS
GAIT LEVEL OF ASSIST: SUPERVISED
ASSISTIVE DEVICE: FRONT WHEEL WALKER
DEVIATION: SHUFFLED GAIT;DECREASED BASE OF SUPPORT;DECREASED HEEL STRIKE;DECREASED TOE OFF
DISTANCE (FEET): 50

## 2021-06-22 ASSESSMENT — PATIENT HEALTH QUESTIONNAIRE - PHQ9
2. FEELING DOWN, DEPRESSED, IRRITABLE, OR HOPELESS: NOT AT ALL
1. LITTLE INTEREST OR PLEASURE IN DOING THINGS: NOT AT ALL
SUM OF ALL RESPONSES TO PHQ9 QUESTIONS 1 AND 2: 0
1. LITTLE INTEREST OR PLEASURE IN DOING THINGS: NOT AT ALL
SUM OF ALL RESPONSES TO PHQ9 QUESTIONS 1 AND 2: 0
2. FEELING DOWN, DEPRESSED, IRRITABLE, OR HOPELESS: NOT AT ALL

## 2021-06-22 NOTE — FACE TO FACE
Face to Face Supporting Documentation - Home Health    The encounter with this patient was in whole or in part the primary reason for home health admission.    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 Leaving home requires a considerable and taxing effort. There is a normal inability to leave the home.    Community Physician to provide follow up care: JESUSITA Hahn     Optional Interventions? No      I certify the face to face encounter for this home health care referral meets the CMS requirements and the encounter/clinical assessment with the patient was, in whole, or in part, for the medical condition(s) listed above, which is the primary reason for home health care. Based on my clinical findings: the service(s) are medically necessary, support the need for home health care, and the homebound criteria are met.  I certify that this patient has had a face to face encounter by myself.  JIMENA Drummond. - NPI: [id number]

## 2021-06-22 NOTE — PROGRESS NOTES
Assumed patient care. Patient A&O x 4 on RA. Tele monitor placed, monitors notified. Patient updated on plan of care, verbalizes understanding. Patient has fall precautions in place, call light within reach. Patient has bed in low and locked position. Will continue to monitor.

## 2021-06-22 NOTE — ASSESSMENT & PLAN NOTE
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 No further intervention.   Monitor volume status   Continue ASA and statin

## 2021-06-22 NOTE — H&P
"Hospital Medicine History & Physical Note    Date of Service  6/21/2021    Primary Care Physician  ROSIE Hahn.    Consultants  None at this juncture    Code Status  Full Code    Chief Complaint  Chief Complaint   Patient presents with   • Weakness     generalized x1 hour       History of Presenting Illness  Kedar Woods is a 81 y.o. male with a history of acute arthritis who presents to the Emergency Department via EMS for evaluation of weakness onset one hour. Patient felt as though he was going to fall due to his leg pain. He describes his fall as secondary to his \"body not working\". Patient was unable to feed self or go to the bathroom. He lives alone at this time. Patient is on Lasix and had a drastic weight loss from 444 lbs. Patient has four arthritis patches with two on his knees and two on his back which he wears for 12 hours. He admits to associated symptoms of knee pain, but denies lightheadedness, chills, or fever. No alleviating factors were reported.     Review of Systems  All systems reviewed and negative except as noted per above.    Past Medical History  Hypertension  Arthralgias  Venous stasis ulcerations, chronic receiving [facility].  Right-sided heart failure per history    Surgical History   has no past surgical history on file.     Family History  Negative    Social History   reports that he has never smoked. He has never used smokeless tobacco. He reports current alcohol use. He reports that he does not use drugs.    Allergies  Allergies   Allergen Reactions   • Codeine Nausea       Medications  Prior to Admission Medications   Prescriptions Last Dose Informant Patient Reported? Taking?   Melatonin 3 MG Cap 6/20/2021 at 2130 Patient's Home Pharmacy Yes Yes   Sig: Take 6-9 mg by mouth at bedtime.   acetaminophen (TYLENOL) 500 MG Tab 6/21/2021 at 0630 Patient's Home Pharmacy Yes No   Sig: Take 1,000 mg by mouth in the morning, at noon, and at " bedtime. Indications: Pain   allopurinol (ZYLOPRIM) 100 MG Tab 6/21/2021 at 0630 Patient's Home Pharmacy Yes No   Sig: Take 100 mg by mouth every day.   apixaban (ELIQUIS) 5mg Tab 6/21/2021 at 0630 Patient's Home Pharmacy Yes No   Sig: Take 5 mg by mouth 2 Times a Day.   atenolol (TENORMIN) 25 MG Tab 6/21/2021 at 0630 Patient's Home Pharmacy Yes Yes   Sig: Take 12.5 mg by mouth every day.   atorvastatin (LIPITOR) 40 MG Tab 6/20/2021 at 1800 Patient's Home Pharmacy Yes No   Sig: Take 40 mg by mouth every evening.   bumetanide (BUMEX) 1 MG Tab 6/21/2021 at 0630 Patient's Home Pharmacy Yes Yes   Sig: Take 1 mg by mouth every day.   diclofenac sodium 1 % Gel > 3 days at Unknown Patient's Home Pharmacy Yes Yes   Sig: Apply 1 Application topically as needed (Apply's on both knees and back for pain).   gabapentin (NEURONTIN) 100 MG Cap 6/21/2021 at 0630 Patient's Home Pharmacy Yes Yes   Sig: Take 100 mg by mouth 3 times a day.   levothyroxine (SYNTHROID) 50 MCG Tab 6/21/2021 at 0600 Patient's Home Pharmacy Yes No   Sig: Take 50 mcg by mouth Every morning on an empty stomach.   lidocaine (LIDODERM) 5 % Patch 6/21/2021 at 0900 Patient's Home Pharmacy Yes Yes   Sig: Place 1 Patch on the skin as needed (Apply's on both knees and back for pain).   midodrine (PROAMATINE) 2.5 MG Tab 6/21/2021 at 0630 Patient's Home Pharmacy Yes Yes   Sig: Take 2.5 mg by mouth 2 times a day.   pantoprazole (PROTONIX) 20 MG tablet 6/21/2021 at 0630 Patient's Home Pharmacy Yes No   Sig: Take 20 mg by mouth every day.   polyethylene glycol/lytes (MIRALAX) 17 g Pack 6/21/2021 at 0700 Patient's Home Pharmacy Yes Yes   Sig: Take 17 g by mouth every day.   polyethylene glycol/lytes (MIRALAX) 17 g Pack 6/21/2021 at 0700 Patient's Home Pharmacy Yes Yes   Sig: Take 17 g by mouth every day.   potassium chloride SA (KDUR) 20 MEQ Tab CR 6/21/2021 at 0630 Patient's Home Pharmacy Yes Yes   Sig: Take 20 mEq by mouth every day.   sennosides (SENOKOT) 8.6 MG Tab  6/21/2021 at 0630 Patient's Home Pharmacy Yes Yes   Sig: Take 8.6 mg by mouth 2 times a day. Indications: Constipation   traZODone (DESYREL) 100 MG Tab 6/20/2021 at 2130 Patient's Home Pharmacy Yes No   Sig: Take 200 mg by mouth every evening.   vitamin D (CHOLECALCIFEROL) 1000 UNIT Tab 6/21/2021 at 0630 Patient's Home Pharmacy Yes No   Sig: Take 1,000 Units by mouth every day.      Facility-Administered Medications: None       Physical Exam  Temp:  [36.2 °C (97.2 °F)] 36.2 °C (97.2 °F)  Pulse:  [] 105  Resp:  [15-20] 20  BP: ()/(51-61) 118/58  SpO2:  [93 %-96 %] 96 %    General: No acute distress  HEENT atraumatic, normocephalic, pupils equal round reactive to light  Neck: No JVD  Chest: Respirations are unlabored  Cardiac: Physiologic S1 and S2  Abdomen: Soft, nontender, nondistended  Extremities: Without clubbing, cyanosis, patient does have 3-4+ bilateral lower extremity edema consistent with right-sided heart failure.  Neuro: Cranial nerves II through XII are grossly intact.  Psych: No anxiety, judgement intact.          Laboratory:  Recent Labs     06/21/21  1420   WBC 10.7   RBC 3.88*   HEMOGLOBIN 12.0*   HEMATOCRIT 37.2*   MCV 95.9   MCH 30.9   MCHC 32.3*   RDW 53.8*   PLATELETCT 262   MPV 10.5     Recent Labs     06/21/21  1420   SODIUM 135   POTASSIUM 4.3   CHLORIDE 103   CO2 20   GLUCOSE 116*   BUN 31*   CREATININE 1.21   CALCIUM 9.0     Recent Labs     06/21/21  1420   ALTSGPT 10   ASTSGOT 15   ALKPHOSPHAT 107*   TBILIRUBIN 0.4   GLUCOSE 116*         Recent Labs     06/21/21  1420   NTPROBNP 1272*         Recent Labs     06/21/21  1420   TROPONINT 35*       Imaging:  EC-ECHOCARDIOGRAM COMPLETE W/O CONT    (Results Pending)         Assessment/Plan:  I anticipate this patient is appropriate for observation status at this time.    Debility  Assessment & Plan  Patient will be seen by occupational and physical therapy.  He has required skilled nursing placement in the past, I suspect this will  happen again.    Chronic right-sided heart failure (HCC)  Assessment & Plan  Appears to be more right-sided, with massive anasarca reasonably well controlled on Bumex.  I will check a transthoracic echocardiogram.  It is quite possible that the excess weight and fluid in his lower extremities is contributing to his debility and placement in our facility.    Hypothyroidism- (present on admission)  Assessment & Plan  Continue home dose levothyroxine.    Chronic venous stasis dermatitis of both lower extremities- (present on admission)  Assessment & Plan  Wound nurse to consult consider Unna boot therapy.  Typically these wounds leak a copious amount of serous output, soaking his shoes, soaking his home.    Type 2 diabetes mellitus (HCC)- (present on admission)  Assessment & Plan  I will check and add on hemoglobin A1c, insulins and a basal, prandial, correctional fashion per my orders.  Titrate daily to a goal glucose of .    Pacemaker- (present on admission)  Assessment & Plan  Appears to be functioning normally, no acute interventions.    Aortic stenosis, severe- (present on admission)  Assessment & Plan  Avoid large fluid shifts.  Will be reevaluated on repeat echocardiogram.    Essential hypertension- (present on admission)  Assessment & Plan  Continue home medications with hold parameters.    Hyperlipidemia- (present on admission)  Assessment & Plan  Continue home dose statin.    Pulmonary hypertension (HCC)- (present on admission)  Assessment & Plan  Continue home dose loop diuretic.

## 2021-06-22 NOTE — THERAPY
"Occupational Therapy   Initial Evaluation     Patient Name: Kedar Woods  Age:  81 y.o., Sex:  male  Medical Record #: [id number]  Today's Date: 6/22/2021       Precautions: Fall Risk    Assessment  Patient is 81 y.o. male with a diagnosis of weakness & BLE pain.  Additional factors influencing patient status / progress: Pt has hx of CHF, AS, pacemaker, DM, HTN, hypothyroidism & chronic venous stasis dermatitis.  Today pt was able to perform basic ADL's with supervision.  Pt is likely functioning close to his baseline.  Pt would benefit from a tub transfer bench, grab bars by his toilet/shower & a sock aid to increase his safety & independence with ADL's.  Pt was not very recepetive to new learning.  \"I've been doing fine all these years\"  Pt would benefit from HH therapy upon D/C.  Patient will not be actively followed for occupational therapy services at this time, however may be seen if requested by physician for 1 more visit within 30 days to address any discharge or equipment needs.     Plan    Recommend Occupational Therapy for Evaluation only.    DC Equipment Recommendations: Tub Transfer Bench, Hand Held Shower, Grab Bar(s) by Toilet  Discharge Recommendations: Recommend home health for continued occupational therapy services     Subjective    \"I wish I didn't miss my appt with the podiatrist.\"     Objective       06/22/21 1012   Prior Living Situation   Prior Services Home With Outpatient Therapy  (wound care )   Housing / Facility 1 Story Apartment / Condo   Steps Into Home 3   Bathroom Set up Bathtub / Shower Combination   Equipment Owned 4-Wheel Walker;Single Point Cane   Lives with - Patient's Self Care Capacity Alone and Able to Care For Self   Prior Level of ADL Function   Comments pt reports he has been sponge bathing for years due to difficulty getting in/out of tub.  Pt also reports not being able to don socks   Cognition    Comments pt very verbose, pleasant, appeared set in his ways with " limited ability for new learning   Balance Assessment   Sitting Balance (Static) Fair +   Sitting Balance (Dynamic) Fair +   Standing Balance (Static) Fair   Standing Balance (Dynamic) Fair   Weight Shift Sitting Good   Weight Shift Standing Good   ADL Assessment   Eating Modified Independent   Grooming Supervision;Standing   Bathing Supervision   Upper Body Dressing Supervision   Lower Body Dressing Minimal Assist   Toileting Supervision   Comments pt uses LH reacher for LB dressing   Functional Mobility   Sit to Stand Supervised   Bed, Chair, Wheelchair Transfer Supervised   Toilet Transfers Supervised   Mobility pt amb with FWW to bathroom   Edema / Skin Assessment   Comments pt has BLE wounds & goes to wound clinic

## 2021-06-22 NOTE — CARE PLAN
Problem: Knowledge Deficit - Standard  Goal: Patient and family/care givers will demonstrate understanding of plan of care, disease process/condition, diagnostic tests and medications  Outcome: Progressing     Problem: Pain - Standard  Goal: Alleviation of pain or a reduction in pain to the patient’s comfort goal  Outcome: Progressing     Problem: Skin Integrity  Goal: Skin integrity is maintained or improved  Outcome: Progressing       Shift Goals  Clinical Goals: Safety, comfort  Patient Goals: sleep  Family Goals: RAFAT    Progress made toward(s) clinical / shift goals:  Patient has fall precautions in place, bed in low and locked position, call light within reach.     Patient is not progressing towards the following goals: Patient unable to ambulate due to weakness in lower extremities.

## 2021-06-22 NOTE — ASSESSMENT & PLAN NOTE
Appears to be more right-sided, with massive anasarca reasonably well controlled on Bumex.    It is quite possible that the excess weight and fluid in his lower extremities is contributing to his debility and placement in our facility.  Check echocardiogram

## 2021-06-22 NOTE — CONSULTS
"Cardiology Initial Consult Note    Date of note:    6/22/2021      Consulting Physician: OSIEL Drummond*    Name:   Kedar Woods   YOB: 1940  Age:   81 y.o.  male   MRN:   [id number]      Reason for Consultation: Severe AS    HPI:  Kedar Woods is a 81 y.o. male with a history of type 2 diabetes mellitus, hypertension, hyperlipidemia and hypothyroidism who was BIBA for weakness.  Cardiology was called for history of severe aortic stenosis.    Patient states that he has been having episodes of dizziness and blacking out at home.  States that few days ago he was cooking himself breakfast and had 5 episodes of blacking out which were short in duration with no postictal symptoms.  He currently lives alone and has difficulty performing his ADLs due to weakness and recurrent falls.  States that his last fall was few weeks ago for which he was admitted to the hospital.    In regards to severe AS, patient states that he had \"valve cleanup\" done in [place], [place] couple years ago but is unable to recall any other details.  Is unsure if he received a new valve but does remember the procedure being through bilateral groins.    ROS  All others reviewed and negative except for pertinent positives mentioned in HPI.      Past Medical History:   Diagnosis Date   • Hypertension        History reviewed. No pertinent surgical history.      Medications Prior to Admission   Medication Sig Dispense Refill Last Dose   • polyethylene glycol/lytes (MIRALAX) 17 g Pack Take 17 g by mouth every day.   6/21/2021 at 0700   • atenolol (TENORMIN) 25 MG Tab Take 12.5 mg by mouth every day.   6/21/2021 at 0630   • Melatonin 3 MG Cap Take 6-9 mg by mouth at bedtime.   6/20/2021 at 2130   • sennosides (SENOKOT) 8.6 MG Tab Take 8.6 mg by mouth 2 times a day. Indications: Constipation   6/21/2021 at 0630   • lidocaine (LIDODERM) 5 % Patch Place 1 Patch on the skin as needed (Apply's on both knees and " back for pain).   6/21/2021 at 0900   • midodrine (PROAMATINE) 2.5 MG Tab Take 2.5 mg by mouth 2 times a day.   6/21/2021 at 0630   • potassium chloride SA (KDUR) 20 MEQ Tab CR Take 20 mEq by mouth every day.   6/21/2021 at 0630   • gabapentin (NEURONTIN) 100 MG Cap Take 100 mg by mouth 3 times a day.   6/21/2021 at 0630   • polyethylene glycol/lytes (MIRALAX) 17 g Pack Take 17 g by mouth every day.   6/21/2021 at 0700   • diclofenac sodium 1 % Gel Apply 1 Application topically as needed (Apply's on both knees and back for pain).   > 3 days at Unknown   • bumetanide (BUMEX) 1 MG Tab Take 1 mg by mouth every day.   6/21/2021 at 0630   • acetaminophen (TYLENOL) 500 MG Tab Take 1,000 mg by mouth in the morning, at noon, and at bedtime. Indications: Pain   6/21/2021 at 0630   • pantoprazole (PROTONIX) 20 MG tablet Take 20 mg by mouth every day.   6/21/2021 at 0630   • traZODone (DESYREL) 100 MG Tab Take 200 mg by mouth every evening.   6/20/2021 at 2130   • allopurinol (ZYLOPRIM) 100 MG Tab Take 100 mg by mouth every day.   6/21/2021 at 0630   • apixaban (ELIQUIS) 5mg Tab Take 5 mg by mouth 2 Times a Day.   6/21/2021 at 0630   • atorvastatin (LIPITOR) 40 MG Tab Take 40 mg by mouth every evening.   6/20/2021 at 1800   • vitamin D (CHOLECALCIFEROL) 1000 UNIT Tab Take 1,000 Units by mouth every day.   6/21/2021 at 0630   • levothyroxine (SYNTHROID) 50 MCG Tab Take 50 mcg by mouth Every morning on an empty stomach.   6/21/2021 at 0600     Current Facility-Administered Medications   Medication Dose Route Frequency Provider Last Rate Last Admin   • miconazole 2%-zinc oxide (Enio) topical cream   Topical BID OSIEL DrummondRYeimyN.       • methocarbamol (ROBAXIN) tablet 500 mg  500 mg Oral 4X/DAY JIMENA Drummond.   500 mg at 06/22/21 1430   • allopurinol (ZYLOPRIM) tablet 100 mg  100 mg Oral DAILY Michael Rascon M.D.   100 mg at 06/22/21 0570   • apixaban (ELIQUIS) tablet 5 mg  5 mg Oral BID Michael Rascon,  M.D.   5 mg at 06/22/21 0527   • atenolol (TENORMIN) tablet 12.5 mg  12.5 mg Oral DAILY Michael Rascon M.D.       • atorvastatin (LIPITOR) tablet 40 mg  40 mg Oral Q EVENING Michael Rascon M.D.   40 mg at 06/21/21 1820   • bumetanide (BUMEX) tablet 1 mg  1 mg Oral DAILY Michael Rascon M.D.       • gabapentin (NEURONTIN) capsule 100 mg  100 mg Oral TID Michael Rascon M.D.   100 mg at 06/22/21 1210   • levothyroxine (SYNTHROID) tablet 50 mcg  50 mcg Oral AM ES Michael Rascon M.D.   50 mcg at 06/22/21 0527   • lidocaine (LIDODERM) 5 % 1 Patch  1 Patch Transdermal PRN Michael Rascon M.D.       • melatonin tablet 5-10 mg  5-10 mg Oral QHS Michael Rascon M.D.   10 mg at 06/21/21 2219   • midodrine (PROAMATINE) tablet 2.5 mg  2.5 mg Oral BID Michael Rascon M.D.   2.5 mg at 06/22/21 0525   • omeprazole (PRILOSEC) capsule 20 mg  20 mg Oral DAILY Michael Rascon M.D.   20 mg at 06/22/21 0528   • polyethylene glycol/lytes (MIRALAX) PACKET 1 Packet  1 Packet Oral DAILY Michael Rascon M.D.       • potassium chloride SA (Kdur) tablet 20 mEq  20 mEq Oral DAILY Michael Rascon M.D.   20 mEq at 06/22/21 0525   • traZODone (DESYREL) tablet 200 mg  200 mg Oral Nightly Michael Rascon M.D.   200 mg at 06/21/21 2219   • vitamin D (cholecalciferol) tablet 1,000 Units  1,000 Units Oral DAILY Michael Rascon M.D.   1,000 Units at 06/22/21 0527   • senna-docusate (PERICOLACE or SENOKOT S) 8.6-50 MG per tablet 2 tablet  2 tablet Oral BID Michael Rascon M.D.        And   • polyethylene glycol/lytes (MIRALAX) PACKET 1 Packet  1 Packet Oral QDAY PRN Michael Rascon M.D.        And   • magnesium hydroxide (MILK OF MAGNESIA) suspension 30 mL  30 mL Oral QDAY PRN Michael Rascon M.D.        And   • bisacodyl (DULCOLAX) suppository 10 mg  10 mg Rectal QDAY PRN Michael Rascon M.D.       • glucose 4 g chewable tablet 16 g  16 g Oral Q15 MIN PRN Michael Rascon M.D.        And   • dextrose 50% (D50W) injection 50 mL  50 mL Intravenous Q15  MIN PRN Michael Rascon M.D.       • acetaminophen (Tylenol) tablet 650 mg  650 mg Oral Q4HRS PRN Michael Rascon M.D.   650 mg at 06/21/21 6857         Allergies   Allergen Reactions   • Codeine Nausea         History reviewed. No pertinent family history.      Social History     Socioeconomic History   • Marital status:      Spouse name: Not on file   • Number of children: Not on file   • Years of education: Not on file   • Highest education level: Not on file   Occupational History   • Not on file   Tobacco Use   • Smoking status: Never Smoker   • Smokeless tobacco: Never Used   Vaping Use   • Vaping Use: Never used   Substance and Sexual Activity   • Alcohol use: Yes   • Drug use: Never   • Sexual activity: Not on file   Other Topics Concern   • Not on file   Social History Narrative   • Not on file     Social Determinants of Health     Financial Resource Strain:    • Difficulty of Paying Living Expenses:    Food Insecurity:    • Worried About Running Out of Food in the Last Year:    • Ran Out of Food in the Last Year:    Transportation Needs:    • Lack of Transportation (Medical):    • Lack of Transportation (Non-Medical):    Physical Activity:    • Days of Exercise per Week:    • Minutes of Exercise per Session:    Stress:    • Feeling of Stress :    Social Connections:    • Frequency of Communication with Friends and Family:    • Frequency of Social Gatherings with Friends and Family:    • Attends [employer] Services:    • Active Member of Clubs or Organizations:    • Attends Club or Organization Meetings:    • Marital Status:    Intimate Partner Violence:    • Fear of Current or Ex-Partner:    • Emotionally Abused:    • Physically Abused:    • Sexually Abused:            Intake/Output Summary (Last 24 hours) at 6/22/2021 1602  Last data filed at 6/21/2021 1807  Gross per 24 hour   Intake 1000 ml   Output --   Net 1000 ml        Physical Exam  Body mass index is 35.98 kg/m².  /56   Pulse 62    "Temp 36.3 °C (97.3 °F) (Temporal)   Resp 18   Ht 1.803 m (5' 11\")   Wt 117 kg (258 lb)   SpO2 94%   Vitals:    06/22/21 0521 06/22/21 0840 06/22/21 1219 06/22/21 1534   BP: 108/51 109/56 118/59 115/56   Pulse: 61 66 60 62   Resp: 16 18 16 18   Temp:  36.3 °C (97.3 °F) 36.1 °C (96.9 °F) 36.3 °C (97.3 °F)   TempSrc:  Temporal Temporal Temporal   SpO2:  94% 96% 94%   Weight:       Height:         Oxygen Therapy:  Pulse Oximetry: 94 %, O2 (LPM): 0, O2 Delivery Device: None - Room Air    General: Well appearing and in no apparent distress  Eyes: nl conjunctiva  ENT: OP clear  Neck: JVP not elevated,  no carotid bruits  Lungs: normal respiratory effort, CTAB  Heart: RRR, systolic murmurs, no rubs or gallops, +edema bilateral lower extremities. No LV/RV heave on cardiac palpatation. 2+ bilateral radial pulses.  2+ bilateral dp pulses.   Abdomen: soft, non tender, non distended, no masses, normal bowel sounds.  No HSM.  Extremities/MSK: no clubbing, no cyanosis  Neurological: No focal sensory deficits  Psychiatric: Appropriate affect, A/O x 3  Skin: Warm extremities      Labs (personally reviewed and notable for):   Lab Results   Component Value Date/Time    SODIUM 138 06/22/2021 02:51 AM    POTASSIUM 4.2 06/22/2021 02:51 AM    CHLORIDE 105 06/22/2021 02:51 AM    CO2 23 06/22/2021 02:51 AM    GLUCOSE 115 (H) 06/22/2021 02:51 AM    BUN 29 (H) 06/22/2021 02:51 AM    CREATININE 1.17 06/22/2021 02:51 AM      Lab Results   Component Value Date/Time    WBC 10.5 06/22/2021 02:51 AM    RBC 3.56 (L) 06/22/2021 02:51 AM    HEMOGLOBIN 11.0 (L) 06/22/2021 02:51 AM    HEMATOCRIT 34.8 (L) 06/22/2021 02:51 AM    MCV 97.8 06/22/2021 02:51 AM    MCH 30.9 06/22/2021 02:51 AM    MCHC 31.6 (L) 06/22/2021 02:51 AM    MPV 10.7 06/22/2021 02:51 AM    NEUTSPOLYS 64.70 06/21/2021 02:20 PM    LYMPHOCYTES 19.00 (L) 06/21/2021 02:20 PM    MONOCYTES 12.20 06/21/2021 02:20 PM    EOSINOPHILS 2.50 06/21/2021 02:20 PM    BASOPHILS 0.70 06/21/2021 02:20 " PM      Lab Results   Component Value Date/Time    CHOLSTRLTOT 159 12/24/2019 02:50 AM     (H) 12/24/2019 02:50 AM    HDL 25 (A) 12/24/2019 02:50 AM    TRIGLYCERIDE 139 12/24/2019 02:50 AM       Lab Results   Component Value Date/Time    TROPONINT 35 (H) 06/21/2021 1420     Lab Results   Component Value Date/Time    NTPROBNP 1272 (H) 06/21/2021 1420         Cardiac Imaging and Procedures Review:    EKG dated 6/21/2021: My personal interpretation is V paced rhythm    Echo dated 2019:   CONCLUSIONS  No prior study is available for comparison.   Left ventricular ejection fraction is visually estimated to be 60%.  Severe aortic stenosis.  Aortic valve area calculated from the continuity equation is 0.6 cm2.      Radiology test Review:  EC-ECHOCARDIOGRAM COMPLETE W/O CONT    (Results Pending)   US-EXTREMITY ARTERY LOWER BILAT W/DOMENIC (COMBO)    (Results Pending)         Impression and Medical Decision Making:  #Severe AS with ?valve intervention  #Recurrent falls  #Recurrent syncopal episodes  #Type 2 diabetes mellitus  #Hypertension  #Hyperlipidemia  #History of atrial flutter    Recommendations:  --Echocardiogram pending to evaluate aortic valve.  Further recommendations based on echocardiogram results.  --Continue atenolol and Eliquis for anticoagulation.  Patient denies any bleeding concerns.  --Continue Lipitor 40 mg daily.  --Continue midodrine 2.5 mg twice daily.    Thank you for allowing me to participate in the care of this patient.  Please contact me with any questions.      Jp Platt M.D.  Cardiologist, [facility] [place]   [phone]    This note was generated using voice recognition software which has a small chance of producing errors of grammar and possibly content. I have made every reasonable attempt to find and correct any obvious errors, but expect that some may not be found prior to finalization of this note.

## 2021-06-22 NOTE — THERAPY
Physical Therapy   Initial Evaluation     Patient Name: Kedar Woods  Age:  81 y.o., Sex:  male  Medical Record #: [id number]  Today's Date: 6/22/2021     Precautions: Fall Risk     Assessment  Patient is 81 y.o. male admitted with weakness and falls. Pt lives alone and typically is independent with ADLs and IADLs. Pt ambulates with SPC and is able to drive [facility]. Currently pt required min assist with SPC an SPV for short distances with FWW. Anticipate with increased mobility in acute care setting, pt will return to his prior level. Pt will cont to address gait and stairs.     Plan    Recommend Physical Therapy 3 times per week until therapy goals are met for the following treatments:  Gait Training, Neuro Re-Education / Balance and Stair Training    DC Equipment Recommendations: None (has 4WW and SPC)  Discharge Recommendations: Other - (continue with OP PT)          06/22/21 0954   Prior Living Situation   Prior Services Home With Outpatient Therapy   Housing / Facility 1 Story Apartment / Condo   Steps Into Home 3   Steps In Home 0   Rail Right Rail (Steps into Home)   Elevator No   Equipment Owned 4-Wheel Walker;Single Point Cane   Lives with - Patient's Self Care Capacity Alone and Able to Care For Self   Comments Pt reports he has limited assist available upon D/C   Prior Level of Functional Mobility   Bed Mobility Independent   Transfer Status Independent   Ambulation Independent   Distance Ambulation (Feet)   (community)   Assistive Devices Used Single Point Cane   Stairs Independent   Comments independent prior    History of Falls   History of Falls Yes   Date of Last Fall 06/21/21   Cognition    Cognition / Consciousness WDL   Comments cooperative   Gait Analysis   Gait Level Of Assist Supervised   Assistive Device Front Wheel Walker   Distance (Feet) 50   # of Times Distance was Traveled 1    Deviation Shuffled Gait;Decreased Base Of Support;Decreased Heel Strike;Decreased Toe Off   # of Stairs Climbed 0   Weight Bearing Status no restrictions   Comments no LOB, attempted SPC and required min assist   Bed Mobility    Supine to Sit Supervised   Sit to Supine Supervised   Scooting Supervised   Rolling Supervised   Functional Mobility   Sit to Stand Supervised   Bed, Chair, Wheelchair Transfer Supervised   Transfer Method Stand Step   Mobility in room with FWW   Edema / Skin Assessment   Edema / Skin  Not Assessed   Comments B LE wounds, R worse than L   Short Term Goals    Short Term Goal # 1 pt will be able to ambulate 150ft with SPC and SPV in 6tx in order to return to baseline   Short Term Goal # 2 pt will be able to negotiate 3 steps with SPV in 6tx in order to return to prior level   Anticipated Discharge Equipment and Recommendations   DC Equipment Recommendations None  (has 4WW and SPC)   Discharge Recommendations Other -  (continue with OP PT)

## 2021-06-22 NOTE — ASSESSMENT & PLAN NOTE
Wound nurse to consult consider Unna boot therapy.  Typically these wounds leak a copious amount of serous output, soaking his shoes, soaking his home.  Arterial US of BLE ordered  Compression stockings

## 2021-06-22 NOTE — PROGRESS NOTES
[facility] Medicine Daily Progress Note    Date of Service  6/22/2021    Chief Complaint  81 y.o. male admitted 6/21/2021 with weakness     Hospital Course  No notes on file    Interval Problem Update  Robaxin added for complaints of bilateral muscle spasms.   BNP 1272.   VSS on room air  Echo ordered for c/o dizziness and pre-syncope   OT recommending C , referral placed   Cardiology to consult on management recommendations     Consultants/Specialty  Cardiology, Dr. Platt    Code Status  Full Code    Disposition  Possible discharge home tomorrow pending PT/OT recommendations and clinical course.     Review of Systems  Review of Systems   Constitutional: Negative for diaphoresis, fever and malaise/fatigue.   Respiratory: Negative for cough, shortness of breath and wheezing.    Cardiovascular: Positive for chest pain and leg swelling.   Gastrointestinal: Negative for abdominal pain, nausea and vomiting.   Genitourinary: Negative for dysuria, frequency and urgency.   Musculoskeletal: Positive for back pain, joint pain and myalgias. Negative for neck pain.   Neurological: Positive for dizziness, weakness and headaches. Negative for sensory change, speech change and focal weakness.   All other systems reviewed and are negative.       Physical Exam  Temp:  [35.9 °C (96.7 °F)-36.3 °C (97.3 °F)] 36.1 °C (96.9 °F)  Pulse:  [60-84] 60  Resp:  [15-20] 16  BP: ()/(49-67) 118/59  SpO2:  [93 %-96 %] 96 %    Physical Exam  Vitals and nursing note reviewed.   Constitutional:       General: He is not in acute distress.     Appearance: He is obese. He is not toxic-appearing.   HENT:      Right Ear: External ear normal.      Left Ear: External ear normal.      Nose: Nose normal.      Mouth/Throat:      Mouth: Mucous membranes are moist.      Pharynx: Oropharynx is clear.   Eyes:      Extraocular Movements: Extraocular movements intact.      Pupils: Pupils are equal, round, and reactive to light.   Cardiovascular:      Rate and  Rhythm: Normal rate.      Pulses:           Dorsalis pedis pulses are 1+ on the right side and 1+ on the left side.   Pulmonary:      Effort: Pulmonary effort is normal.      Breath sounds: Normal breath sounds.   Abdominal:      General: There is distension.      Palpations: Abdomen is soft.      Tenderness: There is no abdominal tenderness. There is no guarding.   Musculoskeletal:      Cervical back: No rigidity or tenderness.      Right lower leg: Edema present.      Left lower leg: Edema present.   Skin:     General: Skin is warm.      Comments: BLE with chronic venous stasis changes    Neurological:      Mental Status: He is alert and oriented to person, place, and time.   Psychiatric:         Mood and Affect: Mood normal.         Thought Content: Thought content normal.         Judgment: Judgment normal.         Fluids    Intake/Output Summary (Last 24 hours) at 6/22/2021 1441  Last data filed at 6/21/2021 1807  Gross per 24 hour   Intake 1000 ml   Output --   Net 1000 ml       Laboratory  Recent Labs     06/21/21  1420 06/22/21  0251   WBC 10.7 10.5   RBC 3.88* 3.56*   HEMOGLOBIN 12.0* 11.0*   HEMATOCRIT 37.2* 34.8*   MCV 95.9 97.8   MCH 30.9 30.9   MCHC 32.3* 31.6*   RDW 53.8* 54.1*   PLATELETCT 262 234   MPV 10.5 10.7     Recent Labs     06/21/21  1420 06/22/21  0251   SODIUM 135 138   POTASSIUM 4.3 4.2   CHLORIDE 103 105   CO2 20 23   GLUCOSE 116* 115*   BUN 31* 29*   CREATININE 1.21 1.17   CALCIUM 9.0 8.8                   Imaging  EC-ECHOCARDIOGRAM COMPLETE W/O CONT    (Results Pending)   US-EXTREMITY ARTERY LOWER BILAT W/DOMENIC (COMBO)    (Results Pending)        Assessment/Plan  * Weakness of both lower extremities- (present on admission)  Assessment & Plan  Chronic and worsening   PT/OT consult   Pain control   Robaxin added for muscle spasms     Chronic venous stasis dermatitis of both lower extremities- (present on admission)  Assessment & Plan  Wound nurse to consult consider Unna boot therapy.   Typically these wounds leak a copious amount of serous output, soaking his shoes, soaking his home.  Arterial US of BLE ordered  Compression stockings       Chronic right-sided heart failure (HCC)  Assessment & Plan  Appears to be more right-sided, with massive anasarca reasonably well controlled on Bumex.    It is quite possible that the excess weight and fluid in his lower extremities is contributing to his debility and placement in our facility.  Check echocardiogram     Debility  Assessment & Plan  PT/OT to consult     Hypothyroidism- (present on admission)  Assessment & Plan  Continue home dose levothyroxine.    Type 2 diabetes mellitus (HCC)- (present on admission)  Assessment & Plan  Continue on SSI   A1c 6.6%    CAD (coronary artery disease)- (present on admission)  Assessment & Plan  S/p stent placement in 11/2019  Denies chest pain   EKG did not show dynamic ischemic changes     Pacemaker- (present on admission)  Assessment & Plan  Appears to be functioning normally, no acute interventions.    Aortic stenosis, severe- (present on admission)  Assessment & Plan  S/p TAVAR in November 2019    Will be reevaluated on repeat echocardiogram.  Cardiology consult   Monitor volume status   Continue ASA and statin     Essential hypertension- (present on admission)  Assessment & Plan  Continue home medications with hold parameters.    Hyperlipidemia- (present on admission)  Assessment & Plan  Continue home dose statin.    Pulmonary hypertension (HCC)- (present on admission)  Assessment & Plan  Continue home dose loop diuretic.    Atrial flutter (HCC)- (present on admission)  Assessment & Plan  Continue atenolol and Eliquis   Tele monitoring        VTE prophylaxis: Eliquis

## 2021-06-22 NOTE — HEART FAILURE PROGRAM
Echocardiogram pending. Prior echo in 2019 showed severe AS. No contact with cardiology in our system. [facility] Not clear if he's been assessed for AVR there.    Patient presentation for leg weakness and apparently has been falling at home, diagnosed with debility.    Awaiting echo results.    ADDENDUM 06.23.21 1159: no echo

## 2021-06-22 NOTE — ED NOTES
Bedside report to QUENTIN Ramirez.  Kedar Woods transported to T7 via [facility] with RN, patient on monitor. All personal belongings in possession.  NAD noted.

## 2021-06-22 NOTE — PROGRESS NOTES
4 Eyes Skin Assessment Completed by QUENTIN Ramirez and QUENTIN Jimenez.    Head Scab  Ears Redness and Blanching  Nose WDL  Mouth WDL  Neck WDL  Breast/Chest Redness and Blanching  Shoulder Blades WDL  Spine WDL  (R) Arm/Elbow/Hand Scab  (L) Arm/Elbow/Hand WDL  Abdomen WDL  Groin Redness  Scrotum/Coccyx/Buttocks Redness and Blanching  (R) Leg Redness and Blanching  (L) Leg Redness and Blanching  (R) Heel/Foot/Toe Discoloration  (L) Heel/Foot/Toe Discoloration          Devices In Places Tele Box      Interventions In Place Pillows    Possible Skin Injury Yes    Pictures Uploaded Into Epic Yes  Wound Consult Placed Yes  RN Wound Prevention Protocol Ordered Yes

## 2021-06-22 NOTE — PROGRESS NOTES
Received bedside report from QUENTIN kessler, pt care assumed, VS stable, pt assessment complete. Pt AAOx4, c/o chronicPain at this time. No signs of acute distress noted at this time. POC discussed with pt and verbalizes no questions. Pt denies any additional needs at this time. Bed in lowest position, bed alarm on. Pt educated on fall risk and verbalized understanding, call light within reach, hourly rounding initiated. in a sinus rhythm. States he has weakness in both lower legs.

## 2021-06-23 ENCOUNTER — APPOINTMENT (OUTPATIENT)
Dept: CARDIOLOGY | Facility: MEDICAL CENTER | Age: 81
End: 2021-06-23
Attending: HOSPITALIST
Payer: COMMERCIAL

## 2021-06-23 ENCOUNTER — APPOINTMENT (OUTPATIENT)
Dept: RADIOLOGY | Facility: MEDICAL CENTER | Age: 81
End: 2021-06-23
Attending: NURSE PRACTITIONER
Payer: COMMERCIAL

## 2021-06-23 ENCOUNTER — PHARMACY VISIT (OUTPATIENT)
Dept: PHARMACY | Facility: MEDICAL CENTER | Age: 81
End: 2021-06-23
Payer: COMMERCIAL

## 2021-06-23 ENCOUNTER — PATIENT OUTREACH (OUTPATIENT)
Dept: HEALTH INFORMATION MANAGEMENT | Facility: OTHER | Age: 81
End: 2021-06-23

## 2021-06-23 VITALS
TEMPERATURE: 97.7 F | DIASTOLIC BLOOD PRESSURE: 63 MMHG | HEART RATE: 84 BPM | SYSTOLIC BLOOD PRESSURE: 102 MMHG | OXYGEN SATURATION: 97 % | WEIGHT: 258 LBS | HEIGHT: 71 IN | BODY MASS INDEX: 36.12 KG/M2 | RESPIRATION RATE: 16 BRPM

## 2021-06-23 LAB
ALBUMIN SERPL BCP-MCNC: 3 G/DL (ref 3.2–4.9)
ALBUMIN/GLOB SERPL: 1 G/DL
ALP SERPL-CCNC: 92 U/L (ref 30–99)
ALT SERPL-CCNC: 6 U/L (ref 2–50)
ANION GAP SERPL CALC-SCNC: 9 MMOL/L (ref 7–16)
AST SERPL-CCNC: 17 U/L (ref 12–45)
BILIRUB SERPL-MCNC: 0.3 MG/DL (ref 0.1–1.5)
BUN SERPL-MCNC: 20 MG/DL (ref 8–22)
CALCIUM SERPL-MCNC: 8.6 MG/DL (ref 8.5–10.5)
CHLORIDE SERPL-SCNC: 108 MMOL/L (ref 96–112)
CO2 SERPL-SCNC: 23 MMOL/L (ref 20–33)
CREAT SERPL-MCNC: 0.99 MG/DL (ref 0.5–1.4)
EKG IMPRESSION: NORMAL
ERYTHROCYTE [DISTWIDTH] IN BLOOD BY AUTOMATED COUNT: 54.9 FL (ref 35.9–50)
GLOBULIN SER CALC-MCNC: 2.9 G/DL (ref 1.9–3.5)
GLUCOSE SERPL-MCNC: 108 MG/DL (ref 65–99)
HCT VFR BLD AUTO: 36 % (ref 42–52)
HGB BLD-MCNC: 11.1 G/DL (ref 14–18)
LV EJECT FRACT  99904: 70
LV EJECT FRACT MOD 2C 99903: 56.09
LV EJECT FRACT MOD 4C 99902: 81.1
LV EJECT FRACT MOD BP 99901: 70.91
MCH RBC QN AUTO: 30 PG (ref 27–33)
MCHC RBC AUTO-ENTMCNC: 30.8 G/DL (ref 33.7–35.3)
MCV RBC AUTO: 97.3 FL (ref 81.4–97.8)
PLATELET # BLD AUTO: 235 K/UL (ref 164–446)
PMV BLD AUTO: 11 FL (ref 9–12.9)
POTASSIUM SERPL-SCNC: 4.6 MMOL/L (ref 3.6–5.5)
PROT SERPL-MCNC: 5.9 G/DL (ref 6–8.2)
RBC # BLD AUTO: 3.7 M/UL (ref 4.7–6.1)
SODIUM SERPL-SCNC: 140 MMOL/L (ref 135–145)
WBC # BLD AUTO: 9.6 K/UL (ref 4.8–10.8)

## 2021-06-23 PROCEDURE — A9270 NON-COVERED ITEM OR SERVICE: HCPCS | Performed by: HOSPITALIST

## 2021-06-23 PROCEDURE — 80053 COMPREHEN METABOLIC PANEL: CPT

## 2021-06-23 PROCEDURE — 93010 ELECTROCARDIOGRAM REPORT: CPT | Performed by: INTERNAL MEDICINE

## 2021-06-23 PROCEDURE — 36415 COLL VENOUS BLD VENIPUNCTURE: CPT

## 2021-06-23 PROCEDURE — A9270 NON-COVERED ITEM OR SERVICE: HCPCS | Performed by: NURSE PRACTITIONER

## 2021-06-23 PROCEDURE — 99214 OFFICE O/P EST MOD 30 MIN: CPT | Performed by: INTERNAL MEDICINE

## 2021-06-23 PROCEDURE — 93922 UPR/L XTREMITY ART 2 LEVELS: CPT | Mod: 26 | Performed by: INTERNAL MEDICINE

## 2021-06-23 PROCEDURE — 99217 PR OBSERVATION CARE DISCHARGE: CPT | Performed by: NURSE PRACTITIONER

## 2021-06-23 PROCEDURE — G0378 HOSPITAL OBSERVATION PER HR: HCPCS

## 2021-06-23 PROCEDURE — RXMED WILLOW AMBULATORY MEDICATION CHARGE: Performed by: NURSE PRACTITIONER

## 2021-06-23 PROCEDURE — 85027 COMPLETE CBC AUTOMATED: CPT

## 2021-06-23 PROCEDURE — 93005 ELECTROCARDIOGRAM TRACING: CPT | Performed by: NURSE PRACTITIONER

## 2021-06-23 PROCEDURE — 93306 TTE W/DOPPLER COMPLETE: CPT

## 2021-06-23 PROCEDURE — 700102 HCHG RX REV CODE 250 W/ 637 OVERRIDE(OP): Performed by: HOSPITALIST

## 2021-06-23 PROCEDURE — 700102 HCHG RX REV CODE 250 W/ 637 OVERRIDE(OP): Performed by: NURSE PRACTITIONER

## 2021-06-23 PROCEDURE — 93922 UPR/L XTREMITY ART 2 LEVELS: CPT

## 2021-06-23 PROCEDURE — 93306 TTE W/DOPPLER COMPLETE: CPT | Mod: 26 | Performed by: INTERNAL MEDICINE

## 2021-06-23 RX ORDER — METHOCARBAMOL 500 MG/1
500 TABLET, FILM COATED ORAL 4 TIMES DAILY
Qty: 120 TABLET | Refills: 0 | Status: SHIPPED | OUTPATIENT
Start: 2021-06-23 | End: 2023-04-18

## 2021-06-23 RX ADMIN — Medication 1000 UNITS: at 06:06

## 2021-06-23 RX ADMIN — MIDODRINE HYDROCHLORIDE 2.5 MG: 5 TABLET ORAL at 17:42

## 2021-06-23 RX ADMIN — APIXABAN 5 MG: 5 TABLET, FILM COATED ORAL at 06:06

## 2021-06-23 RX ADMIN — GABAPENTIN 100 MG: 100 CAPSULE ORAL at 17:44

## 2021-06-23 RX ADMIN — MIDODRINE HYDROCHLORIDE 2.5 MG: 5 TABLET ORAL at 06:08

## 2021-06-23 RX ADMIN — METHOCARBAMOL 500 MG: 500 TABLET ORAL at 17:44

## 2021-06-23 RX ADMIN — ATORVASTATIN CALCIUM 40 MG: 40 TABLET, FILM COATED ORAL at 17:42

## 2021-06-23 RX ADMIN — POLYETHYLENE GLYCOL 3350 1 PACKET: 17 POWDER, FOR SOLUTION ORAL at 06:14

## 2021-06-23 RX ADMIN — MICONAZOLE NITRATE: 20 CREAM TOPICAL at 06:14

## 2021-06-23 RX ADMIN — POTASSIUM CHLORIDE 20 MEQ: 1500 TABLET, EXTENDED RELEASE ORAL at 06:07

## 2021-06-23 RX ADMIN — ALLOPURINOL 100 MG: 100 TABLET ORAL at 06:07

## 2021-06-23 RX ADMIN — OMEPRAZOLE 20 MG: 20 CAPSULE, DELAYED RELEASE ORAL at 06:05

## 2021-06-23 RX ADMIN — METHOCARBAMOL 500 MG: 500 TABLET ORAL at 08:29

## 2021-06-23 RX ADMIN — LEVOTHYROXINE SODIUM 50 MCG: 0.05 TABLET ORAL at 06:05

## 2021-06-23 RX ADMIN — ACETAMINOPHEN 650 MG: 325 TABLET, FILM COATED ORAL at 06:07

## 2021-06-23 RX ADMIN — DOCUSATE SODIUM 50 MG AND SENNOSIDES 8.6 MG 2 TABLET: 8.6; 5 TABLET, FILM COATED ORAL at 06:05

## 2021-06-23 RX ADMIN — APIXABAN 5 MG: 5 TABLET, FILM COATED ORAL at 17:44

## 2021-06-23 RX ADMIN — MICONAZOLE NITRATE: 20 CREAM TOPICAL at 17:47

## 2021-06-23 RX ADMIN — DOCUSATE SODIUM 50 MG AND SENNOSIDES 8.6 MG 2 TABLET: 8.6; 5 TABLET, FILM COATED ORAL at 17:44

## 2021-06-23 RX ADMIN — GABAPENTIN 100 MG: 100 CAPSULE ORAL at 06:04

## 2021-06-23 RX ADMIN — METHOCARBAMOL 500 MG: 500 TABLET ORAL at 12:31

## 2021-06-23 RX ADMIN — GABAPENTIN 100 MG: 100 CAPSULE ORAL at 12:31

## 2021-06-23 ASSESSMENT — PAIN DESCRIPTION - PAIN TYPE: TYPE: ACUTE PAIN

## 2021-06-23 NOTE — CARE PLAN
The patient is Stable - Low risk of patient condition declining or worsening    Shift Goals  Clinical Goals: Safety, comfort, pain control, echo, US BLE  Patient Goals: sleep  Family Goals: RAFAT    Progress made toward(s) clinical / shift goals:    Problem: Knowledge Deficit - Standard  Goal: Patient and family/care givers will demonstrate understanding of plan of care, disease process/condition, diagnostic tests and medications  Outcome: Progressing     Problem: Pain - Standard  Goal: Alleviation of pain or a reduction in pain to the patient’s comfort goal  Outcome: Progressing     Problem: Fall Risk  Goal: Patient will remain free from falls  Outcome: Progressing       Patient is not progressing towards the following goals:NA

## 2021-06-23 NOTE — WOUND TEAM
"[facility]  Inpatient Services  Initial Wound and Skin Care Evaluation    Admission Date: 6/21/2021     Last order of IP CONSULT TO WOUND CARE was found on 6/22/2021 from Hospital Encounter on 6/21/2021     HPI, PMH, SH: Reviewed    History reviewed. No pertinent surgical history.  Social History     Tobacco Use   • Smoking status: Never Smoker   • Smokeless tobacco: Never Used   Substance Use Topics   • Alcohol use: Yes     Chief Complaint   Patient presents with   • Weakness     generalized x1 hour     Diagnosis: Ambulatory dysfunction [R26.2]    Unit where seen by Wound Team: T727/01     WOUND CONSULT/FOLLOW UP RELATED TO:  Scrotum    WOUND HISTORY:  Patient states that he has not been able to shower in three years, and is unable to clean himself after bowel movements so he takes a towel and makes a \"bandana\" (rolls the towel) and kind of flosses the towel between his legs causing a friction burn on his scrotum.      WOUND ASSESSMENT/LDA  Wound 06/21/21 Scrotum (Active)   Wound Image     06/22/21 1700   Site Assessment Purple;Pink;Red 06/22/21 1700   Periwound Assessment [place] 06/22/21 1700   Margins Defined edges 06/22/21 1700   Closure Secondary intention 06/22/21 1700   Drainage Amount Small 06/22/21 1700   Drainage Description Serosanguineous 06/22/21 1700   Treatments Cleansed;Site care 06/22/21 1700   Wound Cleansing Not Applicable 06/22/21 1700   Periwound Protectant Antifungal Therapy 06/22/21 1700   Dressing Cleansing/Solutions Not Applicable 06/22/21 1700   Dressing Options Open to Air 06/22/21 1700   Dressing Changed New 06/22/21 1700   Dressing Status Open to Air 06/22/21 1700   Dressing Change/Treatment Frequency Every Shift, and As Needed 06/22/21 1700   NEXT Dressing Change/Treatment Date 06/22/21 06/22/21 1700   NEXT Weekly Photo (Inpatient Only) 06/29/21 06/22/21 1700   Shape irregular 06/22/21 1700   Wound Odor None 06/22/21 1700   Pulses N/A 06/22/21 1700   Exposed Structures " None 06/22/21 1700   WOUND NURSE ONLY - Time Spent with Patient (mins) 60 06/22/21 1700   Number of days: 1        Vascular:    DOMENIC:   No results found.    Lab Values:    Lab Results   Component Value Date/Time    WBC 10.5 06/22/2021 02:51 AM    RBC 3.56 (L) 06/22/2021 02:51 AM    HEMOGLOBIN 11.0 (L) 06/22/2021 02:51 AM    HEMATOCRIT 34.8 (L) 06/22/2021 02:51 AM    HBA1C 6.6 (H) 06/21/2021 02:20 PM        Culture Results show:  No results found for this or any previous visit (from the past 720 hour(s)).    Pain Level/Medicated:  Patient denied pain       INTERVENTIONS BY WOUND TEAM:  Chart and images reviewed. Discussed with bedside RN. All areas of concern (based on picture review, LDA review and discussion with bedside RN) have been thoroughly assessed. Documentation of areas based on significant findings. This RN in to assess patient. Performed standard wound care which includes appropriate positioning, dressing removal and non-selective debridement. Pictures and measurements obtained weekly if/when required.  Preparation for Dressing removal: Dressing soaked with n/a  Cleansed with:  4 in 1 foam and gauze.  Sharp debridement: n/a  Tiffanie wound: Cleansed with 4 in 1 foam, Prepped with n/a  Primary Dressing: antifungal cream  Secondary (Outer) Dressing: LOWELL    Interdisciplinary consultation: Patient, Bedside RN ,     EVALUATION / RATIONALE FOR TREATMENT:  Most Recent Date:  6/23/2021: Patient has yeast in his groin area, educated patient about possible getting a bidet to help wash that area.       Goals: Steady decrease in wound area and depth weekly.    WOUND TEAM PLAN OF CARE ([X] for frequency of wound follow up,): X  Nursing to follow orders written for wound care. Contact wound team if area fails to progress, deteriorates or with any questions/concerns  Dressing changes by wound team:                   Follow up 3 times weekly:                NPWT change 3 times weekly:     Follow up 1-2 times weekly:       Follow up Bi-Monthly:                   Follow up as needed:   X Please re-consult is wound fails to progress  Other (explain):     NURSING PLAN OF CARE ORDERS (X):  Dressing changes: See Dressing Care orders: X  Skin care: See Skin Care orders: X  RN Prevention Protocol: X  Rectal tube care: See Rectal Tube Care orders:   Other orders:    RSKIN:   CURRENTLY IN PLACE (X), APPLIED THIS VISIT (A), ORDERED (O):   Q shift Spike:  X  Q shift pressure point assessments:  X    Surface/Positioning X  Pressure redistribution mattress      X      Low Airloss          Bariatric foam      Bariatric SIL     Waffle cushion        Waffle Overlay    X      Reposition q 2 hours      TAPs Turning system     Z Thong Pillow     Offloading/Redistribution X  Sacral Mepilex (Silicone dressing)     Heel Mepilex (Silicone dressing)       O  Heel float boots (Prevalon boot)             Float Heels off Bed with Pillows           Respiratory n/a  Silicone O2 tubing         Gray Foam Ear protectors     Cannula fixation Device (Tender )          High flow offloading Clip    Elastic head band offloading device      Anchorfast                                                         Trach with Optifoam split foam             Containment/Moisture Prevention O    Rectal tube or BMS    Purwick/Condom Cath      O  Rosales Catheter    Barrier wipes         O  Barrier paste       Antifungal tx    O  Interdry        Mobilization X      Up to chair        Ambulate   X  PT/OT      Nutrition X      Dietician        Diabetes Education      PO    X TF     TPN     NPO   # days     Other        Anticipated discharge plans: [facility]  [facility]:        SNF/Rehab:                  Home Health Care:           Outpatient Wound Center:            Self/Family Care:        Other:

## 2021-06-23 NOTE — DISCHARGE SUMMARY
Discharge Summary    CHIEF COMPLAINT ON ADMISSION  Chief Complaint   Patient presents with   • Weakness     generalized x1 hour       Reason for Admission  EMS 02     Admission Date  6/21/2021    CODE STATUS  Full Code    HPI & HOSPITAL COURSE  This is a 81 y.o. male here with lower extremity weakness and dizziness. Labs showed elevated BNP of 1272. EKG did not show acute ischemic changes .VSS on room air. H/H was at his baseline. Cardiology was consulted given he had a past medical history of severe aortic stenosis. Echocardiogram was done and showed normal EF and TAVR functioning normally. Cardiology recommended continuing home medications. No further cardiac intervention warranted. PT/OT evaluated and cleared him for discharge home.     Therefore, he is discharged in fair and stable condition to home with organized home healthcare and close outpatient follow-up.    The patient recovered much more quickly than anticipated on admission.    Discharge Date  6/23/2021    FOLLOW UP ITEMS POST DISCHARGE  N/A    DISCHARGE DIAGNOSES  Principal Problem:    Weakness of both lower extremities POA: Yes  Active Problems:    Chronic venous stasis dermatitis of both lower extremities POA: Yes    Atrial flutter (HCC) POA: Yes    Pulmonary hypertension (HCC) POA: Yes    Hyperlipidemia POA: Yes    Essential hypertension POA: Yes    Aortic stenosis, severe POA: Yes    Pacemaker POA: Yes    CAD (coronary artery disease) POA: Yes    Type 2 diabetes mellitus (HCC) POA: Yes    Hypothyroidism POA: Yes    Debility POA: Unknown    Chronic right-sided heart failure (HCC) POA: Unknown  Resolved Problems:    * No resolved hospital problems. *      FOLLOW UP  No future appointments.  No follow-up provider specified.    MEDICATIONS ON DISCHARGE     Medication List      START taking these medications      Instructions   methocarbamol 500 MG Tabs  Commonly known as: ROBAXIN   Take 1 tablet by mouth 4 times a day.  Dose: 500 mg        CONTINUE  taking these medications      Instructions   allopurinol 100 MG Tabs  Commonly known as: ZYLOPRIM   Take 100 mg by mouth every day.  Dose: 100 mg     atenolol 25 MG Tabs  Commonly known as: TENORMIN   Take 12.5 mg by mouth every day.  Dose: 12.5 mg     atorvastatin 40 MG Tabs  Commonly known as: LIPITOR   Take 40 mg by mouth every evening.  Dose: 40 mg     bumetanide 1 MG Tabs  Commonly known as: BUMEX   Take 1 mg by mouth every day.  Dose: 1 mg     diclofenac sodium 1 % Gel   Apply 1 Application topically as needed (Apply's on both knees and back for pain).  Dose: 1 Application     Eliquis 5mg Tabs  Generic drug: apixaban   Take 5 mg by mouth 2 Times a Day.  Dose: 5 mg     gabapentin 100 MG Caps  Commonly known as: NEURONTIN   Take 100 mg by mouth 3 times a day.  Dose: 100 mg     levothyroxine 50 MCG Tabs  Commonly known as: SYNTHROID   Take 50 mcg by mouth Every morning on an empty stomach.  Dose: 50 mcg     lidocaine 5 % Ptch  Commonly known as: LIDODERM   Place 1 Patch on the skin as needed (Apply's on both knees and back for pain).  Dose: 1 Patch     Melatonin 3 MG Caps   Take 6-9 mg by mouth at bedtime.  Dose: 6-9 mg     midodrine 2.5 MG Tabs  Commonly known as: PROAMATINE   Take 2.5 mg by mouth 2 times a day.  Dose: 2.5 mg     * polyethylene glycol/lytes 17 g Pack  Commonly known as: MIRALAX   Take 17 g by mouth every day.  Dose: 17 g     * polyethylene glycol/lytes 17 g Pack  Commonly known as: MIRALAX   Take 17 g by mouth every day.  Dose: 17 g     potassium chloride SA 20 MEQ Tbcr  Commonly known as: Kdur   Take 20 mEq by mouth every day.  Dose: 20 mEq     Protonix 20 MG tablet  Generic drug: pantoprazole   Take 20 mg by mouth every day.  Dose: 20 mg     sennosides 8.6 MG Tabs  Commonly known as: SENOKOT   Take 8.6 mg by mouth 2 times a day. Indications: Constipation  Dose: 8.6 mg     vitamin D 1000 Unit (25 mcg) Tabs  Commonly known as: cholecalciferol   Take 1,000 Units by mouth every day.  Dose: 1,000  Units         * This list has 2 medication(s) that are the same as other medications prescribed for you. Read the directions carefully, and ask your doctor or other care provider to review them with you.            STOP taking these medications    acetaminophen 500 MG Tabs  Commonly known as: TYLENOL     traZODone 100 MG Tabs  Commonly known as: DESYREL            Allergies  Allergies   Allergen Reactions   • Codeine Nausea       DIET  Orders Placed This Encounter   Procedures   • Diet Order Diet: Cardiac     Standing Status:   Standing     Number of Occurrences:   1     Order Specific Question:   Diet:     Answer:   Cardiac [6]       ACTIVITY  As tolerated.  Weight bearing as tolerated    CONSULTATIONS  Cardiology     PROCEDURES  N/A    LABORATORY  Lab Results   Component Value Date    SODIUM 140 06/23/2021    POTASSIUM 4.6 06/23/2021    CHLORIDE 108 06/23/2021    CO2 23 06/23/2021    GLUCOSE 108 (H) 06/23/2021    BUN 20 06/23/2021    CREATININE 0.99 06/23/2021        Lab Results   Component Value Date    WBC 9.6 06/23/2021    HEMOGLOBIN 11.1 (L) 06/23/2021    HEMATOCRIT 36.0 (L) 06/23/2021    PLATELETCT 235 06/23/2021

## 2021-06-23 NOTE — DISCHARGE PLANNING
Anticipated Discharge Disposition: TBD    Action: RN CM met with the patient at bedside to discuss HH choice.  Patient stated that he doesn't want to go home, he does not feel safe since he lives alone.  Patient's only remaining family are nieces and nephews that live in [place] and [place].  RN CM updated patient's emergency contacts.  Patient's PCP is Yokasta Morillo NP.    Patient's home address:    [address] [place] [id number]    Barriers to Discharge: HH set up, support at home     Plan: Case coordination to continue to follow up with medical team to discuss discharge barriers.

## 2021-06-23 NOTE — PROGRESS NOTES
"       [facility] Cardiology Follow-up Note    Date of Service:    6/23/2021      Name:   Kedar Woods   YOB: 1940  Age:   81 y.o.  male   MRN:   [id number]      Chief Complaint: AS, AFIB    HPI:  Mr Woods Is an 80 y/o fellow with PMH including Diabetes mellitus type II, Essential hypertension, Dyslipidemia, hypothyroidism, chronic, rate controlled atrial flutter on Eliquis who presented to [facility] on 6/21/21 with generalized weakness.      He is known to have severe AS with MG 50, Vmax 4.4 on echo in 2019.  Patient states he had a valve procedure done in [place], [place] a couple years ago, but does not remember the details and we do not have records.    On presentation patient noted \"blacking out spells\" at home lately.    Interim Events:  He is a rather poor historian and it's difficult to ascertain his situation from his personal history.   [facility] He states [facility] have [facility] records.  We can try to request these.    His echo is still pending, this should enlighten us as well.    Also notes his legs were so swollen they were leaking at one point.  He is afraid to go home at this point.    ROS  Constitutional:  denies fatigue.  + generalized weakness, + syncope.  Respiratory:  Denies shortness of breath, no cough.  Cardiovascular:  Denies chest pain.  improved lower extremity edema.  Denies orthopnea or PND.  : denies  polyuria, no dysuria.  GI:  Denies nausea/vomiting.  No abdominal distention.  Neuro:  Denies dizziness, syncope.  Hem/lymph: Denies easy bleeding/bruising.      All other review of systems reviewed and negative.    Past medical, surgical, social, and family history reviewed and unchanged from admission except as noted in HPI.    Medications: Reviewed in MAR  Current Facility-Administered Medications   Medication Dose Frequency Provider Last Rate Last Admin   • miconazole 2%-zinc oxide " (Enio) topical cream   BID Gracierimma Ugaldeson, A.P.R.N.   Given at 06/23/21 0614   • methocarbamol (ROBAXIN) tablet 500 mg  500 mg 4X/DAY Gracierimma Ugaldeson, A.P.R.N.   500 mg at 06/22/21 2120   • allopurinol (ZYLOPRIM) tablet 100 mg  100 mg DAILY Michael Rascon M.D.   100 mg at 06/23/21 0607   • apixaban (ELIQUIS) tablet 5 mg  5 mg BID Michael Rascon M.D.   5 mg at 06/23/21 0606   • atenolol (TENORMIN) tablet 12.5 mg  12.5 mg DAILY Michael Rascon M.D.       • atorvastatin (LIPITOR) tablet 40 mg  40 mg Q EVENING Michael Rascon M.D.   40 mg at 06/22/21 1656   • bumetanide (BUMEX) tablet 1 mg  1 mg DAILY Michael Rascon M.D.       • gabapentin (NEURONTIN) capsule 100 mg  100 mg TID Michael Rascon M.D.   100 mg at 06/23/21 0604   • levothyroxine (SYNTHROID) tablet 50 mcg  50 mcg AM ES Michael Rascon M.D.   50 mcg at 06/23/21 0605   • lidocaine (LIDODERM) 5 % 1 Patch  1 Patch PRN Michael Rascon M.D.       • melatonin tablet 5-10 mg  5-10 mg QHS Michael Rascon M.D.   10 mg at 06/22/21 2120   • midodrine (PROAMATINE) tablet 2.5 mg  2.5 mg BID Michael Rascon M.D.   2.5 mg at 06/23/21 0608   • omeprazole (PRILOSEC) capsule 20 mg  20 mg DAILY Michael Rascon M.D.   20 mg at 06/23/21 0605   • polyethylene glycol/lytes (MIRALAX) PACKET 1 Packet  1 Packet DAILY Michael Rascon M.D.   1 Packet at 06/23/21 0614   • potassium chloride SA (Kdur) tablet 20 mEq  20 mEq DAILY Michael Rascon M.D.   20 mEq at 06/23/21 0607   • traZODone (DESYREL) tablet 200 mg  200 mg Nightly Michael Rascon M.D.   200 mg at 06/22/21 2120   • vitamin D (cholecalciferol) tablet 1,000 Units  1,000 Units DAILY Michael Rascon M.D.   1,000 Units at 06/23/21 0606   • senna-docusate (PERICOLACE or SENOKOT S) 8.6-50 MG per tablet 2 tablet  2 tablet BID Michael Rascon M.D.   2 tablet at 06/23/21 0605    And   • polyethylene glycol/lytes (MIRALAX) PACKET 1 Packet  1 Packet QDAY PRN Michael Rascon M.D.        And   • magnesium hydroxide (MILK OF  "MAGNESIA) suspension 30 mL  30 mL QDAY PRN Michael Rascon M.D.        And   • bisacodyl (DULCOLAX) suppository 10 mg  10 mg QDAY PRN Michael Rascon M.D.       • glucose 4 g chewable tablet 16 g  16 g Q15 MIN PRN Michael Rascon M.D.        And   • dextrose 50% (D50W) injection 50 mL  50 mL Q15 MIN PRN Michael Rascon M.D.       • acetaminophen (Tylenol) tablet 650 mg  650 mg Q4HRS PRN Michael Rascon M.D.   650 mg at 06/23/21 0607   Last reviewed on 6/22/2021  2:29 AM by Ashley Maya R.N.    Allergies   Allergen Reactions   • Codeine Nausea       Physical Exam  Body mass index is 35.98 kg/m². /59   Pulse 61   Temp 36.7 °C (98 °F) (Temporal)   Resp 19   Ht 1.803 m (5' 11\")   Wt 117 kg (258 lb)   SpO2 93%    Vitals:    06/22/21 2000 06/23/21 0000 06/23/21 0400 06/23/21 0603   BP: 113/55 106/54 108/59 108/59   Pulse: 64 61 63 61   Resp: 19 18 19 19   Temp: 36.3 °C (97.3 °F) 36.6 °C (97.8 °F) 36.7 °C (98 °F)    TempSrc: Temporal Temporal Temporal    SpO2: 94% 93% 93%    Weight:       Height:        Oxygen Therapy:  Pulse Oximetry: 93 %, O2 (LPM): 0, O2 Delivery Device: None - Room Air    General: no apparent distress, unkempt.  Neck: no significant JVD   Lungs: normal effort,  without crackles, no wheezing or rhonchi  Heart: normal rate, regular rhythm, 2/6 sysotlic murmur at the USB, no rub  EXT: no lower extremity edema  Abdomen: soft, non tender, non distended  Neurological: No focal deficits, no facial asymmetry.  Normal speech  Psychiatric: Appropriate affect, alert and oriented x 3  Skin: Warm extremities, no rash    Labs (personally reviewed):     Lab Results   Component Value Date/Time    SODIUM 140 06/23/2021 03:26 AM    POTASSIUM 4.6 06/23/2021 03:26 AM    CHLORIDE 108 06/23/2021 03:26 AM    CO2 23 06/23/2021 03:26 AM    GLUCOSE 108 (H) 06/23/2021 03:26 AM    BUN 20 06/23/2021 03:26 AM    CREATININE 0.99 06/23/2021 03:26 AM     Lab Results   Component Value Date/Time    ALKPHOSPHAT 92 " 2021 03:26 AM    ASTSGOT 17 2021 03:26 AM    ALTSGPT 6 2021 03:26 AM    TBILIRUBIN 0.3 2021 03:26 AM      Lab Results   Component Value Date/Time    CHOLSTRLTOT 159 2019 02:50 AM     (H) 2019 02:50 AM    HDL 25 (A) 2019 02:50 AM    TRIGLYCERIDE 139 2019 02:50 AM         Cardiac Imaging and Procedures Review:      Personal interpretation of EKG dated 2021:  Atrial flutter with controlled HR around 60, Chronic LBBB with qrsd 146    Personal Telemetry Review:    Echo 10/25/2019:  CONCLUSIONS  No prior study is available for comparison.   Left ventricular ejection fraction is visually estimated to be 60%.  Severe aortic stenosis. Aortic valve area calculated from the   continuity equation is 0.6 cm2 Vmax is  4.4 m/s. Transvalvular   gradients are - Peak: 78  mmHg, Mean: 50  mmHg. Dimensionless index is.    17 Mild aortic insufficiency. Pressure half time is 885   msec.  Aortic valve area calculated from the continuity equation is 0.6 cm2.        Assessment and Medical Decision Makin  Hx of severe AS s/p valve intervention?  2  Recurrent syncope  3  physicial debilitation  4  Essential hypertension   5  Dyslipidemia  6  Chronic atrial flutter on chronic Eliquis  7  Diabetes mellitus type II, A1C 6.6    -   Updated echo pending for reevaluation of aortic valve   -   Continue Elqius and BB   -   [facility]     Further recommendations pending the echo results.    Mary Garcia PA-C  [facility]

## 2021-06-23 NOTE — PROGRESS NOTES
Received report from night shift RN.  Assumed care at 0700. Pt sleeping  at this time.  Call light within reach. Bed locked and in lowest position.  Non skid socks on, Belongings within  Reach.  Will continue to monitor hourly.

## 2021-06-23 NOTE — DISCHARGE PLANNING
Anticipated Discharge Disposition: Home with HH    Action: RN BELGICA spoke with the patient at bedside again.  He was still nervous about the idea of going home, but he is willing to have HH referrals sent out.  Patient provided HH choice for [facility]. [facility] and 2. [facility].  Choice form faxed to [facility].    Barriers to Discharge: HH set up     Plan: Case coordination to continue to follow up with medical team to discuss discharge barriers.

## 2021-06-23 NOTE — CARE PLAN
Problem: Knowledge Deficit - Standard  Goal: Patient and family/care givers will demonstrate understanding of plan of care, disease process/condition, diagnostic tests and medications  Outcome: Progressing     Problem: Pain - Standard  Goal: Alleviation of pain or a reduction in pain to the patient’s comfort goal  Outcome: Progressing     Problem: Skin Integrity  Goal: Skin integrity is maintained or improved  Outcome: Progressing     Problem: Fall Risk  Goal: Patient will remain free from falls  Outcome: Progressing      Shift Goals  Clinical Goals: Safety, comfort  Patient Goals: sleep  Family Goals: RAFAT    Progress made toward(s) clinical / shift goals:  Patient has fall precautions in place. Bed in low and locked position. Call light within reach. Patient verbalizes understanding to call for assistance when needed.     Patient is not progressing towards the following goals:none

## 2021-06-24 NOTE — DISCHARGE PLANNING
Referral sent per choice to [facility].  [id number]- DPA Haley called [facility], pt approved for .

## 2021-06-24 NOTE — DISCHARGE INSTRUCTIONS
Discharge instructions:    DIET: Resume home diet previous to admission    ACTIVITY: Resume previous level of activities.    DIAGNOSIS: Chronic knee pain secondary to arthritis     Return to ER if fever greater than 38 degree Celsius or 100.4 degree Fahrenheit, worsening pain, chest pain at rest or associated with exertion, worsening shortness of breath, bloody coughs, bloody bowel movement, severe unrelenting abdominal pain, focal weakness.    MIRYAM Drummond     Discharge Instructions    Discharged to home by car with friend. Discharged via wheelchair, hospital escort: Yes.  Special equipment needed: Not Applicable    Be sure to schedule a follow-up appointment with your primary care doctor or any specialists as instructed.     Discharge Plan:   Diet Plan: Discussed  Activity Level: Discussed  Confirmed Follow up Appointment: Patient to Call and Schedule Appointment  Confirmed Symptoms Management: Discussed  Medication Reconciliation Updated: Yes    I understand that a diet low in cholesterol, fat, and sodium is recommended for good health. Unless I have been given specific instructions below for another diet, I accept this instruction as my diet prescription.       Special Instructions: None    · Is patient discharged on Warfarin / Coumadin?   No     Depression / Suicide Risk    As you are discharged from this [facility] facility, it is important to learn how to keep safe from harming yourself.    Recognize the warning signs:  · Abrupt changes in personality, positive or negative- including increase in energy   · Giving away possessions  · Change in eating patterns- significant weight changes-  positive or negative  · Change in sleeping patterns- unable to sleep or sleeping all the time   · Unwillingness or inability to communicate  · Depression  · Unusual sadness, discouragement and loneliness  · Talk of wanting to die  · Neglect of personal appearance   · Rebelliousness- reckless  behavior  · Withdrawal from people/activities they love  · Confusion- inability to concentrate     If you or a loved one observes any of these behaviors or has concerns about self-harm, here's what you can do:  · Talk about it- your feelings and reasons for harming yourself  · Remove any means that you might use to hurt yourself (examples: pills, rope, extension cords, firearm)  · Get professional help from the community (Mental Health, Substance Abuse, psychological counseling)  · Do not be alone:Call your Safe Contact- someone whom you trust who will be there for you.  · Call your local CRISIS HOTLINE 973-2067 or 289-494-7646  · Call your local Children's Mobile Crisis Response Team [place] (776) 266-6342 or www.[employer]  · Call the toll free National Suicide Prevention Hotlines   · National Suicide Prevention Lifeline 553-477-FFKR (3754)  · National Hope Line Network 800-SUICIDE (405-6044)

## 2021-06-24 NOTE — DISCHARGE PLANNING
Meds-to-Beds: Discharge prescription orders listed below delivered to patient's bedside. RN Tiffani notified. Patient counseled. Patient elected to have co-payment billed to patient account.        Manuel Woods   Pax Medication Instructions JOAN:[id number]    Printed on:06/23/21 5036   Medication Information                      methocarbamol (ROBAXIN) 500 MG Tab  Take 1 tablet by mouth 4 times a day.                 Jazmyne Del Cid, PharmD

## 2021-06-24 NOTE — PROGRESS NOTES
Patient discharged. Left floor via wheelchair, accompanied by RN and friend. IV removed. VS stable. Pt denies pain. Medication delivered to bedside. All belongings taken home with pt. Pt to make own followup appointment.

## 2023-04-18 ENCOUNTER — APPOINTMENT (OUTPATIENT)
Dept: RADIOLOGY | Facility: MEDICAL CENTER | Age: 83
DRG: 871 | End: 2023-04-18
Attending: EMERGENCY MEDICINE
Payer: COMMERCIAL

## 2023-04-18 ENCOUNTER — HOSPITAL ENCOUNTER (INPATIENT)
Facility: MEDICAL CENTER | Age: 83
LOS: 14 days | DRG: 871 | End: 2023-05-02
Attending: EMERGENCY MEDICINE | Admitting: HOSPITALIST
Payer: COMMERCIAL

## 2023-04-18 DIAGNOSIS — R78.81 BACTEREMIA: ICD-10-CM

## 2023-04-18 DIAGNOSIS — S81.801D WOUND OF RIGHT LOWER EXTREMITY, SUBSEQUENT ENCOUNTER: ICD-10-CM

## 2023-04-18 DIAGNOSIS — L03.116 CELLULITIS OF LEFT LOWER EXTREMITY: ICD-10-CM

## 2023-04-18 DIAGNOSIS — I50.32 CHRONIC HEART FAILURE WITH PRESERVED EJECTION FRACTION (HCC): ICD-10-CM

## 2023-04-18 DIAGNOSIS — I48.91 ATRIAL FIBRILLATION, UNSPECIFIED TYPE (HCC): ICD-10-CM

## 2023-04-18 DIAGNOSIS — K92.2 ACUTE UPPER GI BLEED: ICD-10-CM

## 2023-04-18 DIAGNOSIS — E83.39 HYPOPHOSPHATEMIA: ICD-10-CM

## 2023-04-18 DIAGNOSIS — S81.802D WOUND OF LEFT LOWER EXTREMITY, SUBSEQUENT ENCOUNTER: ICD-10-CM

## 2023-04-18 DIAGNOSIS — R53.1 GENERALIZED WEAKNESS: ICD-10-CM

## 2023-04-18 DIAGNOSIS — E83.51 HYPOCALCEMIA: ICD-10-CM

## 2023-04-18 DIAGNOSIS — A41.9 SEPSIS WITHOUT ACUTE ORGAN DYSFUNCTION, DUE TO UNSPECIFIED ORGANISM (HCC): ICD-10-CM

## 2023-04-18 PROBLEM — E87.1 HYPONATREMIA: Status: ACTIVE | Noted: 2023-04-18

## 2023-04-18 PROBLEM — Z95.2 HISTORY OF TRANSCATHETER AORTIC VALVE REPLACEMENT (TAVR): Status: ACTIVE | Noted: 2023-04-18

## 2023-04-18 PROBLEM — G93.40 ENCEPHALOPATHY ACUTE: Status: ACTIVE | Noted: 2023-04-18

## 2023-04-18 PROBLEM — E87.6 HYPOKALEMIA: Status: ACTIVE | Noted: 2023-04-18

## 2023-04-18 LAB
ALBUMIN SERPL BCP-MCNC: 2.1 G/DL (ref 3.2–4.9)
ALBUMIN/GLOB SERPL: 0.8 G/DL
ALP SERPL-CCNC: 69 U/L (ref 30–99)
ALT SERPL-CCNC: 11 U/L (ref 2–50)
AMORPH CRY #/AREA URNS HPF: PRESENT /HPF
ANION GAP SERPL CALC-SCNC: 11 MMOL/L (ref 7–16)
APPEARANCE UR: ABNORMAL
AST SERPL-CCNC: 21 U/L (ref 12–45)
BACTERIA #/AREA URNS HPF: NEGATIVE /HPF
BASOPHILS # BLD AUTO: 0.4 % (ref 0–1.8)
BASOPHILS # BLD: 0.09 K/UL (ref 0–0.12)
BILIRUB SERPL-MCNC: 0.5 MG/DL (ref 0.1–1.5)
BILIRUB UR QL STRIP.AUTO: NEGATIVE
BUN SERPL-MCNC: 27 MG/DL (ref 8–22)
CA-I SERPL-SCNC: 1.2 MMOL/L (ref 1.1–1.3)
CALCIUM ALBUM COR SERPL-MCNC: 7.7 MG/DL (ref 8.5–10.5)
CALCIUM SERPL-MCNC: 6.2 MG/DL (ref 8.5–10.5)
CHLORIDE SERPL-SCNC: 106 MMOL/L (ref 96–112)
CO2 SERPL-SCNC: 17 MMOL/L (ref 20–33)
COLOR UR: YELLOW
CREAT SERPL-MCNC: 1.3 MG/DL (ref 0.5–1.4)
CRP SERPL HS-MCNC: 14.08 MG/DL (ref 0–0.75)
EKG IMPRESSION: NORMAL
EKG IMPRESSION: NORMAL
EOSINOPHIL # BLD AUTO: 0 K/UL (ref 0–0.51)
EOSINOPHIL NFR BLD: 0 % (ref 0–6.9)
EPI CELLS #/AREA URNS HPF: ABNORMAL /HPF
ERYTHROCYTE [DISTWIDTH] IN BLOOD BY AUTOMATED COUNT: 54.7 FL (ref 35.9–50)
FLUAV RNA SPEC QL NAA+PROBE: NEGATIVE
FLUBV RNA SPEC QL NAA+PROBE: NEGATIVE
GFR SERPLBLD CREATININE-BSD FMLA CKD-EPI: 55 ML/MIN/1.73 M 2
GLOBULIN SER CALC-MCNC: 2.7 G/DL (ref 1.9–3.5)
GLUCOSE SERPL-MCNC: 105 MG/DL (ref 65–99)
GLUCOSE UR STRIP.AUTO-MCNC: >=1000 MG/DL
GRAN CASTS #/AREA URNS LPF: ABNORMAL /LPF
HCT VFR BLD AUTO: 42.2 % (ref 42–52)
HGB BLD-MCNC: 13.7 G/DL (ref 14–18)
HYALINE CASTS #/AREA URNS LPF: ABNORMAL /LPF
IMM GRANULOCYTES # BLD AUTO: 0.28 K/UL (ref 0–0.11)
IMM GRANULOCYTES NFR BLD AUTO: 1.1 % (ref 0–0.9)
KETONES UR STRIP.AUTO-MCNC: ABNORMAL MG/DL
LACTATE SERPL-SCNC: 2.9 MMOL/L (ref 0.5–2)
LACTATE SERPL-SCNC: 3.2 MMOL/L (ref 0.5–2)
LEUKOCYTE ESTERASE UR QL STRIP.AUTO: ABNORMAL
LYMPHOCYTES # BLD AUTO: 0.69 K/UL (ref 1–4.8)
LYMPHOCYTES NFR BLD: 2.7 % (ref 22–41)
MAGNESIUM SERPL-MCNC: 1.6 MG/DL (ref 1.5–2.5)
MCH RBC QN AUTO: 28.9 PG (ref 27–33)
MCHC RBC AUTO-ENTMCNC: 32.5 G/DL (ref 33.7–35.3)
MCV RBC AUTO: 89 FL (ref 81.4–97.8)
MICRO URNS: ABNORMAL
MONOCYTES # BLD AUTO: 0.5 K/UL (ref 0–0.85)
MONOCYTES NFR BLD AUTO: 2 % (ref 0–13.4)
NEUTROPHILS # BLD AUTO: 23.77 K/UL (ref 1.82–7.42)
NEUTROPHILS NFR BLD: 93.8 % (ref 44–72)
NITRITE UR QL STRIP.AUTO: NEGATIVE
NRBC # BLD AUTO: 0 K/UL
NRBC BLD-RTO: 0 /100 WBC
NT-PROBNP SERPL IA-MCNC: 6395 PG/ML (ref 0–125)
PH UR STRIP.AUTO: 5 [PH] (ref 5–8)
PLATELET # BLD AUTO: 274 K/UL (ref 164–446)
PMV BLD AUTO: 10 FL (ref 9–12.9)
POTASSIUM SERPL-SCNC: 3.4 MMOL/L (ref 3.6–5.5)
PROCALCITONIN SERPL-MCNC: 13.3 NG/ML
PROT SERPL-MCNC: 4.8 G/DL (ref 6–8.2)
PROT UR QL STRIP: 30 MG/DL
RBC # BLD AUTO: 4.74 M/UL (ref 4.7–6.1)
RBC # URNS HPF: ABNORMAL /HPF
RBC UR QL AUTO: ABNORMAL
RSV RNA SPEC QL NAA+PROBE: NEGATIVE
SARS-COV-2 RNA RESP QL NAA+PROBE: NOTDETECTED
SODIUM SERPL-SCNC: 134 MMOL/L (ref 135–145)
SP GR UR STRIP.AUTO: 1.02
SPECIMEN SOURCE: NORMAL
TROPONIN T SERPL-MCNC: 60 NG/L (ref 6–19)
UROBILINOGEN UR STRIP.AUTO-MCNC: 0.2 MG/DL
WBC # BLD AUTO: 25.3 K/UL (ref 4.8–10.8)
WBC #/AREA URNS HPF: ABNORMAL /HPF

## 2023-04-18 PROCEDURE — 84145 PROCALCITONIN (PCT): CPT

## 2023-04-18 PROCEDURE — 700111 HCHG RX REV CODE 636 W/ 250 OVERRIDE (IP): Performed by: EMERGENCY MEDICINE

## 2023-04-18 PROCEDURE — 87040 BLOOD CULTURE FOR BACTERIA: CPT | Mod: 91

## 2023-04-18 PROCEDURE — 86140 C-REACTIVE PROTEIN: CPT

## 2023-04-18 PROCEDURE — 700105 HCHG RX REV CODE 258: Performed by: HOSPITALIST

## 2023-04-18 PROCEDURE — 85025 COMPLETE CBC W/AUTO DIFF WBC: CPT

## 2023-04-18 PROCEDURE — 80048 BASIC METABOLIC PNL TOTAL CA: CPT

## 2023-04-18 PROCEDURE — 87077 CULTURE AEROBIC IDENTIFY: CPT

## 2023-04-18 PROCEDURE — 700102 HCHG RX REV CODE 250 W/ 637 OVERRIDE(OP): Performed by: HOSPITALIST

## 2023-04-18 PROCEDURE — A9270 NON-COVERED ITEM OR SERVICE: HCPCS | Performed by: HOSPITALIST

## 2023-04-18 PROCEDURE — 700111 HCHG RX REV CODE 636 W/ 250 OVERRIDE (IP)

## 2023-04-18 PROCEDURE — 93005 ELECTROCARDIOGRAM TRACING: CPT | Performed by: HOSPITALIST

## 2023-04-18 PROCEDURE — 82330 ASSAY OF CALCIUM: CPT

## 2023-04-18 PROCEDURE — 71260 CT THORAX DX C+: CPT

## 2023-04-18 PROCEDURE — 93005 ELECTROCARDIOGRAM TRACING: CPT | Performed by: EMERGENCY MEDICINE

## 2023-04-18 PROCEDURE — 96366 THER/PROPH/DIAG IV INF ADDON: CPT

## 2023-04-18 PROCEDURE — 84484 ASSAY OF TROPONIN QUANT: CPT

## 2023-04-18 PROCEDURE — 71045 X-RAY EXAM CHEST 1 VIEW: CPT

## 2023-04-18 PROCEDURE — 99223 1ST HOSP IP/OBS HIGH 75: CPT | Performed by: HOSPITALIST

## 2023-04-18 PROCEDURE — 93005 ELECTROCARDIOGRAM TRACING: CPT

## 2023-04-18 PROCEDURE — 70450 CT HEAD/BRAIN W/O DYE: CPT

## 2023-04-18 PROCEDURE — 87181 SC STD AGAR DILUTION PER AGT: CPT

## 2023-04-18 PROCEDURE — 36415 COLL VENOUS BLD VENIPUNCTURE: CPT

## 2023-04-18 PROCEDURE — 80053 COMPREHEN METABOLIC PANEL: CPT

## 2023-04-18 PROCEDURE — 770020 HCHG ROOM/CARE - TELE (206)

## 2023-04-18 PROCEDURE — C9803 HOPD COVID-19 SPEC COLLECT: HCPCS | Performed by: EMERGENCY MEDICINE

## 2023-04-18 PROCEDURE — 99285 EMERGENCY DEPT VISIT HI MDM: CPT

## 2023-04-18 PROCEDURE — 83735 ASSAY OF MAGNESIUM: CPT

## 2023-04-18 PROCEDURE — 81001 URINALYSIS AUTO W/SCOPE: CPT

## 2023-04-18 PROCEDURE — 700105 HCHG RX REV CODE 258

## 2023-04-18 PROCEDURE — 700101 HCHG RX REV CODE 250: Performed by: HOSPITALIST

## 2023-04-18 PROCEDURE — 700111 HCHG RX REV CODE 636 W/ 250 OVERRIDE (IP): Performed by: HOSPITALIST

## 2023-04-18 PROCEDURE — 83880 ASSAY OF NATRIURETIC PEPTIDE: CPT

## 2023-04-18 PROCEDURE — 83605 ASSAY OF LACTIC ACID: CPT

## 2023-04-18 PROCEDURE — 93010 ELECTROCARDIOGRAM REPORT: CPT | Performed by: STUDENT IN AN ORGANIZED HEALTH CARE EDUCATION/TRAINING PROGRAM

## 2023-04-18 PROCEDURE — 0241U HCHG SARS-COV-2 COVID-19 NFCT DS RESP RNA 4 TRGT MIC: CPT

## 2023-04-18 PROCEDURE — 700117 HCHG RX CONTRAST REV CODE 255: Performed by: EMERGENCY MEDICINE

## 2023-04-18 RX ORDER — SODIUM CHLORIDE 9 MG/ML
1000 INJECTION, SOLUTION INTRAVENOUS ONCE
Status: COMPLETED | OUTPATIENT
Start: 2023-04-18 | End: 2023-04-18

## 2023-04-18 RX ORDER — ACETAMINOPHEN 325 MG/1
650 TABLET ORAL 3 TIMES DAILY PRN
COMMUNITY

## 2023-04-18 RX ORDER — TROSPIUM CHLORIDE 20 MG/1
20 TABLET, FILM COATED ORAL DAILY
COMMUNITY

## 2023-04-18 RX ORDER — BISACODYL 10 MG
10 SUPPOSITORY, RECTAL RECTAL
Status: DISCONTINUED | OUTPATIENT
Start: 2023-04-18 | End: 2023-05-02 | Stop reason: HOSPADM

## 2023-04-18 RX ORDER — ROSUVASTATIN CALCIUM 20 MG/1
40 TABLET, COATED ORAL DAILY
Status: DISCONTINUED | OUTPATIENT
Start: 2023-04-19 | End: 2023-05-02 | Stop reason: HOSPADM

## 2023-04-18 RX ORDER — ONDANSETRON 4 MG/1
4 TABLET, ORALLY DISINTEGRATING ORAL EVERY 4 HOURS PRN
Status: DISCONTINUED | OUTPATIENT
Start: 2023-04-18 | End: 2023-05-02

## 2023-04-18 RX ORDER — CEPHALEXIN 500 MG/1
1000 CAPSULE ORAL 3 TIMES DAILY
Status: ON HOLD | COMMUNITY
Start: 2023-03-29 | End: 2023-04-26

## 2023-04-18 RX ORDER — LORAZEPAM 2 MG/ML
0.5 INJECTION INTRAMUSCULAR ONCE
Status: COMPLETED | OUTPATIENT
Start: 2023-04-18 | End: 2023-04-18

## 2023-04-18 RX ORDER — SACUBITRIL AND VALSARTAN 24; 26 MG/1; MG/1
0.5 TABLET, FILM COATED ORAL 2 TIMES DAILY
COMMUNITY

## 2023-04-18 RX ORDER — ROSUVASTATIN CALCIUM 40 MG/1
40 TABLET, COATED ORAL DAILY
COMMUNITY

## 2023-04-18 RX ORDER — FLUCONAZOLE 200 MG/1
200 TABLET ORAL
Status: ON HOLD | COMMUNITY
End: 2023-04-26

## 2023-04-18 RX ORDER — AMOXICILLIN 250 MG
2 CAPSULE ORAL 2 TIMES DAILY
Status: DISCONTINUED | OUTPATIENT
Start: 2023-04-18 | End: 2023-05-02 | Stop reason: HOSPADM

## 2023-04-18 RX ORDER — FAMOTIDINE 20 MG/1
20 TABLET, FILM COATED ORAL NIGHTLY
Status: ON HOLD | COMMUNITY
End: 2023-05-02

## 2023-04-18 RX ORDER — LANOLIN ALCOHOL/MO/W.PET/CERES
420 CREAM (GRAM) TOPICAL DAILY
Status: ON HOLD | COMMUNITY
End: 2023-04-26

## 2023-04-18 RX ORDER — EMPAGLIFLOZIN 25 MG/1
12.5 TABLET, FILM COATED ORAL DAILY
COMMUNITY

## 2023-04-18 RX ORDER — ACETAMINOPHEN 325 MG/1
650 TABLET ORAL EVERY 6 HOURS PRN
Status: DISCONTINUED | OUTPATIENT
Start: 2023-04-18 | End: 2023-05-02 | Stop reason: HOSPADM

## 2023-04-18 RX ORDER — CALCIUM CARBONATE 500 MG/1
500 TABLET, CHEWABLE ORAL
Status: DISCONTINUED | OUTPATIENT
Start: 2023-04-18 | End: 2023-04-27

## 2023-04-18 RX ORDER — SODIUM CHLORIDE 9 MG/ML
500 INJECTION, SOLUTION INTRAVENOUS ONCE
Status: COMPLETED | OUTPATIENT
Start: 2023-04-19 | End: 2023-04-19

## 2023-04-18 RX ORDER — METOPROLOL SUCCINATE 25 MG/1
12.5 TABLET, EXTENDED RELEASE ORAL DAILY
COMMUNITY

## 2023-04-18 RX ORDER — CALCIUM GLUCONATE 20 MG/ML
2 INJECTION, SOLUTION INTRAVENOUS ONCE
Status: COMPLETED | OUTPATIENT
Start: 2023-04-18 | End: 2023-04-18

## 2023-04-18 RX ORDER — SILDENAFIL 100 MG/1
50 TABLET, FILM COATED ORAL
COMMUNITY

## 2023-04-18 RX ORDER — POLYETHYLENE GLYCOL 3350 17 G/17G
1 POWDER, FOR SOLUTION ORAL
Status: DISCONTINUED | OUTPATIENT
Start: 2023-04-18 | End: 2023-05-02 | Stop reason: HOSPADM

## 2023-04-18 RX ORDER — SODIUM CHLORIDE AND POTASSIUM CHLORIDE 150; 900 MG/100ML; MG/100ML
INJECTION, SOLUTION INTRAVENOUS CONTINUOUS
Status: DISCONTINUED | OUTPATIENT
Start: 2023-04-18 | End: 2023-04-19

## 2023-04-18 RX ORDER — ONDANSETRON 2 MG/ML
4 INJECTION INTRAMUSCULAR; INTRAVENOUS EVERY 4 HOURS PRN
Status: DISCONTINUED | OUTPATIENT
Start: 2023-04-18 | End: 2023-05-02

## 2023-04-18 RX ORDER — SODIUM CHLORIDE, SODIUM LACTATE, POTASSIUM CHLORIDE, AND CALCIUM CHLORIDE .6; .31; .03; .02 G/100ML; G/100ML; G/100ML; G/100ML
1000 INJECTION, SOLUTION INTRAVENOUS
Status: DISCONTINUED | OUTPATIENT
Start: 2023-04-18 | End: 2023-05-02 | Stop reason: HOSPADM

## 2023-04-18 RX ORDER — METOPROLOL SUCCINATE 25 MG/1
12.5 TABLET, EXTENDED RELEASE ORAL DAILY
Status: DISCONTINUED | OUTPATIENT
Start: 2023-04-18 | End: 2023-04-27

## 2023-04-18 RX ADMIN — IOHEXOL 100 ML: 350 INJECTION, SOLUTION INTRAVENOUS at 17:21

## 2023-04-18 RX ADMIN — CALCIUM CARBONATE 500 MG: 500 TABLET, CHEWABLE ORAL at 17:39

## 2023-04-18 RX ADMIN — CALCIUM GLUCONATE 2 G: 20 INJECTION, SOLUTION INTRAVENOUS at 17:58

## 2023-04-18 RX ADMIN — POTASSIUM CHLORIDE AND SODIUM CHLORIDE: 900; 150 INJECTION, SOLUTION INTRAVENOUS at 17:37

## 2023-04-18 RX ADMIN — AMPICILLIN AND SULBACTAM 3 G: 1; 2 INJECTION, POWDER, FOR SOLUTION INTRAMUSCULAR; INTRAVENOUS at 16:18

## 2023-04-18 RX ADMIN — ACETAMINOPHEN 650 MG: 325 TABLET, FILM COATED ORAL at 17:42

## 2023-04-18 RX ADMIN — SODIUM CHLORIDE 1000 ML: 9 INJECTION, SOLUTION INTRAVENOUS at 22:04

## 2023-04-18 RX ADMIN — VANCOMYCIN HYDROCHLORIDE 2750 MG: 500 INJECTION, POWDER, LYOPHILIZED, FOR SOLUTION INTRAVENOUS at 17:46

## 2023-04-18 RX ADMIN — LORAZEPAM 0.5 MG: 2 INJECTION INTRAMUSCULAR; INTRAVENOUS at 22:13

## 2023-04-18 RX ADMIN — METOPROLOL SUCCINATE 12.5 MG: 25 TABLET, EXTENDED RELEASE ORAL at 17:39

## 2023-04-18 RX ADMIN — APIXABAN 5 MG: 5 TABLET, FILM COATED ORAL at 17:39

## 2023-04-18 RX ADMIN — ONDANSETRON HYDROCHLORIDE 4 MG: 2 SOLUTION INTRAMUSCULAR; INTRAVENOUS at 17:35

## 2023-04-18 ASSESSMENT — CHA2DS2 SCORE
DIABETES: YES
HYPERTENSION: YES
VASCULAR DISEASE: NO
PRIOR STROKE OR TIA OR THROMBOEMBOLISM: NO
AGE 75 OR GREATER: YES
AGE 65 TO 74: NO
SEX: FEMALE
CHA2DS2 VASC SCORE: 6
CHF OR LEFT VENTRICULAR DYSFUNCTION: YES

## 2023-04-18 ASSESSMENT — LIFESTYLE VARIABLES
DOES PATIENT WANT TO STOP DRINKING: CANNOT ASSESS
ON A TYPICAL DAY WHEN YOU DRINK ALCOHOL HOW MANY DRINKS DO YOU HAVE: 0
HAVE YOU EVER FELT YOU SHOULD CUT DOWN ON YOUR DRINKING: NO
HAVE PEOPLE ANNOYED YOU BY CRITICIZING YOUR DRINKING: NO
AVERAGE NUMBER OF DAYS PER WEEK YOU HAVE A DRINK CONTAINING ALCOHOL: 0
EVER FELT BAD OR GUILTY ABOUT YOUR DRINKING: NO
CONSUMPTION TOTAL: NEGATIVE
TOTAL SCORE: 0
EVER HAD A DRINK FIRST THING IN THE MORNING TO STEADY YOUR NERVES TO GET RID OF A HANGOVER: NO
TOTAL SCORE: 0
HOW MANY TIMES IN THE PAST YEAR HAVE YOU HAD 5 OR MORE DRINKS IN A DAY: 0
TOTAL SCORE: 0
ALCOHOL_USE: NO

## 2023-04-18 ASSESSMENT — COGNITIVE AND FUNCTIONAL STATUS - GENERAL
MOVING FROM LYING ON BACK TO SITTING ON SIDE OF FLAT BED: A LOT
MOBILITY SCORE: 10
TOILETING: A LOT
TURNING FROM BACK TO SIDE WHILE IN FLAT BAD: UNABLE
MOVING TO AND FROM BED TO CHAIR: A LOT
EATING MEALS: A LITTLE
SUGGESTED CMS G CODE MODIFIER MOBILITY: CL
STANDING UP FROM CHAIR USING ARMS: TOTAL
WALKING IN HOSPITAL ROOM: A LOT
HELP NEEDED FOR BATHING: A LOT
DAILY ACTIVITIY SCORE: 14
PERSONAL GROOMING: A LITTLE
CLIMB 3 TO 5 STEPS WITH RAILING: A LOT
SUGGESTED CMS G CODE MODIFIER DAILY ACTIVITY: CK
DRESSING REGULAR UPPER BODY CLOTHING: A LOT
DRESSING REGULAR LOWER BODY CLOTHING: A LOT

## 2023-04-18 ASSESSMENT — PAIN DESCRIPTION - PAIN TYPE: TYPE: ACUTE PAIN

## 2023-04-18 ASSESSMENT — FIBROSIS 4 INDEX
FIB4 SCORE: 2.45
FIB4 SCORE: 1.92

## 2023-04-18 NOTE — ED PROVIDER NOTES
ED Provider Note    CHIEF COMPLAINT  Chief Complaint   Patient presents with    Weakness     EXTERNAL RECORDS REVIEWED  Patient was last admitted here in June 2021 for generalized weakness. He has a history of severe aortic stenosis with TAVR. His last echocardiogram was during this admission, showing normal functioning tavr value and normal EF. [facility], so no other [facility] are available.       HPI/ROS  LIMITATION TO HISTORY   Select: : None  OUTSIDE HISTORIAN(S):  None    Kedar Woods is a 83 y.o. male who presents to the Emergency Department for evaluation of malaise onset three days. Over the course of these three days, patient has not eaten food because he has no appetite. Additionally, patient reports of more fatigue and weakness than normal during these few days; for the first time a few days ago, he was unable to get out of a chair without help. He admits to associated symptoms of nausea, but denies fever, congestion, vomiting, chest pain, shortness of breath, new leg swelling, and abdominal pain. No alleviating factors were reported. Patient has been compliant with his medications. Patient has chronic lower extremity wound and leg dressings are changed everyday by his home health nurse.  He states that these appear improved.       PAST MEDICAL HISTORY  Past Medical History:   Diagnosis Date    Hypertension         SURGICAL HISTORY  None noted    FAMILY HISTORY  None noted    SOCIAL HISTORY   reports that he has never smoked. He has never used smokeless tobacco. He reports current alcohol use. He reports that he does not use drugs.    CURRENT MEDICATIONS  Current Discharge Medication List        CONTINUE these medications which have NOT CHANGED    Details   acetaminophen (TYLENOL) 325 MG Tab Take 650 mg by mouth 3 times a day as needed. Indications: Pain      ARTIFICIAL SALIVA MT Use 1 Application. in the mouth or throat as needed (Dry mouth).      aspirin EC (ECOTRIN) 81 MG Tablet  "Delayed Response Take 81 mg by mouth every day.      Empagliflozin (JARDIANCE) 25 MG Tab Take 12.5 mg by mouth every day.      famotidine (PEPCID) 20 MG Tab Take 20 mg by mouth every evening.      cephALEXin (KEFLEX) 500 MG Cap Take 1,000 mg by mouth 3 times a day. 9 day course      fluconazole (DIFLUCAN) 200 MG Tab Take 200 mg by mouth every 7 days.      Magnesium Oxide 420 (252 Mg) MG Tab Take 420 mg by mouth every day.      metoprolol SR (TOPROL XL) 25 MG TABLET SR 24 HR Take 12.5 mg by mouth every day.      miconazole (MICOTIN) 2 % Cream Apply 1 Application. topically 2 times a day.      rosuvastatin (CRESTOR) 40 MG tablet Take 40 mg by mouth every day.      sacubitril-valsartan (ENTRESTO) 24-26 MG Tab Take 0.5 Tablets by mouth 2 times a day.      sildenafil citrate (VIAGRA) 100 MG tablet Take 50 mg by mouth 1 time a day as needed for Erectile Dysfunction.      trospium (SANCTURA) 20 MG Tab Take 20 mg by mouth every day.      Melatonin 3 MG Cap Take 12 mg by mouth at bedtime.      sennosides (SENOKOT) 8.6 MG Tab Take 8.6 mg by mouth 2 times a day as needed. Indications: Constipation      potassium chloride SA (KDUR) 20 MEQ Tab CR Take 20 mEq by mouth every day.      gabapentin (NEURONTIN) 300 MG Cap Take 600 mg by mouth 3 times a day.      polyethylene glycol/lytes (MIRALAX) 17 g Pack Take 17 g by mouth every 48 hours.      diclofenac sodium 1 % Gel Apply 4 g topically 4 times a day as needed (Apply's on both knees and back for pain). Indications: Joint Damage causing Pain and Loss of Function      bumetanide (BUMEX) 0.5 MG Tab Take 0.5 mg by mouth every day.      apixaban (ELIQUIS) 5mg Tab Take 5 mg by mouth 2 Times a Day.             ALLERGIES  Codeine    PHYSICAL EXAM  /52   Pulse 90   Temp 37.2 °C (98.9 °F) (Temporal)   Resp 18   Ht 1.854 m (6' 1\")   Wt 113 kg (250 lb)   SpO2 92%        Constitutional: Chronically ill appearing. Alert in no apparent distress.  HENT: Normocephalic, Atraumatic. " Bilateral external ears normal. Nose normal.  Moist mucous membranes.  Oropharynx clear.  Eyes: Pupils are equal and reactive. Conjunctiva normal.   Neck: Supple, full range of motion  Heart: Tachycardic rate and irregular rhythm.  No murmurs.    Lungs: No respiratory distress, normal work of breathing. Lungs clear to auscultation bilaterally.  Abdomen Soft, no distention.  No tenderness to palpation.  Musculoskeletal: Atraumatic. No obvious deformities noted.  Bilateral lower extremity edema with compression wraps in place.   Skin: Warm, Dry.  Chronic skin breakdown noted to bilateral lower extremities.  Erythema noted left>right.  Neurologic: Alert and oriented x2 with some confusion. Moving all extremities spontaneously without focal deficits.  Psychiatric: Affect normal, Mood normal, Appears appropriate and not intoxicated.      DIAGNOSTIC STUDIES / PROCEDURES    EKG  I have independently interpreted this EKG  Results for orders placed or performed during the hospital encounter of 23   EKG   Result Value Ref Range    Report       [facility] Emergency Dept.    Test Date:  2023  Pt Name:    JAMISON ZAYAS           Department: ER  MRN:        [id number]                      Room:       TRAUMA - EXAM 1  Gender:     Male                         Technician: [id number]  :        1940                   Requested By:ER TRIAGE PROTOCOL  Order #:    [id number]                    Reading MD: Felisa Corado MD    Measurements  Intervals                                Axis  Rate:       82                           P:  DC:                                      QRS:        109  QRSD:       146                          T:          48  QT:         449  QTc:        525    Interpretive Statements  Atrial fibrillation  Nonspecific intraventricular conduction delay  Anterolateral infarct, old  No acute ST or T wave change  Compared to ECG 2021 00:56:58  No significant change from  prior  Electronically Signed On 2023 12:43:44 PDT by Felisa Corado MD     EKG   Result Value Ref Range    Report       [facility]    Test Date:  2023  Pt Name:    JAMISON ZAYAS           Department: 183  MRN:        [id number]                      Room:       T808  Gender:     Male                         Technician: [id number]  :        1940                   Requested By:YANG OLVERA  Order #:    [id number]                    Reading MD: Mirta Mcarthur MD    Measurements  Intervals                                Axis  Rate:       77                           P:  CT:                                      QRS:        103  QRSD:       143                          T:          38  QT:         475  QTc:        538    Interpretive Statements  ECTOPIC ATRIAL RHYTHM  Nonspecific intraventricular conduction delay  Anterolateral infarct, old  Electronically Signed On 2023 22:26:17 PDT by Mirta Mcarhtur MD           LABS  Labs Reviewed   CBC WITH DIFFERENTIAL - Abnormal; Notable for the following components:       Result Value    WBC 25.3 (*)     Hemoglobin 13.7 (*)     MCHC 32.5 (*)     RDW 54.7 (*)     Neutrophils-Polys 93.80 (*)     Lymphocytes 2.70 (*)     Immature Granulocytes 1.10 (*)     Neutrophils (Absolute) 23.77 (*)     Lymphs (Absolute) 0.69 (*)     Immature Granulocytes (abs) 0.28 (*)     All other components within normal limits    Narrative:     Biotin intake of greater than 5 mg per day may interfere with  troponin levels, causing false low values.   COMP METABOLIC PANEL - Abnormal; Notable for the following components:    Sodium 134 (*)     Potassium 3.4 (*)     Co2 17 (*)     Glucose 105 (*)     Bun 27 (*)     Calcium 6.2 (*)     Albumin 2.1 (*)     Total Protein 4.8 (*)     All other components within normal limits    Narrative:     Biotin intake of greater than 5 mg per day may interfere with  troponin levels, causing false low values.   TROPONIN - Abnormal; Notable for the following  components:    Troponin T 60 (*)     All other components within normal limits    Narrative:     Biotin intake of greater than 5 mg per day may interfere with  troponin levels, causing false low values.   PROBRAIN NATRIURETIC PEPTIDE, NT - Abnormal; Notable for the following components:    NT-proBNP 6395 (*)     All other components within normal limits    Narrative:     Biotin intake of greater than 5 mg per day may interfere with  troponin levels, causing false low values.   LACTIC ACID - Abnormal; Notable for the following components:    Lactic Acid 2.9 (*)     All other components within normal limits   URINALYSIS,CULTURE IF INDICATED - Abnormal; Notable for the following components:    Character Cloudy (*)     Glucose >=1000 (*)     Ketones Trace (*)     Protein 30 (*)     Leukocyte Esterase Trace (*)     Occult Blood Small (*)     All other components within normal limits    Narrative:     Indication for culture:->Patient WITHOUT an indwelling Rosales  catheter in place with new onset of Dysuria, Frequency,  Urgency, and/or Suprapubic pain   CORRECTED CALCIUM - Abnormal; Notable for the following components:    Correct Calcium 7.7 (*)     All other components within normal limits    Narrative:     Biotin intake of greater than 5 mg per day may interfere with  troponin levels, causing false low values.   ESTIMATED GFR - Abnormal; Notable for the following components:    GFR (CKD-EPI) 55 (*)     All other components within normal limits    Narrative:     Biotin intake of greater than 5 mg per day may interfere with  troponin levels, causing false low values.   URINE MICROSCOPIC (W/UA) - Abnormal; Notable for the following components:    WBC 2-5 (*)     RBC 5-10 (*)     Granular Casts 6-10 (*)     All other components within normal limits    Narrative:     Indication for culture:->Patient WITHOUT an indwelling Rosales  catheter in place with new onset of Dysuria, Frequency,  Urgency, and/or Suprapubic pain  "  PROCALCITONIN - Abnormal; Notable for the following components:    Procalcitonin 13.30 (*)     All other components within normal limits    Narrative:     Biotin intake of greater than 5 mg per day may interfere with  troponin levels, causing false low values.   CRP QUANTITIVE (NON-CARDIAC) - Abnormal; Notable for the following components:    Stat C-Reactive Protein 14.08 (*)     All other components within normal limits    Narrative:     Biotin intake of greater than 5 mg per day may interfere with  troponin levels, causing false low values.   LACTIC ACID - Abnormal; Notable for the following components:    Lactic Acid 3.2 (*)     All other components within normal limits   MAGNESIUM    Narrative:     Biotin intake of greater than 5 mg per day may interfere with  troponin levels, causing false low values.   COV-2, FLU A/B, AND RSV BY PCR ([employer])   IONIZED CALCIUM    Narrative:     Blood in lab   BLOOD CULTURE    Narrative:     Per Hospital Policy: Only change Specimen Src: to \"Line\" if  specified by physician order.   BLOOD CULTURE    Narrative:     Per Hospital Policy: Only change Specimen Src: to \"Line\" if  specified by physician order.   S. AUREUS BY PCR, NASAL COMPLETE   LACTIC ACID         RADIOLOGY  I have independently interpreted the diagnostic imaging associated with this visit and am waiting the final reading from the radiologist.   My preliminary interpretation is a follows: no intracranial hemorrhage  Radiologist interpretation:   CT-CHEST,ABDOMEN,PELVIS WITH   Final Result      1.  No acute inflammatory process in the chest, abdomen or pelvis.   2.  Multiple hypodense hepatic regions measuring up to 7.9 cm. They are indeterminate. Areas of regional hepatic steatosis and active or treated metastases are in the differential. Correlate with history of cancer, prior studies and consider further    evaluation with contrast-enhanced MRI, liver protocol.   3.  Cholelithiasis.   4.  Nonobstructing right " nephrolithiasis.   5.  Moderate amount of stool in the distal colon and rectum.   6.  Mildly enlarged left inguinal lymph node, statistically likely reactive. No other enlarged nodes detected.         CT-HEAD W/O   Final Result      1.  Cerebral atrophy.      2.  White matter lucencies most consistent with small vessel ischemic change versus demyelination or gliosis.      3.  Otherwise, Head CT without contrast with no acute findings. No evidence of acute cerebral infarction, hemorrhage or mass lesion.         DX-CHEST-PORTABLE (1 VIEW)   Final Result      No acute cardiac or pulmonary abnormalities are identified.            COURSE & MEDICAL DECISION MAKING    12:39 PM  -  Patient was evaluated at bedside. I ordered for DX-chest, Urine microscopic, Urinalysis, COVID swab, lactic acid, CBC with diff, CMP, Troponin, magnesium, corrected calcium, and EKG to evaluate patient's symptoms. I informed patient on plan of care for scans, labs, and EKG. Patient verbalizes understanding and agreement to this plan of care.     ED Observation Status? Yes; I am placing the patient in to an observation status due to a diagnostic uncertainty as well as therapeutic intensity. Patient placed in observation status at 12:39 PM, 4/18/2023.     Observation plan is as follows: Labs, scans, and EKG. Then reassess.     Upon Reevaluation, the patient's condition has: not improved; and will be escalated to hospitalization.    Patient discharged from ED Observation status at 3:34 PM (Time) 4/18/2023 (Date).     INITIAL ASSESSMENT, COURSE AND PLAN  Care Narrative:  Elderly patient with complicated medical history who presents with generalized weakness.  He is afebrile with normal vitals on arrival.  He is in atrial fibrillation with rates increasing to the low 100s at times.  No findings of ischemia.  Patient has elevation of troponin and BNP with are likely consistent with ongoing heart failure.  Chest xray without pulmonary edema or  pneumonia.  Patient has lab findings concerning for sepsis with significant leukocytosis and mildly elevated lactate.  Unclear source at this time, may be cellulitis of chronic lower extremity wounds although patient states they are improved from prior.  UA negative for infection.  Abdominal exam benign however pending CT C/A/P for further assessment of infectious process. Patient was given limited fluid (500cc) due to concern for heart failure.  Vitals remained stable.  Will cover with Unasyn for LLE cellulitis pending CT scans.    3:10 PM -  Patient was reevaluated at bedside. Discussed lab results with the patient and informed them that his white blood cell count is high. It does not seem to be a UTI or pneumonia. We will order for a CAT scan of his abdomen to further investigate the source of the location. Patient verbalizes understanding and agreement to this plan of care.  He seems somewhat confused compared to before. He can tell me his name and how old he is. But appears to be having some issues describing things. He has no other focal neurologic deficits on exam. He is taking anticoagulation for atrial fibrillation so he would not be a TPA candidate if this is a stroke. Will plan for CT of head. There is no sign of large vessel occlusion. Dr. Saleh is at bedside with me and agrees with plan. He will admit the patinet. Will plan to start Unasyn for presumed cellulitis.         ADDITIONAL PROBLEM LIST  Problem #1: Sepsis - elevated WBC, lactate, inflammatory markers, presumed due to LLE cellulitis, treated with Unasyn, vitals stable    Problem #2: Acute encephalopathy - likely due to above, no focal deficits, pending CT head    Problem #3: Hypocalcemia - repleted    Problem #4: Atrial fibrillation - rates currently controlled, continue to monitor    DISPOSITION AND DISCUSSIONS  I have discussed management of the patient with the following physicians and JAN's:      3:34 PM I discussed the patient's case and  the above findings with Dr. Saleh (Hospitalist) who accepts admission of the patient    CRITICAL CARE TIME  Upon my evaluation, this patient had a high probability of imminent or life-threatening deterioration due to sepsis with elevated lactate, hypocalcemia requiring IV repletion which required my direct attention, intervention, and personal management.     I personally provided 41 minutes of total critical care time outside of time spent on separately billable/documented procedures. Time includes: review of laboratory data, review of radiology studies, discussion with consultants, discussion with family/patient, monitoring for potential decompensation.  Interventions were performed as documented above.         DISPOSITION:  Patient will be hospitalized by Dr. Saleh in critical condition.    FINAL DIAGNOSIS  1. Generalized weakness    2. Hypocalcemia    3. Atrial fibrillation, unspecified type (HCC)    4. Sepsis without acute organ dysfunction, due to unspecified organism (HCC)        The note accurately reflects work and decisions made by me.  Felisa Corado M.D.  4/18/2023  10:45 PM     Zuri ALCANTAR (Edward), am scribing for, and in the presence of, Felisa Corado M.D..    Electronically signed by: Zuri Suárez (Edward), 4/18/2023    Felisa ALCANTAR M.D. personally performed the services described in this documentation, as scribed by Zuri Suárez in my presence, and it is both accurate and complete.

## 2023-04-18 NOTE — ED NOTES
Med rec completed per [facility]'s [facility] ([facility]  Allergies reviewed    Pt completed a 9 day course of Keflex recently     Pt states that he took his AM meds today, but he was unable to specify what meds are AM or PM meds

## 2023-04-18 NOTE — ED NOTES
Pt was incontinent of stool. Cleaned patient and new linen placed. Pt wounds undressed for md. Pt skin felt hot to touch so checked an oral temp.99.1 md aware

## 2023-04-18 NOTE — H&P
Hospital Medicine History & Physical Note    Date of Service  4/18/2023    Primary Care Physician  ROSIE Hahn.    Consultants  none    Code Status  Full Code    Chief Complaint  Chief Complaint   Patient presents with    Weakness       History of Presenting Illness  Kedar Woods is a 83 y.o. male who presented 4/18/2023 with weakness.  Mr. 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  Today paramedics were called as he was too weak to get up and the [facility] was [facility] so [facility] was [facility].  In the emergency room he was found to have a white blood cell count of 25,000 and lower extremity cellulitis.  [facility] and apparently he just finished a course of Keflex for the cellulitis.  In the emergency room urinalysis and chest x-ray are negative.  Abdominal exam is benign.  Presumptive etiology of his sepsis is cellulitis and he will be treated with IV Unasyn and vancomycin in setting emergency room he has a modest historian cephalopathic.  We do not have any friends nor family to corroborate his review of systems and history of present illness.    I discussed the plan of care with Dr. Corado.    Review of Systems  Review of Systems   Unable to perform ROS: Mental acuity     Past Medical History   has a past medical history of Hypertension.TAVR, afib  HTN    Surgical History  TAVR     Family History  family history is not on file.   Family history reviewed with patient. There is no family history that is pertinent to the chief complaint.     Social History   reports that he has never smoked. He has never used smokeless tobacco. He reports current alcohol use. He reports that he does not use drugs.    Allergies  Allergies   Allergen Reactions    Codeine Nausea       Medications  Prior to Admission  Medications   Prescriptions Last Dose Informant Patient Reported? Taking?   ARTIFICIAL SALIVA MT PRN at PRN Patient's Home Pharmacy Yes Yes   Sig: Use 1 Application. in the mouth or throat as needed (Dry mouth).   Empagliflozin (JARDIANCE) 25 MG Tab UNK at [facility] Patient's Home Pharmacy Yes Yes   Sig: Take 12.5 mg by mouth every day.   Magnesium Oxide 420 (252 Mg) MG Tab UNK at [facility] Patient's Home Pharmacy Yes Yes   Sig: Take 420 mg by mouth every day.   Melatonin 3 MG Cap 4/17/2023 at PM Patient's Home Pharmacy Yes No   Sig: Take 12 mg by mouth at bedtime.   acetaminophen (TYLENOL) 325 MG Tab PRN at PRN Patient's Home Pharmacy Yes Yes   Sig: Take 650 mg by mouth 3 times a day as needed. Indications: Pain   apixaban (ELIQUIS) 5mg Tab 4/18/2023 at AM Patient's Home Pharmacy Yes No   Sig: Take 5 mg by mouth 2 Times a Day.   aspirin EC (ECOTRIN) 81 MG Tablet Delayed Response UNK at [facility] Patient's Home Pharmacy Yes Yes   Sig: Take 81 mg by mouth every day.   bumetanide (BUMEX) 0.5 MG Tab UNK at [facility] Patient's Home Pharmacy Yes No   Sig: Take 0.5 mg by mouth every day.   cephALEXin (KEFLEX) 500 MG Cap COMPLETED at COMPLETED Patient's Home Pharmacy Yes Yes   Sig: Take 1,000 mg by mouth 3 times a day. 9 day course   diclofenac sodium 1 % Gel PRN at PRN Patient's Home Pharmacy Yes No   Sig: Apply 4 g topically 4 times a day as needed (Apply's on both knees and back for pain). Indications: Joint Damage causing Pain and Loss of Function   famotidine (PEPCID) 20 MG Tab 4/17/2023 at PM Patient's Home Pharmacy Yes Yes   Sig: Take 20 mg by mouth every evening.   fluconazole (DIFLUCAN) 200 MG Tab UNK at [facility] Patient's Home Pharmacy Yes Yes   Sig: Take 200 mg by mouth every 7 days.   gabapentin (NEURONTIN) 300 MG Cap 4/18/2023 at AM Patient's Home Pharmacy Yes No   Sig: Take 600 mg by mouth 3 times a day.   metoprolol SR (TOPROL XL) 25 MG TABLET SR 24 HR UNK at [facility] Patient's Home Pharmacy Yes Yes   Sig: Take 12.5 mg by mouth every day.    miconazole (MICOTIN) 2 % Cream 4/18/2023 at AM Patient's Home Pharmacy Yes Yes   Sig: Apply 1 Application. topically 2 times a day.   polyethylene glycol/lytes (MIRALAX) 17 g Pack UNK at [facility] Patient's Home Pharmacy Yes No   Sig: Take 17 g by mouth every 48 hours.   potassium chloride SA (KDUR) 20 MEQ Tab CR UNK at [facility] Patient's Home Pharmacy Yes No   Sig: Take 20 mEq by mouth every day.   rosuvastatin (CRESTOR) 40 MG tablet UNK at [facility] Patient's Home Pharmacy Yes Yes   Sig: Take 40 mg by mouth every day.   sacubitril-valsartan (ENTRESTO) 24-26 MG Tab 4/18/2023 at AM Patient's Home Pharmacy Yes Yes   Sig: Take 0.5 Tablets by mouth 2 times a day.   sennosides (SENOKOT) 8.6 MG Tab UNK at [facility] Patient's Home Pharmacy Yes No   Sig: Take 8.6 mg by mouth 2 times a day as needed. Indications: Constipation   sildenafil citrate (VIAGRA) 100 MG tablet UNK at [facility] Patient's Home Pharmacy Yes Yes   Sig: Take 50 mg by mouth 1 time a day as needed for Erectile Dysfunction.   trospium (SANCTURA) 20 MG Tab UNK at [facility] Patient's Home Pharmacy Yes Yes   Sig: Take 20 mg by mouth every day.      Facility-Administered Medications: None       Physical Exam  Temp:  [37.2 °C (98.9 °F)] 37.2 °C (98.9 °F)  Pulse:  [] 93  Resp:  [11-51] 33  BP: ()/() 178/127  SpO2:  [90 %-93 %] 92 %  Blood Pressure : (!) 178/127   Temperature: 37.2 °C (98.9 °F)   Pulse: 93   Respiration: (!) 33   Pulse Oximetry: 92 %       Physical Exam  Vitals and nursing note reviewed.   Constitutional:       General: He is in acute distress.      Appearance: He is ill-appearing and toxic-appearing.   HENT:      Head: Normocephalic and atraumatic.      Mouth/Throat:      Mouth: Mucous membranes are dry.   Cardiovascular:      Rate and Rhythm: Normal rate. Rhythm irregular.   Pulmonary:      Effort: Pulmonary effort is normal.      Breath sounds: Normal breath sounds.   Abdominal:      General: There is no distension.      Palpations: Abdomen is soft.       Tenderness: There is no abdominal tenderness.      Comments: Breaks in the skin under abdominal pannus bilaterally    Musculoskeletal:      Cervical back: Neck supple.      Right lower leg: Edema present.      Left lower leg: Edema present.      Comments: Pink discoloration of bilat shins and feet.   Neurological:      Comments: He is oriented to hospital, age, and self but not year        Laboratory:  Recent Labs     04/18/23  1222   WBC 25.3*   RBC 4.74   HEMOGLOBIN 13.7*   HEMATOCRIT 42.2   MCV 89.0   MCH 28.9   MCHC 32.5*   RDW 54.7*   PLATELETCT 274   MPV 10.0     Recent Labs     04/18/23  1222   SODIUM 134*   POTASSIUM 3.4*   CHLORIDE 106   CO2 17*   GLUCOSE 105*   BUN 27*   CREATININE 1.30   CALCIUM 6.2*     Recent Labs     04/18/23  1222   ALTSGPT 11   ASTSGOT 21   ALKPHOSPHAT 69   TBILIRUBIN 0.5   GLUCOSE 105*         Recent Labs     04/18/23  1222   NTPROBNP 6395*         Recent Labs     04/18/23  1222   TROPONINT 60*       Imaging:  DX-CHEST-PORTABLE (1 VIEW)   Final Result      No acute cardiac or pulmonary abnormalities are identified.      CT-CHEST,ABDOMEN,PELVIS WITH    (Results Pending)   CT-HEAD W/O    (Results Pending)           Assessment/Plan:  Justification for Admission Status  I anticipate this patient will require at least two midnights for appropriate medical management, necessitating inpatient admission because IV antibiotics and 48-72 hour blood cultures    Patient will need a Telemetry bed on MEDICAL service .  The need is secondary to sepsis.    * Sepsis (HCC)- (present on admission)  Assessment & Plan  This is Sepsis Present on admission  SIRS criteria identified on my evaluation include: Leukocytosis, with WBC greater than 12,000 WBC 25  Source is likely bilateral lower extremity cellulitis. He recently was treated with a course of Keflex thus Vancomycin will be added to IV Unasyn. Check MRSA swab. Blood cultures ordered in the ER.  Sepsis protocol initiated  Fluid resuscitation  ordered per protocol  Crystalloid Fluid Administration: Fluid resuscitation ordered though given his meds (Entresto) he likely has HFpEF and may go into volume overload with excessive fluids.   IV antibiotics as appropriate for source of sepsis IV Unasyn and Vanco.  Reassessment: I have reassessed the patient's hemodynamic status          Encephalopathy acute- (present on admission)  Assessment & Plan  Acute toxic, metabolic encephalopathy secondary to sepsis   CT head ordered.    Hypocalcemia- (present on admission)  Assessment & Plan  Adjusted calcium for albumin is low at 7.7  Check ionized calcium  2 grams calcium gluconate ordered  Start TUMS TID   Check calcium in the morning.    AF (atrial fibrillation) (HCC)- (present on admission)  Assessment & Plan  Long history of  Continue Eliquis for anticoagulation.    History of transcatheter aortic valve replacement (TAVR)- (present on admission)  Assessment & Plan  Hx of  Last echo 2021 revealed a normal EF with normally functioning TAVR    Essential hypertension- (present on admission)  Assessment & Plan  Restart home Toprol with holding parameters  He is interestingly on Entresto and Jardiance outpatient which may be due to HFpEF ([facility])    Hypokalemia- (present on admission)  Assessment & Plan  Potassium is low at 3.4  Replacement ordered    Hyponatremia- (present on admission)  Assessment & Plan  Low sodium at 134        VTE prophylaxis: therapeutic anticoagulation with Eliquis

## 2023-04-18 NOTE — ASSESSMENT & PLAN NOTE
Acute toxic, metabolic encephalopathy secondary to sepsis   CT head with no acute process    Now resolved.

## 2023-04-18 NOTE — ED NOTES
Labs drawn, sent to lab, side rails x 2, bed locked in low position.pt placed on monitors. Awaiting md sofia

## 2023-04-18 NOTE — PROGRESS NOTES
"Pharmacy Vancomycin Kinetics Note for 4/18/2023     83 y.o. male on Vancomycin day # 1       Vancomycin Indication (Two level/Trough based Dosing):  Skin/skin structure infection    Provider specified end date: 04/21/23    Active Antibiotics (From admission, onward)      Ordered     Ordering Provider       Tue Apr 18, 2023  4:21 PM    04/18/23 1621  vancomycin (VANCOCIN) 2,750 mg in  mL IVPB  (vancomycin (VANCOCIN) IV (LD + Maintenance))  ONCE         Stepan Saleh M.D.       Tue Apr 18, 2023  4:17 PM    04/18/23 1617  MD Alert...Vancomycin per Pharmacy  PHARMACY TO DOSE        Question:  Indication(s) for vancomycin?  Answer:  Skin and soft tissue infection    Stepan Saleh M.D.       Tue Apr 18, 2023  3:34 PM    04/18/23 1534  ampicillin/sulbactam (UNASYN) 3 g in  mL IVPB  EVERY 6 HOURS         Stepan Saleh M.D.            Dosing Weight: 113 kg (249 lb 1.9 oz)      Admission History: Admitted on 4/18/2023 for Sepsis ([facility]) [A41.9]  Pertinent history: 83 year old started on vancomycin and unasyn for lower extremity cellulitis    Allergies:     Codeine     Pertinent cultures to date:     Results       Procedure Component Value Units Date/Time    BLOOD CULTURE [[id number]] Collected: 04/18/23 1603    Order Status: Sent Specimen: Blood from Peripheral Updated: 04/18/23 1633    Narrative:      Per Hospital Policy: Only change Specimen Src: to \"Line\" if  specified by physician order.    BLOOD CULTURE [[id number]] Collected: 04/18/23 1601    Order Status: Sent Specimen: Blood from Peripheral Updated: 04/18/23 1622    Narrative:      Per Hospital Policy: Only change Specimen Src: to \"Line\" if  specified by physician order.    S. Aureus By PCR, Nasal Complete [[id number]]     Order Status: Sent Specimen: Nasal from Other     CoV-2, FLU A/B, and RSV by PCR (2-4 Hours CEPHEID) : Collect NP swab in VTM [[id number]] Collected: 04/18/23 1332    Order Status: Completed Specimen: Respirate Updated: 04/18/23 1429     " "Influenza virus A RNA Negative     Influenza virus B, PCR Negative     RSV, PCR Negative     SARS-CoV-2 by PCR NotDetected     Comment: [facility] providers: PLEASE REFER TO DE-ESCALATION AND RETESTING PROTOCOL  on [email or web address]    **The [employer] GeneXpert Xpress SARS-CoV-2 RT-PCR Test has been made  available for use under the Emergency Use Authorization (EUA) only.          SARS-CoV-2 Source NP Swab    URINALYSIS,CULTURE IF INDICATED [[id number]]  (Abnormal) Collected: 23 1332    Order Status: Completed Specimen: Urine Updated: 23 1415     Color Yellow     Character Cloudy     Specific Gravity 1.023     Ph 5.0     Glucose >=1000 mg/dL      Ketones Trace mg/dL      Protein 30 mg/dL      Bilirubin Negative     Urobilinogen, Urine 0.2     Nitrite Negative     Leukocyte Esterase Trace     Occult Blood Small     Micro Urine Req Microscopic    Narrative:      Indication for culture:->Patient WITHOUT an indwelling Rosales  catheter in place with new onset of Dysuria, Frequency,  Urgency, and/or Suprapubic pain            Labs:     Estimated Creatinine Clearance: 56.7 mL/min (by C-G formula based on SCr of 1.3 mg/dL).  Recent Labs     23  1222   WBC 25.3*   NEUTSPOLYS 93.80*     Recent Labs     23  1222   BUN 27*   CREATININE 1.30   ALBUMIN 2.1*     No intake or output data in the 24 hours ending 23 1652   BP (!) 178/127   Pulse 93   Temp 37.2 °C (98.9 °F) (Temporal)   Resp (!) 33   Ht 1.854 m (6' 1\")   Wt 113 kg (250 lb)   SpO2 92%  Temp (24hrs), Av.2 °C (98.9 °F), Min:37.2 °C (98.9 °F), Max:37.2 °C (98.9 °F)      List concerns for Vancomycin clearance:     Age;BUN/Scr ratio greater than 20:1;Obesity    Pharmacokinetics:     AUC kinetics:   Ke (hr ^-1): 0.0509 hr^-1  Half life: 13.62 hr  Clearance: 3.047  Estimated TDD: 1523.5  Estimated Dose: 990  Estimated interval: 15.6        A/P:     -  Vancomycin dose: vancomycin 2750 mg IV loading dose ordered on  in evening. Vancomycin to be " dosed by levels in setting of variable renal function. If urine output adequate and renal indices stable consider scheduling future doses. Estimated random ~18 hour level ordered     -  Next vancomycin level(s): 4/19 at noon     -  Comments: follow up on MRSA nasal swab result, blood culture result     Melanie Harris, PharmD

## 2023-04-18 NOTE — ED TRIAGE NOTES
[facility]- [facility] for c/o afib, weakness x 2 days, worse today, unable to get out of chair, uses fww at home.   Has bilateral LE wounds, gets daily wound care.

## 2023-04-19 PROBLEM — R78.81 BACTEREMIA: Status: ACTIVE | Noted: 2023-04-19

## 2023-04-19 PROBLEM — L03.116 CELLULITIS OF LEFT LOWER EXTREMITY: Status: ACTIVE | Noted: 2023-04-19

## 2023-04-19 LAB
ANION GAP SERPL CALC-SCNC: 14 MMOL/L (ref 7–16)
BASOPHILS # BLD AUTO: 0.2 % (ref 0–1.8)
BASOPHILS # BLD: 0.04 K/UL (ref 0–0.12)
BUN SERPL-MCNC: 36 MG/DL (ref 8–22)
CALCIUM SERPL-MCNC: 8 MG/DL (ref 8.5–10.5)
CHLORIDE SERPL-SCNC: 100 MMOL/L (ref 96–112)
CO2 SERPL-SCNC: 17 MMOL/L (ref 20–33)
CREAT SERPL-MCNC: 1.86 MG/DL (ref 0.5–1.4)
EOSINOPHIL # BLD AUTO: 0.12 K/UL (ref 0–0.51)
EOSINOPHIL NFR BLD: 0.5 % (ref 0–6.9)
ERYTHROCYTE [DISTWIDTH] IN BLOOD BY AUTOMATED COUNT: 55.8 FL (ref 35.9–50)
GFR SERPLBLD CREATININE-BSD FMLA CKD-EPI: 35 ML/MIN/1.73 M 2
GLUCOSE SERPL-MCNC: 144 MG/DL (ref 65–99)
HCT VFR BLD AUTO: 37.3 % (ref 42–52)
HGB BLD-MCNC: 11.7 G/DL (ref 14–18)
IMM GRANULOCYTES # BLD AUTO: 0.25 K/UL (ref 0–0.11)
IMM GRANULOCYTES NFR BLD AUTO: 1.1 % (ref 0–0.9)
LACTATE SERPL-SCNC: 1.2 MMOL/L (ref 0.5–2)
LACTATE SERPL-SCNC: 1.2 MMOL/L (ref 0.5–2)
LACTATE SERPL-SCNC: 1.3 MMOL/L (ref 0.5–2)
LACTATE SERPL-SCNC: 1.7 MMOL/L (ref 0.5–2)
LIPASE SERPL-CCNC: 10 U/L (ref 11–82)
LYMPHOCYTES # BLD AUTO: 0.73 K/UL (ref 1–4.8)
LYMPHOCYTES NFR BLD: 3.2 % (ref 22–41)
MCH RBC QN AUTO: 28.1 PG (ref 27–33)
MCHC RBC AUTO-ENTMCNC: 31.4 G/DL (ref 33.7–35.3)
MCV RBC AUTO: 89.4 FL (ref 81.4–97.8)
MONOCYTES # BLD AUTO: 0.66 K/UL (ref 0–0.85)
MONOCYTES NFR BLD AUTO: 2.9 % (ref 0–13.4)
NEUTROPHILS # BLD AUTO: 20.95 K/UL (ref 1.82–7.42)
NEUTROPHILS NFR BLD: 92.1 % (ref 44–72)
NRBC # BLD AUTO: 0 K/UL
NRBC BLD-RTO: 0 /100 WBC
PLATELET # BLD AUTO: 234 K/UL (ref 164–446)
PMV BLD AUTO: 9.9 FL (ref 9–12.9)
POTASSIUM SERPL-SCNC: 4.9 MMOL/L (ref 3.6–5.5)
RBC # BLD AUTO: 4.17 M/UL (ref 4.7–6.1)
SODIUM SERPL-SCNC: 131 MMOL/L (ref 135–145)
VANCOMYCIN SERPL-MCNC: 14 UG/ML
WBC # BLD AUTO: 22.8 K/UL (ref 4.8–10.8)

## 2023-04-19 PROCEDURE — A9270 NON-COVERED ITEM OR SERVICE: HCPCS

## 2023-04-19 PROCEDURE — 700105 HCHG RX REV CODE 258

## 2023-04-19 PROCEDURE — 36415 COLL VENOUS BLD VENIPUNCTURE: CPT

## 2023-04-19 PROCEDURE — 700102 HCHG RX REV CODE 250 W/ 637 OVERRIDE(OP): Performed by: HOSPITALIST

## 2023-04-19 PROCEDURE — 700102 HCHG RX REV CODE 250 W/ 637 OVERRIDE(OP)

## 2023-04-19 PROCEDURE — A9270 NON-COVERED ITEM OR SERVICE: HCPCS | Performed by: INTERNAL MEDICINE

## 2023-04-19 PROCEDURE — 700105 HCHG RX REV CODE 258: Performed by: HOSPITALIST

## 2023-04-19 PROCEDURE — A9270 NON-COVERED ITEM OR SERVICE: HCPCS | Performed by: HOSPITALIST

## 2023-04-19 PROCEDURE — 700111 HCHG RX REV CODE 636 W/ 250 OVERRIDE (IP): Performed by: INTERNAL MEDICINE

## 2023-04-19 PROCEDURE — 51798 US URINE CAPACITY MEASURE: CPT

## 2023-04-19 PROCEDURE — 700111 HCHG RX REV CODE 636 W/ 250 OVERRIDE (IP)

## 2023-04-19 PROCEDURE — 700102 HCHG RX REV CODE 250 W/ 637 OVERRIDE(OP): Performed by: INTERNAL MEDICINE

## 2023-04-19 PROCEDURE — 80202 ASSAY OF VANCOMYCIN: CPT

## 2023-04-19 PROCEDURE — 700101 HCHG RX REV CODE 250: Performed by: HOSPITALIST

## 2023-04-19 PROCEDURE — 770020 HCHG ROOM/CARE - TELE (206)

## 2023-04-19 PROCEDURE — 83605 ASSAY OF LACTIC ACID: CPT | Mod: 91

## 2023-04-19 PROCEDURE — 99222 1ST HOSP IP/OBS MODERATE 55: CPT

## 2023-04-19 PROCEDURE — 83690 ASSAY OF LIPASE: CPT

## 2023-04-19 PROCEDURE — 97602 WOUND(S) CARE NON-SELECTIVE: CPT

## 2023-04-19 PROCEDURE — 700105 HCHG RX REV CODE 258: Performed by: INTERNAL MEDICINE

## 2023-04-19 PROCEDURE — 99232 SBSQ HOSP IP/OBS MODERATE 35: CPT | Performed by: INTERNAL MEDICINE

## 2023-04-19 PROCEDURE — 700111 HCHG RX REV CODE 636 W/ 250 OVERRIDE (IP): Performed by: HOSPITALIST

## 2023-04-19 RX ORDER — LORAZEPAM 2 MG/ML
0.5 INJECTION INTRAMUSCULAR ONCE
Status: COMPLETED | OUTPATIENT
Start: 2023-04-19 | End: 2023-04-19

## 2023-04-19 RX ORDER — SODIUM CHLORIDE 9 MG/ML
500 INJECTION, SOLUTION INTRAVENOUS ONCE
Status: COMPLETED | OUTPATIENT
Start: 2023-04-19 | End: 2023-04-19

## 2023-04-19 RX ORDER — NYSTATIN 100000 [USP'U]/G
POWDER TOPICAL 2 TIMES DAILY
Status: DISPENSED | OUTPATIENT
Start: 2023-04-19 | End: 2023-04-24

## 2023-04-19 RX ORDER — SCOLOPAMINE TRANSDERMAL SYSTEM 1 MG/1
1 PATCH, EXTENDED RELEASE TRANSDERMAL
Status: DISCONTINUED | OUTPATIENT
Start: 2023-04-19 | End: 2023-04-27

## 2023-04-19 RX ADMIN — NYSTATIN: 100000 POWDER TOPICAL at 04:51

## 2023-04-19 RX ADMIN — CALCIUM CARBONATE 500 MG: 500 TABLET, CHEWABLE ORAL at 16:59

## 2023-04-19 RX ADMIN — DOCUSATE SODIUM 50 MG AND SENNOSIDES 8.6 MG 2 TABLET: 8.6; 5 TABLET, FILM COATED ORAL at 16:59

## 2023-04-19 RX ADMIN — POTASSIUM CHLORIDE AND SODIUM CHLORIDE 950 ML: 900; 150 INJECTION, SOLUTION INTRAVENOUS at 03:54

## 2023-04-19 RX ADMIN — AMPICILLIN AND SULBACTAM 3 G: 1; 2 INJECTION, POWDER, FOR SOLUTION INTRAMUSCULAR; INTRAVENOUS at 12:24

## 2023-04-19 RX ADMIN — SODIUM CHLORIDE 500 ML: 9 INJECTION, SOLUTION INTRAVENOUS at 04:53

## 2023-04-19 RX ADMIN — COLLAGENASE SANTYL: 250 OINTMENT TOPICAL at 16:59

## 2023-04-19 RX ADMIN — SCOPALAMINE 1 PATCH: 1 PATCH, EXTENDED RELEASE TRANSDERMAL at 04:52

## 2023-04-19 RX ADMIN — NYSTATIN 2 APPLICATION: 100000 POWDER TOPICAL at 16:59

## 2023-04-19 RX ADMIN — AMPICILLIN AND SULBACTAM 3 G: 1; 2 INJECTION, POWDER, FOR SOLUTION INTRAMUSCULAR; INTRAVENOUS at 17:08

## 2023-04-19 RX ADMIN — SODIUM CHLORIDE 500 ML: 9 INJECTION, SOLUTION INTRAVENOUS at 00:18

## 2023-04-19 RX ADMIN — CALCIUM CARBONATE 500 MG: 500 TABLET, CHEWABLE ORAL at 12:16

## 2023-04-19 RX ADMIN — CALCIUM CARBONATE 500 MG: 500 TABLET, CHEWABLE ORAL at 07:49

## 2023-04-19 RX ADMIN — LORAZEPAM 0.5 MG: 2 INJECTION INTRAMUSCULAR; INTRAVENOUS at 00:29

## 2023-04-19 RX ADMIN — AMPICILLIN AND SULBACTAM 3 G: 1; 2 INJECTION, POWDER, FOR SOLUTION INTRAMUSCULAR; INTRAVENOUS at 00:23

## 2023-04-19 RX ADMIN — APIXABAN 2.5 MG: 5 TABLET, FILM COATED ORAL at 16:59

## 2023-04-19 RX ADMIN — ACETAMINOPHEN 650 MG: 325 TABLET, FILM COATED ORAL at 22:22

## 2023-04-19 RX ADMIN — AMPICILLIN AND SULBACTAM 3 G: 1; 2 INJECTION, POWDER, FOR SOLUTION INTRAMUSCULAR; INTRAVENOUS at 06:32

## 2023-04-19 ASSESSMENT — ENCOUNTER SYMPTOMS
FALLS: 0
PALPITATIONS: 0
HEADACHES: 0
VOMITING: 0
BACK PAIN: 0
NAUSEA: 0
ABDOMINAL PAIN: 0
COUGH: 0
SORE THROAT: 0
SEIZURES: 0
SHORTNESS OF BREATH: 0
DIARRHEA: 0
DOUBLE VISION: 0
CHILLS: 0
BLURRED VISION: 0
FEVER: 0
HALLUCINATIONS: 0

## 2023-04-19 ASSESSMENT — LIFESTYLE VARIABLES: SUBSTANCE_ABUSE: 0

## 2023-04-19 ASSESSMENT — PAIN DESCRIPTION - PAIN TYPE: TYPE: ACUTE PAIN

## 2023-04-19 NOTE — PROGRESS NOTES
4 Eyes Skin Assessment Completed by QUENTIN See and QUENTIN Hi.    Head Scab  Ears WDL  Nose WDL  Mouth WDL  Neck WDL  Breast/Chest WDL  Shoulder Blades WDL  Spine Redness, Blanching  (R) Arm/Elbow/Hand Redness, Blanching, Bruising, Scab, and Scar  (L) Arm/Elbow/Hand Redness, Blanching, Bruising, Scab, and Scar  Abdomen Redness and Bruising  Groin Redness and Blanching, Swelling  Scrotum/Coccyx/Buttocks Redness, Non-Blanching, Excoriation, and Discoloration  (R) Leg Redness, Blanching, Bruising, Swelling, Abrasion, and Edema  (L) Leg Redness, Blanching, Scab, Bruising, Swelling, Abrasion, and Edema  (R) Heel/Foot/Toe Redness, Blanching, and Discoloration  (L) Heel/Foot/Toe Redness, Blanching, Discoloration, and Ulcer(s), Shiny 2nd toe          Devices In Places Tele Box, Pulse Ox, and Nasal Cannula    Possible Skin Injury Yes    Pictures Uploaded Into Epic Yes  Wound Consult Placed Yes  RN Wound Prevention Protocol Ordered yes

## 2023-04-19 NOTE — DISCHARGE PLANNING
Case Management Discharge Planning    Admission Date: 4/18/2023  GMLOS: 5  ALOS: 1    6-Clicks ADL Score: 14  6-Clicks Mobility Score: 10  PT and/or OT Eval ordered: Yes  Post-acute Referrals Ordered: No  Post-acute Choice Obtained: No  Has referral(s) been sent to post-acute provider:  No      Anticipated Discharge Dispo: Discharge Disposition: D/T to SNF with Medicare cert in anticipation of skilled care (03)  Discharge Address: Home address ([address] [place] [address] [address] [place])  Discharge Contact Phone Number: [phone]    DME Needed: No    Action(s) Taken: Updated Provider/Nurse on Discharge Plan, DC Assessment Complete (See below), and Choice obtained    Pt discussed in IDT rounds. Pt may need placement. Pending PT/OT recommendations. [facility] is [facility].     LSW met with the pt at bedside. The pt was alert and oriented. LSW confirmed the demographic information on the face sheet and completed a assessment.  Pt lives in a 1st floor apartment alone. [facility] Prior to current hospitalization, pt was independent in ADLS/IADLS, Pt stated he does have difficulty bathing but, is able to sponge bathe himself. Pt has a good support system of friends. Pt denies any hx of substance use and denies any hx of mh.    @1145 Pt completed a SNF choice form and chose (1) [facility], (2) [facility] and (3) [facility]. Choice form was faxed to RUPERTO Cárdenas.      Escalations Completed: None    Medically Clear: No    Next Steps: LSW will follow and assist with placement acceptance pending PT/OT recommendations.    Barriers to Discharge: Medical clearance and Pending PT Evaluation    Is the patient up for discharge tomorrow: No    Care Transition Team Assessment    Information Source  Orientation Level: Oriented X4  Information Given By: Patient  Who is responsible for making decisions for patient? : Patient    Readmission Evaluation  Is this a readmission?: No    Elopement Risk  Legal Hold: No  Ambulatory or Self Mobile in Wheelchair:  No-Not an Elopement Risk  Elopement Risk: Not at Risk for Elopement    Interdisciplinary Discharge Planning  Primary Care Physician: Dr. James  Lives with - Patient's Self Care Capacity: Alone and Unable to Care For Self (home health 7days a week)  Support Systems: Other (Comments)  Housing / Facility: 1 Story Apartment / Condo  Durable Medical Equipment: Walker, Other - Specify (cane)    Discharge Preparedness  What is your plan after discharge?: Skilled nursing facility  What are your discharge supports?: Other (comment) (Friends)  Prior Functional Level: Needs Assist with Activities of Daily Living, Independent with Medication Management, Ambulatory  Difficulity with ADLs: Bathing  Difficulity with IADLs: None    Functional Assesment  Prior Functional Level: Needs Assist with Activities of Daily Living, Independent with Medication Management, Ambulatory    Finances  Financial Barriers to Discharge: No  Prescription Coverage: Yes    Vision / Hearing Impairment  Vision Impairment : Yes  Right Eye Vision: Wears Glasses  Left Eye Vision: Wears Glasses  Hearing Impairment : No    Advance Directive  Advance Directive?: DPOA for Health Care  Durable Power of  Name and Contact : Bravo Sauceda [phone]    Domestic Abuse  Have you ever been the victim of abuse or violence?: No  Physical Abuse or Sexual Abuse: No  Verbal Abuse or Emotional Abuse: No  Possible Abuse/Neglect Reported to:: Not Applicable    Psychological Assessment  History of Substance Abuse: None  History of Psychiatric Problems: No  Non-compliant with Treatment: No  Newly Diagnosed Illness: Yes    Discharge Risks or Barriers  Discharge risks or barriers?: No  Patient risk factors: Complex medical needs    Anticipated Discharge Information  Discharge Disposition: D/T to SNF with Medicare cert in anticipation of skilled care (03)  Discharge Address: Home address ([address] [place] [address] [address] [facility])  Discharge Contact Phone Number:  [phone]

## 2023-04-19 NOTE — PROGRESS NOTES
Assumed care of patient at bedside report from RN. Updated on POC. Patient currently A & O x 4; on 2 L O2 via nasal cannula; up in bed without complaints of acute pain. Call light within reach. Whiteboard updated. Fall precautions in place. Bed locked and in lowest position. All questions answered. No other needs indicated at this time.

## 2023-04-19 NOTE — CONSULTS
"LIMB PRESERVATION SERVICE CONSULT      REFERRED BY: Dr. Saleh    DATE OF CONSULTATION: 4/19/2023    REASON FOR CONSULT: Left second toe and bilateral LE wounds     HISTORY OF PRESENT ILLNESS: Kedar Woods is a 83 y.o.  with a past medical history that includes hypertension, TAVR, A-fib.  Admitted 4/18/2023 for Sepsis (HCC) [A41.9].     LPS has been consulted for left second toe and bilateral LE wounds.  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  Patient states that these well heal he may be symptom-free for 1 to 2 years and then the wounds return.  These are usually associated with edema, blistering, and then developing into wounds.  Patient stated his current wounds have been in place for \"months\".  [facility] once a week on Tuesdays for inpatient wound care and the rest of the week he is covered by his home health nurse.  Patient states that he has been told he needs a procedure to has right leg-states they will going through his groin-states he believes it may be related to a vascular procedure but is unsure.  He also states that at this time this procedure is on hold due to his overall health.  Patient denies fevers, chills, nausea, vomiting.      Antibiotics were started on this admission.  Infectious diseases has been consulted.  Xray not completed. Ortho and Vascular surgery not involved yet.     Patient denies a history of diabetes.  States he has borderline diabetes.  [facility] states [facility]    Smoking:   reports that he has never smoked. He has never used smokeless tobacco.    Alcohol:   reports current alcohol use.    Drug:   reports no history of drug use.      PAST MEDICAL HISTORY:   Past Medical History:   Diagnosis Date    Hypertension         PAST SURGICAL HISTORY: No past surgical history on file.    MEDICATIONS:   Scheduled " Medications   Medication Dose Frequency    nystatin   BID    scopolamine  1 Patch Q72HRS    apixaban  2.5 mg BID    ampicillin-sulbactam (UNASYN) IV  3 g Q6HRS    calcium carbonate  500 mg TID WITH MEALS    [Held by provider] metoprolol SR  12.5 mg DAILY    rosuvastatin  40 mg DAILY    senna-docusate  2 Tablet BID       ALLERGIES:    Allergies   Allergen Reactions    Codeine Nausea        FAMILY HISTORY: No family history on file.      REVIEW OF SYSTEMS:   Constitutional: Negative for chills, fever   Respiratory: Negative for cough and shortness of breath.    Cardiovascular:Negative for chest pain, and claudication.   Gastrointestinal: Negative for constipation, diarrhea, nausea and vomiting.   Lower extremities: positive for swelling and redness  Neurological: Negative for numbness to feet and lower legs  All other systems reviewed and are negative     RESULTS:     Recent Labs     04/18/23  1222 04/18/23  2352   WBC 25.3* 22.8*   RBC 4.74 4.17*   HEMOGLOBIN 13.7* 11.7*   HEMATOCRIT 42.2 37.3*   MCV 89.0 89.4   MCH 28.9 28.1   MCHC 32.5* 31.4*   RDW 54.7* 55.8*   PLATELETCT 274 234   MPV 10.0 9.9     Recent Labs     04/18/23  1222 04/18/23  2352   SODIUM 134* 131*   POTASSIUM 3.4* 4.9   CHLORIDE 106 100   CO2 17* 17*   GLUCOSE 105* 144*   BUN 27* 36*         ESR:     None    CRP:       Results from last 7 days   Lab Units 04/18/23  1222   C REACTIVE PROTEIN 4596 mg/dL 14.08*         COVID-19: Negative 4/18/23    Imaging:  CT-CHEST,ABDOMEN,PELVIS WITH   Final Result      1.  No acute inflammatory process in the chest, abdomen or pelvis.   2.  Multiple hypodense hepatic regions measuring up to 7.9 cm. They are indeterminate. Areas of regional hepatic steatosis and active or treated metastases are in the differential. Correlate with history of cancer, prior studies and consider further    evaluation with contrast-enhanced MRI, liver protocol.   3.  Cholelithiasis.   4.  Nonobstructing right nephrolithiasis.   5.   "Moderate amount of stool in the distal colon and rectum.   6.  Mildly enlarged left inguinal lymph node, statistically likely reactive. No other enlarged nodes detected.         CT-HEAD W/O   Final Result      1.  Cerebral atrophy.      2.  White matter lucencies most consistent with small vessel ischemic change versus demyelination or gliosis.      3.  Otherwise, Head CT without contrast with no acute findings. No evidence of acute cerebral infarction, hemorrhage or mass lesion.         DX-CHEST-PORTABLE (1 VIEW)   Final Result      No acute cardiac or pulmonary abnormalities are identified.      EC-ECHOCARDIOGRAM COMPLETE W/O CONT    (Results Pending)       Arterial studies: DOMENIC ordered    A1c:  Lab Results   Component Value Date/Time    HBA1C 6.6 (H) 06/21/2021 02:20 PM         Microbiology:  Results       Procedure Component Value Units Date/Time    BLOOD CULTURE [[id number]]  (Abnormal) Collected: 04/18/23 1601    Order Status: Completed Specimen: Blood from Peripheral Updated: 04/19/23 0046     Significant Indicator POS     Source BLD     Site PERIPHERAL     Culture Result Growth detected by Bactec instrument. 04/19/2023  00:41  Gram Stain: Gram positive cocci: Possible Streptococcus sp.      Narrative:      CALL  [facility]  183 tel. [phone],  CALLED  183 tel. [phone] 04/19/2023, 00:41, RB PERF. RESULTS CALLED  TO:[id number]  Per Hospital Policy: Only change Specimen Src: to \"Line\" if  specified by physician order.  Right AC    BLOOD CULTURE [[id number]]  (Abnormal) Collected: 04/18/23 1603    Order Status: Completed Specimen: Blood from Peripheral Updated: 04/19/23 0046     Significant Indicator POS     Source BLD     Site PERIPHERAL     Culture Result Growth detected by Bactec instrument. 04/19/2023  00:41  Gram Stain: Gram positive cocci: Possible Streptococcus sp.      Narrative:      Per Hospital Policy: Only change Specimen Src: to \"Line\" if  specified by physician order.  Left AC    S. Aureus By PCR, Nasal " "Complete [[id number]]     Order Status: Sent Specimen: Nasal from Other     CoV-2, FLU A/B, and RSV by PCR (2-4 Hours [facility]) : Collect NP swab in VTM [[id number]] Collected: 04/18/23 1332    Order Status: Completed Specimen: Respirate Updated: 04/18/23 1429     Influenza virus A RNA Negative     Influenza virus B, PCR Negative     RSV, PCR Negative     SARS-CoV-2 by PCR NotDetected     Comment: [facility] providers: PLEASE REFER TO DE-ESCALATION AND RETESTING PROTOCOL  on [email or web address]    **The [employer] GeneXpert Xpress SARS-CoV-2 RT-PCR Test has been made  available for use under the Emergency Use Authorization (EUA) only.          SARS-CoV-2 Source NP Swab    URINALYSIS,CULTURE IF INDICATED [[id number]]  (Abnormal) Collected: 04/18/23 1332    Order Status: Completed Specimen: Urine Updated: 04/18/23 1415     Color Yellow     Character Cloudy     Specific Gravity 1.023     Ph 5.0     Glucose >=1000 mg/dL      Ketones Trace mg/dL      Protein 30 mg/dL      Bilirubin Negative     Urobilinogen, Urine 0.2     Nitrite Negative     Leukocyte Esterase Trace     Occult Blood Small     Micro Urine Req Microscopic    Narrative:      Indication for culture:->Patient WITHOUT an indwelling Rosales  catheter in place with new onset of Dysuria, Frequency,  Urgency, and/or Suprapubic pain             PHYSICAL EXAMINATION:     VITAL SIGNS: BP 98/52   Pulse 70   Temp 36.3 °C (97.3 °F) (Temporal)   Resp 15   Ht 1.854 m (6' 1\")   Wt 113 kg (249 lb 1.9 oz)   SpO2 96%   BMI 32.87 kg/m²       General Appearance:  Well developed, well nourished, in no acute distress      Lower Extremity Assessment:    Edema:   Minimal non pitting edema    ROM dorsi/plantarflexion:   Dorsiflexion limited    Structural /mechanical changes:   non mycotic toenails      Visual Inspection: Feet with maceration, ulcers, fissures.  Pedal pulses: decreased bilaterally    Pulses:  R foot: palpable DP, faintly palpable PT  L foot: palpable DP, palpable PT    With " doppler hyperemic tones noted to PT/DP      Wound Assessment:    Wound(s)   location: Left second toe  Wound characteristics: Yellow, pink, red dry tissue  Erythema: Moderate  Drainage: Scant  Callus: None  Odor: None    Wound(s)   location: Left lateral LE  Full thickness  Wound characteristics: Yellow, tan, pink, red dried tissue  Erythema: Mild  Drainage: Scant  Callus: None  Odor: None    Wound(s)   location: Right anterior LE-scattered wounds  Full thickness  Wound characteristics: Pink, red tissue  Erythema: None  Drainage: Scant  Callus: None  Odor: None      Wound care completed by LPS APRN and wound RN Mouna  Bilateral LE: Cleanse with NS, pat dry.  Cut Hydrofera Blue to fit wound bed, apply with writing side facing out, secure with Hypafix tape or Mepilex    Santyl ointment ordered-to be applied to wound beds by bedside RN once it arrives.    Wound photo:                       ASSESSMENT AND PLAN:   83 y.o. admitted for Sepsis ([facility]) [A41.9]. Presents with left second toe and bilateral LE chronic wounds.    -Left leg has distinct erythema and edema-improved from previous picture  -Collagenase Santyl ointment ordered to provide enzymatic debridement to wound beds-to be secured with Hydrofera Blue dressing  -Patient unsure if [facility] is [facility].  Patient does states he believes he will be having vascular procedure to his right leg in the future but unable to provide further information.  -Pulses hyperemic with Doppler  -DOMENIC ordered        Wound care:   -Wound care orders placed for nursing by [facility] wound team   -Bilateral LE: Cleanse with NS, pat dry.  Apply thick layer of Santyl ointment-approximately 2-3 mm thick to wound bed.  Secure with Hydrofera Blue cut to fit wound bed, apply with writing side facing out, secure with Hypafix tape or Mepilex  -Left second toe: Cleanse with NS, pat dry.  Apply thick layer of Santyl ointment- approximately 2-3 mm thick to wound bed.   Secure with Hydrofera Blue-apply with writing side facing out, secure with Hypafix tape     Imaging/Labs:  -COVID-19: not completed this admission.     Vascular status:   - R foot: palpable DP, faintly palpable PT  L foot: palpable DP,  palpable PT  With doppler hyperemic tones noted to PT/DP  -  arterial studies ordered with TBI and toe pressures    Surgery:   -  no plans for surgery at this time    Antibiotics:   -currently on antibiotics managed by hospitalist     Weight Bearing Status:   -Right foot: Weight bearing as tolerated  -Left foot: Weight bearing as tolerated    Offloading:   -Offloading shoe; ordered    PT/OT:   -consult in place      Diabetes Education:   -Not involved at this time    Lab Results   Component Value Date/Time    HBA1C 6.6 (H) 06/21/2021 02:20 PM          -   Advised to check feet at least daily, moisturize feet, and to always wear protective foot wear.   -avoid trimming own nails. See podiatrist or certified foot and nail RN        Smoking Cessation:   -Does not smoke          Discharge Plan:  -TBD, patient [facility].  [facility]      D/W: pt, RN, Dr. Smart, Wound RN Mouna     Please note that this dictation was created using voice recognition software. I have  worked with technical experts from [employer] to optimize the interface.  I have made every reasonable attempt to correct obvious errors, but there may be errors of grammar and possibly content that I did not discover before finalizing the note.    Please contact LPS through [employer].

## 2023-04-19 NOTE — DIETARY
"Nutrition services: Day 1 of admit.  Kedar Woods is a 83 y.o. male with admitting DX of bacteremia.  Consult received for unsure wt loss in >1 year (MST 2).     Assessment:  Height: 185.4 cm (6' 1\")  Weight: 113 kg (249 lb 1.9 oz)  Body mass index is 32.87 kg/m²., BMI classification: Obese Class 1  Diet/Intake: Cardiac     Evaluation:   RD able to visit pt at bed side. Pt reports having no appetite for x3 days now. Pt eating 0% of meals so far. Poor po related to N/V x3 days per patient.   [facility] RD stopped by dietitian office as they normally follow this patient at home. [facility] RD states pt likes all flavors of boost. Will send Boost Plus TID. RD also recommended arginaid for wounds if appropriate.   Pt reports UBW of 250 lbs. Pt states he tracks his wt daily. Pt states he was 170 lbs for a while but has steadily decreased in weight. PT did not give RD a wt loss timeline. Per pt report pt is down -7%.   Per [facility] pt is well nourished.   Labs: Glu 144, Na 131, GFR 35, Creatinine 1.86.   Meds: tums, LR, zofran, bowel regimen.   Skin: Multiple wounds documented. 2+ edema in the RLE and LLE. Arginaid appropriate at this time.   +BM 4/19    Malnutrition Risk: Unable to meet multiple criteria at this time.     Recommendations/Plan:  Boost Plus TID.    Arginaid BID.   Encourage intake of >50%  Document intake of all PO as % taken in ADL's to provide interdisciplinary communication across all shifts.   Monitor weight.  Nutrition rep will continue to see patient for ongoing meal and snack preferences.       RD following.   "

## 2023-04-19 NOTE — PROGRESS NOTES
Patient Transported to unit via [facility] @ 2235. Patient placed on tele box and monitors notified. Pictures of wounds taken and logged in LDA. Wound consult placed. VSS, patient alert and oriented x4. Patient vomited one time. No belongings with patient at this time.

## 2023-04-19 NOTE — PROGRESS NOTES
Patient unable to take PO medications due to nausea/vomiting. Medications were held and notified SOCORRO Ramires.

## 2023-04-19 NOTE — CARE PLAN
The patient is Watcher - Medium risk of patient condition declining or worsening    Shift Goals  Clinical Goals: Maintain Vitals, Control nausea  Patient Goals: Rest, feel better  Family Goals: n/a    Progress made toward(s) clinical / shift goals:    Problem: Knowledge Deficit - Standard  Goal: Patient and family/care givers will demonstrate understanding of plan of care, disease process/condition, diagnostic tests and medications  Outcome: Progressing     Problem: Hemodynamics  Goal: Patient's hemodynamics, fluid balance and neurologic status will be stable or improve  Outcome: Progressing     Problem: Fluid Volume  Goal: Fluid volume balance will be maintained  Outcome: Progressing     Problem: Urinary - Renal Perfusion  Goal: Ability to achieve and maintain adequate renal perfusion and functioning will improve  Outcome: Progressing     Problem: Respiratory  Goal: Patient will achieve/maintain optimum respiratory ventilation and gas exchange  Outcome: Progressing       Patient is not progressing towards the following goals:

## 2023-04-19 NOTE — CARE PLAN
The patient is Watcher - Medium risk of patient condition declining or worsening    Shift Goals  Clinical Goals: monitor vital signs, nausea, and labs  Patient Goals: decrease nausea  Family Goals: n/a    Progress made toward(s) clinical / shift goals:      Problem: Knowledge Deficit - Standard  Goal: Patient and family/care givers will demonstrate understanding of plan of care, disease process/condition, diagnostic tests and medications  Outcome: Progressing     Problem: Hemodynamics  Goal: Patient's hemodynamics, fluid balance and neurologic status will be stable or improve  Outcome: Progressing     Problem: Fluid Volume  Goal: Fluid volume balance will be maintained  Outcome: Progressing     Problem: Respiratory  Goal: Patient will achieve/maintain optimum respiratory ventilation and gas exchange  Outcome: Progressing

## 2023-04-19 NOTE — WOUND TEAM
[facility]  Inpatient Services  Initial Wound and Skin Care Evaluation    Admission Date: 4/18/2023     Last order of IP CONSULT TO WOUND CARE was found on 4/18/2023 from Hospital Encounter on 4/18/2023     HPI, PMH, SH: Reviewed    No past surgical history on file.  Social History     Tobacco Use    Smoking status: Never    Smokeless tobacco: Never   Substance Use Topics    Alcohol use: Yes     Chief Complaint   Patient presents with    Weakness     Diagnosis: Sepsis (HCC) [A41.9]    Unit where seen by Wound Team: T808/02     WOUND CONSULT/FOLLOW UP RELATED TO:  Sacrum; BLE; Feet; pannus; groin: B Hips     WOUND HISTORY:  H&P: Mando Woods is a 83 y.o. male who presented 4/18/2023 with weakness.  Mr. 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  Today paramedics were called as he was too weak to get up and the [facility] was full so [facility] was di[facility] here.  In the emergency room he was found to have a white blood cell count of 25,000 and lower extremity cellulitis.  [facility] contacted and apparently he just finished a course of Keflex for the cellulitis.  In the emergency room urinalysis and chest x-ray are negative.  Abdominal exam is benign.  Presumptive etiology of his sepsis is cellulitis and he will be treated with IV Unasyn and vancomycin in setting emergency room he has a modest historian cephalopathic.  We do not have any friends nor family to corroborate his review of systems and history of present illness.'    WOUND ASSESSMENT/LDA  Wound 04/18/23 Venous Ulcer Leg Posterior;Lateral Left POA (Active)   Wound Image   04/19/23 1100   Site Assessment Dry;Red;Yellow;Edema;Eschar 04/19/23 1100   Periwound Assessment Dry;Intact;Red;Induration;Hemosiderin Staining;Edema 04/19/23 1100   Margins Attached  edges;Defined edges 04/19/23 1100   Closure Secondary intention 04/19/23 1100   Drainage Amount None 04/19/23 1100   Drainage Description Purulent;Serous;Yellow 04/19/23 0745   Treatments Cleansed;Offloading;Site care 04/19/23 1100   Wound Cleansing Approved Wound Cleanser 04/19/23 1100   Periwound Protectant Moisture Barrier 04/19/23 1100   Dressing Options Hydrofera Blue Ready;Mepilex 04/19/23 1100   Dressing Changed New 04/19/23 1100   Dressing Status Clean;Dry;Intact 04/19/23 1100   Dressing Change/Treatment Frequency Every 48 hrs, and As Needed 04/19/23 1100   NEXT Weekly Photo (Inpatient Only) 04/26/23 04/19/23 1100   Wound Length (cm) 12 cm 04/19/23 1100   Wound Width (cm) 3 cm 04/19/23 1100   Wound Depth (cm) 0.1 cm 04/19/23 1100   Wound Surface Area (cm^2) 36 cm^2 04/19/23 1100   Wound Volume (cm^3) 3.6 cm^3 04/19/23 1100   Wound Bed Eschar (%) 100 % 04/19/23 1100   Wound Odor None 04/19/23 1100   WOUND NURSE ONLY - Time Spent with Patient (mins) 30 04/19/23 1100   Number of days: 1       Wound 04/18/23 Venous Ulcer Leg Lateral Right POA (Active)   Wound Image   04/19/23 1100   Site Assessment Red;Yellow;Granulation tissue;Slough 04/19/23 1100   Periwound Assessment Hemosiderin Staining;Red;Blanchable erythema;Edema;Induration 04/19/23 1100   Margins Defined edges;Attached edges 04/19/23 1100   Closure Secondary intention 04/19/23 1100   Drainage Amount None 04/19/23 1100   Treatments Cleansed;Offloading;Site care 04/19/23 1100   Wound Cleansing Approved Wound Cleanser 04/19/23 1100   Periwound Protectant Moisture Barrier 04/19/23 1100   Dressing Options Hydrofera Blue Ready;Mepilex 04/19/23 1100   Dressing Changed New 04/19/23 1100   Dressing Status Clean;Dry;Intact 04/19/23 1100   Dressing Change/Treatment Frequency Every 48 hrs, and As Needed 04/19/23 1100   NEXT Weekly Photo (Inpatient Only) 04/26/23 04/19/23 1100   Non-staged Wound Description Full thickness 04/19/23 1100   Wound Length (cm) 10 cm  04/19/23 1100   Wound Width (cm) 1.5 cm 04/19/23 1100   Wound Depth (cm) 0.1 cm 04/19/23 1100   Wound Surface Area (cm^2) 15 cm^2 04/19/23 1100   Wound Volume (cm^3) 1.5 cm^3 04/19/23 1100   Wound Bed Granulation (%) 80 % 04/19/23 1100   Wound Bed Slough (%) 20 % 04/19/23 1100   Shape linear 04/19/23 1100   Wound Odor None 04/19/23 1100   WOUND NURSE ONLY - Time Spent with Patient (mins) 30 04/19/23 1100   Number of days: 1       Wound 04/18/23 Skin Tear Hip Mid;Outer Right (Active)   Wound Image   04/19/23 0745   Site Assessment Red;Pink;Bleeding;Painful 04/19/23 0745   Periwound Assessment Pink;Painful 04/19/23 0745   Margins Defined edges 04/19/23 0745   Closure Open to air 04/19/23 0745   Drainage Amount Scant 04/19/23 0745   Drainage Description Serosanguineous 04/19/23 0745   Treatments Cleansed 04/19/23 0745   Wound Cleansing Soap and Water 04/19/23 0745   Number of days: 1       Wound 04/18/23 Pressure Injury Sacrum Bilateral POA DTI w/moisture associated dermatitis (Active)   Wound Image   04/19/23 1100   Site Assessment Purple;Red;Excoriated;Fragile 04/19/23 1100   Periwound Assessment Purple;Red;Non-blanchable erythema 04/19/23 1100   Closure Open to air 04/19/23 1100   Drainage Amount Scant 04/19/23 1100   Drainage Description Serous 04/19/23 1100   Treatments Cleansed;Offloading;Site care 04/19/23 1100   Wound Cleansing Approved Wound Cleanser 04/19/23 1100   Periwound Protectant Moisture Barrier 04/19/23 1100   WOUND NURSE ONLY - Pressure Injury Stage DTPI 04/19/23 1100   Wound Length (cm) 20 cm 04/19/23 1100   Wound Width (cm) 20 cm 04/19/23 1100   Wound Depth (cm) 0.05 cm 04/19/23 1100   Wound Surface Area (cm^2) 400 cm^2 04/19/23 1100   Wound Volume (cm^3) 20 cm^3 04/19/23 1100   Wound Odor None 04/19/23 1100   Exposed Structures None 04/19/23 1100   WOUND NURSE ONLY - Time Spent with Patient (mins) 30 04/19/23 1100   Number of days: 1       Wound 04/18/23 Venous Ulcer Toe, 2nd Left POA (Active)    Wound Image    04/19/23 1100   Site Assessment Dry;Yellow;Red;Eschar 04/19/23 1100   Periwound Assessment Dry;Pink;Red;Fragile;Blanchable erythema 04/19/23 1100   Margins Attached edges;Defined edges 04/19/23 1100   Closure Secondary intention 04/19/23 1100   Drainage Amount None 04/19/23 1100   Treatments Cleansed;Offloading;Site care 04/19/23 1100   Wound Cleansing Approved Wound Cleanser 04/19/23 1100   Periwound Protectant Moisture Barrier 04/19/23 1100   Dressing Options Absorbent Abdominal Pad;Dry Roll Gauze 04/19/23 1100   Dressing Changed New 04/19/23 1100   Dressing Status Clean;Dry;Intact 04/19/23 1100   Dressing Change/Treatment Frequency Every 48 hrs, and As Needed 04/19/23 1100   NEXT Weekly Photo (Inpatient Only) 04/26/23 04/19/23 1100   Non-staged Wound Description Full thickness 04/19/23 1100   Wound Length (cm) 3.5 cm 04/19/23 1100   Wound Width (cm) 2 cm 04/19/23 1100   Wound Depth (cm) 0.1 cm 04/19/23 1100   Wound Surface Area (cm^2) 7 cm^2 04/19/23 1100   Wound Volume (cm^3) 0.7 cm^3 04/19/23 1100   Wound Bed Eschar (%) 100 % 04/19/23 1100   Shape linear 04/19/23 1100   Wound Odor None 04/19/23 1100   WOUND NURSE ONLY - Time Spent with Patient (mins) 30 04/19/23 1100   Number of days: 1        Vascular: 6/23/21-                     RIGHT      Waveform            Systolic BPs (mmHg)                              106           Brachial   Triphasic                                Common Femoral   Triphasic                  0             Posterior Tibial   Triphasic                  261           Dorsalis Pedis                                            Peroneal                              2.46          DOMENIC                                            TBI                           LEFT   Waveform        Systolic BPs (mmHg)                              102           Brachial   Triphasic                                Common Femoral   Triphasic                  262           Posterior Tibial   Triphasic                   276           Dorsalis Pedis                                            Peroneal                              2.60          DOMENIC                                            TBI         Findings   Bilateral.    Doppler waveforms of the common femoral arteries are of high amplitude and    triphasic.    Doppler waveforms at the ankle are brisk and    triphasic/multiphasic/hyperemic.    Ankle pressures are not accurately measured due to calcification and    noncompressibility of the tibial vessels.    Toe pressures and toe-brachial indices are normal.   All toes PPG's waveforms are normal.     DOMENIC:   No results found. LPS ordered this admission    Lab Values:    Lab Results   Component Value Date/Time    WBC 22.8 (H) 04/18/2023 11:52 PM    RBC 4.17 (L) 04/18/2023 11:52 PM    HEMOGLOBIN 11.7 (L) 04/18/2023 11:52 PM    HEMATOCRIT 37.3 (L) 04/18/2023 11:52 PM    CREACTPROT 14.08 (H) 04/18/2023 12:22 PM    HBA1C 6.6 (H) 06/21/2021 02:20 PM        Culture Results show:  No results found for this or any previous visit (from the past 720 hour(s)).    Pain Level/Medicated:  None, Tolerated without pain medication       INTERVENTIONS BY WOUND TEAM:  Chart and images reviewed. Discussed with bedside RN. All areas of concern (based on picture review, LDA review and discussion with bedside RN) have been thoroughly assessed. Documentation of areas based on significant findings. This RN in to assess patient. Performed standard wound care which includes appropriate positioning, dressing removal and non-selective debridement. Pictures and measurements obtained weekly if/when required.    LLE; Left 2nd Toe:  Preparation for Dressing removal: Dressing soaked with Removed without difficulty  Non-selectively Debrided with:  Wound cleanser   Sharp debridement: no  Tiffanie wound: Cleansed with Wound cleanser, Prepped with Barrier paste  Primary Dressing: Santyl and Hydrofera Blue  Secondary (Outer) Dressing:  Mepilex    RLE:  Preparation for Dressing removal: Dressing soaked with Removed without difficulty  Non-selectively Debrided with:  Wound cleanser   Sharp debridement: no  Tiffanie wound: Cleansed with Wound cleanser, Prepped with Barrier paste  Primary Dressing: Hydrofera Blue  Secondary (Outer) Dressing: Mepilex    Sacrum:  Preparation for Dressing removal: open to air  Non-selectively Debrided with:  Wound cleanser   Sharp debridement: no  Tiffanie wound: Prepped with Barrier paste  Primary Dressing: open to air    Pannus; Groin:  Preparation for Dressing removal: open to air  Non-selectively Debrided with:  Wound cleanser or foam cleanser  Sharp debridement: no  Tiffanie wound: fungal powder  Primary Dressing: Interdry cloth    Advanced Wound Care Discharge Planning  Number of Clinicians necessary to complete wound care: 1  Is patient requiring IV pain medications for dressing changes: no  Length of time for dressing change 30 min. (This does not include chart review, pre-medication time, set up, clean up or time spent charting.)    Interdisciplinary consultation: Patient, Bedside RN (Esa), Pretty GARRIDO    EVALUATION / RATIONALE FOR TREATMENT:  Most Recent Date:  4/19/23:   -Chronic venous ulcers to bilateral lateral lower legs and left 2nd toe with 100% intact adhered yellow eschar, santyl ordered and hydrofera blue applied. Hydrofera Blue applied for the hydrophilic polyurethane foam which contains ethylene oxide used as a bactericidal, fungicidal, and sporicidal disinfectant. Hydrofera Blue also aids in maintaining a moist wound environment. The absorption properties of this dressing are important in collecting exudates and bacteria from the injured area. These harmful fluid secretions bind to the dressing removing it from the wound without the foam sticking to the wound causing more harm.  -Moisture associated dermatitis to pannus and groin. Fungal powder and interdry cloth to wick moisture and address  fungal component.   -DTI to sacrum, moisture barrier paste applied and offloading measures in place.     Goals: Steady decrease in wound area and depth weekly.    WOUND TEAM PLAN OF CARE ([X] for frequency of wound follow up,):   Nursing to follow dressing orders written for wound care. Contact wound team if area fails to progress, deteriorates or with any questions/concerns if something comes up before next scheduled follow up (See below as to whether wound is following and frequency of wound follow up)  Dressing changes by wound team:                   Follow up 3 times weekly:                NPWT change 3 times weekly:     Follow up 1-2 times weekly:      Follow up Bi-Monthly:  X         Follow up Monthly (High Risk):                        Follow up as needed:     Other (explain):     NURSING PLAN OF CARE ORDERS (X):  Dressing changes: See Dressing Care orders: X  Skin care: See Skin Care orders: X  RN Prevention Protocol:   Rectal tube care: See Rectal Tube Care orders:   Other orders:    RSKIN:   CURRENTLY IN PLACE (X), APPLIED THIS VISIT (A), ORDERED (O):   Q shift Spike:  X  Q shift pressure point assessments:  X    Surface/Positioning X  Standard Mattress/Trauma Bed           Low Airloss   X       ICU Low Airloss   Bariatric SIL     Waffle cushion   O     Waffle Overlay  X        Reposition q 2 hours  X    TAPs Turning system   X  Z Thong Pillow     Offloading/Redistribution X  Sacral Offloading Dressing (Silicone dressing)     Heel Offloading Dressing (Silicone dressing)   X      Heel float boots (Prevalon boot)             Float Heels off Bed with Pillows    X       Respiratory   Silicone O2 tubing         Gray Foam Ear protectors     Cannula fixation Device (Tender )          High flow offloading Clip    Elastic head band offloading device      Anchorfast                                                         Trach with Optifoam split foam             Containment/Moisture Prevention     Rectal tube  or BMS    Purwick/Condom Cath        Rosales Catheter    Barrier wipes           Barrier paste       Antifungal tx      Interdry        Mobilization RAFAT      Up to chair        Ambulate      PT/OT     Anticipated discharge plans: TBD  LTACH:        SNF/Rehab:  X                Home Health Care:  X         Outpatient Wound Center:   X         Self/Family Care:        Other:                  Vac Discharge Needs:   Vac Discharge plan is purely a recommendation from wound team and not a requirement for discharge unless otherwise stated by physician.  Not Applicable Pt not on a wound vac:   X    Regular Vac while inpatient, alternative dressing at DC:        Regular Vac in use and continued at DC:            Reg. Vac w/ Skin Sub/Biologic in use. Will need to be changed 2x wkly:      Veraflo Vac while inpatient, ok to transition to Regular Vac on Discharge (Bedside RN to Clamp small instillation tubing at time of DC):           Veraflo Vac while inpatient, would benefit from remaining on Veraflo Vac upon discharge:

## 2023-04-19 NOTE — ASSESSMENT & PLAN NOTE
Blood culture positive for strep group G  Repeat cultures negative x 48-hour   Echo did not show any vegetation, infectious disease Dr. Gandara has evaluated, recommended Augmentin upon discharge.  Currently patient is on Unasyn  Continue IV Unasyn on April 29,023.

## 2023-04-19 NOTE — PROGRESS NOTES
Patient is hypotensive, BP readings of 83/43 and 78/46. Heart rate in the 70's and map 57. Notified the APRN Gonsalo San who came to bedside to assess patient. Patient having nausea and vomiting cola colored emesis. Monitor tech notified this RN patient having  ST elevation. Stat ekg ordered. New orders for bolus of NS 1 L placed per Jaswinder. Patient qtc is 0.52, per Jaswinder give new orders for ativan for nausea was ordered.

## 2023-04-19 NOTE — DISCHARGE PLANNING
Received choice form at: [id number]  Agency/Facility name: [facility]  Referral sent per choice form at:  [phone]

## 2023-04-20 ENCOUNTER — APPOINTMENT (OUTPATIENT)
Dept: RADIOLOGY | Facility: MEDICAL CENTER | Age: 83
DRG: 871 | End: 2023-04-20
Payer: COMMERCIAL

## 2023-04-20 ENCOUNTER — APPOINTMENT (OUTPATIENT)
Dept: CARDIOLOGY | Facility: MEDICAL CENTER | Age: 83
DRG: 871 | End: 2023-04-20
Attending: INTERNAL MEDICINE
Payer: COMMERCIAL

## 2023-04-20 PROBLEM — G92.8 TOXIC METABOLIC ENCEPHALOPATHY: Status: ACTIVE | Noted: 2023-04-18

## 2023-04-20 PROBLEM — N17.9 AKI (ACUTE KIDNEY INJURY) (HCC): Status: ACTIVE | Noted: 2023-04-20

## 2023-04-20 LAB
ALBUMIN SERPL BCP-MCNC: 2.4 G/DL (ref 3.2–4.9)
BASOPHILS # BLD AUTO: 0.3 % (ref 0–1.8)
BASOPHILS # BLD: 0.05 K/UL (ref 0–0.12)
BUN SERPL-MCNC: 47 MG/DL (ref 8–22)
CALCIUM ALBUM COR SERPL-MCNC: 9.3 MG/DL (ref 8.5–10.5)
CALCIUM SERPL-MCNC: 8 MG/DL (ref 8.5–10.5)
CHLORIDE SERPL-SCNC: 102 MMOL/L (ref 96–112)
CO2 SERPL-SCNC: 18 MMOL/L (ref 20–33)
CREAT SERPL-MCNC: 1.43 MG/DL (ref 0.5–1.4)
EOSINOPHIL # BLD AUTO: 0.01 K/UL (ref 0–0.51)
EOSINOPHIL NFR BLD: 0.1 % (ref 0–6.9)
ERYTHROCYTE [DISTWIDTH] IN BLOOD BY AUTOMATED COUNT: 55.6 FL (ref 35.9–50)
GFR SERPLBLD CREATININE-BSD FMLA CKD-EPI: 49 ML/MIN/1.73 M 2
GLUCOSE SERPL-MCNC: 100 MG/DL (ref 65–99)
HCT VFR BLD AUTO: 36.4 % (ref 42–52)
HGB BLD-MCNC: 11.8 G/DL (ref 14–18)
IMM GRANULOCYTES # BLD AUTO: 0.18 K/UL (ref 0–0.11)
IMM GRANULOCYTES NFR BLD AUTO: 1 % (ref 0–0.9)
LYMPHOCYTES # BLD AUTO: 1.17 K/UL (ref 1–4.8)
LYMPHOCYTES NFR BLD: 6.7 % (ref 22–41)
MAGNESIUM SERPL-MCNC: 2.5 MG/DL (ref 1.5–2.5)
MCH RBC QN AUTO: 28.4 PG (ref 27–33)
MCHC RBC AUTO-ENTMCNC: 32.4 G/DL (ref 33.7–35.3)
MCV RBC AUTO: 87.7 FL (ref 81.4–97.8)
MONOCYTES # BLD AUTO: 1.21 K/UL (ref 0–0.85)
MONOCYTES NFR BLD AUTO: 7 % (ref 0–13.4)
NEUTROPHILS # BLD AUTO: 14.77 K/UL (ref 1.82–7.42)
NEUTROPHILS NFR BLD: 84.9 % (ref 44–72)
NRBC # BLD AUTO: 0 K/UL
NRBC BLD-RTO: 0 /100 WBC
PHOSPHATE SERPL-MCNC: 3.4 MG/DL (ref 2.5–4.5)
PLATELET # BLD AUTO: 210 K/UL (ref 164–446)
PMV BLD AUTO: 10.2 FL (ref 9–12.9)
POTASSIUM SERPL-SCNC: 4.8 MMOL/L (ref 3.6–5.5)
RBC # BLD AUTO: 4.15 M/UL (ref 4.7–6.1)
SODIUM SERPL-SCNC: 135 MMOL/L (ref 135–145)
WBC # BLD AUTO: 17.4 K/UL (ref 4.8–10.8)

## 2023-04-20 PROCEDURE — 93922 UPR/L XTREMITY ART 2 LEVELS: CPT

## 2023-04-20 PROCEDURE — 97166 OT EVAL MOD COMPLEX 45 MIN: CPT

## 2023-04-20 PROCEDURE — A9270 NON-COVERED ITEM OR SERVICE: HCPCS

## 2023-04-20 PROCEDURE — A9270 NON-COVERED ITEM OR SERVICE: HCPCS | Performed by: HOSPITALIST

## 2023-04-20 PROCEDURE — 93922 UPR/L XTREMITY ART 2 LEVELS: CPT | Mod: 26 | Performed by: INTERNAL MEDICINE

## 2023-04-20 PROCEDURE — 700102 HCHG RX REV CODE 250 W/ 637 OVERRIDE(OP)

## 2023-04-20 PROCEDURE — 97162 PT EVAL MOD COMPLEX 30 MIN: CPT

## 2023-04-20 PROCEDURE — 85025 COMPLETE CBC W/AUTO DIFF WBC: CPT

## 2023-04-20 PROCEDURE — 700102 HCHG RX REV CODE 250 W/ 637 OVERRIDE(OP): Performed by: INTERNAL MEDICINE

## 2023-04-20 PROCEDURE — 700111 HCHG RX REV CODE 636 W/ 250 OVERRIDE (IP): Performed by: INTERNAL MEDICINE

## 2023-04-20 PROCEDURE — 700105 HCHG RX REV CODE 258: Performed by: INTERNAL MEDICINE

## 2023-04-20 PROCEDURE — 36415 COLL VENOUS BLD VENIPUNCTURE: CPT

## 2023-04-20 PROCEDURE — 87040 BLOOD CULTURE FOR BACTERIA: CPT

## 2023-04-20 PROCEDURE — 700102 HCHG RX REV CODE 250 W/ 637 OVERRIDE(OP): Performed by: HOSPITALIST

## 2023-04-20 PROCEDURE — A9270 NON-COVERED ITEM OR SERVICE: HCPCS | Performed by: INTERNAL MEDICINE

## 2023-04-20 PROCEDURE — 770001 HCHG ROOM/CARE - MED/SURG/GYN PRIV*

## 2023-04-20 PROCEDURE — 99232 SBSQ HOSP IP/OBS MODERATE 35: CPT | Performed by: INTERNAL MEDICINE

## 2023-04-20 PROCEDURE — 93926 LOWER EXTREMITY STUDY: CPT | Mod: 26,LT | Performed by: INTERNAL MEDICINE

## 2023-04-20 PROCEDURE — 83735 ASSAY OF MAGNESIUM: CPT

## 2023-04-20 PROCEDURE — 93926 LOWER EXTREMITY STUDY: CPT | Mod: LT

## 2023-04-20 PROCEDURE — 80069 RENAL FUNCTION PANEL: CPT

## 2023-04-20 RX ORDER — CHOLECALCIFEROL (VITAMIN D3) 125 MCG
5 CAPSULE ORAL NIGHTLY PRN
Status: DISCONTINUED | OUTPATIENT
Start: 2023-04-20 | End: 2023-05-02 | Stop reason: HOSPADM

## 2023-04-20 RX ADMIN — DOCUSATE SODIUM 50 MG AND SENNOSIDES 8.6 MG 2 TABLET: 8.6; 5 TABLET, FILM COATED ORAL at 05:03

## 2023-04-20 RX ADMIN — AMPICILLIN AND SULBACTAM 3 G: 1; 2 INJECTION, POWDER, FOR SOLUTION INTRAMUSCULAR; INTRAVENOUS at 12:11

## 2023-04-20 RX ADMIN — ACETAMINOPHEN 650 MG: 325 TABLET, FILM COATED ORAL at 05:03

## 2023-04-20 RX ADMIN — AMPICILLIN AND SULBACTAM 3 G: 1; 2 INJECTION, POWDER, FOR SOLUTION INTRAMUSCULAR; INTRAVENOUS at 17:30

## 2023-04-20 RX ADMIN — AMPICILLIN AND SULBACTAM 3 G: 1; 2 INJECTION, POWDER, FOR SOLUTION INTRAMUSCULAR; INTRAVENOUS at 05:59

## 2023-04-20 RX ADMIN — ACETAMINOPHEN 650 MG: 325 TABLET, FILM COATED ORAL at 12:14

## 2023-04-20 RX ADMIN — NYSTATIN: 100000 POWDER TOPICAL at 05:03

## 2023-04-20 RX ADMIN — ROSUVASTATIN CALCIUM 40 MG: 20 TABLET, FILM COATED ORAL at 05:03

## 2023-04-20 RX ADMIN — CALCIUM CARBONATE 500 MG: 500 TABLET, CHEWABLE ORAL at 08:00

## 2023-04-20 RX ADMIN — DOCUSATE SODIUM 50 MG AND SENNOSIDES 8.6 MG 2 TABLET: 8.6; 5 TABLET, FILM COATED ORAL at 17:28

## 2023-04-20 RX ADMIN — CALCIUM CARBONATE 500 MG: 500 TABLET, CHEWABLE ORAL at 17:28

## 2023-04-20 RX ADMIN — APIXABAN 2.5 MG: 5 TABLET, FILM COATED ORAL at 05:03

## 2023-04-20 RX ADMIN — Medication 5 MG: at 00:39

## 2023-04-20 RX ADMIN — APIXABAN 5 MG: 5 TABLET, FILM COATED ORAL at 17:28

## 2023-04-20 RX ADMIN — COLLAGENASE SANTYL: 250 OINTMENT TOPICAL at 05:03

## 2023-04-20 RX ADMIN — CALCIUM CARBONATE 500 MG: 500 TABLET, CHEWABLE ORAL at 12:07

## 2023-04-20 RX ADMIN — AMPICILLIN AND SULBACTAM 3 G: 1; 2 INJECTION, POWDER, FOR SOLUTION INTRAMUSCULAR; INTRAVENOUS at 00:00

## 2023-04-20 ASSESSMENT — ENCOUNTER SYMPTOMS
NAUSEA: 0
COUGH: 0
SORE THROAT: 0
HALLUCINATIONS: 0
BLURRED VISION: 0
VOMITING: 0
DIARRHEA: 0
DOUBLE VISION: 0
PALPITATIONS: 0
BACK PAIN: 0
SHORTNESS OF BREATH: 0
CHILLS: 0
FEVER: 0
FALLS: 0
SEIZURES: 0
ABDOMINAL PAIN: 0
HEADACHES: 0

## 2023-04-20 ASSESSMENT — COGNITIVE AND FUNCTIONAL STATUS - GENERAL
CLIMB 3 TO 5 STEPS WITH RAILING: TOTAL
MOVING TO AND FROM BED TO CHAIR: UNABLE
SUGGESTED CMS G CODE MODIFIER DAILY ACTIVITY: CK
PERSONAL GROOMING: A LITTLE
STANDING UP FROM CHAIR USING ARMS: A LOT
TOILETING: A LOT
DRESSING REGULAR LOWER BODY CLOTHING: A LOT
EATING MEALS: A LITTLE
WALKING IN HOSPITAL ROOM: TOTAL
DRESSING REGULAR UPPER BODY CLOTHING: A LOT
SUGGESTED CMS G CODE MODIFIER MOBILITY: CL
MOBILITY SCORE: 10
DAILY ACTIVITIY SCORE: 14
HELP NEEDED FOR BATHING: A LOT
TURNING FROM BACK TO SIDE WHILE IN FLAT BAD: UNABLE

## 2023-04-20 ASSESSMENT — GAIT ASSESSMENTS
DISTANCE (FEET): 1
GAIT LEVEL OF ASSIST: CONTACT GUARD ASSIST
ASSISTIVE DEVICE: FRONT WHEEL WALKER

## 2023-04-20 ASSESSMENT — LIFESTYLE VARIABLES: SUBSTANCE_ABUSE: 0

## 2023-04-20 ASSESSMENT — ACTIVITIES OF DAILY LIVING (ADL): TOILETING: INDEPENDENT

## 2023-04-20 NOTE — PROGRESS NOTES
[facility] Medicine Daily Progress Note    Date of Service  4/20/2023    Chief Complaint  Kedar Woods is a 83 y.o. male admitted 4/18/2023 with weakness    Hospital Course  No notes on file    Interval Problem Update  Patient was seen and examined at bedside.  No acute events overnight. Patient is resting comfortably in bed and in no acute distress. Poor appetitie.      LPS   IV unasyn  Blood culture 04/18 - strep sp.   Repeat blood culture 04/20 in process    I have discussed this patient's plan of care and discharge plan at IDT rounds today with Case Management, Nursing, Nursing leadership, and other members of the IDT team.    Code Status  Full Code    Disposition  Patient is not medically cleared for discharge.   Anticipate discharge to to home with close outpatient follow-up.  I have placed the appropriate orders for post-discharge needs.    Review of Systems  Review of Systems   Constitutional:  Positive for malaise/fatigue. Negative for chills and fever.   HENT:  Negative for congestion and sore throat.    Eyes:  Negative for blurred vision and double vision.   Respiratory:  Negative for cough and shortness of breath.    Cardiovascular:  Negative for chest pain and palpitations.   Gastrointestinal:  Negative for abdominal pain, diarrhea, nausea and vomiting.   Genitourinary:  Negative for dysuria and frequency.   Musculoskeletal:  Negative for back pain and falls.   Skin:  Positive for rash.   Neurological:  Negative for seizures and headaches.   Psychiatric/Behavioral:  Negative for hallucinations and substance abuse.       Physical Exam  Temp:  [36.4 °C (97.5 °F)-36.9 °C (98.4 °F)] 36.5 °C (97.7 °F)  Pulse:  [57-69] 60  Resp:  [14-18] 15  BP: ()/(41-59) 105/49  SpO2:  [90 %-97 %] 94 %    Physical Exam  Vitals and nursing note reviewed.   Constitutional:       General: He is not in acute distress.     Appearance: He is obese. He is ill-appearing. He is not toxic-appearing.      Comments:  conversational   HENT:      Head: Normocephalic.      Right Ear: External ear normal.      Left Ear: External ear normal.      Nose: No congestion.      Mouth/Throat:      Pharynx: No oropharyngeal exudate.   Eyes:      General: No scleral icterus.     Pupils: Pupils are equal, round, and reactive to light.   Cardiovascular:      Rate and Rhythm: Normal rate and regular rhythm.      Heart sounds: No murmur heard.  Pulmonary:      Breath sounds: No wheezing.   Abdominal:      Palpations: Abdomen is soft.      Tenderness: There is no abdominal tenderness. There is no guarding or rebound.   Musculoskeletal:         General: No swelling or deformity.      Comments: Multiple wounds to left lower extremity including toes, shin   Skin:     Coloration: Skin is not jaundiced.      Findings: No bruising.   Neurological:      General: No focal deficit present.      Mental Status: He is alert.      Motor: No weakness.   Psychiatric:         Mood and Affect: Mood normal.         Behavior: Behavior normal.         Fluids    Intake/Output Summary (Last 24 hours) at 4/20/2023 1359  Last data filed at 4/20/2023 0500  Gross per 24 hour   Intake 150 ml   Output 125 ml   Net 25 ml       Laboratory  Recent Labs     04/18/23  1222 04/18/23  2352 04/20/23  0047   WBC 25.3* 22.8* 17.4*   RBC 4.74 4.17* 4.15*   HEMOGLOBIN 13.7* 11.7* 11.8*   HEMATOCRIT 42.2 37.3* 36.4*   MCV 89.0 89.4 87.7   MCH 28.9 28.1 28.4   MCHC 32.5* 31.4* 32.4*   RDW 54.7* 55.8* 55.6*   PLATELETCT 274 234 210   MPV 10.0 9.9 10.2     Recent Labs     04/18/23  1222 04/18/23  2352 04/20/23  0047   SODIUM 134* 131* 135   POTASSIUM 3.4* 4.9 4.8   CHLORIDE 106 100 102   CO2 17* 17* 18*   GLUCOSE 105* 144* 100*   BUN 27* 36* 47*   CREATININE 1.30 1.86* 1.43*   CALCIUM 6.2* 8.0* 8.0*                   Imaging  CT-CHEST,ABDOMEN,PELVIS WITH   Final Result      1.  No acute inflammatory process in the chest, abdomen or pelvis.   2.  Multiple hypodense hepatic regions measuring  up to 7.9 cm. They are indeterminate. Areas of regional hepatic steatosis and active or treated metastases are in the differential. Correlate with history of cancer, prior studies and consider further    evaluation with contrast-enhanced MRI, liver protocol.   3.  Cholelithiasis.   4.  Nonobstructing right nephrolithiasis.   5.  Moderate amount of stool in the distal colon and rectum.   6.  Mildly enlarged left inguinal lymph node, statistically likely reactive. No other enlarged nodes detected.         CT-HEAD W/O   Final Result      1.  Cerebral atrophy.      2.  White matter lucencies most consistent with small vessel ischemic change versus demyelination or gliosis.      3.  Otherwise, Head CT without contrast with no acute findings. No evidence of acute cerebral infarction, hemorrhage or mass lesion.         DX-CHEST-PORTABLE (1 VIEW)   Final Result      No acute cardiac or pulmonary abnormalities are identified.      EC-ECHOCARDIOGRAM COMPLETE W/O CONT    (Results Pending)   US-EXTREMITY ARTERY LOWER BILAT W/DOMENIC (COMBO)    (Results Pending)        Assessment/Plan  * Bacteremia  Assessment & Plan  Blood culture positive for strep sp -- await further speciation and specificity   Continue IV unasyn  Repeat cultures in process  Echo pending    ADINA (acute kidney injury) (HCC)  Assessment & Plan  Sequelae of sepsis vs decreased IVV  Cr 1.86 --> 1.43  improving    Cellulitis of left lower extremity  Assessment & Plan  WBC 25.3 --> 22.8 --> 17.4  Blood culture positive for strep sp  LPS recs  Repeat blood culture in process  Continue IV unasyn    AF (atrial fibrillation) (HCC)- (present on admission)  Assessment & Plan  Long history of  Continue Eliquis for anticoagulation.    Toxic metabolic encephalopathy- (present on admission)  Assessment & Plan  Acute toxic, metabolic encephalopathy secondary to sepsis   CT head with no acute process  improving    History of transcatheter aortic valve replacement (TAVR)- (present  on admission)  Assessment & Plan  Hx of  Last echo 2021 revealed a normal EF with normally functioning TAVR    Hypokalemia- (present on admission)  Assessment & Plan  improved    Hyponatremia- (present on admission)  Assessment & Plan  131 --> 135  improved    Hypocalcemia- (present on admission)  Assessment & Plan  Adjusted calcium for albumin is low at 7.7  Check ionized calcium  2 grams calcium gluconate ordered  Start TUMS TID   Check calcium in the morning.    Sepsis (HCC)- (present on admission)  Assessment & Plan  This is Sepsis Present on admission    Essential hypertension- (present on admission)  Assessment & Plan  Restart home Toprol with holding parameters  He is interestingly on Entresto and Jardiance outpatient which may be due to HFpEF ([facility])         VTE prophylaxis: SCDs/TEDs and therapeutic anticoagulation with eliquis    I have performed a physical exam and reviewed and updated ROS and Plan today (4/20/2023). In review of yesterday's note (4/19/2023), there are no changes except as documented above.

## 2023-04-20 NOTE — PROGRESS NOTES
Assumed care of patient. Updates received from Esa SAAVEDRA. Updated POC, call light within reach, fall precautions in place, bed alarm on and working. Bed locked, and in lowest position. Assessment completed, patient is A&Ox4, states 3/10 pain in legs.Patient instructed to call for assistance. All questions answered, no further needs at this time.

## 2023-04-20 NOTE — DISCHARGE PLANNING
Case Management Discharge Planning    Admission Date: 4/18/2023  GMLOS: 5  ALOS: 2    6-Clicks ADL Score: 14  6-Clicks Mobility Score: 10  PT and/or OT Eval ordered: Yes  Post-acute Referrals Ordered: No  Post-acute Choice Obtained: No  Has referral(s) been sent to post-acute provider:  No    Pending PT/OT evaluations for recommendations regarding placement    Anticipated Discharge Dispo: Discharge Disposition: D/T to SNF with Medicare cert in anticipation of skilled care (03)  Discharge Address: Home address ([address] [place] [address] [address] [facility])  Discharge Contact Phone Number: [phone]    DME Needed: No    Action(s) Taken: Pt discussed during interdisciplinary rounds. Repeating blood cultures. [facility] to give her a call regarding pt.     Escalations Completed: None    Medically Clear: No    Next Steps: discharge to SNF if recommended. Dr to follow up with [facility] at the [facility]. LMSW available for any questions or need to transfer to either [facility] SNF or other SNF.     Barriers to Discharge: None    Is the patient up for discharge tomorrow: Yes   Is transport arranged for discharge disposition: No    Pt discussed to need another day or two.

## 2023-04-20 NOTE — CARE PLAN
The patient is Stable - Low risk of patient condition declining or worsening    Shift Goals  Clinical Goals: Monitor vital signs and labs, Abx therapy, promote skin integrity  Patient Goals: Sleep  Family Goals: n/a      Problem: Hemodynamics  Goal: Patient's hemodynamics, fluid balance and neurologic status will be stable or improve  Outcome: Progressing     Problem: Urinary - Renal Perfusion  Goal: Ability to achieve and maintain adequate renal perfusion and functioning will improve  Outcome: Progressing  Note: Patient urinating small amounts and bladder scan being used to monitor urine amount.      Problem: Fall Risk  Goal: Patient will remain free from falls  Outcome: Progressing     Problem: Skin Integrity  Goal: Skin integrity is maintained or improved  Outcome: Progressing  Note: Barrier wipes/cream in use, waffle mattress and TAPS system in use, pillows in use for offloading pressure points, mepilex's in place, and Q2 turning in use.        Progress made toward(s) clinical / shift goals:  Progressing

## 2023-04-20 NOTE — THERAPY
Physical Therapy   Initial Evaluation     Patient Name: Kedar Woods  Age:  83 y.o., Sex:  male  Medical Record #: [id number]  Today's Date: 4/20/2023          Assessment  Patient is 83 y.o. male w/ hx of LE cellulitis.  He [facility]  Admitted w/ weakness, unable to get out of his chair.  Found to be septic.  He lives alone in a single story apartment w/ three steps to enter.  He was ambulatory w/ a cane.  Today, he is rec'd alert, in bed, agreeable to work w/ PT.  Bilateral post op shoes were donned prior to standing.  Pt is able to sit eob w/o assist w/ the use of bed features, as he has a hospital bed at home.  He is able to maintain his sitting balance w/o assist.  He is able to stand w/o assist and take several small steps to his left, at which time his knees buckled and he was assisted back to the eob, and returned to supine w/ max assist.  PT will follow and address impairments noted below.  Plan    Physical Therapy Initial Treatment Plan   Treatment Plan : Therapeutic Activities, Stair Training, Gait Training  Treatment Frequency: 3 Times per Week  Duration: Until Therapy Goals Met    DC Equipment Recommendations: Unable to determine at this time  Discharge Recommendations: Recommend post-acute placement for additional physical therapy services prior to discharge home         Objective       04/20/23 1055   Prior Living Situation   Housing / Facility 1 Story Apartment / Condo   Steps Into Home 3   Steps In Home 0   Rail Both Rail (Steps into Home)   Equipment Owned Single Point Cane;Hospital Bed   Lives with - Patient's Self Care Capacity Alone and Unable to Care For Self   Prior Level of Functional Mobility   Bed Mobility Independent   Transfer Status Independent   Ambulation Independent   Assistive Devices Used Single Point Cane   Stairs Independent   Sensation Lower Body   Comments not formally assessed due to pain/wounds, not able to elevate off  the bed   Balance Assessment   Sitting Balance (Static) Fair +   Sitting Balance (Dynamic) Fair +   Standing Balance (Static) Fair -   Standing Balance (Dynamic) Fair -   Weight Shift Sitting Fair   Weight Shift Standing Poor   Comments w/ fww   Bed Mobility    Supine to Sit Supervised   Sit to Supine Maximal Assist   Gait Analysis   Gait Level Of Assist Contact Guard Assist   Assistive Device Front Wheel Walker   Distance (Feet) 1   Deviation   (sidestepping right, knees buckled)   Weight Bearing Status wbat   Functional Mobility   Sit to Stand Standby Assist   Bed, Chair, Wheelchair Transfer Unable to Participate   Short Term Goals    Short Term Goal # 1 Pt to move sit to/from stand w/ spv in 6 visits to improve fxl indep   Short Term Goal # 2 Pt to ambulate 75 ft w/ fww and spv in 6 visits to improve fxl indep   Short Term Goal # 3 Pt to move up/down 3 steps w/ spv in 6 visits to access his home (if pt to d/c directly home)   Physical Therapy Initial Treatment Plan    Treatment Plan  Therapeutic Activities;Stair Training;Gait Training   Treatment Frequency 3 Times per Week   Duration Until Therapy Goals Met   Problem List    Problems Impaired Transfers;Impaired Ambulation;Impaired Balance;Functional Strength Deficit;Decreased Activity Tolerance   Anticipated Discharge Equipment and Recommendations   DC Equipment Recommendations Unable to determine at this time   Discharge Recommendations Recommend post-acute placement for additional physical therapy services prior to discharge home

## 2023-04-21 ENCOUNTER — APPOINTMENT (OUTPATIENT)
Dept: CARDIOLOGY | Facility: MEDICAL CENTER | Age: 83
DRG: 871 | End: 2023-04-21
Attending: INTERNAL MEDICINE
Payer: COMMERCIAL

## 2023-04-21 PROBLEM — I73.9 PAD (PERIPHERAL ARTERY DISEASE) (HCC): Status: ACTIVE | Noted: 2023-04-21

## 2023-04-21 PROBLEM — I50.32 CHRONIC HEART FAILURE WITH PRESERVED EJECTION FRACTION (HCC): Status: ACTIVE | Noted: 2023-04-21

## 2023-04-21 LAB
ALBUMIN SERPL BCP-MCNC: 2.5 G/DL (ref 3.2–4.9)
BACTERIA BLD CULT: ABNORMAL
BASOPHILS # BLD AUTO: 0.2 % (ref 0–1.8)
BASOPHILS # BLD: 0.04 K/UL (ref 0–0.12)
BUN SERPL-MCNC: 44 MG/DL (ref 8–22)
CALCIUM ALBUM COR SERPL-MCNC: 9.3 MG/DL (ref 8.5–10.5)
CALCIUM SERPL-MCNC: 8.1 MG/DL (ref 8.5–10.5)
CHLORIDE SERPL-SCNC: 103 MMOL/L (ref 96–112)
CO2 SERPL-SCNC: 16 MMOL/L (ref 20–33)
CREAT SERPL-MCNC: 1.27 MG/DL (ref 0.5–1.4)
EOSINOPHIL # BLD AUTO: 0.02 K/UL (ref 0–0.51)
EOSINOPHIL NFR BLD: 0.1 % (ref 0–6.9)
ERYTHROCYTE [DISTWIDTH] IN BLOOD BY AUTOMATED COUNT: 57.5 FL (ref 35.9–50)
ETEST SENSITIVITY ETEST: NORMAL
GFR SERPLBLD CREATININE-BSD FMLA CKD-EPI: 56 ML/MIN/1.73 M 2
GLUCOSE SERPL-MCNC: 82 MG/DL (ref 65–99)
HCT VFR BLD AUTO: 39.4 % (ref 42–52)
HGB BLD-MCNC: 12.1 G/DL (ref 14–18)
IMM GRANULOCYTES # BLD AUTO: 0.14 K/UL (ref 0–0.11)
IMM GRANULOCYTES NFR BLD AUTO: 0.9 % (ref 0–0.9)
LV EJECT FRACT  99904: 50
LV EJECT FRACT MOD 2C 99903: 45.38
LV EJECT FRACT MOD 4C 99902: 56.4
LV EJECT FRACT MOD BP 99901: 51.68
LYMPHOCYTES # BLD AUTO: 1.01 K/UL (ref 1–4.8)
LYMPHOCYTES NFR BLD: 6.3 % (ref 22–41)
MAGNESIUM SERPL-MCNC: 2.6 MG/DL (ref 1.5–2.5)
MCH RBC QN AUTO: 27.8 PG (ref 27–33)
MCHC RBC AUTO-ENTMCNC: 30.7 G/DL (ref 33.7–35.3)
MCV RBC AUTO: 90.6 FL (ref 81.4–97.8)
MONOCYTES # BLD AUTO: 1.14 K/UL (ref 0–0.85)
MONOCYTES NFR BLD AUTO: 7.1 % (ref 0–13.4)
NEUTROPHILS # BLD AUTO: 13.72 K/UL (ref 1.82–7.42)
NEUTROPHILS NFR BLD: 85.4 % (ref 44–72)
NRBC # BLD AUTO: 0.02 K/UL
NRBC BLD-RTO: 0.1 /100 WBC
PHOSPHATE SERPL-MCNC: 2.6 MG/DL (ref 2.5–4.5)
PLATELET # BLD AUTO: 210 K/UL (ref 164–446)
PMV BLD AUTO: 10.6 FL (ref 9–12.9)
POTASSIUM SERPL-SCNC: 4.7 MMOL/L (ref 3.6–5.5)
RBC # BLD AUTO: 4.35 M/UL (ref 4.7–6.1)
SIGNIFICANT IND 70042: ABNORMAL
SIGNIFICANT IND 70042: ABNORMAL
SITE SITE: ABNORMAL
SITE SITE: ABNORMAL
SODIUM SERPL-SCNC: 135 MMOL/L (ref 135–145)
SOURCE SOURCE: ABNORMAL
SOURCE SOURCE: ABNORMAL
WBC # BLD AUTO: 16.1 K/UL (ref 4.8–10.8)

## 2023-04-21 PROCEDURE — 700111 HCHG RX REV CODE 636 W/ 250 OVERRIDE (IP): Performed by: INTERNAL MEDICINE

## 2023-04-21 PROCEDURE — A9270 NON-COVERED ITEM OR SERVICE: HCPCS | Performed by: INTERNAL MEDICINE

## 2023-04-21 PROCEDURE — A9270 NON-COVERED ITEM OR SERVICE: HCPCS | Performed by: HOSPITALIST

## 2023-04-21 PROCEDURE — 99232 SBSQ HOSP IP/OBS MODERATE 35: CPT | Performed by: INTERNAL MEDICINE

## 2023-04-21 PROCEDURE — 700102 HCHG RX REV CODE 250 W/ 637 OVERRIDE(OP): Performed by: HOSPITALIST

## 2023-04-21 PROCEDURE — 85025 COMPLETE CBC W/AUTO DIFF WBC: CPT

## 2023-04-21 PROCEDURE — 36415 COLL VENOUS BLD VENIPUNCTURE: CPT

## 2023-04-21 PROCEDURE — 770001 HCHG ROOM/CARE - MED/SURG/GYN PRIV*

## 2023-04-21 PROCEDURE — 93306 TTE W/DOPPLER COMPLETE: CPT | Mod: 26 | Performed by: INTERNAL MEDICINE

## 2023-04-21 PROCEDURE — 700102 HCHG RX REV CODE 250 W/ 637 OVERRIDE(OP): Performed by: INTERNAL MEDICINE

## 2023-04-21 PROCEDURE — 83735 ASSAY OF MAGNESIUM: CPT

## 2023-04-21 PROCEDURE — 93306 TTE W/DOPPLER COMPLETE: CPT

## 2023-04-21 PROCEDURE — 700102 HCHG RX REV CODE 250 W/ 637 OVERRIDE(OP)

## 2023-04-21 PROCEDURE — 80069 RENAL FUNCTION PANEL: CPT

## 2023-04-21 PROCEDURE — 700111 HCHG RX REV CODE 636 W/ 250 OVERRIDE (IP): Performed by: HOSPITALIST

## 2023-04-21 PROCEDURE — 700105 HCHG RX REV CODE 258: Performed by: INTERNAL MEDICINE

## 2023-04-21 PROCEDURE — 770006 HCHG ROOM/CARE - MED/SURG/GYN SEMI*

## 2023-04-21 PROCEDURE — A9270 NON-COVERED ITEM OR SERVICE: HCPCS

## 2023-04-21 RX ORDER — OXYCODONE HYDROCHLORIDE 5 MG/1
2.5 TABLET ORAL EVERY 4 HOURS PRN
Status: DISCONTINUED | OUTPATIENT
Start: 2023-04-21 | End: 2023-05-02 | Stop reason: HOSPADM

## 2023-04-21 RX ADMIN — AMPICILLIN AND SULBACTAM 3 G: 1; 2 INJECTION, POWDER, FOR SOLUTION INTRAMUSCULAR; INTRAVENOUS at 17:12

## 2023-04-21 RX ADMIN — CALCIUM CARBONATE 500 MG: 500 TABLET, CHEWABLE ORAL at 12:19

## 2023-04-21 RX ADMIN — APIXABAN 5 MG: 5 TABLET, FILM COATED ORAL at 04:17

## 2023-04-21 RX ADMIN — DOCUSATE SODIUM 50 MG AND SENNOSIDES 8.6 MG 2 TABLET: 8.6; 5 TABLET, FILM COATED ORAL at 04:17

## 2023-04-21 RX ADMIN — AMPICILLIN AND SULBACTAM 3 G: 1; 2 INJECTION, POWDER, FOR SOLUTION INTRAMUSCULAR; INTRAVENOUS at 04:20

## 2023-04-21 RX ADMIN — AMPICILLIN AND SULBACTAM 3 G: 1; 2 INJECTION, POWDER, FOR SOLUTION INTRAMUSCULAR; INTRAVENOUS at 00:03

## 2023-04-21 RX ADMIN — CALCIUM CARBONATE 500 MG: 500 TABLET, CHEWABLE ORAL at 17:09

## 2023-04-21 RX ADMIN — APIXABAN 5 MG: 5 TABLET, FILM COATED ORAL at 17:09

## 2023-04-21 RX ADMIN — CALCIUM CARBONATE 500 MG: 500 TABLET, CHEWABLE ORAL at 09:12

## 2023-04-21 RX ADMIN — NYSTATIN: 100000 POWDER TOPICAL at 17:17

## 2023-04-21 RX ADMIN — NYSTATIN: 100000 POWDER TOPICAL at 09:12

## 2023-04-21 RX ADMIN — AMPICILLIN AND SULBACTAM 3 G: 1; 2 INJECTION, POWDER, FOR SOLUTION INTRAMUSCULAR; INTRAVENOUS at 12:23

## 2023-04-21 RX ADMIN — ROSUVASTATIN CALCIUM 40 MG: 20 TABLET, FILM COATED ORAL at 04:17

## 2023-04-21 RX ADMIN — OXYCODONE 2.5 MG: 5 TABLET ORAL at 04:16

## 2023-04-21 RX ADMIN — ONDANSETRON HYDROCHLORIDE 4 MG: 2 SOLUTION INTRAMUSCULAR; INTRAVENOUS at 21:47

## 2023-04-21 RX ADMIN — DOCUSATE SODIUM 50 MG AND SENNOSIDES 8.6 MG 2 TABLET: 8.6; 5 TABLET, FILM COATED ORAL at 17:09

## 2023-04-21 ASSESSMENT — ENCOUNTER SYMPTOMS
CHILLS: 0
DIARRHEA: 0
PALPITATIONS: 0
DOUBLE VISION: 0
ABDOMINAL PAIN: 0
FALLS: 0
HALLUCINATIONS: 0
BACK PAIN: 0
SHORTNESS OF BREATH: 0
NAUSEA: 0
BLURRED VISION: 0
COUGH: 0
SEIZURES: 0
FEVER: 0
SORE THROAT: 0
HEADACHES: 0
VOMITING: 0

## 2023-04-21 ASSESSMENT — PAIN DESCRIPTION - PAIN TYPE
TYPE: ACUTE PAIN
TYPE: ACUTE PAIN

## 2023-04-21 ASSESSMENT — PATIENT HEALTH QUESTIONNAIRE - PHQ9
SUM OF ALL RESPONSES TO PHQ9 QUESTIONS 1 AND 2: 0
1. LITTLE INTEREST OR PLEASURE IN DOING THINGS: NOT AT ALL
2. FEELING DOWN, DEPRESSED, IRRITABLE, OR HOPELESS: NOT AT ALL

## 2023-04-21 ASSESSMENT — LIFESTYLE VARIABLES: SUBSTANCE_ABUSE: 0

## 2023-04-21 NOTE — PROGRESS NOTES
Discussed results of pts DOMENIC and duplex with Dr Smart.   Dr Smart plans to discuss further with pts [facility] care [facility] and establish plan as pt [facility].   Pretty Parry APRN     DOMENIC 4/20/23  History:         Non-healing wounds of the calves      Limitations:                     RIGHT      Waveform            Systolic BPs (mmHg)                              112           Brachial   Triphasic                                Common Femoral   Biphasic                                 Popliteal   Biphasic                                 Posterior Tibial   Triphasic                                Dorsalis Pedis   Normal                     85            Digit                                            DOMENIC                              0.76          TBI                           LEFT   Waveform        Systolic BPs (mmHg)                              111           Brachial   Triphasic                                Common Femoral   Triphasic                                Popliteal   Biphasic                                 Posterior Tibial   Hyperemic                                Dorsalis Pedis   Diminished                 44            Digit                                            DOMENIC                              0.39          TBI         Findings   Right.    The toe-brachial index is normal.    Doppler waveforms of the common femoral, popliteal, posterior tibial, and    dorsalis pedis arteries are of high amplitude and multiphasic.    The 1st digit PPG waveform is normal.       Left.    The toe-brachial index is abnormally reduced.    The 1st digit PPG waveform is dampened and of low amplitude.    Doppler waveforms of the common femoral, popliteal, and posterior tibial    arteries are of high amplitude and multiphasic.    Doppler waveforms of the dorsalis pedis artery are hyperemic.       Ankle brachial indices not performed due to non-compressibility on prior    exam.       An  arterial duplex was performed in accordance with lower extremity    arterial evaluation protocol - see separate report.     4/20/23 Left Duplex     LEFT   Waveform        Peak Systolic Velocity (cm/s)                   Prox    Prox-Mid  Mid    Mid-Dist  Distal   Triphasic                                          111     CFA         Triphasic       43                                         PFA         Triphasic       80                80               66      SFA         Triphasic       55                                         POP         Hyperemic       132                                62      AT         Hyperemic       25                                 30      PT         Hyperemic       33                                 58      POPEYE               FINDINGS   Left.    There is very mild atherosclerotic plaque seen. Flow velocities and    waveforms are normal through the popliteal artery.    Three vessel runoff to the ankle with hyperemic flow.

## 2023-04-21 NOTE — THERAPY
Occupational Therapy   Initial Evaluation     Patient Name: Kedar Woods  Age:  83 y.o., Sex:  male  Medical Record #: [id number]  Today's Date: 4/20/2023     Precautions: Fall Risk    Assessment    Patient is 83 y.o. male admitted with weakness, chronic bilateral lower extremity wounds. Pt presents to OT [facility] below his self-reported baseline of functional independence for ADLs. Pt lives alone and [facility] Pt currently unable to care for himself d/t weakness, will benefit from post-acute placement for additional therapy prior to DC home.     Plan    Occupational Therapy Initial Treatment Plan   Treatment Interventions: Self Care / Activities of Daily Living, Adaptive Equipment, Therapeutic Activity, Therapeutic Exercises  Treatment Frequency: 3 Times per Week  Duration: Until Therapy Goals Met    DC Equipment Recommendations: Unable to determine at this time  Discharge Recommendations: Recommend post-acute placement for additional occupational therapy services prior to discharge home      Objective       04/20/23 1039   Prior Living Situation   Prior Services Skilled Home Health Services   Housing / Facility 1 Story Apartment / Condo   Bathroom Set up   (sponge bathes 2/2 chronic wounds)   Equipment Owned Single Point Cane;Hospital Bed   Lives with - Patient's Self Care Capacity Alone and Unable to Care For Self   Prior Level of ADL Function   Self Feeding Independent   Grooming / Hygiene Independent   Bathing Independent  (sponge bathes)   Dressing Independent   Toileting Independent   Comments pt reports independence   Precautions   Precautions Fall Risk   Pain 0 - 10 Group   Therapist Pain Assessment Post Activity Pain Same as Prior to Activity;0;Nurse Notified   Cognition    Comments cooperative   Strength Upper Body   Upper Body Strength  WDL   Upper Body Muscle Tone   Upper Body Muscle Tone  WDL   Coordination Upper Body   Coordination WDL   Balance Assessment    Sitting Balance (Static) Fair +   Sitting Balance (Dynamic) Fair +   Standing Balance (Static) Fair -   Standing Balance (Dynamic) Fair -   Weight Shift Sitting Fair   Weight Shift Standing Poor   Bed Mobility    Supine to Sit Supervised   Sit to Supine Maximal Assist   Scooting Maximal Assist   ADL Assessment   Eating Supervision   Grooming Minimal Assist;Seated   Upper Body Dressing Moderate Assist   Lower Body Dressing Maximal Assist   Toileting Maximal Assist   How much help from another person does the patient currently need...   Putting on and taking off regular lower body clothing? 2   Bathing (including washing, rinsing, and drying)? 2   Toileting, which includes using a toilet, bedpan, or urinal? 2   Putting on and taking off regular upper body clothing? 2   Taking care of personal grooming such as brushing teeth? 3   Eating meals? 3   6 Clicks Daily Activity Score 14   Functional Mobility   Sit to Stand Contact Guard Assist   Bed, Chair, Wheelchair Transfer Unable to Participate   Transfer Method Stand Step   Mobility FWW   Activity Tolerance   Sitting in Chair unable   Sitting Edge of Bed 15min   Standing 3min   Short Term Goals   Short Term Goal # 1 pt will complete LB dress with AE PRN and Pool   Short Term Goal # 2 pt will complete toileting ADL at SPV level   Short Term Goal # 3 pt will complete functional transfers at SPV level   Education Group   Education Provided Activities of Daily Living;Role of Occupational Therapist   Role of Occupational Therapist Patient Response Patient;Acceptance;Explanation;Verbal Demonstration   ADL Patient Response Patient;Acceptance;Explanation;Verbal Demonstration   Occupational Therapy Initial Treatment Plan    Treatment Interventions Self Care / Activities of Daily Living;Adaptive Equipment;Therapeutic Activity;Therapeutic Exercises   Treatment Frequency 3 Times per Week   Duration Until Therapy Goals Met   Problem List   Problem List Decreased Active Daily Living  Skills;Decreased Homemaking Skills;Decreased Functional Mobility;Decreased Activity Tolerance;Safety Awareness Deficits / Cognition;Limited Knowledge of Post Op Precautions;Impaired Postural Control / Balance   Anticipated Discharge Equipment and Recommendations   DC Equipment Recommendations Unable to determine at this time   Discharge Recommendations Recommend post-acute placement for additional occupational therapy services prior to discharge home

## 2023-04-21 NOTE — PROGRESS NOTES
Hospital Medicine Daily Progress Note    Date of Service  4/21/2023    Chief Complaint  Kedar Woods is a 83 y.o. male admitted 4/18/2023 with weakness    Hospital Course  No notes on file    Interval Problem Update  Patient was seen and examined at bedside.  No acute events overnight. Patient is resting comfortably in bed and in no acute distress. Discussed with [facility] home director Dr. Reza ([phone]). [facility] able to to accept at discharge, can manage IV antibiotics if deemed necessary.     LPS   IV unasyn  Blood culture 04/18 - strep group G  Await 2D echo  Consult to ID    I have discussed this patient's plan of care and discharge plan at IDT rounds today with Case Management, Nursing, Nursing leadership, and other members of the IDT team.    Code Status  Full Code    Disposition  Patient is not medically cleared for discharge.   Anticipate discharge to to home with close outpatient follow-up.  I have placed the appropriate orders for post-discharge needs.    Review of Systems  Review of Systems   Constitutional:  Positive for malaise/fatigue. Negative for chills and fever.   HENT:  Negative for congestion and sore throat.    Eyes:  Negative for blurred vision and double vision.   Respiratory:  Negative for cough and shortness of breath.    Cardiovascular:  Negative for chest pain and palpitations.   Gastrointestinal:  Negative for abdominal pain, diarrhea, nausea and vomiting.   Genitourinary:  Negative for dysuria and frequency.   Musculoskeletal:  Negative for back pain and falls.   Skin:  Positive for rash.   Neurological:  Negative for seizures and headaches.   Psychiatric/Behavioral:  Negative for hallucinations and substance abuse.       Physical Exam  Temp:  [36.4 °C (97.5 °F)-36.6 °C (97.9 °F)] 36.6 °C (97.9 °F)  Pulse:  [64-67] 67  Resp:  [16-20] 20  BP: (115-123)/(56-62) 123/61  SpO2:  [92 %-94 %] 92 %    Physical Exam  Vitals and nursing note reviewed.    Constitutional:       General: He is not in acute distress.     Appearance: He is obese. He is ill-appearing. He is not toxic-appearing.      Comments: conversational   HENT:      Head: Normocephalic.      Right Ear: External ear normal.      Left Ear: External ear normal.      Nose: No congestion.      Mouth/Throat:      Pharynx: No oropharyngeal exudate.   Eyes:      General: No scleral icterus.     Pupils: Pupils are equal, round, and reactive to light.   Cardiovascular:      Rate and Rhythm: Normal rate and regular rhythm.      Heart sounds: No murmur heard.  Pulmonary:      Breath sounds: No wheezing.   Abdominal:      Palpations: Abdomen is soft.      Tenderness: There is no abdominal tenderness. There is no guarding or rebound.   Musculoskeletal:         General: No swelling or deformity.      Comments: Multiple wounds to left lower extremity including toes, shin   Skin:     Coloration: Skin is not jaundiced.      Findings: No bruising.   Neurological:      General: No focal deficit present.      Mental Status: He is alert.      Motor: No weakness.   Psychiatric:         Mood and Affect: Mood normal.         Behavior: Behavior normal.         Fluids    Intake/Output Summary (Last 24 hours) at 4/21/2023 1608  Last data filed at 4/21/2023 0830  Gross per 24 hour   Intake --   Output 150 ml   Net -150 ml       Laboratory  Recent Labs     04/18/23 2352 04/20/23 0047 04/21/23 0229   WBC 22.8* 17.4* 16.1*   RBC 4.17* 4.15* 4.35*   HEMOGLOBIN 11.7* 11.8* 12.1*   HEMATOCRIT 37.3* 36.4* 39.4*   MCV 89.4 87.7 90.6   MCH 28.1 28.4 27.8   MCHC 31.4* 32.4* 30.7*   RDW 55.8* 55.6* 57.5*   PLATELETCT 234 210 210   MPV 9.9 10.2 10.6     Recent Labs     04/18/23 2352 04/20/23 0047 04/21/23 0229   SODIUM 131* 135 135   POTASSIUM 4.9 4.8 4.7   CHLORIDE 100 102 103   CO2 17* 18* 16*   GLUCOSE 144* 100* 82   BUN 36* 47* 44*   CREATININE 1.86* 1.43* 1.27   CALCIUM 8.0* 8.0* 8.1*                   Imaging  US-EXTREMITY  ARTERY LOWER UNILAT LEFT   Final Result      US-DOMENIC SINGLE LEVEL BILAT   Final Result      CT-CHEST,ABDOMEN,PELVIS WITH   Final Result      1.  No acute inflammatory process in the chest, abdomen or pelvis.   2.  Multiple hypodense hepatic regions measuring up to 7.9 cm. They are indeterminate. Areas of regional hepatic steatosis and active or treated metastases are in the differential. Correlate with history of cancer, prior studies and consider further    evaluation with contrast-enhanced MRI, liver protocol.   3.  Cholelithiasis.   4.  Nonobstructing right nephrolithiasis.   5.  Moderate amount of stool in the distal colon and rectum.   6.  Mildly enlarged left inguinal lymph node, statistically likely reactive. No other enlarged nodes detected.         CT-HEAD W/O   Final Result      1.  Cerebral atrophy.      2.  White matter lucencies most consistent with small vessel ischemic change versus demyelination or gliosis.      3.  Otherwise, Head CT without contrast with no acute findings. No evidence of acute cerebral infarction, hemorrhage or mass lesion.         DX-CHEST-PORTABLE (1 VIEW)   Final Result      No acute cardiac or pulmonary abnormalities are identified.      EC-ECHOCARDIOGRAM COMPLETE W/O CONT    (Results Pending)        Assessment/Plan  * Bacteremia  Assessment & Plan  Blood culture positive for strep group G  Repeat cultures negative x1 day  Echo pending  Consult placed to ID, discussed case with Dr. Barrios via voalte  Continue IV unasyn    ADINA (acute kidney injury) (HCC)  Assessment & Plan  Sequelae of sepsis vs decreased IVV  Cr 1.86 --> 1.43 --> 1.27  improving    Cellulitis of left lower extremity  Assessment & Plan  WBC 25.3 --> 22.8 --> 17.4 --> 16.1  Blood culture positive for strep group G  LPS recs  Repeat blood culture negative x1 day  Continue IV unasyn  Consult placed to ID, discussed with Dr. Barrios via voalte    Chronic heart failure with preserved ejection fraction  ([facility])  Assessment & Plan  Stable  Entresto, jardiance  bumex held for ADINA  Restart BB    PAD (peripheral artery disease) ([facility])  Assessment & Plan  DOMENIC noted  [facility]    AF (atrial fibrillation) ([facility])- (present on admission)  Assessment & Plan  Long history of  Continue Eliquis for anticoagulation.    Toxic metabolic encephalopathy- (present on admission)  Assessment & Plan  Acute toxic, metabolic encephalopathy secondary to sepsis   CT head with no acute process  improving    History of transcatheter aortic valve replacement (TAVR)- (present on admission)  Assessment & Plan  Hx of  Last echo 2021 revealed a normal EF with normally functioning TAVR    Hypokalemia- (present on admission)  Assessment & Plan  improved    Hyponatremia- (present on admission)  Assessment & Plan  131 --> 135  improved    Hypocalcemia- (present on admission)  Assessment & Plan  Adjusted calcium for albumin is low at 7.7  Check ionized calcium  2 grams calcium gluconate ordered  Start TUMS TID   Check calcium in the morning.    Sepsis ([facility])- (present on admission)  Assessment & Plan  This is Sepsis Present on admission    Essential hypertension- (present on admission)  Assessment & Plan  Restart home Toprol with holding parameters  Entresto and Jardiance outpatient          VTE prophylaxis: SCDs/TEDs and therapeutic anticoagulation with eliquis    I have performed a physical exam and reviewed and updated ROS and Plan today (4/21/2023). In review of yesterday's note (4/20/2023), there are no changes except as documented above.

## 2023-04-21 NOTE — WOUND TEAM
Wound reconsult received regarding patient sacrum. Patient sacrum with known POA DTI and surrounding MASD, patient was evaluated by wound Rn on 4/19/23, please refer to the wound team note. Area was already beginning to open and is expected to evolve. Patient is on weekly follow up list. Skin care orders in place, bedside RN's to continue barrier paste. Wound team already following, please call for questions.

## 2023-04-21 NOTE — CARE PLAN
The patient is Stable - Low risk of patient condition declining or worsening    Shift Goals  Clinical Goals: q2h turns, monitor labs  Patient Goals: rest  Family Goals: n/a    Progress made toward(s) clinical / shift goals:    Problem: Knowledge Deficit - Standard  Goal: Patient and family/care givers will demonstrate understanding of plan of care, disease process/condition, diagnostic tests and medications  Outcome: Progressing     Problem: Hemodynamics  Goal: Patient's hemodynamics, fluid balance and neurologic status will be stable or improve  Outcome: Progressing     Problem: Fluid Volume  Goal: Fluid volume balance will be maintained  Outcome: Progressing     Problem: Respiratory  Goal: Patient will achieve/maintain optimum respiratory ventilation and gas exchange  Outcome: Progressing       Patient is not progressing towards the following goals:

## 2023-04-21 NOTE — DOCUMENTATION QUERY
[facility]                                                                       Query Response Note      PATIENT:               JAMISON ZAYAS  ACCT #:                  [id number]  MRN:                     [id number]  :                      1940  ADMIT DATE:       2023 11:57 AM  DISCH DATE:          RESPONDING  PROVIDER #:        [id number]           QUERY TEXT:    Documentation in the medical record indicates that this patient has been diagnosed with sepsis due to BLE cellulitis.  Additional findings documented in the medical record include: ADINA    Can the acuity of sepsis be further clarified based on the above clinical indicators?      The patient's Clinical Indicators include:  Findings:  --Patient admitted for BLE cellulitis  --Per H&P, ''Sepsis POA, SIRS criteria identified on my evaluation include: Leukocytosis, with WBC greater      than 12,000. Source is likely BLE cellulitis'' stated  --Per PN , ''ADINA, sequelae of sepsis'' stated  --Labs from :  BUN 47, Creatinine 1.43, GFR 49    Treatment:  --Labs including kidney function  --IV Unasyn  --IVF    Risk Factors:  --Toxic encephalopathy  --Cellulitis of BLE  --ADINA  --CHF    Thank you,  Natasha Hauser RN, BSN  Clinical   Connect via [facility]  Options provided:   -- Sepsis with associated organ failure of ADINA   -- Severe Sepsis in the presence of ADINA   -- Sepsis Only, not Severe Sepsis   -- Other explanation, please specify   -- Unable to determine      Query created by: Natasha Hauser on 2023 10:35 AM    RESPONSE TEXT:    Sepsis with associated organ failure of ADINA          Electronically signed by:  STEPHANIE CAMACHO MD 2023 1:16 PM

## 2023-04-21 NOTE — CONSULTS
Reviewed chart including vitals and labs, patient appears stable, ongoing leukocytosis and ADINA but improving.  Patient with group G strep bacteremia.  CT with indeterminant liver lesions.    --- Continue Unasyn 3 g every  6 hours  --- Follow-up repeat blood cultures, no growth to date    ID will see patient in the a..    Madison Barrios MD

## 2023-04-21 NOTE — DISCHARGE PLANNING
Case Management Discharge Planning    Admission Date: 4/18/2023  GMLOS: 5  ALOS: 3    6-Clicks ADL Score: 14  6-Clicks Mobility Score: 10  PT and/or OT Eval ordered: Yes  Post-acute Referrals Ordered: Yes  Post-acute Choice Obtained: Yes  Has referral(s) been sent to post-acute provider:  Yes      Anticipated Discharge Dispo: Discharge Disposition: D/T to SNF with Medicare cert in anticipation of skilled care (03)  Discharge Address: Home address ([address] [place] [address] [address] [facility])  Discharge Contact Phone Number: [phone]    DME Needed: No    Action(s) Taken: [facility]. [facility] know [facility] are able [facility] in the home. Sussy stated their program does not offer PT/OT.       Escalations Completed: None    Medically Clear: No    Next Steps: PT/OT recommending SNF. If pt can go home with [facility] program, contact Sussy at ([phone]) but likely will need SNF. Pending SNF    Barriers to Discharge: None

## 2023-04-21 NOTE — ASSESSMENT & PLAN NOTE
DOMENIC noted  [facility]   Where would you like me to schedule patient?

## 2023-04-22 PROBLEM — K76.9 LIVER LESION: Status: ACTIVE | Noted: 2023-04-22

## 2023-04-22 LAB
ALBUMIN SERPL BCP-MCNC: 2.6 G/DL (ref 3.2–4.9)
BASOPHILS # BLD AUTO: 0.4 % (ref 0–1.8)
BASOPHILS # BLD: 0.06 K/UL (ref 0–0.12)
BUN SERPL-MCNC: 38 MG/DL (ref 8–22)
CALCIUM ALBUM COR SERPL-MCNC: 9.3 MG/DL (ref 8.5–10.5)
CALCIUM SERPL-MCNC: 8.2 MG/DL (ref 8.5–10.5)
CHLORIDE SERPL-SCNC: 104 MMOL/L (ref 96–112)
CO2 SERPL-SCNC: 19 MMOL/L (ref 20–33)
CREAT SERPL-MCNC: 1.07 MG/DL (ref 0.5–1.4)
EOSINOPHIL # BLD AUTO: 0.04 K/UL (ref 0–0.51)
EOSINOPHIL NFR BLD: 0.3 % (ref 0–6.9)
ERYTHROCYTE [DISTWIDTH] IN BLOOD BY AUTOMATED COUNT: 56.2 FL (ref 35.9–50)
GFR SERPLBLD CREATININE-BSD FMLA CKD-EPI: 69 ML/MIN/1.73 M 2
GLUCOSE SERPL-MCNC: 102 MG/DL (ref 65–99)
HCT VFR BLD AUTO: 37.8 % (ref 42–52)
HGB BLD-MCNC: 12.2 G/DL (ref 14–18)
IMM GRANULOCYTES # BLD AUTO: 0.21 K/UL (ref 0–0.11)
IMM GRANULOCYTES NFR BLD AUTO: 1.3 % (ref 0–0.9)
LYMPHOCYTES # BLD AUTO: 1.21 K/UL (ref 1–4.8)
LYMPHOCYTES NFR BLD: 7.6 % (ref 22–41)
MAGNESIUM SERPL-MCNC: 2.6 MG/DL (ref 1.5–2.5)
MCH RBC QN AUTO: 28.4 PG (ref 27–33)
MCHC RBC AUTO-ENTMCNC: 32.3 G/DL (ref 33.7–35.3)
MCV RBC AUTO: 88.1 FL (ref 81.4–97.8)
MONOCYTES # BLD AUTO: 1.24 K/UL (ref 0–0.85)
MONOCYTES NFR BLD AUTO: 7.8 % (ref 0–13.4)
NEUTROPHILS # BLD AUTO: 13.22 K/UL (ref 1.82–7.42)
NEUTROPHILS NFR BLD: 82.6 % (ref 44–72)
NRBC # BLD AUTO: 0 K/UL
NRBC BLD-RTO: 0 /100 WBC
PHOSPHATE SERPL-MCNC: 2.4 MG/DL (ref 2.5–4.5)
PLATELET # BLD AUTO: 221 K/UL (ref 164–446)
PMV BLD AUTO: 10.9 FL (ref 9–12.9)
POTASSIUM SERPL-SCNC: 4.4 MMOL/L (ref 3.6–5.5)
RBC # BLD AUTO: 4.29 M/UL (ref 4.7–6.1)
SODIUM SERPL-SCNC: 137 MMOL/L (ref 135–145)
WBC # BLD AUTO: 16 K/UL (ref 4.8–10.8)

## 2023-04-22 PROCEDURE — A9270 NON-COVERED ITEM OR SERVICE: HCPCS

## 2023-04-22 PROCEDURE — 700111 HCHG RX REV CODE 636 W/ 250 OVERRIDE (IP): Performed by: INTERNAL MEDICINE

## 2023-04-22 PROCEDURE — 99233 SBSQ HOSP IP/OBS HIGH 50: CPT | Performed by: STUDENT IN AN ORGANIZED HEALTH CARE EDUCATION/TRAINING PROGRAM

## 2023-04-22 PROCEDURE — 36415 COLL VENOUS BLD VENIPUNCTURE: CPT

## 2023-04-22 PROCEDURE — A9270 NON-COVERED ITEM OR SERVICE: HCPCS | Performed by: HOSPITALIST

## 2023-04-22 PROCEDURE — 83735 ASSAY OF MAGNESIUM: CPT

## 2023-04-22 PROCEDURE — 700102 HCHG RX REV CODE 250 W/ 637 OVERRIDE(OP): Performed by: HOSPITALIST

## 2023-04-22 PROCEDURE — 80069 RENAL FUNCTION PANEL: CPT

## 2023-04-22 PROCEDURE — 770006 HCHG ROOM/CARE - MED/SURG/GYN SEMI*

## 2023-04-22 PROCEDURE — 85025 COMPLETE CBC W/AUTO DIFF WBC: CPT

## 2023-04-22 PROCEDURE — 700102 HCHG RX REV CODE 250 W/ 637 OVERRIDE(OP)

## 2023-04-22 PROCEDURE — 700102 HCHG RX REV CODE 250 W/ 637 OVERRIDE(OP): Performed by: INTERNAL MEDICINE

## 2023-04-22 PROCEDURE — A9270 NON-COVERED ITEM OR SERVICE: HCPCS | Performed by: INTERNAL MEDICINE

## 2023-04-22 PROCEDURE — 700105 HCHG RX REV CODE 258: Performed by: INTERNAL MEDICINE

## 2023-04-22 RX ADMIN — APIXABAN 5 MG: 5 TABLET, FILM COATED ORAL at 05:28

## 2023-04-22 RX ADMIN — CALCIUM CARBONATE 500 MG: 500 TABLET, CHEWABLE ORAL at 07:49

## 2023-04-22 RX ADMIN — DOCUSATE SODIUM 50 MG AND SENNOSIDES 8.6 MG 2 TABLET: 8.6; 5 TABLET, FILM COATED ORAL at 18:00

## 2023-04-22 RX ADMIN — AMPICILLIN AND SULBACTAM 3 G: 1; 2 INJECTION, POWDER, FOR SOLUTION INTRAMUSCULAR; INTRAVENOUS at 05:32

## 2023-04-22 RX ADMIN — AMPICILLIN AND SULBACTAM 3 G: 1; 2 INJECTION, POWDER, FOR SOLUTION INTRAMUSCULAR; INTRAVENOUS at 11:49

## 2023-04-22 RX ADMIN — SCOPALAMINE 1 PATCH: 1 PATCH, EXTENDED RELEASE TRANSDERMAL at 04:08

## 2023-04-22 RX ADMIN — AMPICILLIN AND SULBACTAM 3 G: 1; 2 INJECTION, POWDER, FOR SOLUTION INTRAMUSCULAR; INTRAVENOUS at 17:18

## 2023-04-22 RX ADMIN — NYSTATIN: 100000 POWDER TOPICAL at 05:28

## 2023-04-22 RX ADMIN — ROSUVASTATIN CALCIUM 40 MG: 20 TABLET, FILM COATED ORAL at 05:28

## 2023-04-22 RX ADMIN — DOCUSATE SODIUM 50 MG AND SENNOSIDES 8.6 MG 2 TABLET: 8.6; 5 TABLET, FILM COATED ORAL at 05:28

## 2023-04-22 RX ADMIN — APIXABAN 5 MG: 5 TABLET, FILM COATED ORAL at 17:18

## 2023-04-22 RX ADMIN — NYSTATIN: 100000 POWDER TOPICAL at 17:18

## 2023-04-22 RX ADMIN — AMPICILLIN AND SULBACTAM 3 G: 1; 2 INJECTION, POWDER, FOR SOLUTION INTRAMUSCULAR; INTRAVENOUS at 00:15

## 2023-04-22 ASSESSMENT — PAIN DESCRIPTION - PAIN TYPE
TYPE: ACUTE PAIN
TYPE: ACUTE PAIN

## 2023-04-22 ASSESSMENT — COGNITIVE AND FUNCTIONAL STATUS - GENERAL
PERSONAL GROOMING: A LITTLE
MOVING FROM LYING ON BACK TO SITTING ON SIDE OF FLAT BED: UNABLE
MOBILITY SCORE: 8
HELP NEEDED FOR BATHING: A LOT
EATING MEALS: A LITTLE
STANDING UP FROM CHAIR USING ARMS: A LOT
EATING MEALS: A LITTLE
MOVING TO AND FROM BED TO CHAIR: UNABLE
STANDING UP FROM CHAIR USING ARMS: A LOT
WALKING IN HOSPITAL ROOM: A LOT
PERSONAL GROOMING: A LITTLE
TOILETING: A LOT
DRESSING REGULAR LOWER BODY CLOTHING: A LOT
TURNING FROM BACK TO SIDE WHILE IN FLAT BAD: UNABLE
CLIMB 3 TO 5 STEPS WITH RAILING: TOTAL
SUGGESTED CMS G CODE MODIFIER DAILY ACTIVITY: CK
TURNING FROM BACK TO SIDE WHILE IN FLAT BAD: UNABLE
TOILETING: A LOT
CLIMB 3 TO 5 STEPS WITH RAILING: TOTAL
MOVING TO AND FROM BED TO CHAIR: UNABLE
MOBILITY SCORE: 7
SUGGESTED CMS G CODE MODIFIER MOBILITY: CM
DRESSING REGULAR LOWER BODY CLOTHING: A LOT
HELP NEEDED FOR BATHING: A LOT
SUGGESTED CMS G CODE MODIFIER MOBILITY: CM
DRESSING REGULAR UPPER BODY CLOTHING: A LOT
WALKING IN HOSPITAL ROOM: TOTAL
DAILY ACTIVITIY SCORE: 14
MOVING FROM LYING ON BACK TO SITTING ON SIDE OF FLAT BED: UNABLE

## 2023-04-22 NOTE — PROGRESS NOTES
2100: assumed care of patient transferred from T8. Skin check and assessment complete. Patient arrived with nausea. This RN administered zofran iv for patient's nausea and vomiting. Patient now resting comfortably with no other complaints at this time. Hourly rounding and fall precautions are in place. Bedside table and call light are within reach.

## 2023-04-22 NOTE — CARE PLAN
The patient is Stable - Low risk of patient condition declining or worsening    Shift Goals  Clinical Goals: wound care, abx  Patient Goals: comfort, rest  Family Goals: n/a    Progress made toward(s) clinical / shift goals:    Problem: Skin Integrity  Goal: Skin integrity is maintained or improved  4/22/2023 1611 by Nataly Wallace R.N.  Note: Dressing to BLE CDI   Barrier paste applied to sacral wound   On waffle mattress, q 2h turns   4/22/2023 1610 by Nataly Wallace R.N.  Outcome: Progressing     Problem: Fall Risk  Goal: Patient will remain free from falls  Outcome: Progressing     Problem: Hemodynamics  Goal: Patient's hemodynamics, fluid balance and neurologic status will be stable or improve  4/22/2023 1611 by ROQUE DesaiN.  Outcome: Progressing  Note: VSS  4/22/2023 1610 by Nataly Wallace R.N.  Outcome: Progressing     Problem: Knowledge Deficit - Standard  Goal: Patient and family/care givers will demonstrate understanding of plan of care, disease process/condition, diagnostic tests and medications  Outcome: Progressing       Patient is not progressing towards the following goals:

## 2023-04-22 NOTE — PROGRESS NOTES
Hospital Medicine Daily Progress Note    Date of Service  4/22/2023    Chief Complaint  Kedar Woods is a 83 y.o. male admitted 4/18/2023 with weakness    Hospital Course  Patient with past medical history of TAVR A-fib on Eliquis therapy, hypertension heart failure with preserved recent fraction presented with cellulitis.  Blood culture growing G strep bacteremia.  ID was consulted for further recommendation    Interval Problem Update  4/22/2023'  Blood pressure remained stable, remained afebrile  AOx3  Labs noted leukocytosis 16  Renal function improving  Repeat blood culture negative so far  Discussed CT finding showing liver lesion with the patient.   Eventually need MRI liver protocol    Continue IV antibiotic  Follow repeat blood culture    I have discussed this patient's plan of care and discharge plan at IDT rounds today with Case Management, Nursing, Nursing leadership, and other members of the IDT team.    Consultants/Specialty  infectious disease    Code Status  Full Code    Disposition  Patient is not medically cleared for discharge.   Anticipate discharge to  TBD .  I have placed the appropriate orders for post-discharge needs.    Review of Systems  Review of Systems   Constitutional:  Positive for malaise/fatigue.      Physical Exam  Temp:  [36.2 °C (97.1 °F)-36.7 °C (98.1 °F)] 36.3 °C (97.3 °F)  Pulse:  [60-75] 60  Resp:  [16-20] 18  BP: ()/(35-70) 132/63  SpO2:  [91 %-94 %] 91 %    Physical Exam  Musculoskeletal:      Comments: Bilateral lower extremity wound covered with clean dressing       Fluids    Intake/Output Summary (Last 24 hours) at 4/22/2023 1302  Last data filed at 4/22/2023 0614  Gross per 24 hour   Intake 1150.56 ml   Output --   Net 1150.56 ml       Laboratory  Recent Labs     04/20/23  0047 04/21/23  0229 04/22/23  0101   WBC 17.4* 16.1* 16.0*   RBC 4.15* 4.35* 4.29*   HEMOGLOBIN 11.8* 12.1* 12.2*   HEMATOCRIT 36.4* 39.4* 37.8*   MCV 87.7 90.6 88.1   MCH 28.4 27.8  28.4   MCHC 32.4* 30.7* 32.3*   RDW 55.6* 57.5* 56.2*   PLATELETCT 210 210 221   MPV 10.2 10.6 10.9     Recent Labs     04/20/23  0047 04/21/23  0229 04/22/23  0101   SODIUM 135 135 137   POTASSIUM 4.8 4.7 4.4   CHLORIDE 102 103 104   CO2 18* 16* 19*   GLUCOSE 100* 82 102*   BUN 47* 44* 38*   CREATININE 1.43* 1.27 1.07   CALCIUM 8.0* 8.1* 8.2*                   Imaging  EC-ECHOCARDIOGRAM COMPLETE W/O CONT   Final Result      US-EXTREMITY ARTERY LOWER UNILAT LEFT   Final Result      US-DOMENIC SINGLE LEVEL BILAT   Final Result      CT-CHEST,ABDOMEN,PELVIS WITH   Final Result      1.  No acute inflammatory process in the chest, abdomen or pelvis.   2.  Multiple hypodense hepatic regions measuring up to 7.9 cm. They are indeterminate. Areas of regional hepatic steatosis and active or treated metastases are in the differential. Correlate with history of cancer, prior studies and consider further    evaluation with contrast-enhanced MRI, liver protocol.   3.  Cholelithiasis.   4.  Nonobstructing right nephrolithiasis.   5.  Moderate amount of stool in the distal colon and rectum.   6.  Mildly enlarged left inguinal lymph node, statistically likely reactive. No other enlarged nodes detected.         CT-HEAD W/O   Final Result      1.  Cerebral atrophy.      2.  White matter lucencies most consistent with small vessel ischemic change versus demyelination or gliosis.      3.  Otherwise, Head CT without contrast with no acute findings. No evidence of acute cerebral infarction, hemorrhage or mass lesion.         DX-CHEST-PORTABLE (1 VIEW)   Final Result      No acute cardiac or pulmonary abnormalities are identified.           Assessment/Plan  * Bacteremia  Assessment & Plan  Blood culture positive for strep group G  Repeat cultures negative x1 day  Echo pending  Consult placed to ID, discussed case with Dr. Barrios via voalte  Continue IV unasyn    Chronic heart failure with preserved ejection fraction (HCC)  Assessment &  Plan  Stable  Entresto, jardiance  bumex held for ADINA  Restart BB    PAD (peripheral artery disease) ([facility])  Assessment & Plan  DOMENIC noted  [facility]    ADINA (acute kidney injury) ([facility])  Assessment & Plan  Sequelae of sepsis vs decreased IVV  Cr 1.86 --> 1.43 --> 1.27  improving    Cellulitis of left lower extremity  Assessment & Plan  WBC 25.3 --> 22.8 --> 17.4 --> 16.1  Blood culture positive for strep group G  LPS recs  Repeat blood culture negative x1 day  Continue IV unasyn  Consult placed to ID, discussed with Dr. Barrios via voalte    AF (atrial fibrillation) ([facility])- (present on admission)  Assessment & Plan  Long history of  Continue Eliquis for anticoagulation.    Hypokalemia- (present on admission)  Assessment & Plan  improved    Hyponatremia- (present on admission)  Assessment & Plan  131 --> 135  improved    History of transcatheter aortic valve replacement (TAVR)- (present on admission)  Assessment & Plan  Hx of  Last echo 2021 revealed a normal EF with normally functioning TAVR    Toxic metabolic encephalopathy- (present on admission)  Assessment & Plan  Acute toxic, metabolic encephalopathy secondary to sepsis   CT head with no acute process  improving    Hypocalcemia- (present on admission)  Assessment & Plan  Adjusted calcium for albumin is low at 7.7  Check ionized calcium  2 grams calcium gluconate ordered  Start TUMS TID   Check calcium in the morning.    Sepsis ([facility])- (present on admission)  Assessment & Plan  This is Sepsis Present on admission    Essential hypertension- (present on admission)  Assessment & Plan  Restart home Toprol with holding parameters  Entresto and Jardiance outpatient          VTE prophylaxis: SCDs/TEDs and therapeutic anticoagulation with eliquis    I have performed a physical exam and reviewed and updated ROS and Plan today (4/22/2023). In review of yesterday's note (4/21/2023), there are no changes except as documented above.

## 2023-04-22 NOTE — PROGRESS NOTES
4 Eyes Skin Assessment Completed by QUENTIN Orellana and QUENTIN Liriano.    Head Scab  Ears WDL  Nose WDL  Mouth WDL  Neck WDL  Breast/Chest WDL  Shoulder Blades WDL  Spine Redness and Blanching  (R) Arm/Elbow/Hand Redness, Blanching, Bruising, Scab, and Scar  (L) Arm/Elbow/Hand Redness, Blanching, Bruising, Scab, and Scar  Abdomen Bruising  Groin Redness  Scrotum/Coccyx/Buttocks Excoriation and Discoloration  (R) Leg Bruising, Swelling, and Abrasion dressing in place  (L) Leg Redness, Bruising, and Abrasion dressing in place  (R) Heel/Foot/Toe Redness and Blanching  (L) Heel/Foot/Toe Discoloration and Ulcer(s) dressing in place          Devices In Places Nasal Cannula      Interventions In Place Heel Mepilex, Sacral Mepilex, and Waffle Overlay wound dressings                                                   Possible Skin Injury Yes    Pictures Uploaded Into Epic Yes  Wound Consult Placed Yes  RN Wound Prevention Protocol Ordered Yes

## 2023-04-23 LAB
ANION GAP SERPL CALC-SCNC: 16 MMOL/L (ref 7–16)
BASOPHILS # BLD AUTO: 0.4 % (ref 0–1.8)
BASOPHILS # BLD: 0.05 K/UL (ref 0–0.12)
BUN SERPL-MCNC: 29 MG/DL (ref 8–22)
CALCIUM SERPL-MCNC: 8 MG/DL (ref 8.5–10.5)
CHLORIDE SERPL-SCNC: 103 MMOL/L (ref 96–112)
CO2 SERPL-SCNC: 18 MMOL/L (ref 20–33)
CREAT SERPL-MCNC: 0.97 MG/DL (ref 0.5–1.4)
EOSINOPHIL # BLD AUTO: 0.08 K/UL (ref 0–0.51)
EOSINOPHIL NFR BLD: 0.6 % (ref 0–6.9)
ERYTHROCYTE [DISTWIDTH] IN BLOOD BY AUTOMATED COUNT: 55.8 FL (ref 35.9–50)
GFR SERPLBLD CREATININE-BSD FMLA CKD-EPI: 77 ML/MIN/1.73 M 2
GLUCOSE SERPL-MCNC: 96 MG/DL (ref 65–99)
HCT VFR BLD AUTO: 39.4 % (ref 42–52)
HGB BLD-MCNC: 12.5 G/DL (ref 14–18)
IMM GRANULOCYTES # BLD AUTO: 0.31 K/UL (ref 0–0.11)
IMM GRANULOCYTES NFR BLD AUTO: 2.2 % (ref 0–0.9)
LYMPHOCYTES # BLD AUTO: 1.66 K/UL (ref 1–4.8)
LYMPHOCYTES NFR BLD: 11.9 % (ref 22–41)
MCH RBC QN AUTO: 27.8 PG (ref 27–33)
MCHC RBC AUTO-ENTMCNC: 31.7 G/DL (ref 33.7–35.3)
MCV RBC AUTO: 87.8 FL (ref 81.4–97.8)
MONOCYTES # BLD AUTO: 1.24 K/UL (ref 0–0.85)
MONOCYTES NFR BLD AUTO: 8.9 % (ref 0–13.4)
NEUTROPHILS # BLD AUTO: 10.58 K/UL (ref 1.82–7.42)
NEUTROPHILS NFR BLD: 76 % (ref 44–72)
NRBC # BLD AUTO: 0 K/UL
NRBC BLD-RTO: 0 /100 WBC
PLATELET # BLD AUTO: 269 K/UL (ref 164–446)
PMV BLD AUTO: 10.6 FL (ref 9–12.9)
POTASSIUM SERPL-SCNC: 4.1 MMOL/L (ref 3.6–5.5)
RBC # BLD AUTO: 4.49 M/UL (ref 4.7–6.1)
SODIUM SERPL-SCNC: 137 MMOL/L (ref 135–145)
WBC # BLD AUTO: 13.9 K/UL (ref 4.8–10.8)

## 2023-04-23 PROCEDURE — 700102 HCHG RX REV CODE 250 W/ 637 OVERRIDE(OP)

## 2023-04-23 PROCEDURE — 36415 COLL VENOUS BLD VENIPUNCTURE: CPT

## 2023-04-23 PROCEDURE — 700102 HCHG RX REV CODE 250 W/ 637 OVERRIDE(OP): Performed by: HOSPITALIST

## 2023-04-23 PROCEDURE — 85025 COMPLETE CBC W/AUTO DIFF WBC: CPT

## 2023-04-23 PROCEDURE — A9270 NON-COVERED ITEM OR SERVICE: HCPCS | Performed by: INTERNAL MEDICINE

## 2023-04-23 PROCEDURE — 80048 BASIC METABOLIC PNL TOTAL CA: CPT

## 2023-04-23 PROCEDURE — 700102 HCHG RX REV CODE 250 W/ 637 OVERRIDE(OP): Performed by: INTERNAL MEDICINE

## 2023-04-23 PROCEDURE — A9270 NON-COVERED ITEM OR SERVICE: HCPCS

## 2023-04-23 PROCEDURE — 700111 HCHG RX REV CODE 636 W/ 250 OVERRIDE (IP): Performed by: HOSPITALIST

## 2023-04-23 PROCEDURE — 99221 1ST HOSP IP/OBS SF/LOW 40: CPT | Performed by: INTERNAL MEDICINE

## 2023-04-23 PROCEDURE — 770006 HCHG ROOM/CARE - MED/SURG/GYN SEMI*

## 2023-04-23 PROCEDURE — 99232 SBSQ HOSP IP/OBS MODERATE 35: CPT | Performed by: STUDENT IN AN ORGANIZED HEALTH CARE EDUCATION/TRAINING PROGRAM

## 2023-04-23 PROCEDURE — 700105 HCHG RX REV CODE 258: Performed by: INTERNAL MEDICINE

## 2023-04-23 PROCEDURE — A9270 NON-COVERED ITEM OR SERVICE: HCPCS | Performed by: HOSPITALIST

## 2023-04-23 PROCEDURE — 700111 HCHG RX REV CODE 636 W/ 250 OVERRIDE (IP): Performed by: INTERNAL MEDICINE

## 2023-04-23 RX ADMIN — APIXABAN 5 MG: 5 TABLET, FILM COATED ORAL at 05:20

## 2023-04-23 RX ADMIN — DOCUSATE SODIUM 50 MG AND SENNOSIDES 8.6 MG 2 TABLET: 8.6; 5 TABLET, FILM COATED ORAL at 17:56

## 2023-04-23 RX ADMIN — AMPICILLIN AND SULBACTAM 3 G: 1; 2 INJECTION, POWDER, FOR SOLUTION INTRAMUSCULAR; INTRAVENOUS at 17:56

## 2023-04-23 RX ADMIN — OXYCODONE 2.5 MG: 5 TABLET ORAL at 13:42

## 2023-04-23 RX ADMIN — OXYCODONE 2.5 MG: 5 TABLET ORAL at 08:13

## 2023-04-23 RX ADMIN — APIXABAN 5 MG: 5 TABLET, FILM COATED ORAL at 17:56

## 2023-04-23 RX ADMIN — AMPICILLIN AND SULBACTAM 3 G: 1; 2 INJECTION, POWDER, FOR SOLUTION INTRAMUSCULAR; INTRAVENOUS at 23:15

## 2023-04-23 RX ADMIN — NYSTATIN: 100000 POWDER TOPICAL at 17:56

## 2023-04-23 RX ADMIN — OXYCODONE 2.5 MG: 5 TABLET ORAL at 04:05

## 2023-04-23 RX ADMIN — ONDANSETRON HYDROCHLORIDE 4 MG: 2 SOLUTION INTRAMUSCULAR; INTRAVENOUS at 23:12

## 2023-04-23 RX ADMIN — AMPICILLIN AND SULBACTAM 3 G: 1; 2 INJECTION, POWDER, FOR SOLUTION INTRAMUSCULAR; INTRAVENOUS at 12:07

## 2023-04-23 RX ADMIN — ROSUVASTATIN CALCIUM 40 MG: 20 TABLET, FILM COATED ORAL at 05:19

## 2023-04-23 RX ADMIN — NYSTATIN: 100000 POWDER TOPICAL at 05:20

## 2023-04-23 RX ADMIN — AMPICILLIN AND SULBACTAM 3 G: 1; 2 INJECTION, POWDER, FOR SOLUTION INTRAMUSCULAR; INTRAVENOUS at 00:06

## 2023-04-23 RX ADMIN — DOCUSATE SODIUM 50 MG AND SENNOSIDES 8.6 MG 2 TABLET: 8.6; 5 TABLET, FILM COATED ORAL at 05:19

## 2023-04-23 RX ADMIN — AMPICILLIN AND SULBACTAM 3 G: 1; 2 INJECTION, POWDER, FOR SOLUTION INTRAMUSCULAR; INTRAVENOUS at 05:22

## 2023-04-23 RX ADMIN — COLLAGENASE SANTYL: 250 OINTMENT TOPICAL at 10:42

## 2023-04-23 ASSESSMENT — PAIN DESCRIPTION - PAIN TYPE
TYPE: ACUTE PAIN

## 2023-04-23 NOTE — CARE PLAN
The patient is Watcher - Medium risk of patient condition declining or worsening    Shift Goals  Clinical Goals: abx, wound care, pain mgmt, comfort  Patient Goals: rest  Family Goals: n/a    Progress made toward(s) clinical / shift goals:    Problem: Knowledge Deficit - Standard  Goal: Patient and family/care givers will demonstrate understanding of plan of care, disease process/condition, diagnostic tests and medications  Outcome: Progressing  Note: Plan of care discussed with patient      Problem: Fall Risk  Goal: Patient will remain free from falls  Outcome: Progressing     Problem: Skin Integrity  Goal: Skin integrity is maintained or improved  Outcome: Progressing  Note: Wound care complete. On waffle mattress, positioning with pillows      Problem: Pain - Standard  Goal: Alleviation of pain or a reduction in pain to the patient’s comfort goal  Outcome: Progressing  Note: Abdominal pain controlled with po meds      Problem: Gastrointestinal Irritability  Goal: Nausea and vomiting will be absent or improve  Outcome: Progressing  Note: Nausea improved        Patient is not progressing towards the following goals:      Problem: Nutrition  Goal: Patient's nutritional and fluid intake will be adequate or improve  Outcome: Not Progressing  Note: Poor appetite and intake this shift

## 2023-04-23 NOTE — PROGRESS NOTES
Patient is educated on plan of care during course of hospital stay. Patient will remain free from falls and is educated on use of call light for assistance. Pain will be managed to be within patient's comfort zone. Abx therapy is in progress. Patient is currently resting with no complaints at this time. Hourly rounding and fall precautions are in place. Bedside table and call light are within reach.

## 2023-04-23 NOTE — CARE PLAN
The patient is Stable - Low risk of patient condition declining or worsening    Shift Goals  Clinical Goals: abx, wound care, pain mgmt, comfort  Patient Goals: rest  Family Goals: n/a    Progress made toward(s) clinical / shift goals:    Problem: Knowledge Deficit - Standard  Goal: Patient and family/care givers will demonstrate understanding of plan of care, disease process/condition, diagnostic tests and medications  Outcome: Progressing     Problem: Hemodynamics  Goal: Patient's hemodynamics, fluid balance and neurologic status will be stable or improve  Outcome: Progressing     Problem: Respiratory  Goal: Patient will achieve/maintain optimum respiratory ventilation and gas exchange  Outcome: Progressing     Problem: Physical Regulation  Goal: Diagnostic test results will improve  Outcome: Progressing  Goal: Signs and symptoms of infection will decrease  Outcome: Progressing     Problem: Fall Risk  Goal: Patient will remain free from falls  Outcome: Progressing     Problem: Skin Integrity  Goal: Skin integrity is maintained or improved  Outcome: Progressing     Problem: Pain - Standard  Goal: Alleviation of pain or a reduction in pain to the patient’s comfort goal  Outcome: Progressing

## 2023-04-23 NOTE — PROGRESS NOTES
Hospital Medicine Daily Progress Note    Date of Service  4/23/2023    Chief Complaint  Kedar Woods is a 83 y.o. male admitted 4/18/2023 with weakness    Hospital Course  Patient with past medical history of TAVR A-fib on Eliquis therapy, hypertension heart failure with preserved recent fraction presented with cellulitis.  Blood culture growing G strep bacteremia.  ID was consulted for further recommendation    Interval Problem Update  4/22/2023'  Blood pressure remained stable, remained afebrile  AOx3  Labs noted leukocytosis 16  Renal function improving  Repeat blood culture negative so far  Discussed CT finding showing liver lesion with the patient.   Eventually need MRI liver protocol    Continue IV antibiotic  Follow repeat blood culture      4/23/2023  Vitals remained stable, remained afebrile  Leukocytosis improving  Repeat blood culture negative so far  So far completed 6days course of antibiotic  Discussed case with ID specialist Dr. Gandara will eval for stone for further recommendation.    Discussed CT finding of liver mass with GI Dr. Sun .  GI evaluation.  MRI liver protocol pending.          I have discussed this patient's plan of care and discharge plan at IDT rounds today with Case Management, Nursing, Nursing leadership, and other members of the IDT team.    Consultants/Specialty  infectious disease    Code Status  Full Code    Disposition  Patient is not medically cleared for discharge.   Anticipate discharge to  TBD .  I have placed the appropriate orders for post-discharge needs.    Review of Systems  Review of Systems   Constitutional:  Positive for malaise/fatigue.      Physical Exam  Temp:  [36.2 °C (97.1 °F)-36.7 °C (98 °F)] 36.2 °C (97.2 °F)  Pulse:  [60-62] 60  Resp:  [16-18] 16  BP: (111-131)/(47-63) 111/47  SpO2:  [91 %-93 %] 92 %    Physical Exam  Musculoskeletal:      Comments: Bilateral lower extremity wound covered with clean dressing       Fluids    Intake/Output Summary  (Last 24 hours) at 4/23/2023 1158  Last data filed at 4/23/2023 0559  Gross per 24 hour   Intake 396.43 ml   Output 600 ml   Net -203.57 ml         Laboratory  Recent Labs     04/21/23 0229 04/22/23 0101 04/23/23  0355   WBC 16.1* 16.0* 13.9*   RBC 4.35* 4.29* 4.49*   HEMOGLOBIN 12.1* 12.2* 12.5*   HEMATOCRIT 39.4* 37.8* 39.4*   MCV 90.6 88.1 87.8   MCH 27.8 28.4 27.8   MCHC 30.7* 32.3* 31.7*   RDW 57.5* 56.2* 55.8*   PLATELETCT 210 221 269   MPV 10.6 10.9 10.6       Recent Labs     04/21/23 0229 04/22/23  0101 04/23/23  0355   SODIUM 135 137 137   POTASSIUM 4.7 4.4 4.1   CHLORIDE 103 104 103   CO2 16* 19* 18*   GLUCOSE 82 102* 96   BUN 44* 38* 29*   CREATININE 1.27 1.07 0.97   CALCIUM 8.1* 8.2* 8.0*                     Imaging  EC-ECHOCARDIOGRAM COMPLETE W/O CONT   Final Result      US-EXTREMITY ARTERY LOWER UNILAT LEFT   Final Result      US-DOMENIC SINGLE LEVEL BILAT   Final Result      CT-CHEST,ABDOMEN,PELVIS WITH   Final Result      1.  No acute inflammatory process in the chest, abdomen or pelvis.   2.  Multiple hypodense hepatic regions measuring up to 7.9 cm. They are indeterminate. Areas of regional hepatic steatosis and active or treated metastases are in the differential. Correlate with history of cancer, prior studies and consider further    evaluation with contrast-enhanced MRI, liver protocol.   3.  Cholelithiasis.   4.  Nonobstructing right nephrolithiasis.   5.  Moderate amount of stool in the distal colon and rectum.   6.  Mildly enlarged left inguinal lymph node, statistically likely reactive. No other enlarged nodes detected.         CT-HEAD W/O   Final Result      1.  Cerebral atrophy.      2.  White matter lucencies most consistent with small vessel ischemic change versus demyelination or gliosis.      3.  Otherwise, Head CT without contrast with no acute findings. No evidence of acute cerebral infarction, hemorrhage or mass lesion.         DX-CHEST-PORTABLE (1 VIEW)   Final Result      No acute  cardiac or pulmonary abnormalities are identified.      MR-ABDOMEN-WITH & W/O    (Results Pending)          Assessment/Plan  * Bacteremia  Assessment & Plan  Blood culture positive for strep group G  Repeat cultures negative x1 day  Echo pending  Consult placed to ID, discussed case with Dr. Barrios via voalte  Continue IV unasyn    Liver lesion  Assessment & Plan  Need further work-up with MRI liver protocol      Chronic heart failure with preserved ejection fraction (HCC)  Assessment & Plan  Stable  Entresto, jardiance  bumex held for ADINA  Restart BB    PAD (peripheral artery disease) (HCC)  Assessment & Plan  DOMENIC noted  [facility]    ADINA (acute kidney injury) (HCC)  Assessment & Plan  Sequelae of sepsis vs decreased IVV  Cr 1.86 --> 1.43 --> 1.27  improving    Cellulitis of left lower extremity  Assessment & Plan  WBC 25.3 --> 22.8 --> 17.4 --> 16.1  Blood culture positive for strep group G  LPS recs  Repeat blood culture negative x1 day  Continue IV unasyn  Consult placed to ID, discussed with Dr. Barrios via voalte    AF (atrial fibrillation) (HCC)- (present on admission)  Assessment & Plan  Long history of  Continue Eliquis for anticoagulation.    Hypokalemia- (present on admission)  Assessment & Plan  improved    Hyponatremia- (present on admission)  Assessment & Plan  131 --> 135  improved    History of transcatheter aortic valve replacement (TAVR)- (present on admission)  Assessment & Plan  Hx of  Last echo 2021 revealed a normal EF with normally functioning TAVR    Toxic metabolic encephalopathy- (present on admission)  Assessment & Plan  Acute toxic, metabolic encephalopathy secondary to sepsis   CT head with no acute process  improving    Hypocalcemia- (present on admission)  Assessment & Plan  Adjusted calcium for albumin is low at 7.7  Check ionized calcium  2 grams calcium gluconate ordered  Start TUMS TID   Check calcium in the morning.    Sepsis (HCC)- (present on  admission)  Assessment & Plan  This is Sepsis Present on admission    Essential hypertension- (present on admission)  Assessment & Plan  Restart home Toprol with holding parameters  Entresto and Jardiance outpatient          VTE prophylaxis: SCDs/TEDs and therapeutic anticoagulation with eliquis    I have performed a physical exam and reviewed and updated ROS and Plan today (4/23/2023). In review of yesterday's note (4/22/2023), there are no changes except as documented above.

## 2023-04-23 NOTE — CONSULTS
Date of Consultation:  4/23/2023    Patient: : Kedar Woods  MRN: [id number]    Referring Physician:  Dr. Blayne Mendoza      GI:Umesh Sun M.D.     Reason for Consultation: r/o Liver mass    History of Present Illness:   83 y.o. male who presented 4/18/2023 with weakness.  Mr. Woods has a past medical history of TAVR atrial fibrillation on Eliquis therapy, hypertension and possibly HFpEF.     GI team is consulted for the finding of abdomen CAT scan, which show irregular parenchyma, possible liver mass.    We saw the patient at the bedside today in the morning.  The patient could not provide detailed liver disease history at this moment.  [facility], he believes he had a neg screening of hepatitis, which assumably to be hepatitis C.  The patient [facility] possibly [facility], unfortunately we cannot have a detailed record at this time.    We reviewed the CAT scan finding with the patient.  The finding of the CAT scan could be from fatty liver or a true liver mass.    Otherwise, the patient does not have active GI symptoms or sign.  Liver enzymes and total bilirubin are normal.  The patient platelets 269 which is not consistent with advanced liver fibrosis.        Past Medical History:   Diagnosis Date    Hypertension          No past surgical history on file.    No family history on file.    Social History     Socioeconomic History    Marital status:    Tobacco Use    Smoking status: Never    Smokeless tobacco: Never   Vaping Use    Vaping Use: Never used   Substance and Sexual Activity    Alcohol use: Yes    Drug use: Never     Constitutional: Denies fevers.  Eyes: Denies symptoms.   Ears/Nose/Throat/Mouth: Denies choking.  Cardiovascular: Denies chest pain.   Respiratory: Denies shortness of breath.  Gastrointestinal/Hepatic: Per H/P.   Psychiatric: No complaints      HEENT: grossly normal.  Abdomen: Soft, No tenderness.  Skin: No erythema, No  rash.  Neurologic: Alert & oriented x 3, Normal motor function, No focal deficits noted.  PSY: stable mood.         Physical Exam:  Vitals:    04/23/23 0328 04/23/23 0714 04/23/23 0813 04/23/23 0900   BP: 131/52 111/47     Pulse: 60 60     Resp: 18 17 18 16   Temp: 36.2 °C (97.1 °F) 36.2 °C (97.2 °F)     TempSrc: Temporal Temporal     SpO2: 93% 92%     Weight:       Height:                 Labs:  Recent Labs     04/21/23 0229 04/22/23  0101 04/23/23  0355   WBC 16.1* 16.0* 13.9*   RBC 4.35* 4.29* 4.49*   HEMOGLOBIN 12.1* 12.2* 12.5*   HEMATOCRIT 39.4* 37.8* 39.4*   MCV 90.6 88.1 87.8   MCH 27.8 28.4 27.8   MCHC 30.7* 32.3* 31.7*   RDW 57.5* 56.2* 55.8*   PLATELETCT 210 221 269   MPV 10.6 10.9 10.6     Recent Labs     04/21/23 0229 04/22/23  0101 04/23/23  0355   SODIUM 135 137 137   POTASSIUM 4.7 4.4 4.1   CHLORIDE 103 104 103   CO2 16* 19* 18*   GLUCOSE 82 102* 96   BUN 44* 38* 29*                   Imaging:  EC-ECHOCARDIOGRAM COMPLETE W/O CONT  Transthoracic  Echo Report    Echocardiography Laboratory    CONCLUSIONS  Compared to the images of the prior study on 06/23/2021 - there is now   reduced ejection fraction and right ventricular dysfunction.  Mildly reduced left ventricular systolic function.  The left ventricular ejection fraction is visually estimated to be 50%.  Diastolic function is difficult to assess with arrhythmia.  Reduced right ventricular systolic function.  Known TAVR aortic valve that is functioning normally with normal   transvalvular gradients.  Mild tricuspid regurgitation.  Estimated right ventricular systolic pressure is 50 mmHg.  The ascending aorta is dilated with a diameter of 4.0 cm.  Left pleural effusion present.    JAMISON ZAYAS  Exam Date:         04/21/2023                      17:50  Exam Location:     Inpatient  Priority:          Routine    Ordering Physician:        STEPHANIE CAMACHO  Referring Physician:       DOUG Amaya                               STEPHANIE  Sonographer:               Jeanie Guevara                              CS    Age:    83     Gender:    M  MRN:    [id number]  :    1940  BSA:    2.36   Ht (in):    73     Wt (lb):    249  Exam Type:     Complete    Indications:     bateremia  ICD Codes:       r78.81    CPT Codes:       94974    BP:   98     /   52     HR:   63  Technical Quality:    MEASUREMENTS  (Male / Female) Normal Values  2D ECHO  LV Diastolic Diameter PLAX        4.2 cm                4.2 - 5.9 / 3.9 - 5.3   cm  LV Systolic Diameter PLAX         2.2 cm                2.1 - 4.0 cm  IVS Diastolic Thickness           1.3 cm                  LVPW Diastolic Thickness          1.1 cm                  Estimated LV Ejection Fraction    50 %                    LV Ejection Fraction MOD BP       51.7 %                >= 55  %  LV Ejection Fraction MOD 4C       56.4 %                  LV Ejection Fraction MOD 2C       45.4 %                  LV Ejection Fraction 4C AL        57.9 %                  LV Ejection Fraction 2C AL        50.9 %                  LA Volume Index                   53 cm3/m2             16 - 28 cm3/m2    DOPPLER  AV Peak Velocity                  2 m/s                   AV Peak Gradient                  15.5 mmHg               AV Mean Gradient                  7.8 mmHg                AV Acceleration Time              46 ms                   AI Pressure Half Time             14 ms                   LVOT Peak Velocity                0.65 m/s                TR Peak Velocity                  282 cm/s                PV Peak Velocity                  1.3 m/s                 PV Peak Gradient                  6.6 mmHg                  * Indicates values subject to auto-interpretation  LV EF:  50    %    FINDINGS  Left Ventricle  Normal left ventricular chamber size. Mild concentric left ventricular   hypertrophy. Mildly reduced left ventricular systolic function. The   left ventricular ejection fraction is visually  estimated to be 50%.    Abnormal septal motion. Diastolic function is difficult to assess with   arrhythmia.    Right Ventricle  The right ventricle is dilated. Reduced right ventricular systolic   function.    Right Atrium  Enlarged right atrium. Dilated inferior vena cava without inspiratory   collapse.    Left Atrium  Severely dilated left atrium. Left atrial volume index is 58.5 mL/sq m.    Mitral Valve  Mitral annular calcification. Thickened mitral valve leaflets. Unable   to assess for mitral stenosis. Trace mitral regurgitation.    Aortic Valve  Known TAVR aortic valve that is functioning normally with normal   transvalvular gradients. Vmax is  2.64 m/s. Transvalvular gradients are   - Peak: 22 mmHg,  Mean: 9 mmHg. Acceleration time is 46 ms. No aortic   insufficiency.    Tricuspid Valve  No tricuspid stenosis. Mild tricuspid regurgitation. Right atrial   pressure is estimated to be 8 mmHg. Estimated right ventricular   systolic pressure is 50 mmHg.    Pulmonic Valve  Structurally normal pulmonic valve without significant stenosis or   regurgitation.    Pericardium  No pericardial effusion. Left pleural effusion present.    Aorta  Normal aortic root for body surface area. The ascending aorta is   dilated with a diameter of 4.0 cm.    Patrick Dillon MD  (Electronically Signed)  Final Date:     21 April 2023                   19:46            Impressions:  83 years old gentleman with known liver disease history presented to GI inpatient service for the CAT scan finding.    We agree with the plan from the primary team, including MRI of the liver and alpha-fetoprotein.    If the MRI showed liver mass/tumor, the patient should be seen by the oncology team at the [facility] after discharge.    2.  If the MRI does not show liver mass/tumor, no further intervention is suggested for his liver condition.    Of note, the MRI wait time is long in our hospital right now, if the patient could not get an MRI before  discharge, the patient should [facility] by the [facility] instead.    No other necessary intervention during this admission from a GI standpoint, the GI team will sign off.      This note was generated using voice recognition software which has a small chance of producing errors of grammar and possibly content. I have made every reasonable attempt to find and correct any obvious errors, but expect that some may not be found prior to finalization of this note.

## 2023-04-24 LAB
ANION GAP SERPL CALC-SCNC: 14 MMOL/L (ref 7–16)
BASOPHILS # BLD AUTO: 0.4 % (ref 0–1.8)
BASOPHILS # BLD: 0.05 K/UL (ref 0–0.12)
BUN SERPL-MCNC: 28 MG/DL (ref 8–22)
CALCIUM SERPL-MCNC: 7.9 MG/DL (ref 8.5–10.5)
CHLORIDE SERPL-SCNC: 104 MMOL/L (ref 96–112)
CO2 SERPL-SCNC: 21 MMOL/L (ref 20–33)
CREAT SERPL-MCNC: 0.99 MG/DL (ref 0.5–1.4)
EOSINOPHIL # BLD AUTO: 0.09 K/UL (ref 0–0.51)
EOSINOPHIL NFR BLD: 0.6 % (ref 0–6.9)
ERYTHROCYTE [DISTWIDTH] IN BLOOD BY AUTOMATED COUNT: 58.5 FL (ref 35.9–50)
GFR SERPLBLD CREATININE-BSD FMLA CKD-EPI: 76 ML/MIN/1.73 M 2
GLUCOSE SERPL-MCNC: 102 MG/DL (ref 65–99)
HCT VFR BLD AUTO: 40.9 % (ref 42–52)
HGB BLD-MCNC: 12.8 G/DL (ref 14–18)
IMM GRANULOCYTES # BLD AUTO: 0.48 K/UL (ref 0–0.11)
IMM GRANULOCYTES NFR BLD AUTO: 3.4 % (ref 0–0.9)
LYMPHOCYTES # BLD AUTO: 1.94 K/UL (ref 1–4.8)
LYMPHOCYTES NFR BLD: 13.8 % (ref 22–41)
MCH RBC QN AUTO: 28.3 PG (ref 27–33)
MCHC RBC AUTO-ENTMCNC: 31.3 G/DL (ref 33.7–35.3)
MCV RBC AUTO: 90.3 FL (ref 81.4–97.8)
MONOCYTES # BLD AUTO: 1.21 K/UL (ref 0–0.85)
MONOCYTES NFR BLD AUTO: 8.6 % (ref 0–13.4)
NEUTROPHILS # BLD AUTO: 10.3 K/UL (ref 1.82–7.42)
NEUTROPHILS NFR BLD: 73.2 % (ref 44–72)
NRBC # BLD AUTO: 0 K/UL
NRBC BLD-RTO: 0 /100 WBC
PHOSPHATE SERPL-MCNC: 3 MG/DL (ref 2.5–4.5)
PLATELET # BLD AUTO: 337 K/UL (ref 164–446)
PMV BLD AUTO: 10.6 FL (ref 9–12.9)
POTASSIUM SERPL-SCNC: 4.1 MMOL/L (ref 3.6–5.5)
RBC # BLD AUTO: 4.53 M/UL (ref 4.7–6.1)
SODIUM SERPL-SCNC: 139 MMOL/L (ref 135–145)
WBC # BLD AUTO: 14.1 K/UL (ref 4.8–10.8)

## 2023-04-24 PROCEDURE — A9270 NON-COVERED ITEM OR SERVICE: HCPCS | Performed by: HOSPITALIST

## 2023-04-24 PROCEDURE — 700105 HCHG RX REV CODE 258: Performed by: STUDENT IN AN ORGANIZED HEALTH CARE EDUCATION/TRAINING PROGRAM

## 2023-04-24 PROCEDURE — 700111 HCHG RX REV CODE 636 W/ 250 OVERRIDE (IP): Performed by: HOSPITALIST

## 2023-04-24 PROCEDURE — 99232 SBSQ HOSP IP/OBS MODERATE 35: CPT | Performed by: STUDENT IN AN ORGANIZED HEALTH CARE EDUCATION/TRAINING PROGRAM

## 2023-04-24 PROCEDURE — 700105 HCHG RX REV CODE 258: Performed by: INTERNAL MEDICINE

## 2023-04-24 PROCEDURE — 99222 1ST HOSP IP/OBS MODERATE 55: CPT | Performed by: INTERNAL MEDICINE

## 2023-04-24 PROCEDURE — 85025 COMPLETE CBC W/AUTO DIFF WBC: CPT

## 2023-04-24 PROCEDURE — 700102 HCHG RX REV CODE 250 W/ 637 OVERRIDE(OP): Performed by: STUDENT IN AN ORGANIZED HEALTH CARE EDUCATION/TRAINING PROGRAM

## 2023-04-24 PROCEDURE — A9270 NON-COVERED ITEM OR SERVICE: HCPCS | Performed by: INTERNAL MEDICINE

## 2023-04-24 PROCEDURE — A9270 NON-COVERED ITEM OR SERVICE: HCPCS | Performed by: STUDENT IN AN ORGANIZED HEALTH CARE EDUCATION/TRAINING PROGRAM

## 2023-04-24 PROCEDURE — 97116 GAIT TRAINING THERAPY: CPT

## 2023-04-24 PROCEDURE — 80048 BASIC METABOLIC PNL TOTAL CA: CPT

## 2023-04-24 PROCEDURE — 700111 HCHG RX REV CODE 636 W/ 250 OVERRIDE (IP): Performed by: STUDENT IN AN ORGANIZED HEALTH CARE EDUCATION/TRAINING PROGRAM

## 2023-04-24 PROCEDURE — 700102 HCHG RX REV CODE 250 W/ 637 OVERRIDE(OP): Performed by: INTERNAL MEDICINE

## 2023-04-24 PROCEDURE — 700111 HCHG RX REV CODE 636 W/ 250 OVERRIDE (IP): Performed by: INTERNAL MEDICINE

## 2023-04-24 PROCEDURE — 700102 HCHG RX REV CODE 250 W/ 637 OVERRIDE(OP): Performed by: HOSPITALIST

## 2023-04-24 PROCEDURE — 770006 HCHG ROOM/CARE - MED/SURG/GYN SEMI*

## 2023-04-24 PROCEDURE — 84100 ASSAY OF PHOSPHORUS: CPT

## 2023-04-24 PROCEDURE — 97530 THERAPEUTIC ACTIVITIES: CPT

## 2023-04-24 PROCEDURE — 36415 COLL VENOUS BLD VENIPUNCTURE: CPT

## 2023-04-24 RX ORDER — GAUZE BANDAGE 2" X 2"
100 BANDAGE TOPICAL DAILY
Status: DISCONTINUED | OUTPATIENT
Start: 2023-04-24 | End: 2023-05-02 | Stop reason: HOSPADM

## 2023-04-24 RX ADMIN — APIXABAN 5 MG: 5 TABLET, FILM COATED ORAL at 17:48

## 2023-04-24 RX ADMIN — ONDANSETRON 4 MG: 4 TABLET, ORALLY DISINTEGRATING ORAL at 09:16

## 2023-04-24 RX ADMIN — Medication 100 MG: at 15:11

## 2023-04-24 RX ADMIN — ROSUVASTATIN CALCIUM 40 MG: 20 TABLET, FILM COATED ORAL at 05:53

## 2023-04-24 RX ADMIN — AMPICILLIN AND SULBACTAM 3 G: 1; 2 INJECTION, POWDER, FOR SOLUTION INTRAMUSCULAR; INTRAVENOUS at 11:50

## 2023-04-24 RX ADMIN — APIXABAN 5 MG: 5 TABLET, FILM COATED ORAL at 05:53

## 2023-04-24 RX ADMIN — COLLAGENASE SANTYL: 250 OINTMENT TOPICAL at 05:56

## 2023-04-24 RX ADMIN — DOCUSATE SODIUM 50 MG AND SENNOSIDES 8.6 MG 2 TABLET: 8.6; 5 TABLET, FILM COATED ORAL at 09:12

## 2023-04-24 RX ADMIN — AMPICILLIN AND SULBACTAM 3 G: 1; 2 INJECTION, POWDER, FOR SOLUTION INTRAMUSCULAR; INTRAVENOUS at 17:48

## 2023-04-24 RX ADMIN — AMPICILLIN AND SULBACTAM 3 G: 1; 2 INJECTION, POWDER, FOR SOLUTION INTRAMUSCULAR; INTRAVENOUS at 05:55

## 2023-04-24 ASSESSMENT — COGNITIVE AND FUNCTIONAL STATUS - GENERAL
MOVING FROM LYING ON BACK TO SITTING ON SIDE OF FLAT BED: UNABLE
TURNING FROM BACK TO SIDE WHILE IN FLAT BAD: A LITTLE
WALKING IN HOSPITAL ROOM: A LITTLE
SUGGESTED CMS G CODE MODIFIER MOBILITY: CK
STANDING UP FROM CHAIR USING ARMS: A LITTLE
MOVING TO AND FROM BED TO CHAIR: A LITTLE
CLIMB 3 TO 5 STEPS WITH RAILING: A LOT
MOBILITY SCORE: 15

## 2023-04-24 ASSESSMENT — GAIT ASSESSMENTS
ASSISTIVE DEVICE: FRONT WHEEL WALKER
DEVIATION: BRADYKINETIC
DISTANCE (FEET): 10
GAIT LEVEL OF ASSIST: CONTACT GUARD ASSIST

## 2023-04-24 ASSESSMENT — PAIN DESCRIPTION - PAIN TYPE
TYPE: ACUTE PAIN

## 2023-04-24 NOTE — PROGRESS NOTES
Bedside report received. Assumed care of patient this PM. Assessment completed      Patient is A&O x 4, pt calls for assistance appropriately  Reports 5 /10 pain, pain meds offered, no prn medication administered.  Pt is r/a  Mobility x1 FWW   Waffle mattress, heel float boots, mepilex, q2hr turns  + nausea, 2x episodes of emesis; prn administered  Bed alarm in on.  Voiding + urinal   Flatus +             Plan of care reviewed with the patient. Bed is locked and in the lowest position. Call light is within reach. Patient encouraged to voice needs and concerns, all needs met at this time. Hourly rounding in place.

## 2023-04-24 NOTE — CARE PLAN
The patient is Watcher - Medium risk of patient condition declining or worsening    Shift Goals  Clinical Goals: MRI, abx, q2h turns, encourage intake   Patient Goals: Rest  Family Goals: n/a    Progress made toward(s) clinical / shift goals:    Problem: Knowledge Deficit - Standard  Goal: Patient and family/care givers will demonstrate understanding of plan of care, disease process/condition, diagnostic tests and medications  Outcome: Progressing  Note: Plan of care discussed with patient      Problem: Hemodynamics  Goal: Patient's hemodynamics, fluid balance and neurologic status will be stable or improve  Outcome: Progressing  Note: VSS     Problem: Fluid Volume  Goal: Fluid volume balance will be maintained  Outcome: Progressing  Note: Oral intake encouraged     Problem: Fall Risk  Goal: Patient will remain free from falls  Outcome: Progressing     Problem: Skin Integrity  Goal: Skin integrity is maintained or improved  Outcome: Progressing  Note: Q2h hour turns, on waffle mattress. Barrier paste applied to buttocks. BLE dressing CDI, to be changed tomorrow      Problem: Pain - Standard  Goal: Alleviation of pain or a reduction in pain to the patient’s comfort goal  Outcome: Progressing  Note: Improvement of abdominal pain      Problem: Nutrition  Goal: Patient's nutritional and fluid intake will be adequate or improve  Outcome: Progressing  Note: 25% meals eaten, intake encouraged     Problem: Gastrointestinal Irritability  Goal: Nausea and vomiting will be absent or improve  Outcome: Progressing  Note: Nausea improved        Patient is not progressing towards the following goals:

## 2023-04-24 NOTE — DIETARY
Nutrition Services Brief Update:    Day 6 of admit.  Kedar Woods is a 83 y.o. male being followed to optimize nutrition    Current Diet: Cardiac with Boost supplements    Met with patient at bedside.  He reported he is not eating well but that he is eating more that he has in a long time and understands the importance of nutrition.  He stated he did not want the Boost.  He did request a soda with each meal.  He also reported, friends brought in buttermilk for him which he likes.    Problem: Nutritional:  Goal: Achieve adequate nutritional intake  Description: Patient will consume 50% of meals  Outcome: Not met. No lunch eaten and <25% of breakfast eaten today per ADLs.    Will discontinue Boost per his request.  Patient has increased kcal and protein needs for skin healing     Recommend:  Encourage PO intake   Trend weights  If PO continues to be poor, he may benefit from nutrition support. D/W MD.    Nutrition Representative will continue to see him for ongoing meal and snack preferences.  RD following

## 2023-04-24 NOTE — HOSPITAL COURSE
This 83-year-old male with a past medical history significant for obesity, atrial fibrillation, secondary hypercoagulable state on Eliquis, AAA s/p TAVR, heart failure with preserved ejection fraction presented to ER on 4/18/2023 with a complaint of weakness.    Paramedics were called as he was too weak to get up and he was full dose when diverted to [facility].  Upon presentation his WBC count 20 5K, lower extremity cellulitis.  Blood cultures x2 was obtained, patient was started on broad-spectrum antibiotics including Vanco and Unasyn.    Blood culture on 4/19 with negative strep, repeat blood culture obtained on 4/20, infectious disease Dr Gandara  was consulted recommended continuing Augmentin 875/125 1 tablet p.o. twice daily for total of 14 days.  Recommended following up at ID clinic as an op    CT scan was obtained, noted to have 7.9 cm mass GI was consulted, recommended MRI for fetoprotein.  If MRI cannot be completed in time, patient can be discharged without MRI being completed, he will follow-up with the primary care's physician for repeat MRI    PT/OT has evaluate the patient, recommend postacute.  At this time patient is medically stable to be discharged to skilled nursing facility.

## 2023-04-24 NOTE — PROGRESS NOTES
Hospital Medicine Daily Progress Note    Date of Service  4/24/2023    Chief Complaint  Kedar Woods is a 83 y.o. male admitted 4/18/2023 with weakness    Hospital Course  Patient with past medical history of TAVR A-fib on Eliquis therapy, hypertension ,heart failure with preserved recent fraction presented with cellulitis.  CT noted with liver lesion.  GI evaluated.  MRI liver protocol pending.  Blood culture growing G strep bacteremia.  She was started on Unasyn.  ID was consulted for further recommendation    Interval Problem Update  4/22/2023'  Blood pressure remained stable, remained afebrile  AOx3  Labs noted leukocytosis 16  Renal function improving  Repeat blood culture negative so far  Discussed CT finding showing liver lesion with the patient.   Eventually need MRI liver protocol    Continue IV antibiotic  Follow repeat blood culture      4/23/2023  Vitals remained stable, remained afebrile  Leukocytosis improving  Repeat blood culture negative so far  So far completed 6days course of antibiotic  Discussed case with ID specialist Dr. Gandara will eval for antibiotic recommendation.    Discussed CT finding of liver mass with GI Dr. Sun .  GI evaluation.  MRI liver protocol pending.    4/24/2023    Vitals remained stable  Patient is poor appetite  Currently denies any discomfort  Labs reviewed.  Persistent leukocytosis  Repeat blood culture negative so far  ID Dr. Gandara to evaluate for antibiotic recommendation  MRI liver protocol pending        Monitor electrolytes closely  Continue on Unasyn  Palliative to be consulted for goals of care discussion      I have discussed this patient's plan of care and discharge plan at IDT rounds today with Case Management, Nursing, Nursing leadership, and other members of the IDT team.    Consultants/Specialty  infectious disease    Code Status  Full Code    Disposition  Patient is not medically cleared for discharge.   Anticipate discharge to  TBD .  I have  placed the appropriate orders for post-discharge needs.    Review of Systems  Review of Systems   Constitutional:  Positive for malaise/fatigue.      Physical Exam  Temp:  [36.1 °C (97 °F)-36.7 °C (98 °F)] 36.2 °C (97.1 °F)  Pulse:  [60-61] 61  Resp:  [16-18] 18  BP: (112-122)/(53-66) 122/54  SpO2:  [91 %-93 %] 91 %    Physical Exam  Musculoskeletal:      Comments: Bilateral lower extremity wound covered with clean dressing       Fluids  No intake or output data in the 24 hours ending 04/24/23 1415      Laboratory  Recent Labs     04/22/23  0101 04/23/23  0355 04/24/23  0653   WBC 16.0* 13.9* 14.1*   RBC 4.29* 4.49* 4.53*   HEMOGLOBIN 12.2* 12.5* 12.8*   HEMATOCRIT 37.8* 39.4* 40.9*   MCV 88.1 87.8 90.3   MCH 28.4 27.8 28.3   MCHC 32.3* 31.7* 31.3*   RDW 56.2* 55.8* 58.5*   PLATELETCT 221 269 337   MPV 10.9 10.6 10.6       Recent Labs     04/22/23  0101 04/23/23  0355 04/24/23  0653   SODIUM 137 137 139   POTASSIUM 4.4 4.1 4.1   CHLORIDE 104 103 104   CO2 19* 18* 21   GLUCOSE 102* 96 102*   BUN 38* 29* 28*   CREATININE 1.07 0.97 0.99   CALCIUM 8.2* 8.0* 7.9*                     Imaging  EC-ECHOCARDIOGRAM COMPLETE W/O CONT   Final Result      US-EXTREMITY ARTERY LOWER UNILAT LEFT   Final Result      US-DOMENIC SINGLE LEVEL BILAT   Final Result      CT-CHEST,ABDOMEN,PELVIS WITH   Final Result      1.  No acute inflammatory process in the chest, abdomen or pelvis.   2.  Multiple hypodense hepatic regions measuring up to 7.9 cm. They are indeterminate. Areas of regional hepatic steatosis and active or treated metastases are in the differential. Correlate with history of cancer, prior studies and consider further    evaluation with contrast-enhanced MRI, liver protocol.   3.  Cholelithiasis.   4.  Nonobstructing right nephrolithiasis.   5.  Moderate amount of stool in the distal colon and rectum.   6.  Mildly enlarged left inguinal lymph node, statistically likely reactive. No other enlarged nodes detected.         CT-HEAD  W/O   Final Result      1.  Cerebral atrophy.      2.  White matter lucencies most consistent with small vessel ischemic change versus demyelination or gliosis.      3.  Otherwise, Head CT without contrast with no acute findings. No evidence of acute cerebral infarction, hemorrhage or mass lesion.         DX-CHEST-PORTABLE (1 VIEW)   Final Result      No acute cardiac or pulmonary abnormalities are identified.      MR-ABDOMEN-WITH & W/O    (Results Pending)          Assessment/Plan  * Bacteremia  Assessment & Plan  Blood culture positive for strep group G  Repeat cultures negative x 48-hour  Echo unremarkable  ID to evaluate  Continue IV unasyn    Liver lesion  Assessment & Plan  Need further work-up with MRI liver protocol  GI evaluated        Chronic heart failure with preserved ejection fraction (HCC)  Assessment & Plan  Stable  Entresto, jardiance  bumex held for ADINA  Restart BB    PAD (peripheral artery disease) (HCC)  Assessment & Plan  DOMENIC noted  [facility]    ADINA (acute kidney injury) (HCC)  Assessment & Plan  Improved    Cellulitis of left lower extremity  Assessment & Plan  WBC 25.3 --> 22.8 --> 17.4 --> 16.1  Blood culture positive for strep group G  LPS recs  Continue IV antibiotic  ID to evaluate    AF (atrial fibrillation) (HCC)- (present on admission)  Assessment & Plan  Long history of  Continue Eliquis for anticoagulation.    Hypokalemia- (present on admission)  Assessment & Plan  improved    Hyponatremia- (present on admission)  Assessment & Plan  131 --> 135  improved    History of transcatheter aortic valve replacement (TAVR)- (present on admission)  Assessment & Plan  Hx of  Last echo 2021 revealed a normal EF with normally functioning TAVR    Toxic metabolic encephalopathy- (present on admission)  Assessment & Plan  Acute toxic, metabolic encephalopathy secondary to sepsis   CT head with no acute process    Resolved on baseline    Hypocalcemia- (present on  admission)  Assessment & Plan  Corrected calcium for albumin above  8.5    Sepsis (HCC)- (present on admission)  Assessment & Plan  This is Sepsis Present on admission    Essential hypertension- (present on admission)  Assessment & Plan  Restart home Toprol with holding parameters  Entresto and Jardiance outpatient          VTE prophylaxis: SCDs/TEDs and therapeutic anticoagulation with eliquis    I have performed a physical exam and reviewed and updated ROS and Plan today (4/24/2023). In review of yesterday's note (4/23/2023), there are no changes except as documented above.

## 2023-04-24 NOTE — CARE PLAN
The patient is Watcher - Medium risk of patient condition declining or worsening    Shift Goals  Clinical Goals: ABX, q2 turns, elevate BLE, treat nausea  Patient Goals: Rest, treat nausea  Family Goals: n/a    Progress made toward(s) clinical / shift goals:      Problem: Fall Risk  Goal: Patient will remain free from falls  Outcome: Progressing  Pt is at risk for falls. Bed alarm on, hourly rounding in place, call light in reach.     Problem: Skin Integrity  Goal: Skin integrity is maintained or improved  Outcome: Progressing  Patient is at high risk for skin breakdown. Q2 hour turns, heels floated. Waffle overlay.     Patient is not progressing towards the following goals:

## 2023-04-24 NOTE — THERAPY
"Physical Therapy   Daily Treatment     Patient Name: Kedar Woods  Age:  83 y.o., Sex:  male  Medical Record #: [id number]  Today's Date: 4/24/2023     Precautions  Precautions: (P) Fall Risk;Weight Bearing As Tolerated Right Lower Extremity;Weight Bearing As Tolerated Left Lower Extremity    Assessment    Pt is agreeable to participation in therapy session and tolerates session fairly well; no pain reported during therapy session, no SOB or LOB. Pt demonstrates improvements in functional mobility including decreased assistance required for bed mobility, increased ambulation distance. Pt requires extra time and HOB elevated for supine to sit, bed elevated for sit to stand, ambulation with FWW to/from bathroom. Min assist for toilet transfer.    Pt will benefit from continued PT services in acute setting, as well as in post acute setting.         Plan    Treatment Plan Status: (P) Continue Current Treatment Plan  Type of Treatment: Therapeutic Activities, Stair Training, Gait Training  Treatment Frequency: 3 Times per Week  Treatment Duration: Until Therapy Goals Met    DC Equipment Recommendations: (P) Front-Wheel Walker, Unable to determine at this time  Discharge Recommendations: (P) Recommend post-acute placement for additional physical therapy services prior to discharge home      Subjective    \"No I'd rather just get back in bed and sleep.\"     Objective       04/24/23 1259   Charge Group   Charges  Yes   PT Gait Training (Units) 1   PT Therapeutic Activities (Units) 1   Total Time Spent   PT Total Time Yes   PT Therapeutic Activities Time Spent (Mins) 8   PT Self Care/Home Evaluation Time Spent (Mins) 20    Services   Is patient using  services for this encounter? No   Precautions   Precautions Fall Risk;Weight Bearing As Tolerated Right Lower Extremity;Weight Bearing As Tolerated Left Lower Extremity   Vitals   O2 Delivery Device None - Room Air   Pain 0 - 10 Group   Location " Leg;Foot   Location Orientation Lower;Right;Left   Therapist Pain Assessment During Activity   Cognition    Level of Consciousness Alert   Comments cooperative   Strength Lower Body   Lower Body Strength  X   Comments mildly decreased, adequate for ambulation   Balance   Sitting Balance (Static) Fair +   Sitting Balance (Dynamic) Fair +   Standing Balance (Static) Fair -   Standing Balance (Dynamic) Fair -   Weight Shift Sitting Fair   Weight Shift Standing Fair   Bed Mobility    Supine to Sit Supervised   Sit to Supine Supervised   Scooting Supervised   Skilled Intervention Verbal Cuing;Sequencing;Compensatory Strategies   Comments with extra time required   Gait Analysis   Gait Level Of Assist Contact Guard Assist   Assistive Device Front Wheel Walker   Distance (Feet) 10   # of Times Distance was Traveled 2   Deviation Bradykinetic   Weight Bearing Status   (WBAT B)   Skilled Intervention Verbal Cuing;Sequencing;Compensatory Strategies   Functional Mobility   Sit to Stand Standby Assist  (with HOB elevated)   Toilet Transfers Minimal Assist   Mobility supine to/from sit EOB, sit to stand from EOB, ambulation to/from toilet   Skilled Intervention Verbal Cuing;Sequencing;Compensatory Strategies   How much difficulty does the patient currently have...   Turning over in bed (including adjusting bedclothes, sheets and blankets)? 3   Sitting down on and standing up from a chair with arms (e.g., wheelchair, bedside commode, etc.) 1   Moving from lying on back to sitting on the side of the bed? 3   How much help from another person does the patient currently need...   Moving to and from a bed to a chair (including a wheelchair)? 3   Need to walk in a hospital room? 3   Climbing 3-5 steps with a railing? 2   6 clicks Mobility Score 15   Activity Tolerance   Sitting in Chair refused/declined   Sitting Edge of Bed 5 min   Standing 5 min total   Comments 10 min on toilet   Short Term Goals    Short Term Goal # 1 Pt to move  sit to/from stand w/ spv in 6 visits to improve fxl indep   Goal Outcome # 1 Progressing as expected   Short Term Goal # 2 Pt to ambulate 75 ft w/ fww and spv in 6 visits to improve fxl indep   Goal Outcome # 2 Progressing as expected   Short Term Goal # 3 Pt to move up/down 3 steps w/ spv in 6 visits to access his home (if pt to d/c directly home)   Goal Outcome # 3 Goal not met   Education Group   Education Provided Gait Training;Transfer Status   Gait Training Patient Response Patient;Acceptance;Explanation;Verbal Demonstration   Transfer Status Patient Response Patient;Acceptance;Explanation;Verbal Demonstration;Action Demonstration   Physical Therapy Treatment Plan   Physical Therapy Treatment Plan Continue Current Treatment Plan   Anticipated Discharge Equipment and Recommendations   DC Equipment Recommendations Front-Wheel Walker;Unable to determine at this time   Discharge Recommendations Recommend post-acute placement for additional physical therapy services prior to discharge home   Interdisciplinary Plan of Care Collaboration   IDT Collaboration with  Nursing   Patient Position at End of Therapy Call Light within Reach;Tray Table within Reach;Phone within Reach;In Bed;Bed Alarm On   Collaboration Comments RN updated   Session Information   Date / Session Number  4/24  -2 (2/3, 4/26)     Katie Daniels DPT

## 2023-04-25 LAB
ANION GAP SERPL CALC-SCNC: 12 MMOL/L (ref 7–16)
BACTERIA BLD CULT: NORMAL
BACTERIA BLD CULT: NORMAL
BASOPHILS # BLD AUTO: 0.9 % (ref 0–1.8)
BASOPHILS # BLD: 0.1 K/UL (ref 0–0.12)
BUN SERPL-MCNC: 29 MG/DL (ref 8–22)
BURR CELLS BLD QL SMEAR: NORMAL
CALCIUM SERPL-MCNC: 7.5 MG/DL (ref 8.5–10.5)
CHLORIDE SERPL-SCNC: 105 MMOL/L (ref 96–112)
CO2 SERPL-SCNC: 20 MMOL/L (ref 20–33)
CREAT SERPL-MCNC: 0.81 MG/DL (ref 0.5–1.4)
EOSINOPHIL # BLD AUTO: 0.1 K/UL (ref 0–0.51)
EOSINOPHIL NFR BLD: 0.9 % (ref 0–6.9)
ERYTHROCYTE [DISTWIDTH] IN BLOOD BY AUTOMATED COUNT: 57.9 FL (ref 35.9–50)
GFR SERPLBLD CREATININE-BSD FMLA CKD-EPI: 87 ML/MIN/1.73 M 2
GLUCOSE SERPL-MCNC: 88 MG/DL (ref 65–99)
HCT VFR BLD AUTO: 37.5 % (ref 42–52)
HGB BLD-MCNC: 11.4 G/DL (ref 14–18)
HYPOCHROMIA BLD QL SMEAR: ABNORMAL
LG PLATELETS BLD QL SMEAR: NORMAL
LYMPHOCYTES # BLD AUTO: 0.5 K/UL (ref 1–4.8)
LYMPHOCYTES NFR BLD: 4.3 % (ref 22–41)
MANUAL DIFF BLD: NORMAL
MCH RBC QN AUTO: 27.7 PG (ref 27–33)
MCHC RBC AUTO-ENTMCNC: 30.4 G/DL (ref 33.7–35.3)
MCV RBC AUTO: 91.2 FL (ref 81.4–97.8)
MONOCYTES # BLD AUTO: 0.41 K/UL (ref 0–0.85)
MONOCYTES NFR BLD AUTO: 3.5 % (ref 0–13.4)
MORPHOLOGY BLD-IMP: NORMAL
MYELOCYTES NFR BLD MANUAL: 1.7 %
NEUTROPHILS # BLD AUTO: 10.29 K/UL (ref 1.82–7.42)
NEUTROPHILS NFR BLD: 88.7 % (ref 44–72)
NRBC # BLD AUTO: 0 K/UL
NRBC BLD-RTO: 0 /100 WBC
PLATELET # BLD AUTO: 328 K/UL (ref 164–446)
PLATELET BLD QL SMEAR: NORMAL
PMV BLD AUTO: 11 FL (ref 9–12.9)
POIKILOCYTOSIS BLD QL SMEAR: NORMAL
POTASSIUM SERPL-SCNC: 4.5 MMOL/L (ref 3.6–5.5)
RBC # BLD AUTO: 4.11 M/UL (ref 4.7–6.1)
RBC BLD AUTO: PRESENT
SIGNIFICANT IND 70042: NORMAL
SIGNIFICANT IND 70042: NORMAL
SITE SITE: NORMAL
SITE SITE: NORMAL
SODIUM SERPL-SCNC: 137 MMOL/L (ref 135–145)
SOURCE SOURCE: NORMAL
SOURCE SOURCE: NORMAL
TARGETS BLD QL SMEAR: NORMAL
WBC # BLD AUTO: 11.6 K/UL (ref 4.8–10.8)

## 2023-04-25 PROCEDURE — 700102 HCHG RX REV CODE 250 W/ 637 OVERRIDE(OP): Performed by: INTERNAL MEDICINE

## 2023-04-25 PROCEDURE — 97535 SELF CARE MNGMENT TRAINING: CPT

## 2023-04-25 PROCEDURE — 99233 SBSQ HOSP IP/OBS HIGH 50: CPT | Performed by: HOSPITALIST

## 2023-04-25 PROCEDURE — A9270 NON-COVERED ITEM OR SERVICE: HCPCS | Performed by: HOSPITALIST

## 2023-04-25 PROCEDURE — A9270 NON-COVERED ITEM OR SERVICE: HCPCS | Performed by: STUDENT IN AN ORGANIZED HEALTH CARE EDUCATION/TRAINING PROGRAM

## 2023-04-25 PROCEDURE — 85007 BL SMEAR W/DIFF WBC COUNT: CPT

## 2023-04-25 PROCEDURE — A9270 NON-COVERED ITEM OR SERVICE: HCPCS | Performed by: INTERNAL MEDICINE

## 2023-04-25 PROCEDURE — 80048 BASIC METABOLIC PNL TOTAL CA: CPT

## 2023-04-25 PROCEDURE — 770006 HCHG ROOM/CARE - MED/SURG/GYN SEMI*

## 2023-04-25 PROCEDURE — 700102 HCHG RX REV CODE 250 W/ 637 OVERRIDE(OP): Performed by: HOSPITALIST

## 2023-04-25 PROCEDURE — 700111 HCHG RX REV CODE 636 W/ 250 OVERRIDE (IP): Performed by: STUDENT IN AN ORGANIZED HEALTH CARE EDUCATION/TRAINING PROGRAM

## 2023-04-25 PROCEDURE — 700105 HCHG RX REV CODE 258: Performed by: STUDENT IN AN ORGANIZED HEALTH CARE EDUCATION/TRAINING PROGRAM

## 2023-04-25 PROCEDURE — 36415 COLL VENOUS BLD VENIPUNCTURE: CPT

## 2023-04-25 PROCEDURE — 700102 HCHG RX REV CODE 250 W/ 637 OVERRIDE(OP): Performed by: STUDENT IN AN ORGANIZED HEALTH CARE EDUCATION/TRAINING PROGRAM

## 2023-04-25 PROCEDURE — 85025 COMPLETE CBC W/AUTO DIFF WBC: CPT

## 2023-04-25 PROCEDURE — A9270 NON-COVERED ITEM OR SERVICE: HCPCS

## 2023-04-25 PROCEDURE — 700102 HCHG RX REV CODE 250 W/ 637 OVERRIDE(OP)

## 2023-04-25 RX ADMIN — APIXABAN 5 MG: 5 TABLET, FILM COATED ORAL at 05:26

## 2023-04-25 RX ADMIN — COLLAGENASE SANTYL: 250 OINTMENT TOPICAL at 08:41

## 2023-04-25 RX ADMIN — AMPICILLIN AND SULBACTAM 3 G: 1; 2 INJECTION, POWDER, FOR SOLUTION INTRAMUSCULAR; INTRAVENOUS at 05:27

## 2023-04-25 RX ADMIN — CALCIUM CARBONATE 500 MG: 500 TABLET, CHEWABLE ORAL at 08:40

## 2023-04-25 RX ADMIN — DOCUSATE SODIUM 50 MG AND SENNOSIDES 8.6 MG 2 TABLET: 8.6; 5 TABLET, FILM COATED ORAL at 05:25

## 2023-04-25 RX ADMIN — AMPICILLIN AND SULBACTAM 3 G: 1; 2 INJECTION, POWDER, FOR SOLUTION INTRAMUSCULAR; INTRAVENOUS at 11:13

## 2023-04-25 RX ADMIN — SCOPALAMINE 1 PATCH: 1 PATCH, EXTENDED RELEASE TRANSDERMAL at 05:26

## 2023-04-25 RX ADMIN — CALCIUM CARBONATE 500 MG: 500 TABLET, CHEWABLE ORAL at 17:41

## 2023-04-25 RX ADMIN — APIXABAN 5 MG: 5 TABLET, FILM COATED ORAL at 17:41

## 2023-04-25 RX ADMIN — ROSUVASTATIN CALCIUM 40 MG: 20 TABLET, FILM COATED ORAL at 05:25

## 2023-04-25 RX ADMIN — Medication 100 MG: at 05:26

## 2023-04-25 RX ADMIN — AMPICILLIN AND SULBACTAM 3 G: 1; 2 INJECTION, POWDER, FOR SOLUTION INTRAMUSCULAR; INTRAVENOUS at 00:44

## 2023-04-25 RX ADMIN — AMPICILLIN AND SULBACTAM 3 G: 1; 2 INJECTION, POWDER, FOR SOLUTION INTRAMUSCULAR; INTRAVENOUS at 17:42

## 2023-04-25 RX ADMIN — CALCIUM CARBONATE 500 MG: 500 TABLET, CHEWABLE ORAL at 11:10

## 2023-04-25 ASSESSMENT — COGNITIVE AND FUNCTIONAL STATUS - GENERAL
DAILY ACTIVITIY SCORE: 14
PERSONAL GROOMING: A LITTLE
DRESSING REGULAR UPPER BODY CLOTHING: A LOT
DRESSING REGULAR LOWER BODY CLOTHING: A LOT
HELP NEEDED FOR BATHING: A LOT
EATING MEALS: A LITTLE
SUGGESTED CMS G CODE MODIFIER DAILY ACTIVITY: CK
TOILETING: A LOT

## 2023-04-25 ASSESSMENT — ENCOUNTER SYMPTOMS
HEADACHES: 0
HEARTBURN: 0
NERVOUS/ANXIOUS: 1
NAUSEA: 0

## 2023-04-25 ASSESSMENT — PAIN DESCRIPTION - PAIN TYPE: TYPE: ACUTE PAIN

## 2023-04-25 NOTE — DISCHARGE PLANNING
Received Choice form at   Agency/Facility Name: [facility]  Referral sent per Choice form @ 1146  DPA faxed an updated SNF referral.  The pt is no longer on iv vanco, pt is on oral abx.

## 2023-04-25 NOTE — CARE PLAN
Bill is alert and oriented on room air. Denied pain. Q2 turns performed. Up with therapy 1 person assist to bathroom. Tolerating meals. Vitals stable, hourly rounding performed.     The patient is Watcher - Medium risk of patient condition declining or worsening    Shift Goals  Clinical Goals: IV antibiotics, maintain skin integrity  Patient Goals: rest  Family Goals: RAFAT

## 2023-04-25 NOTE — CONSULTS
DATE OF SERVICE:  04/24/2023     INFECTIOUS DISEASE CONSULTATION     Seen at the request of Dr. Blayne Mendoza.     IDENTIFICATION:  An 83-year-old  seen for evaluation of group G strep   bacteremia.     HISTORY OF PRESENT ILLNESS:  The patient is a  for a number of years,   [facility]  He has a hard condition, but his major problem has   been this bilateral stasis ulcerations of his legs with some recurrent venous   insufficiency and wounds and ulcers.  [facility]   and has also been receiving care at home with a [facility] home based   provider program.  He was admitted in March with some chronic venous   insufficiency exacerbation.  His cultures were negative.  His white count was   normal.  He was treated with cefazolin for few days and sent home on Keflex a   gram t.i.d.  He completed that and then on the day of admission, he had these   2 episodes of chills and feeling cold and shaking and he was totally wiped   out, could not move.  The paramedics were called and put him on a gurney and   brought into the hospital. [facility]  On   admission, his white count was markedly elevated and he had blood cultures   obtained. He was started on vancomycin and Unasyn empirically for what was   thought to be lower extremity cellulitis.  His blood cultures subsequently   became positive with group B strep and he has been maintained on Unasyn since   then.  He has now completed 5 days of antibiotics.  He has had resolution of   his chills.  He has been afebrile since his admission and except for being   somewhat weak for which he is working with PT, he feels back to normal.     PAST MEDICAL HISTORY:  Includes coronary artery disease, status post bypass.    He has a TAVR for aortic stenosis.  He has arrhythmias and a pacemaker for   tachybrady syndrome.  He also has type 2 diabetes.     MEDICATIONS:  On admission here, he was on  Jardiance, Apixaban, Ecotrin, and   Bumex.  He has completed a course of Keflex.  He is on Pepcid and fluconazole,   gabapentin, metoprolol SR and rosuvastatin as well as Entresto.     ALLERGIES:  The patient has no known antibiotic allergies.     SOCIAL HISTORY:  He lives alone and [facility] with [facility]   [facility] as well as for the last 11 years, he had [facility].     HABITS:  Are negative for alcohol or smoking.     REVIEW OF SYSTEMS:  Are as noted above.  He was found to have a possible fatty   change in his liver here and that is being followed up.     PHYSICAL EXAMINATION:  VITAL SIGNS:  Today, he is afebrile, blood pressure is 123/54.  He is not on   supplemental oxygen.  His pulse is slightly irregular, possibly paced at about   60.  HEENT:  Unremarkable.  His lungs are clear anteriorly.  I do not appreciate a   heart murmur.  GASTROINTESTINAL:  Abdominal exam, is somewhat overweight, but I cannot   palpate a liver or spleen.  There is no tenderness.  EXTREMITIES:  The most remarkable findings on his exam is lower extremity, he   has severe bilateral venous insufficiency with small ulcers. The second toe on   his left foot has some denuded skin, but does have a pulse. There is also lot   of dry cracked skin and his pulses are decreased. Review of the media section   does show some significant erythema, but particularly on the right foot,   which is completely resolved now on physical examination. There are multiple   draining ulcers on particularly the left leg.     LABORATORY DATA:  His white count on admission was 25,000; it is now down to   14,000.  His chemistry panel showed normal renal function, much improved since   admission.  His transaminases were normal.  His albumin was 2.4.  His proBNP   was 6395.     His cardiac exam showed a normally functioning TAVR, aortic valve with a   normal transvalvular gradients.  There was no vegetations  noted anywhere.     His microbiology, his blood cultures from the 18th, both grew out group G   strep, fully susceptible for penicillin ANASTASIA of 0.023 mcg per mL.  Repeat blood   cultures, which were drawn after the Unasyn was started from the 20th are   negative in 2 sets.     ASSESSMENT AND PLAN:  This is an 83-year-old male with diabetes and severe   peripheral venous insufficiency and ulcerations, who now presents with sepsis,   characterized by leukocytosis and possibly fever, chills, and positive blood   cultures for group G strep.  The most likely source is obviously his legs with   diffuse erythema and the open ulcerations and then venous insufficiency,   making him high risk for cellulitis and bacteremia.  Fortunately, his   bacteremia had resolved quickly and repeat blood cultures are negative.  There   is also nothing on the transthoracic echo to suggest any involvement of the   TAVR or other heart valves and I think it is a uncomplicated bacteremia from   the cellulitis.     RECOMMENDATIONS:  Once the patient is cleared by PT for independent   ambulation, I suggest we switch him to amoxicillin \clavulanate 875 mg twice a   day to complete 10 more additional days for a total of 14 days of treatment of   this group G bacteremia.  His ANASTASIA was so extraordinarily low even with oral   antibiotics, we should get effective levels against the strep and in   additionally, he is no longer bacteremic.  The patient would also be a good   c[facility] where we can put him on low-dose   penicillin, try to reduce his recurrent cellulitis.        ______________________________  MD SAJAN VERMA/CHEPE/KENYON    DD:  04/24/2023 17:38  DT:  04/24/2023 19:30    Job#:  [id number]

## 2023-04-25 NOTE — THERAPY
"Occupational Therapy  Daily Treatment     Patient Name: Kedar Woods  Age:  83 y.o., Sex:  male  Medical Record #: [id number]  Today's Date: 4/25/2023     Precautions  Precautions: (P) Fall Risk    Assessment    Pt seen for follow up OT tx session, pt making steady progress with functional mobility and ADLs, requiring less overall assistance and able to ambulate farther with FWW. Pt willing and able to participate and would benefit from continued skilled therapy while admitted as well as recommend post-acute placement.    Plan    Treatment Plan Status: (P) Continue Current Treatment Plan  Type of Treatment: Self Care / Activities of Daily Living, Adaptive Equipment, Therapeutic Activity, Therapeutic Exercises  Treatment Frequency: 3 Times per Week  Treatment Duration: Until Therapy Goals Met    DC Equipment Recommendations: (P) Unable to determine at this time  Discharge Recommendations: (P) Recommend post-acute placement for additional occupational therapy services prior to discharge home    Subjective    \"If were gonna get up I need to use the restroom\"     Objective       04/25/23 1140   Precautions   Precautions Fall Risk   Cognition    Cognition / Consciousness WDL   Level of Consciousness Alert   Active ROM Upper Body   Active ROM Upper Body  WDL   Strength Upper Body   Upper Body Strength  WDL   Balance   Sitting Balance (Static) Fair +   Sitting Balance (Dynamic) Fair +   Standing Balance (Static) Fair -   Standing Balance (Dynamic) Fair -   Weight Shift Sitting Fair   Weight Shift Standing Fair   Skilled Intervention Verbal Cuing;Facilitation   Comments w/ FWW   Bed Mobility    Supine to Sit Contact Guard Assist   Sit to Supine Supervised   Scooting Supervised   Skilled Intervention Verbal Cuing;Facilitation   Activities of Daily Living   Grooming Supervision;Seated   Upper Body Dressing Minimal Assist   Lower Body Dressing Maximal Assist   Toileting Maximal Assist   Skilled Intervention Verbal " Cuing;Facilitation   How much help from another person does the patient currently need...   Putting on and taking off regular lower body clothing? 2   Bathing (including washing, rinsing, and drying)? 2   Toileting, which includes using a toilet, bedpan, or urinal? 2   Putting on and taking off regular upper body clothing? 2   Taking care of personal grooming such as brushing teeth? 3   Eating meals? 3   6 Clicks Daily Activity Score 14   Functional Mobility   Sit to Stand Standby Assist   Toilet Transfers Moderate Assist   Transfer Method Stand Step   Mobility bed mobility, bathroom mobility, mobility in room, back to bed   Skilled Intervention Verbal Cuing   Comments w/ FWW   Activity Tolerance   Sitting in Chair 25 min  (on toilet)   Sitting Edge of Bed 5 min   Standing 5 min   Short Term Goals   Short Term Goal # 1 pt will complete LB dress with AE PRN and Pool   Goal Outcome # 1 Progressing slower than expected   Short Term Goal # 2 pt will complete toileting ADL at SPV level   Goal Outcome # 2 Progressing slower than expected   Short Term Goal # 3 pt will complete functional transfers at SPV level   Goal Outcome # 3 Progressing slower than expected   Education Group   Education Provided Role of Occupational Therapist   Role of Occupational Therapist Patient Response Patient;Acceptance;Explanation;Verbal Demonstration   Occupational Therapy Treatment Plan    O.T. Treatment Plan Continue Current Treatment Plan   Anticipated Discharge Equipment and Recommendations   DC Equipment Recommendations Unable to determine at this time   Discharge Recommendations Recommend post-acute placement for additional occupational therapy services prior to discharge home   Interdisciplinary Plan of Care Collaboration   IDT Collaboration with  Nursing   Patient Position at End of Therapy In Bed;Bed Alarm On;Call Light within Reach;Tray Table within Reach;Phone within Reach   Collaboration Comments RN updated

## 2023-04-25 NOTE — CARE PLAN
At shift change pt awake but lethargic. Pt contributed during bedside report with his request to let him rest. Pt stated he did not want to be turned the next time he was scheduled to turn. Pt educated on importance of maintaining skin integrity. No signs of respiratory distress. Bed alarm on. No other concerns at this time. Pt denies need for pain medication. Pt educated to alert nurse if new onset pain occurs.     The patient is Watcher - Medium risk of patient condition declining or worsening    Shift Goals  Clinical Goals: IV antibiotics, maintain skin integrity  Patient Goals: rest  Family Goals: RAFAT    Progress made toward(s) clinical / shift goals:  Pt remained free from falls this shift, wound care ordered followed according to what pt allows for his care, pt denies need for pain medication      Problem: Knowledge Deficit - Standard  Goal: Patient and family/care givers will demonstrate understanding of plan of care, disease process/condition, diagnostic tests and medications  Outcome: Progressing     Problem: Respiratory  Goal: Patient will achieve/maintain optimum respiratory ventilation and gas exchange  Outcome: Progressing     Problem: Skin Integrity  Goal: Skin integrity is maintained or improved  Outcome: Progressing     Problem: Pain - Standard  Goal: Alleviation of pain or a reduction in pain to the patient’s comfort goal  Outcome: Progressing       Patient is not progressing towards the following goals:

## 2023-04-25 NOTE — PROGRESS NOTES
Hospital Medicine Daily Progress Note    Date of Service  4/25/2023    Chief Complaint  Kedar Woods is a 83 y.o. male admitted 4/18/2023 with weakness    Hospital Course       This 83-year-old male with a past medical history significant for obesity, atrial fibrillation, secondary hypercoagulable state on Eliquis, AAA s/p TAVR, heart failure with preserved ejection fraction presented to ER on 4/18/2023 with a complaint of weakness.    Paramedics were called as he was too weak to get up and he was full dose when diverted to [facility].  Upon presentation his WBC count 20 5K, lower extremity cellulitis.  Blood cultures x2 was obtained, patient was started on broad-spectrum antibiotics including Vanco and Unasyn.    Blood culture on 4/19 with negative strep, repeat blood culture obtained on 4/20, infectious disease was consulted recommended continuing Augmentin 875/125 1 tablet p.o. twice daily for total of 14 days.  Recommended following up at twice daily clinic.    CT scan was obtained, noted to have 7.9 cm mass GI was consulted, recommended MRI for fetoprotein.  If MRI cannot be completed in time, patient can be discharged without MRI being completed, he will follow-up with the primary care's physician for repeat MRI    Interval events  --No acute events overnight, vital signs been stable, patient is alert, awake, answering questions appropriately  --WBC-11.6, hemoglobin  --ID was consulted, recommended switching to Augmentin 800/125 p.o. twice daily, total of antibiotics being 14 days.    PT/OT has evaluate the patient    Patient will continue to reality patient is medically stable to be discharged to skilled nursing facility.  Discussed with case management    I have discussed this patient's plan of care and discharge plan at IDT rounds today with Case Management, Nursing, Nursing leadership, and other members of the IDT team.    Consultants/Specialty  infectious disease    Code Status  Full  Code    Disposition  Patient is medically cleared  for discharge.   Anticipate discharge to  D .  I have placed the appropriate orders for post-discharge needs.    Review of Systems  Review of Systems   Constitutional:  Positive for malaise/fatigue.   Cardiovascular:  Positive for leg swelling.   Gastrointestinal:  Negative for heartburn and nausea.   Neurological:  Negative for headaches.   Psychiatric/Behavioral:  The patient is nervous/anxious.    All other systems reviewed and are negative.     Physical Exam  Temp:  [36.1 °C (97 °F)-36.5 °C (97.7 °F)] 36.4 °C (97.5 °F)  Pulse:  [60-64] 64  Resp:  [18] 18  BP: (103-123)/(51-55) 108/53  SpO2:  [90 %-95 %] 95 %    Physical Exam  Vitals and nursing note reviewed.   Constitutional:       Appearance: He is obese.   HENT:      Head: Normocephalic and atraumatic.   Eyes:      Extraocular Movements: Extraocular movements intact.      Pupils: Pupils are equal, round, and reactive to light.   Cardiovascular:      Rate and Rhythm: Normal rate and regular rhythm.   Pulmonary:      Breath sounds: Normal breath sounds.   Abdominal:      General: There is no distension.      Palpations: Abdomen is soft.   Musculoskeletal:      Cervical back: Neck supple.      Right lower leg: Edema present.      Left lower leg: Edema present.      Comments: Bilateral lower extremity wound covered with clean dressing   Neurological:      Mental Status: He is alert and oriented to person, place, and time.      Cranial Nerves: No cranial nerve deficit.       Fluids    Intake/Output Summary (Last 24 hours) at 4/25/2023 1120  Last data filed at 4/25/2023 0834  Gross per 24 hour   Intake 640 ml   Output 500 ml   Net 140 ml         Laboratory  Recent Labs     04/23/23  0355 04/24/23  0653 04/25/23  0351   WBC 13.9* 14.1* 11.6*   RBC 4.49* 4.53* 4.11*   HEMOGLOBIN 12.5* 12.8* 11.4*   HEMATOCRIT 39.4* 40.9* 37.5*   MCV 87.8 90.3 91.2   MCH 27.8 28.3 27.7   MCHC 31.7* 31.3* 30.4*   RDW 55.8* 58.5*  57.9*   PLATELETCT 269 337 328   MPV 10.6 10.6 11.0       Recent Labs     04/23/23  0355 04/24/23  0653 04/25/23  0351   SODIUM 137 139 137   POTASSIUM 4.1 4.1 4.5   CHLORIDE 103 104 105   CO2 18* 21 20   GLUCOSE 96 102* 88   BUN 29* 28* 29*   CREATININE 0.97 0.99 0.81   CALCIUM 8.0* 7.9* 7.5*                     Imaging  EC-ECHOCARDIOGRAM COMPLETE W/O CONT   Final Result      US-EXTREMITY ARTERY LOWER UNILAT LEFT   Final Result      US-DOMENIC SINGLE LEVEL BILAT   Final Result      CT-CHEST,ABDOMEN,PELVIS WITH   Final Result      1.  No acute inflammatory process in the chest, abdomen or pelvis.   2.  Multiple hypodense hepatic regions measuring up to 7.9 cm. They are indeterminate. Areas of regional hepatic steatosis and active or treated metastases are in the differential. Correlate with history of cancer, prior studies and consider further    evaluation with contrast-enhanced MRI, liver protocol.   3.  Cholelithiasis.   4.  Nonobstructing right nephrolithiasis.   5.  Moderate amount of stool in the distal colon and rectum.   6.  Mildly enlarged left inguinal lymph node, statistically likely reactive. No other enlarged nodes detected.         CT-HEAD W/O   Final Result      1.  Cerebral atrophy.      2.  White matter lucencies most consistent with small vessel ischemic change versus demyelination or gliosis.      3.  Otherwise, Head CT without contrast with no acute findings. No evidence of acute cerebral infarction, hemorrhage or mass lesion.         DX-CHEST-PORTABLE (1 VIEW)   Final Result      No acute cardiac or pulmonary abnormalities are identified.      MR-ABDOMEN-WITH & W/O    (Results Pending)          Assessment/Plan  * Bacteremia  Assessment & Plan  Blood culture positive for strep group G  Repeat cultures negative x 48-hour  Echo unremarkable  ID to evaluate  Continue IV unasyn, switched to Augmentin by infectious disease    Liver lesion  Assessment & Plan  Need further work-up with MRI liver  protocol  GI evaluated        Chronic heart failure with preserved ejection fraction ([facility])  Assessment & Plan  Stable  Entresto, jardiance  Restart Bumex and metoprolol    PAD (peripheral artery disease) ([facility])  Assessment & Plan  DOMENIC noted  [facility]    ADINA (acute kidney injury) ([facility])  Assessment & Plan  Improved    Cellulitis of left lower extremity  Assessment & Plan  WBC 25.3 --> 22.8 --> 17.4 --> 16.1-->11  Blood culture positive for strep group G  LPS recs  Continue IV antibiotic  ID evaluated, procedure recommendation    AF (atrial fibrillation) ([facility])- (present on admission)  Assessment & Plan  Long history of  Continue Eliquis for anticoagulation.    Hypokalemia- (present on admission)  Assessment & Plan  improved    Hyponatremia- (present on admission)  Assessment & Plan  131 --> 135  improved    History of transcatheter aortic valve replacement (TAVR)- (present on admission)  Assessment & Plan  Hx of  Last echo 2021 revealed a normal EF with normally functioning TAVR    Toxic metabolic encephalopathy- (present on admission)  Assessment & Plan  Acute toxic, metabolic encephalopathy secondary to sepsis   CT head with no acute process    Resolved on baseline    Hypocalcemia- (present on admission)  Assessment & Plan  Corrected calcium for albumin above  8.5    Sepsis ([facility])- (present on admission)  Assessment & Plan  This is Sepsis Present on admission    Essential hypertension- (present on admission)  Assessment & Plan  Restart home Toprol with holding parameters  Entresto and Jardiance outpatient          VTE prophylaxis: SCDs/TEDs and therapeutic anticoagulation with eliquis

## 2023-04-26 ENCOUNTER — PATIENT OUTREACH (OUTPATIENT)
Dept: SCHEDULING | Facility: IMAGING CENTER | Age: 83
End: 2023-04-26
Payer: COMMERCIAL

## 2023-04-26 PROBLEM — S81.801A LEG WOUND, RIGHT: Status: ACTIVE | Noted: 2023-04-26

## 2023-04-26 PROBLEM — S81.802A LEG WOUND, LEFT: Status: ACTIVE | Noted: 2023-04-26

## 2023-04-26 LAB
ALBUMIN SERPL BCP-MCNC: 2.1 G/DL (ref 3.2–4.9)
BUN SERPL-MCNC: 26 MG/DL (ref 8–22)
CALCIUM ALBUM COR SERPL-MCNC: 9.1 MG/DL (ref 8.5–10.5)
CALCIUM SERPL-MCNC: 7.6 MG/DL (ref 8.5–10.5)
CHLORIDE SERPL-SCNC: 106 MMOL/L (ref 96–112)
CO2 SERPL-SCNC: 22 MMOL/L (ref 20–33)
CREAT SERPL-MCNC: 0.91 MG/DL (ref 0.5–1.4)
ERYTHROCYTE [DISTWIDTH] IN BLOOD BY AUTOMATED COUNT: 58.2 FL (ref 35.9–50)
GFR SERPLBLD CREATININE-BSD FMLA CKD-EPI: 84 ML/MIN/1.73 M 2
GLUCOSE SERPL-MCNC: 119 MG/DL (ref 65–99)
HCT VFR BLD AUTO: 35.7 % (ref 42–52)
HGB BLD-MCNC: 10.9 G/DL (ref 14–18)
MAGNESIUM SERPL-MCNC: 2 MG/DL (ref 1.5–2.5)
MCH RBC QN AUTO: 28 PG (ref 27–33)
MCHC RBC AUTO-ENTMCNC: 30.5 G/DL (ref 33.7–35.3)
MCV RBC AUTO: 91.8 FL (ref 81.4–97.8)
PHOSPHATE SERPL-MCNC: 1.9 MG/DL (ref 2.5–4.5)
PLATELET # BLD AUTO: 346 K/UL (ref 164–446)
PMV BLD AUTO: 10.6 FL (ref 9–12.9)
POTASSIUM SERPL-SCNC: 3.6 MMOL/L (ref 3.6–5.5)
RBC # BLD AUTO: 3.89 M/UL (ref 4.7–6.1)
SODIUM SERPL-SCNC: 137 MMOL/L (ref 135–145)
WBC # BLD AUTO: 10.7 K/UL (ref 4.8–10.8)

## 2023-04-26 PROCEDURE — 97602 WOUND(S) CARE NON-SELECTIVE: CPT

## 2023-04-26 PROCEDURE — 700105 HCHG RX REV CODE 258: Performed by: STUDENT IN AN ORGANIZED HEALTH CARE EDUCATION/TRAINING PROGRAM

## 2023-04-26 PROCEDURE — 770006 HCHG ROOM/CARE - MED/SURG/GYN SEMI*

## 2023-04-26 PROCEDURE — A9270 NON-COVERED ITEM OR SERVICE: HCPCS | Performed by: HOSPITALIST

## 2023-04-26 PROCEDURE — 80069 RENAL FUNCTION PANEL: CPT

## 2023-04-26 PROCEDURE — 700111 HCHG RX REV CODE 636 W/ 250 OVERRIDE (IP): Performed by: STUDENT IN AN ORGANIZED HEALTH CARE EDUCATION/TRAINING PROGRAM

## 2023-04-26 PROCEDURE — A9270 NON-COVERED ITEM OR SERVICE: HCPCS

## 2023-04-26 PROCEDURE — 700102 HCHG RX REV CODE 250 W/ 637 OVERRIDE(OP)

## 2023-04-26 PROCEDURE — A9270 NON-COVERED ITEM OR SERVICE: HCPCS | Performed by: STUDENT IN AN ORGANIZED HEALTH CARE EDUCATION/TRAINING PROGRAM

## 2023-04-26 PROCEDURE — 700102 HCHG RX REV CODE 250 W/ 637 OVERRIDE(OP): Performed by: STUDENT IN AN ORGANIZED HEALTH CARE EDUCATION/TRAINING PROGRAM

## 2023-04-26 PROCEDURE — 85027 COMPLETE CBC AUTOMATED: CPT

## 2023-04-26 PROCEDURE — 700102 HCHG RX REV CODE 250 W/ 637 OVERRIDE(OP): Performed by: HOSPITALIST

## 2023-04-26 PROCEDURE — 99232 SBSQ HOSP IP/OBS MODERATE 35: CPT

## 2023-04-26 PROCEDURE — 83735 ASSAY OF MAGNESIUM: CPT

## 2023-04-26 PROCEDURE — 700102 HCHG RX REV CODE 250 W/ 637 OVERRIDE(OP): Performed by: INTERNAL MEDICINE

## 2023-04-26 PROCEDURE — A9270 NON-COVERED ITEM OR SERVICE: HCPCS | Performed by: INTERNAL MEDICINE

## 2023-04-26 RX ORDER — POLYETHYLENE GLYCOL 3350 17 G/17G
17 POWDER, FOR SOLUTION ORAL
Refills: 3 | Status: SHIPPED
Start: 2023-04-26 | End: 2023-05-02 | Stop reason: SDUPTHER

## 2023-04-26 RX ORDER — AMOXICILLIN AND CLAVULANATE POTASSIUM 875; 125 MG/1; MG/1
1 TABLET, FILM COATED ORAL 2 TIMES DAILY
Qty: 20 TABLET | Refills: 0 | Status: ACTIVE | OUTPATIENT
Start: 2023-04-26 | End: 2023-05-02 | Stop reason: SDUPTHER

## 2023-04-26 RX ORDER — AMOXICILLIN 250 MG
2 CAPSULE ORAL 2 TIMES DAILY
Qty: 30 TABLET | Refills: 0 | Status: SHIPPED
Start: 2023-04-26 | End: 2023-05-02 | Stop reason: SDUPTHER

## 2023-04-26 RX ORDER — AMOXICILLIN AND CLAVULANATE POTASSIUM 875; 125 MG/1; MG/1
1 TABLET, FILM COATED ORAL 2 TIMES DAILY
Qty: 20 TABLET | Refills: 0 | Status: SHIPPED | DISCHARGE
Start: 2023-04-26 | End: 2023-04-26 | Stop reason: SDUPTHER

## 2023-04-26 RX ORDER — OXYCODONE HYDROCHLORIDE 5 MG/1
2.5 TABLET ORAL EVERY 6 HOURS PRN
Qty: 2 TABLET | Refills: 0 | Status: SHIPPED | OUTPATIENT
Start: 2023-04-26 | End: 2023-05-02

## 2023-04-26 RX ORDER — LANOLIN ALCOHOL/MO/W.PET/CERES
100 CREAM (GRAM) TOPICAL DAILY
Qty: 7 TABLET | Status: SHIPPED
Start: 2023-04-27 | End: 2023-05-02 | Stop reason: SDUPTHER

## 2023-04-26 RX ADMIN — COLLAGENASE SANTYL: 250 OINTMENT TOPICAL at 17:22

## 2023-04-26 RX ADMIN — AMPICILLIN AND SULBACTAM 3 G: 1; 2 INJECTION, POWDER, FOR SOLUTION INTRAMUSCULAR; INTRAVENOUS at 12:01

## 2023-04-26 RX ADMIN — AMPICILLIN AND SULBACTAM 3 G: 1; 2 INJECTION, POWDER, FOR SOLUTION INTRAMUSCULAR; INTRAVENOUS at 00:30

## 2023-04-26 RX ADMIN — AMPICILLIN AND SULBACTAM 3 G: 1; 2 INJECTION, POWDER, FOR SOLUTION INTRAMUSCULAR; INTRAVENOUS at 05:44

## 2023-04-26 RX ADMIN — Medication 100 MG: at 05:47

## 2023-04-26 RX ADMIN — CALCIUM CARBONATE 500 MG: 500 TABLET, CHEWABLE ORAL at 08:26

## 2023-04-26 RX ADMIN — ROSUVASTATIN CALCIUM 40 MG: 20 TABLET, FILM COATED ORAL at 05:47

## 2023-04-26 RX ADMIN — APIXABAN 5 MG: 5 TABLET, FILM COATED ORAL at 05:47

## 2023-04-26 RX ADMIN — AMPICILLIN AND SULBACTAM 3 G: 1; 2 INJECTION, POWDER, FOR SOLUTION INTRAMUSCULAR; INTRAVENOUS at 22:48

## 2023-04-26 RX ADMIN — AMPICILLIN AND SULBACTAM 3 G: 1; 2 INJECTION, POWDER, FOR SOLUTION INTRAMUSCULAR; INTRAVENOUS at 17:18

## 2023-04-26 RX ADMIN — DOCUSATE SODIUM 50 MG AND SENNOSIDES 8.6 MG 2 TABLET: 8.6; 5 TABLET, FILM COATED ORAL at 17:18

## 2023-04-26 RX ADMIN — CALCIUM CARBONATE 500 MG: 500 TABLET, CHEWABLE ORAL at 17:18

## 2023-04-26 RX ADMIN — APIXABAN 5 MG: 5 TABLET, FILM COATED ORAL at 17:18

## 2023-04-26 RX ADMIN — CALCIUM CARBONATE 500 MG: 500 TABLET, CHEWABLE ORAL at 12:00

## 2023-04-26 ASSESSMENT — PAIN DESCRIPTION - PAIN TYPE
TYPE: ACUTE PAIN
TYPE: ACUTE PAIN

## 2023-04-26 NOTE — DISCHARGE SUMMARY
Discharge Summary    CHIEF COMPLAINT ON ADMISSION  Chief Complaint   Patient presents with    Weakness       Reason for Admission  EMS/GIB on Eliquis.    Admission Date  4/18/2023     CODE STATUS  Full Code    HPI & HOSPITAL COURSE  This 83-year-old male with a past medical history significant for obesity, atrial fibrillation, secondary hypercoagulable state on Eliquis, AAA s/p TAVR, heart failure with preserved ejection fraction presented to ER on 4/18/2023 with a complaint of weakness.    Paramedics were called as he was too weak to get up and he was full dose when diverted to [facility].  Upon presentation his WBC count 20 5K, lower extremity cellulitis.  Blood cultures x2 was obtained, patient was started on broad-spectrum antibiotics including Vanco and Unasyn.      CT scan was obtained, noted to have 7.9 cm mass GI was consulted, recommended MRI for fetoprotein.  If MRI cannot be completed in time, patient can be discharged without MRI being completed, he will follow-up with the primary care's physician for repeat MRI.  Echocardiogram was obtained, noted to have EF of 50%,Unable to assess diastolic dysfunction considering patient having arrhythmia RSVP of 50 mmHg.    Patient had been off bumex since admission for hypotension.  He remains on RA, but chronic leg wounds.   4/18 Group G streptococcus Bacteremia noted on 2 sets of blood cultures, ID recommended 14 days total antibiotics.  Echo no evidence of vegetations.  He was given 13 days IV unasyn, complete 1 more day of augmentin.  Repeat BCs 4/20/23 negative .  GI       Patient does have poor vascular blood flow, DOMENIC was obtained, noted to have a severe PAD, he needs [facility].  On asa/ statin.    PT/OT has evaluate the patient, recommend postacute.  At this time patient is medically stable to be discharged to skilled nursing facility.  Restarted Eliquis after holding for several day for persistent Afib.  Cardiology recommending  Watchman in order to get off AC since high risk given duodenal ulcer and leg wounds.    However, since afib and poort arterial blood flow, restarted Eliquis at discharge.  Repeat Hg in 3 days.  Continue po iron bid with ascorbic acid.  Completed 4 doses of IV iron in hospital.  Hg stable at 7.7.  no melena.  Tolerating diet well, on RA, lungs CTA b/l, TAVR functioning well.  Anxious to start PT/OT rehab  and LE wound care at [facility].    Therefore, he is discharged in fair and stable condition to skilled nursing facility.    The patient met 2-midnight criteria for an inpatient stay at the time of discharge.      FOLLOW UP ITEMS POST DISCHARGE  [facility] as an [facility].  [facility] as [facility]   Patient is to follow-up with vascular surgery [facility]  [facility] follow up with [facility] recheck EGD in 3 months and follow up MRI liver mass, needs to be ordered.  Follow up [facility] cardiologist regarding evaluation for Watchman in order to get off AC due to GIB risk.    DISCHARGE DIAGNOSES  Principal Problem:    Acute upper GI bleed POA: Yes  Active Problems:    Essential hypertension POA: Yes    Type 2 diabetes mellitus (HCC) POA: Yes    Severe sepsis (HCC) POA: Yes    Hypocalcemia POA: Yes    Toxic metabolic encephalopathy POA: Yes    History of transcatheter aortic valve replacement (TAVR) POA: Yes    Hyponatremia POA: Yes    Hypokalemia POA: Yes    AF (atrial fibrillation) (HCC) POA: Yes    Cellulitis of left lower extremity POA: Yes    Bacteremia POA: Yes    ADINA (acute kidney injury) (HCC) POA: Yes    PAD (peripheral artery disease) (HCC) POA: Yes    Chronic heart failure with preserved ejection fraction (HCC) POA: Yes    Liver lesion POA: Yes    Leg wound, left POA: Yes    Leg wound, right POA: Yes    Class 2 severe obesity with serious comorbidity in adult (HCC) POA: Yes    Hypophosphatemia POA: Unknown  Resolved Problems:    * No resolved hospital problems. *      FOLLOW UP  No future  appointments.  MARYANN HahnP.  [address] [place] [id number] [phone]    Follow up  Please call your primary care provider to schedule a hospital follow up. Thank you.    [facility]  [address] [place] [address]  [phone]          MEDICATIONS ON DISCHARGE     Medication List        START taking these medications        Instructions   amoxicillin-clavulanate 875-125 MG Tabs  Commonly known as: AUGMENTIN   Take 1 Tablet by mouth 2 times a day for 1 day.  Dose: 1 Tablet     ascorbic acid 500 MG tablet  Commonly known as: Vitamin C   Take 1 Tablet by mouth every day.  Dose: 500 mg     ferrous sulfate 325 (65 Fe) MG EC tablet   Take 1 Tablet by mouth 2 times a day.  Dose: 325 mg     omeprazole 20 MG delayed-release capsule  Commonly known as: PRILOSEC   Take 1 Capsule by mouth 2 times a day for 14 days.  Dose: 20 mg     phosphorus 250 MG tablet  Commonly known as: K-Phos-Neutral   Take 1 Tablet by mouth every 6 hours for 5 days.  Dose: 1 Tablet     senna-docusate 8.6-50 MG Tabs  Commonly known as: PERICOLACE or SENOKOT S   Take 2 Tablets by mouth 2 times a day.  Dose: 2 Tablet     thiamine 100 MG tablet  Commonly known as: THIAMINE   Take 1 Tablet by mouth every day for 7 days.  Dose: 100 mg            CHANGE how you take these medications        Instructions   polyethylene glycol/lytes 17 g Pack  What changed:   when to take this  reasons to take this  Commonly known as: MIRALAX   Take 1 Packet by mouth 1 time a day as needed (if sennosides and docusate ineffective after 24 hours).  Dose: 17 g            CONTINUE taking these medications        Instructions   acetaminophen 325 MG Tabs  Commonly known as: Tylenol   Take 650 mg by mouth 3 times a day as needed. Indications: Pain  Dose: 650 mg     apixaban 5mg Tabs  Commonly known as: ELIQUIS   Take 5 mg by mouth 2 Times a Day.  Dose: 5 mg     ARTIFICIAL SALIVA MT   Use 1 Application. in the mouth or throat as  needed (Dry mouth).  Dose: 1 Application.     diclofenac sodium 1 % Gel  Commonly known as: Voltaren   Apply 4 g topically 4 times a day as needed (Apply's on both knees and back for pain). Indications: Joint Damage causing Pain and Loss of Function  Dose: 4 g     Entresto 24-26 MG Tabs  Generic drug: sacubitril-valsartan   Take 0.5 Tablets by mouth 2 times a day.  Dose: 0.5 Tablet     gabapentin 300 MG Caps  Commonly known as: NEURONTIN   Take 600 mg by mouth 3 times a day.  Dose: 600 mg     Jardiance 25 MG Tabs  Generic drug: Empagliflozin   Take 12.5 mg by mouth every day.  Dose: 12.5 mg     Melatonin 3 MG Caps   Take 12 mg by mouth at bedtime.  Dose: 12 mg     metoprolol SR 25 MG Tb24  Commonly known as: TOPROL XL   Take 12.5 mg by mouth every day.  Dose: 12.5 mg     miconazole 2 % Crea  Commonly known as: MICOTIN   Apply 1 Application. topically 2 times a day.  Dose: 1 Application.     rosuvastatin 40 MG tablet  Commonly known as: CRESTOR   Take 40 mg by mouth every day.  Dose: 40 mg     sildenafil citrate 100 MG tablet  Commonly known as: VIAGRA   Take 50 mg by mouth 1 time a day as needed for Erectile Dysfunction.  Dose: 50 mg     trospium 20 MG Tabs  Commonly known as: SANCTURA   Take 20 mg by mouth every day.  Dose: 20 mg            STOP taking these medications      aspirin EC 81 MG Tbec  Commonly known as: ECOTRIN     bumetanide 0.5 MG Tabs  Commonly known as: BUMEX     cephALEXin 500 MG Caps  Commonly known as: KEFLEX     famotidine 20 MG Tabs  Commonly known as: PEPCID     fluconazole 200 MG Tabs  Commonly known as: DIFLUCAN     Magnesium Oxide 420 (252 Mg) MG Tabs     potassium chloride SA 20 MEQ Tbcr  Commonly known as: Kdur     sennosides 8.6 MG Tabs  Commonly known as: SENOKOT            ASK your doctor about these medications        Instructions   collagenase ointment  Commonly known as: SANTYL  Ask about: Should I take this medication?   Apply thick layer of ointment (2-3 mm) to bilateral  posterior LE wounds and left dorsal second toe wound              Allergies  Allergies   Allergen Reactions    Codeine Nausea       DIET  Orders Placed This Encounter   Procedures    Diet Order Diet: Full Liquid     Standing Status:   Standing     Number of Occurrences:   1     Order Specific Question:   Diet:     Answer:   Full Liquid [11]       ACTIVITY  As tolerated and directed by skilled nursing.  Weight bearing as tolerated    LINES, DRAINS, AND WOUNDS  This is an automated list. Peripheral IVs will be removed prior to discharge.  Peripheral IV 04/18/23 20 G Left;Posterior Forearm (Active)   Site Assessment Clean;Dry;Intact 04/25/23 2038   Dressing Type Transparent 04/25/23 2038   Line Status Infusing 04/25/23 2038   Dressing Status Clean;Dry;Intact 04/25/23 2038   Dressing Intervention N/A 04/24/23 0929   Date Primary Tubing Changed 04/29/23 04/23/23 1945   [place] [facility] [place] 04/25/23 0834   Phlebitis Scale ([facility] Only) 0 04/25/23 0834       Peripheral IV 04/18/23 20 G Distal;Posterior;Right Forearm (Active)   Site Assessment Clean;Dry;Intact 04/25/23 2038   Dressing Type Transparent 04/25/23 2038   Line Status Saline locked;Scrubbed the hub prior to access;Flushed 04/25/23 2038   Dressing Status Clean;Dry;Intact 04/25/23 2038   Dressing Intervention N/A 04/24/23 0929   Dressing Change Due 04/29/23 04/23/23 1945   Infiltration Grading ([facility], [facility]) 0 04/25/23 0834   Phlebitis Scale ([facility] Only) 0 04/25/23 0834       Wound 04/18/23 Venous Ulcer Leg Posterior;Lateral Left POA (Active)   Wound Image   04/19/23 1100   Site Assessment RAFAT 04/25/23 2038   Periwound Assessment Red;Excoriated 04/25/23 0829   Margins RAFAT 04/25/23 2038   Closure RAFAT 04/23/23 1945   Drainage Amount Small 04/23/23 1100   Drainage Description RAFAT 04/23/23 1945   Treatments Offloading 04/25/23 0829   Wound Cleansing Normal Saline Irrigation 04/23/23 1100   Periwound Protectant Not Applicable 04/21/23 0100   Dressing  Options Collagenase Ointment;Hydrofera Blue Ready;Mepilex 04/23/23 1100   Dressing Changed Observed 04/23/23 1945   Dressing Status Clean;Dry;Intact 04/25/23 0829   Dressing Change/Treatment Frequency Every 48 hrs, and As Needed 04/25/23 2038   NEXT Dressing Change/Treatment Date 04/25/23 04/24/23 0924   NEXT Weekly Photo (Inpatient Only) 04/26/23 04/19/23 1100   Wound Length (cm) 12 cm 04/19/23 1100   Wound Width (cm) 3 cm 04/19/23 1100   Wound Depth (cm) 0.1 cm 04/19/23 1100   Wound Surface Area (cm^2) 36 cm^2 04/19/23 1100   Wound Volume (cm^3) 3.6 cm^3 04/19/23 1100   Wound Bed Eschar (%) 100 % 04/19/23 1100   Wound Odor None 04/19/23 1100   WOUND NURSE ONLY - Time Spent with Patient (mins) 30 04/19/23 1100       Wound 04/18/23 Venous Ulcer Leg Lateral Right POA (Active)   Wound Image   04/19/23 1100   Site Assessment RAFAT 04/25/23 2038   Periwound Assessment Red;Excoriated 04/25/23 0829   Margins RAFAT 04/25/23 2038   Closure RAFAT 04/23/23 1945   Drainage Amount RAFAT 04/23/23 1945   Drainage Description Serosanguineous 04/23/23 1100   Treatments Offloading 04/25/23 0829   Wound Cleansing Normal Saline Irrigation 04/23/23 1100   Periwound Protectant Not Applicable 04/21/23 0100   Dressing Cleansing/Solutions Not Applicable 04/21/23 0100   Dressing Options Collagenase Ointment;Hydrofera Blue Ready;Mepilex 04/23/23 1100   Dressing Changed Observed 04/23/23 1945   Dressing Status Clean;Dry;Intact 04/25/23 0829   Dressing Change/Treatment Frequency Every 48 hrs, and As Needed 04/25/23 2038   NEXT Dressing Change/Treatment Date 04/24/23 04/23/23 1945   NEXT Weekly Photo (Inpatient Only) 04/26/23 04/19/23 1100   Non-staged Wound Description Full thickness 04/19/23 1100   Wound Length (cm) 10 cm 04/19/23 1100   Wound Width (cm) 1.5 cm 04/19/23 1100   Wound Depth (cm) 0.1 cm 04/19/23 1100   Wound Surface Area (cm^2) 15 cm^2 04/19/23 1100   Wound Volume (cm^3) 1.5 cm^3 04/19/23 1100   Wound Bed Granulation (%) 80 % 04/19/23  1100   Wound Bed Slough (%) 20 % 04/19/23 1100   Shape linear 04/19/23 1100   Wound Odor None 04/19/23 1100   WOUND NURSE ONLY - Time Spent with Patient (mins) 30 04/19/23 1100       Wound 04/18/23 Skin Tear Hip Mid;Outer Right (Active)   Wound Image   04/19/23 0745   Site Assessment Red;Pink 04/25/23 2038   Periwound Assessment Pink;Intact 04/25/23 2038   Margins Defined edges 04/23/23 1945   Closure Open to air 04/23/23 1945   Drainage Amount None 04/25/23 0829   Drainage Description Sanguineous 04/23/23 1100   Treatments Cleansed;Site care 04/25/23 0829   Wound Cleansing Soap and Water 04/25/23 0829   Dressing Cleansing/Solutions Antifungal Therapy 04/23/23 1100   Dressing Options Open to Air 04/25/23 0829   Dressing Status Open to Air 04/24/23 0924       Wound 04/18/23 Pressure Injury Sacrum Bilateral POA DTI w/moisture associated dermatitis (Active)   Wound Image   04/21/23 0100   Site Assessment Excoriated;Pink;Red 04/25/23 2038   Periwound Assessment Red 04/25/23 0829   Closure Open to air 04/21/23 0100   Drainage Amount None 04/25/23 0829   Drainage Description Serosanguineous 04/19/23 1945   Treatments Offloading 04/25/23 0829   Wound Cleansing Not Applicable 04/21/23 0100   Periwound Protectant Barrier Paste 04/25/23 0829   Dressing Options Open to Air 04/25/23 0829   Dressing Change/Treatment Frequency Every Shift, and As Needed 04/25/23 2038   WOUND NURSE ONLY - Pressure Injury Stage DTPI 04/19/23 1100   Wound Length (cm) 20 cm 04/19/23 1100   Wound Width (cm) 20 cm 04/19/23 1100   Wound Depth (cm) 0.05 cm 04/19/23 1100   Wound Surface Area (cm^2) 400 cm^2 04/19/23 1100   Wound Volume (cm^3) 20 cm^3 04/19/23 1100   Wound Odor None 04/19/23 1100   Exposed Structures None 04/19/23 1100   WOUND NURSE ONLY - Time Spent with Patient (mins) 30 04/19/23 1100       Wound 04/18/23 Venous Ulcer Toe, 2nd Left POA (Active)   Wound Image    04/19/23 1100   Site Assessment RAFAT 04/25/23 2038   Periwound Assessment  Maceration 04/24/23 2015   Margins RAFAT 04/25/23 2038   Closure Secondary intention 04/21/23 0100   Drainage Amount None 04/23/23 1100   Treatments Cleansed;Offloading 04/23/23 1100   Wound Cleansing Normal Saline Irrigation 04/23/23 1100   Periwound Protectant Not Applicable 04/21/23 0100   Dressing Options Dry Roll Gauze 04/23/23 1100   Dressing Changed Changed 04/23/23 1100   Dressing Status Clean;Dry;Intact 04/24/23 0924   Dressing Change/Treatment Frequency Every 48 hrs, and As Needed 04/25/23 2038   NEXT Dressing Change/Treatment Date 04/25/23 04/24/23 0924   NEXT Weekly Photo (Inpatient Only) 04/26/23 04/19/23 1100   Non-staged Wound Description Full thickness 04/19/23 1100   Wound Length (cm) 3.5 cm 04/19/23 1100   Wound Width (cm) 2 cm 04/19/23 1100   Wound Depth (cm) 0.1 cm 04/19/23 1100   Wound Surface Area (cm^2) 7 cm^2 04/19/23 1100   Wound Volume (cm^3) 0.7 cm^3 04/19/23 1100   Wound Bed Eschar (%) 100 % 04/19/23 1100   Shape linear 04/19/23 1100   Wound Odor None 04/19/23 1100   WOUND NURSE ONLY - Time Spent with Patient (mins) 30 04/19/23 1100       Peripheral IV 04/18/23 20 G Left;Posterior Forearm (Active)   Site Assessment Clean;Dry;Intact 04/25/23 2038   Dressing Type Transparent 04/25/23 2038   Line Status Infusing 04/25/23 2038   Dressing Status Clean;Dry;Intact 04/25/23 2038   Dressing Intervention N/A 04/24/23 0929   Date Primary Tubing Changed 04/29/23 04/23/23 1945   Infiltration Grading ([facility], [facility] [place]) 0 04/25/23 0834   Phlebitis Scale ([facility] Only) 0 04/25/23 0834       Peripheral IV 04/18/23 20 G Distal;Posterior;Right Forearm (Active)   Site Assessment Clean;Dry;Intact 04/25/23 2038   Dressing Type Transparent 04/25/23 2038   Line Status Saline locked;Scrubbed the hub prior to access;Flushed 04/25/23 2038   Dressing Status Clean;Dry;Intact 04/25/23 2038   Dressing Intervention N/A 04/24/23 0929   Dressing Change Due 04/29/23 04/23/23 1945   Infiltration Grading ([facility], [facility] [place]) 0 04/25/23  0834   Phlebitis Scale ([facility] Only) 0 04/25/23 0834               MENTAL STATUS ON TRANSFER   Alert and oriented x 3          CONSULTATIONS  ID  GI  cardiology    PROCEDURES  Pre-procedure Diagnoses   Hematemesis with nausea [K92.0]   Acute posthemorrhagic anemia [D62]     Post-procedure Diagnoses   Hematemesis with nausea [K92.0]   Acute posthemorrhagic anemia [D62]     Procedures   SD UPPER GI ENDOSCOPY,CTRL BLEED [22295 (CPT®)]                                                                        .OPERATIVE REPORT     PATIENT:   [place]edar [place]   1940         PREOPERATIVE DIAGNOSES/INDICATIONS:   Hematemesis.  Post hemorrhagic anemia     POSTOPERATIVE DIAGNOSIS:   Large amount of clots were noted in the esophagus. The clots were suctioned or pushed into the stomach. The distal esophagus revealed LA grade D esophagitis with blood actively spurting. The bleeding was controlled with a single Hemoclip and application of Hemospray.  The stomach contained a large amount of blood clots and food residue/ fluid. The stomach could not be cleared due to the presence of food residue. The antrum appeared normal.  A large 3cm cratered duodenal ulcer was noted in the sweep. A large clot was adherent to the ulcer. The ulcer was treated with hemospray.     PROCEDURE:  ESOPHAGOGASTRODUODENOSCOPY     PHYSICIAN:  Shobha Turner MD     ANESTHESIA:  Per anesthesiologist.     LOCATION: [facility]     CONSENT:  OBTAINED. The risks, benefits and alternatives of the procedure were discussed in details. The risks include and are not limited to bleeding, infection, perforation, missed lesions, and sedations risks (cardiopulmonary compromise and allergic reaction to medications).     DESCRIPTION: The patient presented to the procedure room.  After routine checkup was performed, patient was brought into the endoscopy suite.  Patient was placed on his left lateral decubitus position. A bite block was placed in patient's mouth.  Patient was sedated by anesthesia.  Vital signs were monitored throughout the procedure.  Oxygenation support was provided throughout the procedure. Upper endoscope was inserted into patient's mouth and advanced to the second portion of the duodenum under direct visualization.  Once the site was reached and examined, the upper endoscope was withdrawn.  Retroflexion was made within the stomach.  The stomach was decompressed, scope was withdrawn and the procedure was terminated. The patient was hypotensive during the procedure and required pressors administered by anesthesia. There were no immediate complications.     OPERATIVE FINDINGS:  1.   Large amount of clots were noted in the esophagus. The clots were suctioned or pushed into the stomach. The distal esophagus revealed LA grade D esophagitis with blood actively spurting. The bleeding was controlled with a single Hemoclip and application of Hemospray.  2.  The stomach contained a large amount of blood clots and food residue/ fluid. The stomach could not be cleared due to the presence of food residue. The antrum appeared normal.  3.  A large 3cm cratered duodenal ulcer was noted in the sweep. A large clot was adherent to the ulcer. The ulcer was treated with hemospray.     RECOMMENDATIONS:  The patient needs ICU level of care as he has a severe upper GI bleed with large amounts of blood in the stomach.   He was found to have two possible bleeding sources. He had active bleeding in the esophagus that was treated with a hemoclip and Hemospray and a large duodenal ulcer treated with Hemospray.  Closely observe the patient for active bleeding. If he re-bleeds, obtain a CTA and consider IR for embolization.  Keep strict NPO. Do not place an NG tube.  IV PPI bid or drip.  Do not anticoagulate.  Monitor H/H Q6 hours and consider blood transfusion as his blood count is borderline and the patient has a large amount of blood in the stomach.  Findings and recs discussed with  Dr Moreira. Patient to be transferred to ICU.     This note has been transcribed with digital voice recognition software and although it has been reviewed may contain grammatical or word errors         Transthoracic  Echo Report        Echocardiography Laboratory     CONCLUSIONS  Compared to the images of the prior study on 2021 - there is now   reduced ejection fraction and right ventricular dysfunction.  Mildly reduced left ventricular systolic function.  The left ventricular ejection fraction is visually estimated to be 50%.  Diastolic function is difficult to assess with arrhythmia.  Reduced right ventricular systolic function.  Known TAVR aortic valve that is functioning normally with normal   transvalvular gradients.  Mild tricuspid regurgitation.  Estimated right ventricular systolic pressure is 50 mmHg.  The ascending aorta is dilated with a diameter of 4.0 cm.  Left pleural effusion present.     JAMISON ZAYAS  Exam Date:         2023                      17:50  Exam Location:     Inpatient  Priority:          Routine     Ordering Physician:        STEPHANIE CAMACHO  Referring Physician:       [id number] Menchaca BRENDAN  Sonographer:               Jeanie Guevara                              [facility]     Age:    83     Gender:    M  MRN:    [id number]  :    1940  BSA:    2.36   Ht (in):    73     Wt (lb):    249  Exam Type:     Complete     Indications:     bateremia  ICD Codes:       r78.81     CPT Codes:       07906     BP:   98     /   52     HR:   63  Technical Quality:     MEASUREMENTS  (Male / Female) Normal Values  2D ECHO  LV Diastolic Diameter PLAX        4.2 cm                4.2 - 5.9 / 3.9 - 5.3   cm  LV Systolic Diameter PLAX         2.2 cm                2.1 - 4.0 cm  IVS Diastolic Thickness           1.3 cm                  LVPW Diastolic Thickness          1.1 cm                  Estimated LV Ejection Fraction    50 %                    LV Ejection  Fraction MOD BP       51.7 %                >= 55  %  LV Ejection Fraction MOD 4C       56.4 %                  LV Ejection Fraction MOD 2C       45.4 %                  LV Ejection Fraction 4C AL        57.9 %                  LV Ejection Fraction 2C AL        50.9 %                  LA Volume Index                   53 cm3/m2             16 - 28 cm3/m2     DOPPLER  AV Peak Velocity                  2 m/s                   AV Peak Gradient                  15.5 mmHg               AV Mean Gradient                  7.8 mmHg                AV Acceleration Time              46 ms                   AI Pressure Half Time             14 ms                   LVOT Peak Velocity                0.65 m/s                TR Peak Velocity                  282 cm/s                PV Peak Velocity                  1.3 m/s                 PV Peak Gradient                  6.6 mmHg                   * Indicates values subject to auto-interpretation  LV EF:  50    %     FINDINGS  Left Ventricle  Normal left ventricular chamber size. Mild concentric left ventricular   hypertrophy. Mildly reduced left ventricular systolic function. The   left ventricular ejection fraction is visually estimated to be 50%.    Abnormal septal motion. Diastolic function is difficult to assess with   arrhythmia.     Right Ventricle  The right ventricle is dilated. Reduced right ventricular systolic   function.     Right Atrium  Enlarged right atrium. Dilated inferior vena cava without inspiratory   collapse.     Left Atrium  Severely dilated left atrium. Left atrial volume index is 58.5 mL/sq m.     Mitral Valve  Mitral annular calcification. Thickened mitral valve leaflets. Unable   to assess for mitral stenosis. Trace mitral regurgitation.     Aortic Valve  Known TAVR aortic valve that is functioning normally with normal   transvalvular gradients. Vmax is  2.64 m/s. Transvalvular gradients are   - Peak: 22 mmHg,  Mean: 9 mmHg. Acceleration time is 46 ms.  No aortic   insufficiency.     Tricuspid Valve  No tricuspid stenosis. Mild tricuspid regurgitation. Right atrial   pressure is estimated to be 8 mmHg. Estimated right ventricular   systolic pressure is 50 mmHg.     Pulmonic Valve  Structurally normal pulmonic valve without significant stenosis or   regurgitation.     Pericardium  No pericardial effusion. Left pleural effusion present.     Aorta  Normal aortic root for body surface area. The ascending aorta is   dilated with a diameter of 4.0 cm.                                Patrick Dillon MD  (Electronically Signed)  Final Date:     21 April 2023                   19:46         Component 11 d ago   Eject.Frac. MOD BP 51.68    Eject.Frac. MOD 4C 56.4    Eject.Frac. MOD 2C 45.38    Left Ventrical Ejection Fraction 50    Resulting Agency [facility]              Specimen Collected: 04/21/23  5:50 PM Last Resulted: 04/21/23  7:46 PM             LABORATORY  Lab Results   Component Value Date    SODIUM 140 05/02/2023    POTASSIUM 3.7 05/02/2023    CHLORIDE 110 05/02/2023    CO2 22 05/02/2023    GLUCOSE 112 (H) 05/02/2023    BUN 15 05/02/2023    CREATININE 0.73 05/02/2023        Lab Results   Component Value Date    WBC 11.2 (H) 05/02/2023    HEMOGLOBIN 7.7 (L) 05/02/2023    HEMATOCRIT 24.7 (L) 05/02/2023    PLATELETCT 313 05/02/2023        Total time of the discharge process exceeds 45 minutes.

## 2023-04-26 NOTE — CARE PLAN
Pt A&Ox4. No signs of respiratory distress. Bed alarm on. Pt refusing turns. Stated he does not want to be bothered until the morning. Pt also refused dressing changes to BLE. This RN educated patient on importance of skin integrity. Patient said that he would be willing to have his BLE dressing changed in the morning.     The patient is Stable - Low risk of patient condition declining or worsening    Shift Goals  Clinical Goals: IV antibiotics, maintain skin integrity  Patient Goals: rest  Family Goals: RAFAT    Progress made toward(s) clinical / shift goals:  Pt denies pain. Pt refuses to be turned. Pt educated on importance of maintaining skin integrity. Pt remained free from falls this shift.     Problem: Respiratory  Goal: Patient will achieve/maintain optimum respiratory ventilation and gas exchange  Outcome: Progressing     Problem: Fall Risk  Goal: Patient will remain free from falls  Outcome: Progressing     Problem: Skin Integrity  Goal: Skin integrity is maintained or improved  Outcome: Progressing     Problem: Pain - Standard  Goal: Alleviation of pain or a reduction in pain to the patient’s comfort goal  Outcome: Progressing       Patient is not progressing towards the following goals:

## 2023-04-26 NOTE — DISCHARGE PLANNING
Agency/Facility Name: [facility]  Spoke To: Sharron  Outcome: Will review and give this DPA a call back.

## 2023-04-26 NOTE — PROGRESS NOTES
" [facility]  PROGRESS NOTE    HPI: Kedar Woods is a 83 y.o.  with a past medical history that includes hypertension, TAVR, A-fib.  Admitted 4/18/2023 for Sepsis (HCC) [A41.9].     LPS has been consulted for left second toe and bilateral LE wounds.  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  Patient states that these well heal he may be symptom-free for 1 to 2 years and then the wounds return.  These are usually associated with edema, blistering, and then developing into wounds.  Patient stated his current wounds have been in place for \"months\".  [facility] in once a week on Tuesdays for inpatient wound care and the rest of the week he is covered by his home health nurse.  Patient states that he has been told he needs a procedure to has right leg-states they will going through his groin-states he believes it may be related to a vascular procedure but is unsure.  He also states that at this time this procedure is on hold due to his overall health.       INTERVAL HISTORY:  4/26/2023: Patient denies fevers, chills, nausea, vomiting.  Pain controlled.       PERTINENT LPS RESULTS:   COVID-19: not completed      EXAM:      /47   Pulse 63   Temp 36 °C (96.8 °F) (Temporal)   Resp 18   Ht 1.854 m (6' 1\")   Wt 113 kg (249 lb 1.9 oz)   SpO2 96%   BMI 32.87 kg/m²     Pedal Pulses:   R foot: palpable DP, faintly palpable PT  L foot: palpable DP, palpable PT    We willWound(s) :    Wound location: Left second toe  Full thickness, does not probe to bone  Wound characteristics: Pink, red tissue  Erythema: None  Drainage: Scant  Callus: None  Odor: None    Wound location: Right posterior LE  Full thickness  Wound characteristics: Pink, red tissue with scattered granulation buds  Erythema: None  Drainage: Scant  Callus: None  Odor: None    Wound location: Left " "posterior LE  Full thickness  Wound characteristics:  red tissue, small amount of slough remaining  Erythema: None  Drainage: Small, serosanguineous  Callus: None  Odor: None    Wound photo:                 Wound care completed by LPS APRN and wound RN  Bilateral LE and dorsal left second toe: Cleanse with NS, pat dry.  Apply thick layer of Santyl ointment(2-3 mm) to wound beds, secure with Hydrofera Blue cut to fit wound bed, secure with Hypafix tape or Mepilex.        DIABETES MANAGEMENT:    A1c:   Lab Results   Component Value Date/Time    HBA1C 6.6 (H) 06/21/2021 02:20 PM            INFECTION MANAGEMENT:    Results from last 7 days   Lab Units 04/26/23  0227 04/25/23  0351 04/24/23  0653 04/23/23  0355 04/22/23  0101 04/21/23  0229   WBC 1501 K/uL 10.7 11.6* 14.1* 13.9* 16.0* 16.1*   PLATELET COUNT 1518 K/uL 346 328 337 269 221 210     Wound culture results:   Results       Procedure Component Value Units Date/Time    BLOOD CULTURE [[id number]] Collected: 04/20/23 1205    Order Status: Completed Specimen: Blood from Peripheral Updated: 04/25/23 1500     Significant Indicator NEG     Source BLD     Site PERIPHERAL     Culture Result No growth after 5 days of incubation.    Narrative:      Per Hospital Policy: Only change Specimen Src: to \"Line\" if  specified by physician order.  Left Hand    BLOOD CULTURE [[id number]] Collected: 04/20/23 1205    Order Status: Completed Specimen: Blood from Peripheral Updated: 04/25/23 1500     Significant Indicator NEG     Source BLD     Site PERIPHERAL     Culture Result No growth after 5 days of incubation.    Narrative:      Per Hospital Policy: Only change Specimen Src: to \"Line\" if  specified by physician order.  Right Hand    BLOOD CULTURE [[id number]]  (Abnormal) Collected: 04/18/23 1601    Order Status: Completed Specimen: Blood from Peripheral Updated: 04/21/23 1356     Significant Indicator POS     Source BLD     Site PERIPHERAL     Culture Result Growth detected by Bactec " "instrument. 04/19/2023  00:41      Beta Streptococcus Group G  See previous culture for sensitivity report.      Narrative:      CALL  [facility]  183 tel. [phone],  CALLED  183 tel. [phone] 04/19/2023, 00:41, RB PERF. RESULTS CALLED  TO:[phone]  Per Hospital Policy: Only change Specimen Src: to \"Line\" if  specified by physician order.  Right AC    BLOOD CULTURE [[id number]]  (Abnormal)  (Susceptibility) Collected: 04/18/23 1603    Order Status: Completed Specimen: Blood from Peripheral Updated: 04/21/23 1348     Significant Indicator POS     Source BLD     Site PERIPHERAL     Culture Result Growth detected by Bactec instrument. 04/19/2023  00:41      Beta Streptococcus Group G  This isolate does NOT demonstrate inducible Clindamycin  resistance in vitro.      Narrative:      Per Hospital Policy: Only change Specimen Src: to \"Line\" if  specified by physician order.  Left AC    Susceptibility       Beta streptococcus group g (1)       Antibiotic Interpretation Microscan   Method Status    Cefotaxime Sensitive 0.032 mcg/mL E-TEST Final    Penicillin Sensitive 0.023 mcg/mL E-TEST Final    Clindamycin Sensitive - mcg/mL E-TEST Final                       E-Test [[id number]] Collected: 04/18/23 1603    Order Status: Completed Specimen: Other Updated: 04/21/23 1348     ETEST Sensitivity FINAL    Narrative:      Per Hospital Policy: Only change Specimen Src: to \"Line\" if  specified by physician order.               ASSESSMENT/PLAN:   83 y.o. admitted for Sepsis ([facility]) [A41.9]. Presents with bilateral posterior LE wounds and dorsal left second toe wound.    -All wounds responding well to Santyl ointment  -Decrease slough to wound bed, granulation buds to wound bed  -ordered Santyl dressing  -Hyperemic pulses-ABIs completed, show left TBI of 0.39.  [facility] Smart      Wound care:   -wound care orders placed for nursing by LPS   Bilateral LE and dorsal left second toe: Cleanse with NS, " pat dry.  Apply thick layer of Santyl ointment(2-3 mm) to wound beds, secure with Hydrofera Blue cut to fit wound bed, secure with Hypafix tape or Mepilex.    Labs/Imaging:  -COVID-19: not completed this admission.       Vascular status:   R foot: palpable DP, faintly palpable PT  L foot: palpable DP, palpable PT  -  arterial studies  with TBI and toe pressures completed  -Left TBI 0.39, patient[facility] vascular [facility] updated      Surgery:   -No plans for surgery at this time    Antibiotics:   - on antibiotics   -ID following    Weight Bearing Status:   -Right foot: Weight bearing as tolerated  -Left foot: Weight bearing as tolerated    Offloading:   -Offloading shoe; bedside      PT/OT :   -involved, last seen 4/26    Diabetes Education:   -Not involved at this time    -  Advised to check feet at least daily, moisturize feet, and to always wear protective foot wear.   -avoid trimming own nails. See podiatrist or certified foot and nail RN  -keep blood sugars <150 for improved wound healing        DISCHARGE PLAN:    Disposition:   DC to SNF          Discussed with: pt, RN, Dr. Moreira, Wound RN Sahil    Please note that this dictation was created using voice recognition software. I have  worked with technical experts from [facility] to optimize the interface.  I have made every reasonable attempt to correct obvious errors, but there may be errors of grammar and possibly content that I did not discover before finalizing the note.    Pretty Parry, A.P.R.N.    If any questions or concerns, please contact LPS through voalte.

## 2023-04-26 NOTE — THERAPY
Physical Therapy Contact Note    Patient Name: Kedar Woods  Age:  83 y.o., Sex:  male  Medical Record #: [id number]  Today's Date: 4/26/2023    Pt thinks he is discharging today, therefor would like to rest and politely declined. Will re-attempt as able.

## 2023-04-26 NOTE — DISCHARGE PLANNING
Case Management Discharge Planning    Admission Date: 4/18/2023  GMLOS: 5  ALOS: 8    6-Clicks ADL Score: 14  6-Clicks Mobility Score: 15  PT and/or OT Eval ordered: Yes  Post-acute Referrals Ordered: Yes  Post-acute Choice Obtained: Yes  Has referral(s) been sent to post-acute provider:  Yes      Anticipated Discharge Dispo: Discharge Disposition: D/T to SNF with Medicare cert in anticipation of skilled care (03)  Discharge Address: Home address ([address] [place] [address] [address] [facility])  Discharge Contact Phone Number: [phone]    DME Needed: No    Action(s) Taken: Updated Provider/Nurse on Discharge Plan and Referral(s) sent    Escalations Completed: None    Medically Clear: Yes    Next Steps: Pt med cleared for dc to snf. Req'd [facility] snf reconsider denial since pt no longer on vanco IV- now on a PO ABX. [facility] snf denied, [facility] and [facility] both pending.  Narcotics RX on Gaby  RN's desk from Dr Moreira for dc packet.      Barriers to Discharge: Pending Placement and Transportation    Is the patient up for discharge tomorrow: Yes    Is transport arranged for discharge disposition: No

## 2023-04-26 NOTE — WOUND TEAM
Assisted Pretty Parry ([facility] APRN), RN with wound care to BLE and left 2nd toe, non-selective debridement performed using wound cleanser/NS and gauze. Please see Pretty Parry ([facility] APRN) note for further wound care details.

## 2023-04-26 NOTE — PROGRESS NOTES
Pt care assumed, pt is A&Ox4. Reports pain 0/10, VSS on RA. All needs attended at this time. Safety measures in place, call light within reach.    Attempted multiple times to reposition pt refused, educated on the importance of Q2 turn to maintain skin integrity> Pt verbalized understanding.

## 2023-04-27 ENCOUNTER — ANESTHESIA EVENT (OUTPATIENT)
Dept: SURGERY | Facility: MEDICAL CENTER | Age: 83
DRG: 871 | End: 2023-04-27
Payer: COMMERCIAL

## 2023-04-27 ENCOUNTER — APPOINTMENT (OUTPATIENT)
Dept: RADIOLOGY | Facility: MEDICAL CENTER | Age: 83
DRG: 871 | End: 2023-04-27
Attending: INTERNAL MEDICINE
Payer: COMMERCIAL

## 2023-04-27 ENCOUNTER — ANESTHESIA (OUTPATIENT)
Dept: SURGERY | Facility: MEDICAL CENTER | Age: 83
DRG: 871 | End: 2023-04-27
Payer: COMMERCIAL

## 2023-04-27 PROBLEM — E66.01 CLASS 2 SEVERE OBESITY WITH SERIOUS COMORBIDITY IN ADULT (HCC): Status: ACTIVE | Noted: 2023-04-27

## 2023-04-27 PROBLEM — K92.2 ACUTE UPPER GI BLEED: Status: ACTIVE | Noted: 2023-04-27

## 2023-04-27 LAB
ABO + RH BLD: NORMAL
ABO GROUP BLD: NORMAL
ALBUMIN SERPL BCP-MCNC: 2 G/DL (ref 3.2–4.9)
APTT PPP: 37.7 SEC (ref 24.7–36)
BLD GP AB SCN SERPL QL: NORMAL
BUN SERPL-MCNC: 37 MG/DL (ref 8–22)
CALCIUM ALBUM COR SERPL-MCNC: 9 MG/DL (ref 8.5–10.5)
CALCIUM SERPL-MCNC: 7.4 MG/DL (ref 8.5–10.5)
CFT BLD TEG: 6 MIN (ref 4.6–9.1)
CFT P HPASE BLD TEG: 6.7 MIN (ref 4.3–8.3)
CHLORIDE SERPL-SCNC: 106 MMOL/L (ref 96–112)
CLOT ANGLE BLD TEG: 77.2 DEGREES (ref 63–78)
CLOT LYSIS 30M P MA LENFR BLD TEG: 0.1 % (ref 0–2.6)
CO2 SERPL-SCNC: 23 MMOL/L (ref 20–33)
CREAT SERPL-MCNC: 0.84 MG/DL (ref 0.5–1.4)
CT.EXTRINSIC BLD ROTEM: 0.8 MIN (ref 0.8–2.1)
ERYTHROCYTE [DISTWIDTH] IN BLOOD BY AUTOMATED COUNT: 57.5 FL (ref 35.9–50)
GFR SERPLBLD CREATININE-BSD FMLA CKD-EPI: 87 ML/MIN/1.73 M 2
GLUCOSE SERPL-MCNC: 124 MG/DL (ref 65–99)
HCT VFR BLD AUTO: 23.9 % (ref 42–52)
HCT VFR BLD AUTO: 24.8 % (ref 42–52)
HCT VFR BLD AUTO: 25.2 % (ref 42–52)
HCT VFR BLD AUTO: 28.8 % (ref 42–52)
HCT VFR BLD AUTO: 29.7 % (ref 42–52)
HGB BLD-MCNC: 7.4 G/DL (ref 14–18)
HGB BLD-MCNC: 7.5 G/DL (ref 14–18)
HGB BLD-MCNC: 7.7 G/DL (ref 14–18)
HGB BLD-MCNC: 8.9 G/DL (ref 14–18)
HGB BLD-MCNC: 9 G/DL (ref 14–18)
INR PPP: 2.76 (ref 0.87–1.13)
MCF BLD TEG: 66.6 MM (ref 52–69)
MCF.PLATELET INHIB BLD ROTEM: 37.7 MM (ref 15–32)
MCH RBC QN AUTO: 28.4 PG (ref 27–33)
MCHC RBC AUTO-ENTMCNC: 30.3 G/DL (ref 33.7–35.3)
MCV RBC AUTO: 93.7 FL (ref 81.4–97.8)
PA AA BLD-ACNC: 2.7 % (ref 0–11)
PA ADP BLD-ACNC: 54.6 % (ref 0–17)
PHOSPHATE SERPL-MCNC: 2 MG/DL (ref 2.5–4.5)
PLATELET # BLD AUTO: 351 K/UL (ref 164–446)
PMV BLD AUTO: 10.7 FL (ref 9–12.9)
POTASSIUM SERPL-SCNC: 3.7 MMOL/L (ref 3.6–5.5)
PROTHROMBIN TIME: 28.3 SEC (ref 12–14.6)
RBC # BLD AUTO: 3.17 M/UL (ref 4.7–6.1)
RH BLD: NORMAL
SODIUM SERPL-SCNC: 139 MMOL/L (ref 135–145)
TEG ALGORITHM TGALG: ABNORMAL
WBC # BLD AUTO: 14.6 K/UL (ref 4.8–10.8)

## 2023-04-27 PROCEDURE — 71045 X-RAY EXAM CHEST 1 VIEW: CPT

## 2023-04-27 PROCEDURE — 36556 INSERT NON-TUNNEL CV CATH: CPT

## 2023-04-27 PROCEDURE — 85610 PROTHROMBIN TIME: CPT

## 2023-04-27 PROCEDURE — 86901 BLOOD TYPING SEROLOGIC RH(D): CPT

## 2023-04-27 PROCEDURE — 700102 HCHG RX REV CODE 250 W/ 637 OVERRIDE(OP)

## 2023-04-27 PROCEDURE — 82105 ALPHA-FETOPROTEIN SERUM: CPT

## 2023-04-27 PROCEDURE — 80069 RENAL FUNCTION PANEL: CPT

## 2023-04-27 PROCEDURE — 85027 COMPLETE CBC AUTOMATED: CPT

## 2023-04-27 PROCEDURE — 160035 HCHG PACU - 1ST 60 MINS PHASE I: Performed by: INTERNAL MEDICINE

## 2023-04-27 PROCEDURE — 700111 HCHG RX REV CODE 636 W/ 250 OVERRIDE (IP): Performed by: HOSPITALIST

## 2023-04-27 PROCEDURE — 700102 HCHG RX REV CODE 250 W/ 637 OVERRIDE(OP): Performed by: INTERNAL MEDICINE

## 2023-04-27 PROCEDURE — 86850 RBC ANTIBODY SCREEN: CPT

## 2023-04-27 PROCEDURE — 86900 BLOOD TYPING SEROLOGIC ABO: CPT

## 2023-04-27 PROCEDURE — 43255 EGD CONTROL BLEEDING ANY: CPT | Performed by: INTERNAL MEDICINE

## 2023-04-27 PROCEDURE — 700111 HCHG RX REV CODE 636 W/ 250 OVERRIDE (IP): Performed by: ANESTHESIOLOGY

## 2023-04-27 PROCEDURE — 85347 COAGULATION TIME ACTIVATED: CPT

## 2023-04-27 PROCEDURE — C9113 INJ PANTOPRAZOLE SODIUM, VIA: HCPCS | Performed by: NURSE PRACTITIONER

## 2023-04-27 PROCEDURE — 160202 HCHG ENDO MINUTES - 1ST 30 MINS LEVEL 3: Performed by: INTERNAL MEDICINE

## 2023-04-27 PROCEDURE — 85730 THROMBOPLASTIN TIME PARTIAL: CPT

## 2023-04-27 PROCEDURE — 700101 HCHG RX REV CODE 250: Performed by: ANESTHESIOLOGY

## 2023-04-27 PROCEDURE — 700105 HCHG RX REV CODE 258: Performed by: ANESTHESIOLOGY

## 2023-04-27 PROCEDURE — 97602 WOUND(S) CARE NON-SELECTIVE: CPT

## 2023-04-27 PROCEDURE — 85014 HEMATOCRIT: CPT

## 2023-04-27 PROCEDURE — 700111 HCHG RX REV CODE 636 W/ 250 OVERRIDE (IP): Performed by: INTERNAL MEDICINE

## 2023-04-27 PROCEDURE — 99140 ANES COMP EMERGENCY COND: CPT | Performed by: ANESTHESIOLOGY

## 2023-04-27 PROCEDURE — 700111 HCHG RX REV CODE 636 W/ 250 OVERRIDE (IP): Performed by: NURSE PRACTITIONER

## 2023-04-27 PROCEDURE — 700102 HCHG RX REV CODE 250 W/ 637 OVERRIDE(OP): Performed by: HOSPITALIST

## 2023-04-27 PROCEDURE — A9270 NON-COVERED ITEM OR SERVICE: HCPCS | Performed by: INTERNAL MEDICINE

## 2023-04-27 PROCEDURE — 700111 HCHG RX REV CODE 636 W/ 250 OVERRIDE (IP): Performed by: STUDENT IN AN ORGANIZED HEALTH CARE EDUCATION/TRAINING PROGRAM

## 2023-04-27 PROCEDURE — 02HV33Z INSERTION OF INFUSION DEVICE INTO SUPERIOR VENA CAVA, PERCUTANEOUS APPROACH: ICD-10-PCS | Performed by: INTERNAL MEDICINE

## 2023-04-27 PROCEDURE — 160009 HCHG ANES TIME/MIN: Performed by: INTERNAL MEDICINE

## 2023-04-27 PROCEDURE — 700105 HCHG RX REV CODE 258: Performed by: INTERNAL MEDICINE

## 2023-04-27 PROCEDURE — A9270 NON-COVERED ITEM OR SERVICE: HCPCS | Performed by: STUDENT IN AN ORGANIZED HEALTH CARE EDUCATION/TRAINING PROGRAM

## 2023-04-27 PROCEDURE — 700105 HCHG RX REV CODE 258: Performed by: HOSPITALIST

## 2023-04-27 PROCEDURE — C1751 CATH, INF, PER/CENT/MIDLINE: HCPCS

## 2023-04-27 PROCEDURE — 770022 HCHG ROOM/CARE - ICU (200)

## 2023-04-27 PROCEDURE — 99291 CRITICAL CARE FIRST HOUR: CPT | Mod: 25 | Performed by: INTERNAL MEDICINE

## 2023-04-27 PROCEDURE — 99292 CRITICAL CARE ADDL 30 MIN: CPT | Mod: 25 | Performed by: INTERNAL MEDICINE

## 2023-04-27 PROCEDURE — 36556 INSERT NON-TUNNEL CV CATH: CPT | Mod: RT | Performed by: INTERNAL MEDICINE

## 2023-04-27 PROCEDURE — XW0G886 INTRODUCTION OF MINERAL-BASED TOPICAL HEMOSTATIC AGENT INTO UPPER GI, VIA NATURAL OR ARTIFICIAL OPENING ENDOSCOPIC, NEW TECHNOLOGY GROUP 6: ICD-10-PCS | Performed by: INTERNAL MEDICINE

## 2023-04-27 PROCEDURE — 85576 BLOOD PLATELET AGGREGATION: CPT

## 2023-04-27 PROCEDURE — 30243N1 TRANSFUSION OF NONAUTOLOGOUS RED BLOOD CELLS INTO CENTRAL VEIN, PERCUTANEOUS APPROACH: ICD-10-PCS | Performed by: HOSPITALIST

## 2023-04-27 PROCEDURE — 0W3P8ZZ CONTROL BLEEDING IN GASTROINTESTINAL TRACT, VIA NATURAL OR ARTIFICIAL OPENING ENDOSCOPIC: ICD-10-PCS | Performed by: INTERNAL MEDICINE

## 2023-04-27 PROCEDURE — 85018 HEMOGLOBIN: CPT | Mod: 91

## 2023-04-27 PROCEDURE — 700102 HCHG RX REV CODE 250 W/ 637 OVERRIDE(OP): Performed by: STUDENT IN AN ORGANIZED HEALTH CARE EDUCATION/TRAINING PROGRAM

## 2023-04-27 PROCEDURE — 160207 HCHG ENDO MINUTES - EA ADDL 1 MIN LEVEL 3: Performed by: INTERNAL MEDICINE

## 2023-04-27 PROCEDURE — 99100 ANES PT EXTEME AGE<1 YR&>70: CPT | Performed by: ANESTHESIOLOGY

## 2023-04-27 PROCEDURE — 85384 FIBRINOGEN ACTIVITY: CPT

## 2023-04-27 PROCEDURE — A9270 NON-COVERED ITEM OR SERVICE: HCPCS | Performed by: HOSPITALIST

## 2023-04-27 PROCEDURE — 00731 ANES UPR GI NDSC PX NOS: CPT | Performed by: ANESTHESIOLOGY

## 2023-04-27 PROCEDURE — A9270 NON-COVERED ITEM OR SERVICE: HCPCS

## 2023-04-27 PROCEDURE — 160002 HCHG RECOVERY MINUTES (STAT): Performed by: INTERNAL MEDICINE

## 2023-04-27 PROCEDURE — 160048 HCHG OR STATISTICAL LEVEL 1-5: Performed by: INTERNAL MEDICINE

## 2023-04-27 PROCEDURE — C9113 INJ PANTOPRAZOLE SODIUM, VIA: HCPCS | Performed by: HOSPITALIST

## 2023-04-27 PROCEDURE — 700105 HCHG RX REV CODE 258: Performed by: STUDENT IN AN ORGANIZED HEALTH CARE EDUCATION/TRAINING PROGRAM

## 2023-04-27 PROCEDURE — C9113 INJ PANTOPRAZOLE SODIUM, VIA: HCPCS | Performed by: INTERNAL MEDICINE

## 2023-04-27 PROCEDURE — 99291 CRITICAL CARE FIRST HOUR: CPT | Performed by: HOSPITALIST

## 2023-04-27 RX ORDER — ONDANSETRON 2 MG/ML
INJECTION INTRAMUSCULAR; INTRAVENOUS PRN
Status: DISCONTINUED | OUTPATIENT
Start: 2023-04-27 | End: 2023-04-27 | Stop reason: SURG

## 2023-04-27 RX ORDER — ONDANSETRON 2 MG/ML
4 INJECTION INTRAMUSCULAR; INTRAVENOUS
Status: DISCONTINUED | OUTPATIENT
Start: 2023-04-27 | End: 2023-04-27 | Stop reason: HOSPADM

## 2023-04-27 RX ORDER — EPHEDRINE SULFATE 50 MG/ML
INJECTION, SOLUTION INTRAVENOUS PRN
Status: DISCONTINUED | OUTPATIENT
Start: 2023-04-27 | End: 2023-04-27 | Stop reason: SURG

## 2023-04-27 RX ORDER — LIDOCAINE HYDROCHLORIDE 20 MG/ML
INJECTION, SOLUTION EPIDURAL; INFILTRATION; INTRACAUDAL; PERINEURAL PRN
Status: DISCONTINUED | OUTPATIENT
Start: 2023-04-27 | End: 2023-04-27 | Stop reason: SURG

## 2023-04-27 RX ORDER — EPHEDRINE SULFATE 50 MG/ML
5 INJECTION, SOLUTION INTRAVENOUS
Status: DISCONTINUED | OUTPATIENT
Start: 2023-04-27 | End: 2023-04-27 | Stop reason: HOSPADM

## 2023-04-27 RX ORDER — LIDOCAINE HYDROCHLORIDE 40 MG/ML
SOLUTION TOPICAL PRN
Status: DISCONTINUED | OUTPATIENT
Start: 2023-04-27 | End: 2023-04-27 | Stop reason: SURG

## 2023-04-27 RX ORDER — SUCCINYLCHOLINE CHLORIDE 20 MG/ML
INJECTION INTRAMUSCULAR; INTRAVENOUS PRN
Status: DISCONTINUED | OUTPATIENT
Start: 2023-04-27 | End: 2023-04-27 | Stop reason: SURG

## 2023-04-27 RX ORDER — SODIUM CHLORIDE, SODIUM LACTATE, POTASSIUM CHLORIDE, CALCIUM CHLORIDE 600; 310; 30; 20 MG/100ML; MG/100ML; MG/100ML; MG/100ML
INJECTION, SOLUTION INTRAVENOUS
Status: DISCONTINUED | OUTPATIENT
Start: 2023-04-27 | End: 2023-04-27 | Stop reason: SURG

## 2023-04-27 RX ORDER — DIPHENHYDRAMINE HYDROCHLORIDE 50 MG/ML
12.5 INJECTION INTRAMUSCULAR; INTRAVENOUS
Status: DISCONTINUED | OUTPATIENT
Start: 2023-04-27 | End: 2023-04-27 | Stop reason: HOSPADM

## 2023-04-27 RX ORDER — SODIUM CHLORIDE, SODIUM LACTATE, POTASSIUM CHLORIDE, CALCIUM CHLORIDE 600; 310; 30; 20 MG/100ML; MG/100ML; MG/100ML; MG/100ML
INJECTION, SOLUTION INTRAVENOUS CONTINUOUS
Status: DISCONTINUED | OUTPATIENT
Start: 2023-04-27 | End: 2023-04-28

## 2023-04-27 RX ORDER — PANTOPRAZOLE SODIUM 40 MG/10ML
40 INJECTION, POWDER, LYOPHILIZED, FOR SOLUTION INTRAVENOUS 2 TIMES DAILY
Status: DISCONTINUED | OUTPATIENT
Start: 2023-04-27 | End: 2023-04-27

## 2023-04-27 RX ORDER — SODIUM CHLORIDE, SODIUM LACTATE, POTASSIUM CHLORIDE, CALCIUM CHLORIDE 600; 310; 30; 20 MG/100ML; MG/100ML; MG/100ML; MG/100ML
INJECTION, SOLUTION INTRAVENOUS CONTINUOUS
Status: DISCONTINUED | OUTPATIENT
Start: 2023-04-27 | End: 2023-04-27 | Stop reason: HOSPADM

## 2023-04-27 RX ORDER — PANTOPRAZOLE SODIUM 40 MG/10ML
40 INJECTION, POWDER, LYOPHILIZED, FOR SOLUTION INTRAVENOUS ONCE
Status: COMPLETED | OUTPATIENT
Start: 2023-04-27 | End: 2023-04-27

## 2023-04-27 RX ORDER — HALOPERIDOL 5 MG/ML
1 INJECTION INTRAMUSCULAR
Status: DISCONTINUED | OUTPATIENT
Start: 2023-04-27 | End: 2023-04-27 | Stop reason: HOSPADM

## 2023-04-27 RX ADMIN — METOPROLOL SUCCINATE 12.5 MG: 25 TABLET, EXTENDED RELEASE ORAL at 05:29

## 2023-04-27 RX ADMIN — PANTOPRAZOLE SODIUM 40 MG: 40 INJECTION, POWDER, LYOPHILIZED, FOR SOLUTION INTRAVENOUS at 15:24

## 2023-04-27 RX ADMIN — AMPICILLIN AND SULBACTAM 3 G: 1; 2 INJECTION, POWDER, FOR SOLUTION INTRAMUSCULAR; INTRAVENOUS at 11:53

## 2023-04-27 RX ADMIN — PROPOFOL 70 MG: 10 INJECTION, EMULSION INTRAVENOUS at 13:34

## 2023-04-27 RX ADMIN — SODIUM CHLORIDE 8 MG/HR: 9 INJECTION, SOLUTION INTRAVENOUS at 15:31

## 2023-04-27 RX ADMIN — LIDOCAINE HYDROCHLORIDE 4 ML: 40 SOLUTION TOPICAL at 13:34

## 2023-04-27 RX ADMIN — EPHEDRINE SULFATE 10 MG: 50 INJECTION, SOLUTION INTRAVENOUS at 13:47

## 2023-04-27 RX ADMIN — EPHEDRINE SULFATE 10 MG: 50 INJECTION, SOLUTION INTRAVENOUS at 13:39

## 2023-04-27 RX ADMIN — EPHEDRINE SULFATE 10 MG: 50 INJECTION, SOLUTION INTRAVENOUS at 13:36

## 2023-04-27 RX ADMIN — Medication 1 APPLICATOR: at 22:45

## 2023-04-27 RX ADMIN — ROSUVASTATIN CALCIUM 40 MG: 20 TABLET, FILM COATED ORAL at 05:30

## 2023-04-27 RX ADMIN — APIXABAN 5 MG: 5 TABLET, FILM COATED ORAL at 05:29

## 2023-04-27 RX ADMIN — AMPICILLIN AND SULBACTAM 3 G: 1; 2 INJECTION, POWDER, FOR SOLUTION INTRAMUSCULAR; INTRAVENOUS at 17:44

## 2023-04-27 RX ADMIN — SODIUM CHLORIDE, POTASSIUM CHLORIDE, SODIUM LACTATE AND CALCIUM CHLORIDE: 600; 310; 30; 20 INJECTION, SOLUTION INTRAVENOUS at 13:31

## 2023-04-27 RX ADMIN — AMPICILLIN AND SULBACTAM 3 G: 1; 2 INJECTION, POWDER, FOR SOLUTION INTRAMUSCULAR; INTRAVENOUS at 05:22

## 2023-04-27 RX ADMIN — LIDOCAINE HYDROCHLORIDE 100 MG: 20 INJECTION, SOLUTION EPIDURAL; INFILTRATION; INTRACAUDAL at 13:31

## 2023-04-27 RX ADMIN — SUCCINYLCHOLINE CHLORIDE 100 MG: 20 INJECTION, SOLUTION INTRAMUSCULAR; INTRAVENOUS; PARENTERAL at 13:34

## 2023-04-27 RX ADMIN — OXYCODONE 2.5 MG: 5 TABLET ORAL at 05:29

## 2023-04-27 RX ADMIN — SODIUM CHLORIDE, POTASSIUM CHLORIDE, SODIUM LACTATE AND CALCIUM CHLORIDE: 600; 310; 30; 20 INJECTION, SOLUTION INTRAVENOUS at 15:24

## 2023-04-27 RX ADMIN — ONDANSETRON 4 MG: 2 INJECTION INTRAMUSCULAR; INTRAVENOUS at 14:05

## 2023-04-27 RX ADMIN — CALCIUM CARBONATE 500 MG: 500 TABLET, CHEWABLE ORAL at 08:09

## 2023-04-27 RX ADMIN — Medication 100 MG: at 05:29

## 2023-04-27 RX ADMIN — PROTHROMBIN, COAGULATION FACTOR VII HUMAN, COAGULATION FACTOR IX HUMAN, COAGULATION FACTOR X HUMAN, PROTEIN C, PROTEIN S HUMAN, AND WATER 2500 UNITS: KIT at 11:46

## 2023-04-27 RX ADMIN — AMPICILLIN AND SULBACTAM 3 G: 1; 2 INJECTION, POWDER, FOR SOLUTION INTRAMUSCULAR; INTRAVENOUS at 23:14

## 2023-04-27 RX ADMIN — PANTOPRAZOLE SODIUM 40 MG: 40 INJECTION, POWDER, LYOPHILIZED, FOR SOLUTION INTRAVENOUS at 12:17

## 2023-04-27 ASSESSMENT — PAIN DESCRIPTION - PAIN TYPE
TYPE: ACUTE PAIN

## 2023-04-27 ASSESSMENT — ENCOUNTER SYMPTOMS
HEADACHES: 0
VOMITING: 1
NAUSEA: 0
NAUSEA: 1
NERVOUS/ANXIOUS: 1
COUGH: 1

## 2023-04-27 ASSESSMENT — PATIENT HEALTH QUESTIONNAIRE - PHQ9
SUM OF ALL RESPONSES TO PHQ9 QUESTIONS 1 AND 2: 0
2. FEELING DOWN, DEPRESSED, IRRITABLE, OR HOPELESS: NOT AT ALL
1. LITTLE INTEREST OR PLEASURE IN DOING THINGS: NOT AT ALL

## 2023-04-27 NOTE — CONSULTS
Critical Care Consultation    Date of consult: 4/27/2023    Referring Physician  Marilyn Moreira MD    Reason for Consultation  Acute upper GI bleeding    History of Presenting Illness  83 y.o. male who presented 4/18/2023 Streptococcus group G bacteremia attributed to cellulitis, ID consulted, cultures negative on repeat, currently on Unasyn.  His presenting symptoms were generalized weakness and fatigue.  In the course of the work-up for his sepsis/bacteremia he was found to multiple hypodense hepatic lesions measuring up to 7.9 cm of unclear etiology.    Has a past medical history of morbid obesity, aortic stenosis status post TAVR, heart failure with preserved ejection fraction, s/p micrapacer, atrial fibrillation and secondary hypercoagulable state on Eliquis.    He was planned for discharge yesterday but early morning developed massive hematemesis.  His Eliquis was reversed with Kcentra.  He was emergently taken for an EGD by gastroenterology which found severe grade D esophagitis in the lower esophagus with blood actively spurting from an arterial vessel, which was controlled with a Hemoclip and application of Hemospray.  Stomach mucosa was not able to be visualized studies it was filled with blood clots and food.  The duodenum showed a large 3 cm cratered duodenal ulcer with a large adherent clot which was also treated with Hemospray.      During the EGD he was hypotensive requiring vasopressor support.  In the PACU he remained hypotensive and critical care consultation was requested for transfer to ICU for higher level of care.    Code Status  Full Code -- personally confirmed with the patient at time of consultation  Pt states he has no spouse or NOK. While his power of  on file appoints Vern Mckeon as his DPOA, patient reports this has changed and indicates DPOA to be friend Alistair Kirk.     Review of Systems  Review of Systems   Unable to perform ROS: Acuity of condition    Gastrointestinal:  Negative for nausea.     Past Medical History   has a past medical history of Hypertension.    Surgical History   has a past surgical history that includes pr upper gi endoscopy,diagnosis (4/27/2023).    Family History  Family history reviewed and no significant conditions or diseases relevant to the chief complaint were identified    Social History   reports that he has never smoked. He has never used smokeless tobacco. He reports current alcohol use. He reports that he does not use drugs.    Medications  Home Medications       Reviewed by Gracie Owens R.N. (Registered Nurse) on 04/27/23 at 1313  Med List Status: Complete     Medication Last Dose Status   acetaminophen (TYLENOL) 325 MG Tab PRN Active   acetaminophen (Tylenol) tablet 650 mg  Active   amoxicillin-clavulanate (AUGMENTIN) 875-125 MG Tab  Active   ampicillin/sulbactam (UNASYN) 3 g in  mL IVPB  Active   apixaban (ELIQUIS) 5mg Tab 4/18/2023 Active   apixaban (ELIQUIS) tablet 5 mg  Active   ARTIFICIAL SALIVA MT PRN Active   aspirin EC (ECOTRIN) 81 MG Tablet Delayed Response UNK Active   bisacodyl (DULCOLAX) suppository 10 mg  Active   bumetanide (BUMEX) 0.5 MG Tab UNK Active   calcium carbonate (Tums) chewable tab 500 mg  Active   collagenase (SANTYL)  Active   collagenase (SANTYL) ointment  Active   diclofenac sodium 1 % Gel PRN Active   Empagliflozin (JARDIANCE) 25 MG Tab UNK Active   famotidine (PEPCID) 20 MG Tab 4/17/2023 Active   gabapentin (NEURONTIN) 300 MG Cap 4/18/2023 Active   lactated ringers infusion (BOLUS)  Active   magnesium hydroxide (MILK OF MAGNESIA) suspension 30 mL  Active   Melatonin 3 MG Cap 4/17/2023 Active   melatonin tablet 5 mg  Active   metoprolol SR (TOPROL XL) 25 MG TABLET SR 24 HR UNK Active   metoprolol SR (TOPROL XL) tablet 12.5 mg  Active   miconazole (MICOTIN) 2 % Cream 4/18/2023 Active   ondansetron (ZOFRAN ODT) dispertab 4 mg  Active   ondansetron (ZOFRAN) syringe/vial injection 4 mg   Active   oxyCODONE immediate-release (ROXICODONE) 5 MG Tab  Active   oxyCODONE immediate-release (ROXICODONE) tablet 2.5 mg  Active   pantoprazole (Protonix) injection 40 mg  Active   polyethylene glycol/lytes (MIRALAX) 17 g Pack  Active   polyethylene glycol/lytes (MIRALAX) PACKET 1 Packet  Active   rosuvastatin (CRESTOR) 40 MG tablet UNK Active   rosuvastatin (CRESTOR) tablet 40 mg  Active   sacubitril-valsartan (ENTRESTO) 24-26 MG Tab 4/18/2023 Active   scopolamine (TRANSDERM-SCOP) patch 1 Patch  Active   senna-docusate (PERICOLACE or SENOKOT S) 8.6-50 MG per tablet 2 Tablet  Active   senna-docusate (PERICOLACE OR SENOKOT S) 8.6-50 MG Tab  Active   sildenafil citrate (VIAGRA) 100 MG tablet UNK Active   thiamine (THIAMINE) 100 MG tablet  Active   thiamine (Vitamin B-1) tablet 100 mg  Active   trospium (SANCTURA) 20 MG Tab UNK Active                  Current Facility-Administered Medications   Medication Dose Route Frequency Provider Last Rate Last Admin    lactated ringers infusion   Intravenous Continuous Subisidro Moreira M.DYeimy 125 mL/hr at 04/27/23 1524 New Bag at 04/27/23 1524    pantoprazole (Protonix) 80 mg in  mL Infusion  8 mg/hr Intravenous Continuous Subani Harish M.D. 25 mL/hr at 04/27/23 1531 8 mg/hr at 04/27/23 1531    collagenase (SANTYL)   Topical DAILY Pretty Parry, A.P.R.N.   Given at 04/26/23 1722    thiamine (Vitamin B-1) tablet 100 mg  100 mg Oral DAILY Blayne Mendoza M.D.   100 mg at 04/27/23 0529    oxyCODONE immediate-release (ROXICODONE) tablet 2.5 mg  2.5 mg Oral Q4HRS PRN Goldie Ramires, A.P.R.N.   2.5 mg at 04/27/23 0529    melatonin tablet 5 mg  5 mg Oral HS PRN Goldie Ramires, A.P.R.N.   5 mg at 04/20/23 0039    ampicillin/sulbactam (UNASYN) 3 g in  mL IVPB  3 g Intravenous Q6HRS Miguel Gandara M.D.   Stopped at 04/27/23 1814    lactated ringers infusion (BOLUS)  1,000 mL Intravenous Once PRN Stepan Saleh M.D.        rosuvastatin (CRESTOR) tablet 40 mg  40  mg Oral DAILY Stepan Saleh M.D.   40 mg at 04/27/23 0530    senna-docusate (PERICOLACE or SENOKOT S) 8.6-50 MG per tablet 2 Tablet  2 Tablet Oral BID Stepan Saleh M.D.   2 Tablet at 04/26/23 1718    And    polyethylene glycol/lytes (MIRALAX) PACKET 1 Packet  1 Packet Oral QDAY PRN Stepan Saleh M.D.        And    magnesium hydroxide (MILK OF MAGNESIA) suspension 30 mL  30 mL Oral QDAY PRN Stepan Saleh M.D.        And    bisacodyl (DULCOLAX) suppository 10 mg  10 mg Rectal QDAY PRN Stepan Saleh M.D.        acetaminophen (Tylenol) tablet 650 mg  650 mg Oral Q6HRS PRN Stepan Saleh M.D.   650 mg at 04/20/23 1214    ondansetron (ZOFRAN) syringe/vial injection 4 mg  4 mg Intravenous Q4HRS PRN Stepan Saleh M.D.   4 mg at 04/23/23 2312    ondansetron (ZOFRAN ODT) dispertab 4 mg  4 mg Oral Q4HRS PRN Stepan Saleh M.D.   4 mg at 04/24/23 0916       Allergies  Allergies   Allergen Reactions    Codeine Nausea       Vital Signs last 24 hours  Temp:  [36.2 °C (97.2 °F)-36.6 °C (97.8 °F)] 36.4 °C (97.6 °F)  Pulse:  [60-89] 61  Resp:  [15-23] 23  BP: ()/(40-55) 112/53  SpO2:  [92 %-100 %] 96 %    Physical Exam  Physical Exam  Vitals and nursing note reviewed.   Constitutional:       Appearance: He is obese. He is ill-appearing.   HENT:      Head: Normocephalic and atraumatic.      Mouth/Throat:      Comments: Dried blood around mouth and nares  Eyes:      Extraocular Movements: Extraocular movements intact.      Pupils: Pupils are equal, round, and reactive to light.   Cardiovascular:      Rate and Rhythm: Normal rate and regular rhythm.   Pulmonary:      Breath sounds: Rhonchi and rales present.   Abdominal:      General: There is no distension.      Palpations: Abdomen is soft.   Musculoskeletal:      Cervical back: Neck supple.      Right lower leg: Edema present.      Left lower leg: Edema present.      Comments: Bilateral lower extremity wound covered with clean dressing   Neurological:      Mental Status:  He is alert and oriented to person, place, and time.      Cranial Nerves: No cranial nerve deficit.     Fluids    Intake/Output Summary (Last 24 hours) at 4/27/2023 1924  Last data filed at 4/27/2023 1800  Gross per 24 hour   Intake 2919.71 ml   Output 600 ml   Net 2319.71 ml       Laboratory  Recent Results (from the past 48 hour(s))   CBC WITHOUT DIFFERENTIAL    Collection Time: 04/26/23  2:27 AM   Result Value Ref Range    WBC 10.7 4.8 - 10.8 K/uL    RBC 3.89 (L) 4.70 - 6.10 M/uL    Hemoglobin 10.9 (L) 14.0 - 18.0 g/dL    Hematocrit 35.7 (L) 42.0 - 52.0 %    MCV 91.8 81.4 - 97.8 fL    MCH 28.0 27.0 - 33.0 pg    MCHC 30.5 (L) 33.7 - 35.3 g/dL    RDW 58.2 (H) 35.9 - 50.0 fL    Platelet Count 346 164 - 446 K/uL    MPV 10.6 9.0 - 12.9 fL   Renal Function Panel    Collection Time: 04/26/23  2:27 AM   Result Value Ref Range    Sodium 137 135 - 145 mmol/L    Potassium 3.6 3.6 - 5.5 mmol/L    Chloride 106 96 - 112 mmol/L    Co2 22 20 - 33 mmol/L    Glucose 119 (H) 65 - 99 mg/dL    Creatinine 0.91 0.50 - 1.40 mg/dL    Bun 26 (H) 8 - 22 mg/dL    Calcium 7.6 (L) 8.5 - 10.5 mg/dL    Phosphorus 1.9 (L) 2.5 - 4.5 mg/dL    Albumin 2.1 (L) 3.2 - 4.9 g/dL   MAGNESIUM    Collection Time: 04/26/23  2:27 AM   Result Value Ref Range    Magnesium 2.0 1.5 - 2.5 mg/dL   CORRECTED CALCIUM    Collection Time: 04/26/23  2:27 AM   Result Value Ref Range    Correct Calcium 9.1 8.5 - 10.5 mg/dL   ESTIMATED GFR    Collection Time: 04/26/23  2:27 AM   Result Value Ref Range    GFR (CKD-EPI) 84 >60 mL/min/1.73 m 2   CBC WITHOUT DIFFERENTIAL    Collection Time: 04/27/23  9:49 AM   Result Value Ref Range    WBC 14.6 (H) 4.8 - 10.8 K/uL    RBC 3.17 (L) 4.70 - 6.10 M/uL    Hemoglobin 9.0 (L) 14.0 - 18.0 g/dL    Hematocrit 29.7 (L) 42.0 - 52.0 %    MCV 93.7 81.4 - 97.8 fL    MCH 28.4 27.0 - 33.0 pg    MCHC 30.3 (L) 33.7 - 35.3 g/dL    RDW 57.5 (H) 35.9 - 50.0 fL    Platelet Count 351 164 - 446 K/uL    MPV 10.7 9.0 - 12.9 fL   Renal Function Panel     Collection Time: 04/27/23  9:49 AM   Result Value Ref Range    Sodium 139 135 - 145 mmol/L    Potassium 3.7 3.6 - 5.5 mmol/L    Chloride 106 96 - 112 mmol/L    Co2 23 20 - 33 mmol/L    Glucose 124 (H) 65 - 99 mg/dL    Creatinine 0.84 0.50 - 1.40 mg/dL    Bun 37 (H) 8 - 22 mg/dL    Calcium 7.4 (L) 8.5 - 10.5 mg/dL    Phosphorus 2.0 (L) 2.5 - 4.5 mg/dL    Albumin 2.0 (L) 3.2 - 4.9 g/dL   CORRECTED CALCIUM    Collection Time: 04/27/23  9:49 AM   Result Value Ref Range    Correct Calcium 9.0 8.5 - 10.5 mg/dL   ESTIMATED GFR    Collection Time: 04/27/23  9:49 AM   Result Value Ref Range    GFR (CKD-EPI) 87 >60 mL/min/1.73 m 2   ABO Rh Confirm    Collection Time: 04/27/23  9:49 AM   Result Value Ref Range    ABO Rh Confirm A POS    HEMOGLOBIN AND HEMATOCRIT    Collection Time: 04/27/23 11:33 AM   Result Value Ref Range    Hemoglobin 8.9 (L) 14.0 - 18.0 g/dL    Hematocrit 28.8 (L) 42.0 - 52.0 %   Prothrombin Time    Collection Time: 04/27/23 11:33 AM   Result Value Ref Range    PT 28.3 (H) 12.0 - 14.6 sec    INR 2.76 (H) 0.87 - 1.13   APTT    Collection Time: 04/27/23 11:33 AM   Result Value Ref Range    APTT 37.7 (H) 24.7 - 36.0 sec   COD - Adult (Type and Screen)    Collection Time: 04/27/23 11:33 AM   Result Value Ref Range    ABO Grouping Only A     Rh Grouping Only POS     Antibody Screen-Cod NEG    HEMOGLOBIN AND HEMATOCRIT    Collection Time: 04/27/23  2:41 PM   Result Value Ref Range    Hemoglobin 7.7 (L) 14.0 - 18.0 g/dL    Hematocrit 25.2 (L) 42.0 - 52.0 %   PLATELET MAPPING WITH BASIC TEG    Collection Time: 04/27/23  2:41 PM   Result Value Ref Range    Reaction Time Initial-R 6.0 4.6 - 9.1 min    React Time Initial Hep 6.7 4.3 - 8.3 min    Clot Kinetics-K 0.8 0.8 - 2.1 min    Clot Angle-Angle 77.2 63.0 - 78.0 degrees    Maximum Clot Strength-MA 66.6 52.0 - 69.0 mm    TEG Functional Fibrinogen(MA) 37.7 (H) 15.0 - 32.0 mm    Lysis 30 minutes-LY30 0.1 0.0 - 2.6 %    % Inhibition ADP 54.6 (H) 0.0 - 17.0 %    %  Inhibition AA 2.7 0.0 - 11.0 %    TEG Algorithm Link Algorithm    HEMOGLOBIN AND HEMATOCRIT    Collection Time: 04/27/23  6:28 PM   Result Value Ref Range    Hemoglobin 7.5 (L) 14.0 - 18.0 g/dL    Hematocrit 24.8 (L) 42.0 - 52.0 %     Imaging    DX-CHEST-PORTABLE (1 VIEW)   Final Result      1.  Interval insertion of a central venous catheter which terminates with the tip projecting over the expected region of the mid to distal superior vena cava.   2.  Stable enlargement of the cardiomediastinal silhouette.   3.  New small to moderate bilateral pleural effusions with associated basilar atelectasis and/or consolidation.   4.  Mild interstitial edema.      EC-ECHOCARDIOGRAM COMPLETE W/O CONT   Final Result      US-EXTREMITY ARTERY LOWER UNILAT LEFT   Final Result      US-DOMENIC SINGLE LEVEL BILAT   Final Result      CT-CHEST,ABDOMEN,PELVIS WITH   Final Result      1.  No acute inflammatory process in the chest, abdomen or pelvis.   2.  Multiple hypodense hepatic regions measuring up to 7.9 cm. They are indeterminate. Areas of regional hepatic steatosis and active or treated metastases are in the differential. Correlate with history of cancer, prior studies and consider further    evaluation with contrast-enhanced MRI, liver protocol.   3.  Cholelithiasis.   4.  Nonobstructing right nephrolithiasis.   5.  Moderate amount of stool in the distal colon and rectum.   6.  Mildly enlarged left inguinal lymph node, statistically likely reactive. No other enlarged nodes detected.         CT-HEAD W/O   Final Result      1.  Cerebral atrophy.      2.  White matter lucencies most consistent with small vessel ischemic change versus demyelination or gliosis.      3.  Otherwise, Head CT without contrast with no acute findings. No evidence of acute cerebral infarction, hemorrhage or mass lesion.         DX-CHEST-PORTABLE (1 VIEW)   Final Result      No acute cardiac or pulmonary abnormalities are identified.      MR-ABDOMEN-WITH & W/O     (Results Pending)     Assessment/Plan    * Acute upper GI bleed- (present on admission)  Assessment & Plan  Due to severe esophagitis with bleeding arterial vessel s/p hemoclips and hemospray, and duodenal ulcer with adherent clot status post Hemospray 4/27  S/p Kcentra, IV PPI; TEG reassuring, plts wnl  Start PPI gtt, no octreotide as no varices seen   Hb 12 -> 7.5, not in shock and no e/o end organ failure. Cont to monitor H/H q4 hours transfuse for < 7 or greater if actively bleeding or manifesting signs of end organ hypoperfusion. Typed and crossed  Per GI, no further endoscopic would be effective, discussed case with IR, agree with plan for CTA/embolization if rebleeds  PIV x 2, RIJ CVC placed, blakemore tube and lola rapid transfusion at bedside    Bacteremia  Assessment & Plan  Group G strep 2/2 cellulitis  No e/o vegetation on TTE  Cont unasyn    Class 2 severe obesity with serious comorbidity in adult (HCC)  Assessment & Plan  Patient would benefit from counseling when stabilized about the importance of weight loss for long-term health risk reduction. Will dose-adjust medications appropriately based on larger volume of drug distribution.    Liver lesion  Assessment & Plan  Concerning for malignancy, MRI liver protocol ordered and pending    Chronic heart failure with preserved ejection fraction (HCC)  Assessment & Plan  Holding antihypertensives/diuretics in setting of GIB  Resume when hemodynamics stabilize    PAD (peripheral artery disease) (HCC)  Assessment & Plan  DOMENIC noted  [facility]    AF (atrial fibrillation) (HCC)- (present on admission)  Assessment & Plan  History of AF; currently V-paced  Hold BB in setting of GIB/hemorrhage  Hold anticoagulation  Telemetry    History of transcatheter aortic valve replacement (TAVR)- (present on admission)  Assessment & Plan  LVEF 50%, reduced RV SF, normal transvalvular gradients across TAVR.      Discussed patient condition and risk of  morbidity and/or mortality with Hospitalist, RN, RT, Pharmacy, Code status disscussed, Patient, and GI and IR.      The patient remains critically ill.  Critical care time = 106 minutes in directly providing and coordinating critical care and extensive data review.  No time overlap and excludes procedures.    This note was generated using voice recognition software which has a chance of producing errors of grammar and content.  I have made every reasonable attempt to find and correct any errors, but it should be expected that some may not be found prior to finalization of this note.  __________  Kedar Vega MD  Pulmonary and Critical Care Medicine  [facility]

## 2023-04-27 NOTE — WOUND TEAM
Wound reconsult received regarding patient sacrum.     Patient sacrum with known POA DTI and surrounding MASD, patient was evaluated by Wound RN on 4/21/23, please refer to the wound team note.     Area is evolving as expected with excoriation and small tissue opening with scant serosanguinous drainage. Patient is being followed on a weekly basis.     Skin care orders in place, bedside RN's to continue barrier paste. Wound team already following, please call for questions. Updated Bedside RNBarbie by text.     Wound 04/18/23 Pressure Injury Sacrum Bilateral POA DTI w/moisture associated dermatitis (Active)   Wound Image   04/27/23 1000   Site Assessment Excoriated;Pink;Red;Light Purple 04/27/23 1000   Periwound Assessment Red;Excoriated 04/27/23 1000   Closure Open to air 04/27/23 1000   Drainage Amount Scant 04/27/23 1000   Drainage Description Serosanguineous 04/27/23 1000   Treatments Cleansed;Offloading;Site care 04/27/23 1000   Wound Cleansing Foam Cleanser/Washcloth 04/27/23 1000   Periwound Protectant Barrier Paste 04/27/23 1000   Dressing Options Open to Air 04/27/23 1000   Dressing Change/Treatment Frequency Every Shift, and As Needed 04/27/23 1000   WOUND NURSE ONLY - Pressure Injury Stage DTPI 04/27/23 1000   Wound Length (cm) 20 cm 04/27/23 1000   Wound Width (cm) 20 cm 04/27/23 1000   Wound Depth (cm) 0.05 cm 04/27/23 1000   Wound Surface Area (cm^2) 400 cm^2 04/27/23 1000   Wound Volume (cm^3) 20 cm^3 04/27/23 1000   Wound Healing % 0 04/27/23 1000   Wound Bed Granulation (%) 10 % 04/27/23 1000   Wound Odor None 04/27/23 1000   Exposed Structures None 04/27/23 1000   WOUND NURSE ONLY - Time Spent with Patient (mins) 30 04/27/23 1000

## 2023-04-27 NOTE — OR NURSING
1409  RECEIVED PATIENT FROM OR.  REPORT FROM DR. NAVA.  NO AIRWAY IN PLACE.  RESPIRATIONS ARE EVEN AND UNLABORED.  NO BLEEDING NOTED.    1430  DR. TIERNEY AT BEDSIDE.      1433  TETO FROM RAPID RESPONSE HERE.    1444  TRANSFERRED TO [place] [id number].  REPORT TO JEMIMA SAAVEDRA.

## 2023-04-27 NOTE — ASSESSMENT & PLAN NOTE
Patient was noted to have mild hematemesis early morning, later in the morning he was noted to have active hematemesis.   -- Patient was taking Eliquis, which has been held  [facility]oscopy found to have active source of bleeding from the esophagus and renal ulcer.  It is noted that patient stomach full of blood   Monitor hemoglobin hematocrit  1 unit of PRBC transfusion has been ordered  Transferred to ICU on April 28, 2023.  Today patient transferred back to hospital service.  I discussed plan of care with patient and bedside RN.

## 2023-04-27 NOTE — THERAPY
Physical Therapy Contact Note    Patient Name: Kedar Woods  Age:  83 y.o., Sex:  male  Medical Record #: [id number]  Today's Date: 4/27/2023    Pt not medically appropriate for PT today per discussion with nsg.

## 2023-04-27 NOTE — DISCHARGE PLANNING
Agency/Facility Name: [facility]  Spoke To: Pat  Outcome: Still reviewing.  Will give this DPA a call back.

## 2023-04-27 NOTE — ANESTHESIA TIME REPORT
Anesthesia Start and Stop Event Times     Date Time Event    4/27/2023 1313 Ready for Procedure     1331 Anesthesia Start     1415 Anesthesia Stop        Responsible Staff  04/27/23    Name Role Begin End    Shalini Mckeon M.D. Anesth 1331 1415        Overtime Reason:  no overtime (within assigned shift)    Comments:

## 2023-04-27 NOTE — PROCEDURES
.OPERATIVE REPORT    PATIENT:   Kedar Woods   1940       PREOPERATIVE DIAGNOSES/INDICATIONS:   Hematemesis.  Post hemorrhagic anemia    POSTOPERATIVE DIAGNOSIS:   Large amount of clots were noted in the esophagus. The clots were suctioned or pushed into the stomach. The distal esophagus revealed LA grade D esophagitis with blood actively spurting. The bleeding was controlled with a single Hemoclip and application of Hemospray.  The stomach contained a large amount of blood clots and food residue/ fluid. The stomach could not be cleared due to the presence of food residue. The antrum appeared normal.  A large 3cm cratered duodenal ulcer was noted in the sweep. A large clot was adherent to the ulcer. The ulcer was treated with hemospray.    PROCEDURE:  ESOPHAGOGASTRODUODENOSCOPY    PHYSICIAN:  Shobha Turner MD    ANESTHESIA:  Per anesthesiologist.    LOCATION: [facility]    CONSENT:  OBTAINED. The risks, benefits and alternatives of the procedure were discussed in details. The risks include and are not limited to bleeding, infection, perforation, missed lesions, and sedations risks (cardiopulmonary compromise and allergic reaction to medications).    DESCRIPTION: The patient presented to the procedure room.  After routine checkup was performed, patient was brought into the endoscopy suite.  Patient was placed on his left lateral decubitus position. A bite block was placed in patient's mouth. Patient was sedated by anesthesia.  Vital signs were monitored throughout the procedure.  Oxygenation support was provided throughout the procedure. Upper endoscope was inserted into patient's mouth and advanced to the second portion of the duodenum under direct visualization.  Once the site was reached and examined, the upper endoscope was withdrawn.  Retroflexion was made within the stomach.  The stomach was decompressed, scope was withdrawn and the procedure was terminated. The patient was hypotensive during the  procedure and required pressors administered by anesthesia. There were no immediate complications.    OPERATIVE FINDINGS:  1.   Large amount of clots were noted in the esophagus. The clots were suctioned or pushed into the stomach. The distal esophagus revealed LA grade D esophagitis with blood actively spurting. The bleeding was controlled with a single Hemoclip and application of Hemospray.  2.  The stomach contained a large amount of blood clots and food residue/ fluid. The stomach could not be cleared due to the presence of food residue. The antrum appeared normal.  3.  A large 3cm cratered duodenal ulcer was noted in the sweep. A large clot was adherent to the ulcer. The ulcer was treated with hemospray.    RECOMMENDATIONS:  The patient needs ICU level of care as he has a severe upper GI bleed with large amounts of blood in the stomach.   He was found to have two possible bleeding sources. He had active bleeding in the esophagus that was treated with a hemoclip and Hemospray and a large duodenal ulcer treated with Hemospray.  Closely observe the patient for active bleeding. If he re-bleeds, obtain a CTA and consider IR for embolization.  Keep strict NPO. Do not place an NG tube.  IV PPI bid or drip.  Do not anticoagulate.  Monitor H/H Q6 hours and consider blood transfusion as his blood count is borderline and the patient has a large amount of blood in the stomach.  Findings and recs discussed with Dr Moreira. Patient to be transferred to ICU.    This note has been transcribed with digital voice recognition software and although it has been reviewed may contain grammatical or word errors

## 2023-04-27 NOTE — PROCEDURES
Central Line Insertion    Date/Time: 4/27/2023 4:03 PM  Performed by: Kedar Vega M.D.  Authorized by: Kedar Vega M.D.     Consent:     Consent obtained:  Written    Consent given by:  Patient    Risks discussed:  Arterial puncture, bleeding, infection, incorrect placement, nerve damage and pneumothorax    Alternatives discussed:  No treatment, alternative treatment and delayed treatment  Universal protocol:     Procedure explained and questions answered to patient or proxy's satisfaction: yes      Relevant documents present and verified: yes      Test results available and properly labeled: yes      Imaging studies available: yes      Required blood products, implants, devices, and special equipment available: yes      Site/side marked: yes      Immediately prior to procedure, a time out was called: yes      Patient identity confirmed:  Verbally with patient, provided demographic data and arm band  Pre-procedure details:     Hand hygiene: Hand hygiene performed prior to insertion      Sterile barrier technique: All elements of maximal sterile technique followed      Skin preparation:  ChloraPrep    Skin preparation agent: Skin preparation agent completely dried prior to procedure    Sedation:     Sedation type:  None  Anesthesia:     Anesthesia method:  Local infiltration    Local anesthetic:  Lidocaine 1% w/o epi  Procedure details:     Location:  R internal jugular    Patient position:  Flat    Procedural supplies:  Triple lumen    Catheter size:  7 Fr    Landmarks identified: yes      Ultrasound guidance: yes      Sterile ultrasound techniques: Sterile gel and sterile probe covers were used      Number of attempts:  1    Successful placement: yes    Post-procedure details:     Post-procedure:  Dressing applied and line sutured    Guidewire: guidewire removal confirmed      Assessment:  Blood return through all ports, free fluid flow, placement verified by x-ray and no pneumothorax on x-ray     Patient tolerance of procedure:  Tolerated well, no immediate complications    __________  Kedar Vega MD  Pulmonary and Critical Care Medicine  [facility]

## 2023-04-27 NOTE — ANESTHESIA POSTPROCEDURE EVALUATION
Patient: Kedar Woods    Procedure Summary     Date: 04/27/23 Room / Location: [facility] ROOM 26 / SURGERY SAME DAY [facility]    Anesthesia Start: 1331 Anesthesia Stop: 1415    Procedure: GASTROSCOPY (Esophagus) Diagnosis: (DUODENAL ULCER AND ACTIVE BLEEDING IN DISTAL ESOPHAGUS)    Surgeons: Shobha Turner M.D. Responsible Provider: Shalini Mckeon M.D.    Anesthesia Type: general ASA Status: 3 - Emergent          Final Anesthesia Type: general  Last vitals  BP   Blood Pressure : (!) 79/40    Temp   36.4 °C (97.6 °F)    Pulse   60   Resp   (!) 21    SpO2   96 %      Anesthesia Post Evaluation    Patient location during evaluation: PACU  Patient participation: complete - patient participated  Level of consciousness: awake and alert    Airway patency: patent  Anesthetic complications: no  Cardiovascular status: hemodynamically stable  Respiratory status: acceptable  Hydration status: euvolemic    PONV: none          There were no known notable events for this encounter.     Nurse Pain Score: 0 (NPRS)

## 2023-04-27 NOTE — ANESTHESIA PREPROCEDURE EVALUATION
Case: [id number] Date/Time: 04/27/23 1303    Procedure: GASTROSCOPY    Location: [facility] ROOM 26 / SURGERY SAME DAY [facility]    Surgeons: Shobha uTrner M.D.        82yo with UGIB and on anticoag. Received KCentra on floor. To OR for EGD to eval    TTE 4/21/23  Compared to the images of the prior study on 06/23/2021 - there is now   reduced ejection fraction and right ventricular dysfunction.  Mildly reduced left ventricular systolic function.  The left ventricular ejection fraction is visually estimated to be 50%.  Diastolic function is difficult to assess with arrhythmia.  Reduced right ventricular systolic function.  Known TAVR aortic valve that is functioning normally with normal   transvalvular gradients.  Mild tricuspid regurgitation.  Estimated right ventricular systolic pressure is 50 mmHg.  The ascending aorta is dilated with a diameter of 4.0 cm.  Left pleural effusion present.    Relevant Problems   CARDIAC   (positive) AF (atrial fibrillation) (HCC)   (positive) Aortic stenosis, severe   (positive) Atrial flutter (HCC)   (positive) CAD (coronary artery disease)   (positive) Essential hypertension   (positive) PAD (peripheral artery disease) (HCC)   (positive) Pacemaker   (positive) Pulmonary hypertension (HCC)         (positive) ADINA (acute kidney injury) (HCC)   (positive) Liver lesion      ENDO   (positive) Hypothyroidism   (positive) Type 2 diabetes mellitus (HCC)       Physical Exam    Airway   Mallampati: III  TM distance: >3 FB  Neck ROM: full       Cardiovascular - normal exam  Rhythm: regular  Rate: normal  (-) murmur     Dental - normal exam           Pulmonary - normal exam  Breath sounds clear to auscultation     Abdominal    Neurological - normal exam                 Anesthesia Plan    ASA 3- EMERGENT   ASA physical status 3 criteria: CAD/stents (> 3 months)ASA physical status emergent criteria: acute hemorrhage    Plan - general               Induction: intravenous    Postoperative Plan:  Postoperative administration of opioids is intended.    Pertinent diagnostic labs and testing reviewed    Informed Consent:    Anesthetic plan and risks discussed with patient.    Use of blood products discussed with: patient whom consented to blood products.

## 2023-04-27 NOTE — DIETARY
Nutrition Services Brief Update:    Day 9 of admit.  Kedar Woods is a 83 y.o. male with admitting DX of Sepsis (HCC) [A41.9]    Current Diet: NPO    PO intake prior to NPO order this a.m. has been 0% based on limited documentation. Pt was to discharge this a.m. but developed hematemesis today, 200mL out at time of GI consult this a.m. Pt is now s/p EGD with active esophageal bleed found now s/p hemoclip/hemospray; 3cm duodenal ulcer treated w/ hemospray. Strict NPO w/ no NG tube per GI. Transferred to ICU. Given poor PO intake this admit, recommend consideration of parenteral to meet elevated nutrition needs in setting of evolving deep tissue pressure wound to sacrum.    Problem: Nutritional:  Goal: Achieve adequate nutritional intake  Description: Patient will consume >50% of meals and supplements  Outcome: Not progressing    RD continues to follow.

## 2023-04-27 NOTE — PROGRESS NOTES
..Gastroenterology Progress Note               Author:  MIRYAM Menendez Date & Time Created: 4/27/2023 10:47 AM       Patient ID:  Name:             Kedar Woods  YOB: 1940  Age:                 83 y.o.  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  At that time, it was recommended that MRI of the liver and alpha-fetoprotein be completed.  Primary team was planning on discharging, however patient developed hematemesis today and hemoglobin dropped to 9 from 10.9 yesterday. MRI and AFP pending.    Patient seen and examined. He is actively vomiting blood, approximately 200 ml so far. Last dose of Eliquis 0530 am, Kcentra ordered to reverse.  Stat INR and H&H ordered.    Current vital signs pressure 119/50, heart rate 66, SPO2 93% on room air.      Hospital Medications:  Current Facility-Administered Medications   Medication Dose Frequency Provider Last Rate Last Admin    collagenase (SANTYL)   DAILY MIRYAM Martinez   Given at 04/26/23 1722    thiamine (Vitamin B-1) tablet 100 mg  100 mg DAILY Blayne Mendoza M.D.   100 mg at 04/27/23 0556     oxyCODONE immediate-release (ROXICODONE) tablet 2.5 mg  2.5 mg Q4HRS PRN Goldie Ramires, A.P.R.N.   2.5 mg at 04/27/23 0529    melatonin tablet 5 mg  5 mg HS PRN Goldie Leijaon, A.P.R.N.   5 mg at 04/20/23 0039    [Held by provider] apixaban (ELIQUIS) tablet 5 mg  5 mg BID Chano Smart D.O.   5 mg at 04/27/23 0529    scopolamine (TRANSDERM-SCOP) patch 1 Patch  1 Patch Q72HRS Goldie Ramires, A.P.R.N.   1 Patch at 04/25/23 0526    ampicillin/sulbactam (UNASYN) 3 g in  mL IVPB  3 g Q6HRS Blayne Mendoza M.D.   Stopped at 04/27/23 0552    calcium carbonate (Tums) chewable tab 500 mg  500 mg TID WITH MEALS Stepan Saleh M.D.   500 mg at 04/27/23 0809    lactated ringers infusion (BOLUS)  1,000 mL Once PRN Stepan Saleh M.D.        metoprolol SR (TOPROL XL) tablet 12.5 mg  12.5 mg DAILY Stepan Saleh M.D.   12.5 mg at 04/27/23 0529    rosuvastatin (CRESTOR) tablet 40 mg  40 mg DAILY Stepan Saleh M.D.   40 mg at 04/27/23 0530    senna-docusate (PERICOLACE or SENOKOT S) 8.6-50 MG per tablet 2 Tablet  2 Tablet BID Stepan Saleh M.D.   2 Tablet at 04/26/23 1718    And    polyethylene glycol/lytes (MIRALAX) PACKET 1 Packet  1 Packet QDAY PRN Stepan Saleh M.D.        And    magnesium hydroxide (MILK OF MAGNESIA) suspension 30 mL  30 mL QDAY PRN Stepan Saleh M.D.        And    bisacodyl (DULCOLAX) suppository 10 mg  10 mg QDAY PRN Stepan Saleh M.D.        acetaminophen (Tylenol) tablet 650 mg  650 mg Q6HRS PRN Stepan Saleh M.D.   650 mg at 04/20/23 1214    ondansetron (ZOFRAN) syringe/vial injection 4 mg  4 mg Q4HRS PRN Stepan Saleh M.D.   4 mg at 04/23/23 2312    ondansetron (ZOFRAN ODT) dispertab 4 mg  4 mg Q4HRS PRN Stepan Saleh M.D.   4 mg at 04/24/23 0916   Last reviewed on 4/18/2023  1:59 PM by Ni Gallegos, T       Review of Systems:  Review of Systems   Unable to perform ROS: Acuity of condition       Vital signs:  Weight/BMI: Body mass index is 32.87 kg/m².  /43   Pulse 62    "Temp 36.4 °C (97.6 °F) (Temporal)   Resp 19   Ht 1.854 m (6' 1\")   Wt 113 kg (249 lb 1.9 oz)   SpO2 92%   Vitals:    04/27/23 0510 04/27/23 0529 04/27/23 0600 04/27/23 0800   BP: 110/43   104/43   Pulse: 89   62   Resp:  18 17 19   Temp:    36.4 °C (97.6 °F)   TempSrc:    Temporal   SpO2:    92%   Weight:       Height:         Oxygen Therapy:  Pulse Oximetry: 92 %, O2 (LPM): 0, O2 Delivery Device: None - Room Air    Intake/Output Summary (Last 24 hours) at 4/27/2023 1047  Last data filed at 4/27/2023 0900  Gross per 24 hour   Intake 360 ml   Output --   Net 360 ml         Physical Exam:  Physical Exam  Vitals and nursing note reviewed.   Constitutional:       General: He is in acute distress.      Appearance: Normal appearance. He is ill-appearing.   HENT:      Head: Normocephalic and atraumatic.      Right Ear: External ear normal.      Left Ear: External ear normal.      Nose: Nose normal.      Mouth/Throat:      Mouth: Mucous membranes are moist.      Pharynx: Oropharynx is clear.   Eyes:      General: No scleral icterus.  Cardiovascular:      Rate and Rhythm: Normal rate and regular rhythm.      Pulses: Normal pulses.      Heart sounds: Normal heart sounds.   Pulmonary:      Effort: Pulmonary effort is normal.      Breath sounds: Normal breath sounds.   Abdominal:      General: Abdomen is flat. Bowel sounds are normal. There is no distension.      Palpations: Abdomen is soft.      Comments: Actively having hematemesis   Musculoskeletal:         General: Normal range of motion.      Cervical back: Normal range of motion and neck supple.      Comments: Scattered ecchymosis   Skin:     General: Skin is warm.      Capillary Refill: Capillary refill takes less than 2 seconds.      Coloration: Skin is pale.   Neurological:      Mental Status: He is alert and oriented to person, place, and time.   Psychiatric:         Mood and Affect: Mood normal.         Behavior: Behavior normal.           Labs:  Recent Labs     " 04/25/23 0351 04/26/23 0227   SODIUM 137 137   POTASSIUM 4.5 3.6   CHLORIDE 105 106   CO2 20 22   BUN 29* 26*   CREATININE 0.81 0.91   MAGNESIUM  --  2.0   PHOSPHORUS  --  1.9*   CALCIUM 7.5* 7.6*     Recent Labs     04/25/23 0351 04/26/23 0227   GLUCOSE 88 119*     Recent Labs     04/25/23 0351 04/26/23 0227 04/27/23  0949   WBC 11.6* 10.7 14.6*   NEUTSPOLYS 88.70*  --   --    LYMPHOCYTES 4.30*  --   --    MONOCYTES 3.50  --   --    EOSINOPHILS 0.90  --   --    BASOPHILS 0.90  --   --      Recent Labs     04/25/23 0351 04/26/23 0227 04/27/23  0949   RBC 4.11* 3.89* 3.17*   HEMOGLOBIN 11.4* 10.9* 9.0*   HEMATOCRIT 37.5* 35.7* 29.7*   PLATELETCT 328 346 351     Recent Results (from the past 24 hour(s))   CBC WITHOUT DIFFERENTIAL    Collection Time: 04/27/23  9:49 AM   Result Value Ref Range    WBC 14.6 (H) 4.8 - 10.8 K/uL    RBC 3.17 (L) 4.70 - 6.10 M/uL    Hemoglobin 9.0 (L) 14.0 - 18.0 g/dL    Hematocrit 29.7 (L) 42.0 - 52.0 %    MCV 93.7 81.4 - 97.8 fL    MCH 28.4 27.0 - 33.0 pg    MCHC 30.3 (L) 33.7 - 35.3 g/dL    RDW 57.5 (H) 35.9 - 50.0 fL    Platelet Count 351 164 - 446 K/uL    MPV 10.7 9.0 - 12.9 fL       Radiology Review:  EC-ECHOCARDIOGRAM COMPLETE W/O CONT   Final Result      US-EXTREMITY ARTERY LOWER UNILAT LEFT   Final Result      US-DOMENIC SINGLE LEVEL BILAT   Final Result      CT-CHEST,ABDOMEN,PELVIS WITH   Final Result      1.  No acute inflammatory process in the chest, abdomen or pelvis.   2.  Multiple hypodense hepatic regions measuring up to 7.9 cm. They are indeterminate. Areas of regional hepatic steatosis and active or treated metastases are in the differential. Correlate with history of cancer, prior studies and consider further    evaluation with contrast-enhanced MRI, liver protocol.   3.  Cholelithiasis.   4.  Nonobstructing right nephrolithiasis.   5.  Moderate amount of stool in the distal colon and rectum.   6.  Mildly enlarged left inguinal lymph node, statistically likely  reactive. No other enlarged nodes detected.         CT-HEAD W/O   Final Result      1.  Cerebral atrophy.      2.  White matter lucencies most consistent with small vessel ischemic change versus demyelination or gliosis.      3.  Otherwise, Head CT without contrast with no acute findings. No evidence of acute cerebral infarction, hemorrhage or mass lesion.         DX-CHEST-PORTABLE (1 VIEW)   Final Result      No acute cardiac or pulmonary abnormalities are identified.      MR-ABDOMEN-WITH & W/O    (Results Pending)         MDM (Data Review):   -Records reviewed and summarized in current documentation  -I personally reviewed and interpreted the laboratory results  -I personally reviewed the radiology images    Assessment/Recommendations:  Acute GI bleeding  Hematemesis  Acute blood loss anemia   Elevated BUN  Liver mass on CT  Cholelithiasis  Right nephrolithiasis  History of TAVR  Atrial fibrillation on Eliquis  Hypertension  Diabetes type 2  HFpEF, on Entresto and Jardiance  Peripheral artery disease  Cellulitis    Plan:  Actively having GI bleeding  Urgent endoscopy required  Kcentra ordered to reverse Eliquis  Stat H&H and INR ordered  Trend, transfuse for hemoglobin less than 7  IV PPI twice daily  Currently hemodynamically stable  EGD today around 1230    Team will continue to follow for other issues as outlined above    Plan of care discussed with patient, Dr. Moreira, Dr. Ortiz, Dr. Turner    Core Quality Measures   Reviewed items::  Labs, Medications and Radiology reports reviewed

## 2023-04-27 NOTE — PROGRESS NOTES
Hospital Medicine Daily Progress Note    Date of Service  4/27/2023    Chief Complaint  Kedar Woods is a 83 y.o. male admitted 4/18/2023 with weakness    Hospital Course    This 83-year-old male with a past medical history significant for obesity, atrial fibrillation, secondary hypercoagulable state on Eliquis, AAA s/p TAVR, heart failure with preserved ejection fraction presented to ER on 4/18/2023 with a complaint of weakness.    Paramedics were called as he was too weak to get up and he was full dose when diverted to [facility].  Upon presentation his WBC count 20 5K, lower extremity cellulitis.  Blood cultures x2 was obtained, patient was started on broad-spectrum antibiotics including Vanco and Unasyn.    Blood culture on 4/19 with negative strep, repeat blood culture obtained on 4/20, infectious disease was consulted recommended continuing Augmentin 875/125 1 tablet p.o. twice daily for total of 14 days.  Recommended following up at twice daily clinic.    CT scan was obtained, noted to have 7.9 cm mass GI was consulted, recommended MRI for fetoprotein.  If MRI cannot be completed in time, patient can be discharged without MRI being completed, he will follow-up with the primary care's physician for repeat MRI    Interval events  --No acute events overnight, vital signs been stable, patient is alert, awake, answering questions appropriately  --WBC-11.6, hemoglobin  --ID was consulted, recommended switching to Augmentin 875/125 p.o. twice daily, total of antibiotics being 14 days.    PT/OT has evaluate the patient    4/26:  -- Patient is supposed to be discharged home yesterday  but early morning, patient was noted to be bleeding, GI was consulted, patient hemoglobin early a.m. is noted to be 10.9 which has dropped from 11.4.   -- Later in the morning, patient noted to have active hematemesis  --Patient has been receiving Eliquis for secondary hypercoagulable state for A-fib, reversed with Kcentra,;  patient underwent endoscopy and found to have large amount of clots were noted in esophagus grade D esophagitis with blood actively spurting bleeding was controlled with Hemoclip and application of Hemospray; large amount of blood clots and food surgery in the stomach; a large 3 cm cratered duodenal ulcer; large clot was adherent to the ulcer.    Patient has been transferred to ICU for close monitoring, will monitor hemoglobin hematocrit every 6 hours, continue PPI drip, patient was ordered 1 unit of PRBC transfusion.    Case has been discussed with Dr. Vega intensivist.      I have discussed this patient's plan of care and discharge plan at IDT rounds today with Case Management, Nursing, Nursing leadership, and other members of the IDT team.    Consultants/Specialty  infectious disease    Code Status  Full Code    Disposition  Patient is  Not medically cleared  for discharge.   Anticipate discharge to  TBD .  I have placed the appropriate orders for post-discharge needs.    Review of Systems  Review of Systems   Constitutional:  Positive for malaise/fatigue.   Respiratory:  Positive for cough.    Cardiovascular:  Positive for leg swelling.   Gastrointestinal:  Positive for nausea and vomiting.   Neurological:  Negative for headaches.   Psychiatric/Behavioral:  The patient is nervous/anxious.    All other systems reviewed and are negative.     Physical Exam  Temp:  [36.1 °C (97 °F)-36.6 °C (97.8 °F)] 36.4 °C (97.6 °F)  Pulse:  [60-89] 63  Resp:  [17-23] 21  BP: ()/(40-53) 106/53  SpO2:  [92 %-99 %] 99 %    Physical Exam  Vitals and nursing note reviewed.   Constitutional:       Appearance: He is obese.   HENT:      Head: Normocephalic and atraumatic.   Eyes:      Extraocular Movements: Extraocular movements intact.      Pupils: Pupils are equal, round, and reactive to light.   Cardiovascular:      Rate and Rhythm: Normal rate and regular rhythm.   Pulmonary:      Breath sounds: Rhonchi and rales present.    Abdominal:      General: There is no distension.      Palpations: Abdomen is soft.   Musculoskeletal:      Cervical back: Neck supple.      Right lower leg: Edema present.      Left lower leg: Edema present.      Comments: Bilateral lower extremity wound covered with clean dressing   Neurological:      Mental Status: He is alert and oriented to person, place, and time.      Cranial Nerves: No cranial nerve deficit.       Fluids    Intake/Output Summary (Last 24 hours) at 4/27/2023 1633  Last data filed at 4/27/2023 1433  Gross per 24 hour   Intake 820 ml   Output 150 ml   Net 670 ml         Laboratory  Recent Labs     04/25/23  0351 04/26/23  0227 04/27/23  0949 04/27/23  1133 04/27/23  1441   WBC 11.6* 10.7 14.6*  --   --    RBC 4.11* 3.89* 3.17*  --   --    HEMOGLOBIN 11.4* 10.9* 9.0* 8.9* 7.7*   HEMATOCRIT 37.5* 35.7* 29.7* 28.8* 25.2*   MCV 91.2 91.8 93.7  --   --    MCH 27.7 28.0 28.4  --   --    MCHC 30.4* 30.5* 30.3*  --   --    RDW 57.9* 58.2* 57.5*  --   --    PLATELETCT 328 346 351  --   --    MPV 11.0 10.6 10.7  --   --        Recent Labs     04/25/23  0351 04/26/23  0227 04/27/23  0949   SODIUM 137 137 139   POTASSIUM 4.5 3.6 3.7   CHLORIDE 105 106 106   CO2 20 22 23   GLUCOSE 88 119* 124*   BUN 29* 26* 37*   CREATININE 0.81 0.91 0.84   CALCIUM 7.5* 7.6* 7.4*       Recent Labs     04/27/23  1133   APTT 37.7*   INR 2.76*               Imaging  EC-ECHOCARDIOGRAM COMPLETE W/O CONT   Final Result      US-EXTREMITY ARTERY LOWER UNILAT LEFT   Final Result      US-DOMENIC SINGLE LEVEL BILAT   Final Result      CT-CHEST,ABDOMEN,PELVIS WITH   Final Result      1.  No acute inflammatory process in the chest, abdomen or pelvis.   2.  Multiple hypodense hepatic regions measuring up to 7.9 cm. They are indeterminate. Areas of regional hepatic steatosis and active or treated metastases are in the differential. Correlate with history of cancer, prior studies and consider further    evaluation with contrast-enhanced MRI,  liver protocol.   3.  Cholelithiasis.   4.  Nonobstructing right nephrolithiasis.   5.  Moderate amount of stool in the distal colon and rectum.   6.  Mildly enlarged left inguinal lymph node, statistically likely reactive. No other enlarged nodes detected.         CT-HEAD W/O   Final Result      1.  Cerebral atrophy.      2.  White matter lucencies most consistent with small vessel ischemic change versus demyelination or gliosis.      3.  Otherwise, Head CT without contrast with no acute findings. No evidence of acute cerebral infarction, hemorrhage or mass lesion.         DX-CHEST-PORTABLE (1 VIEW)   Final Result      No acute cardiac or pulmonary abnormalities are identified.      MR-ABDOMEN-WITH & W/O    (Results Pending)   DX-CHEST-PORTABLE (1 VIEW)    (Results Pending)          Assessment/Plan  * Acute upper GI bleed- (present on admission)  Assessment & Plan  Patient was noted to have mild hematemesis early morning, later in the morning he was noted to have active hematemesis.   -- Patient was taking Eliquis, which has been held  Status post [facility], underwent endoscopy found to have active source of bleeding from the esophagus and renal ulcer.  It is noted that patient stomach full of blood   Monitor hemoglobin hematocrit  1 unit of PRBC transfusion has been ordered  Patient will be monitored in ICU, discussed with intensivist Dr. Vega    Hyponatremia- (present on admission)  Assessment & Plan  esolved    Chronic heart failure with preserved ejection fraction (HCC)  Assessment & Plan  Stable  Entresto, jardiance   -- will hold Bumex in the light of GI blled    PAD (peripheral artery disease) ([facility])  Assessment & Plan  DOMENIC noted  [facility]    ADINA (acute kidney injury) ([facility])  Assessment & Plan  Resolved, creatinine 8.84    Bacteremia  Assessment & Plan  Blood culture positive for strep group G  Repeat cultures negative x 48-hour   Echo did not show any vegetation, infectious disease   Nichol has evaluated, recommended Augmentin upon discharge.  Currently patient is on Unasyn    Cellulitis of left lower extremity  Assessment & Plan  WBC was elevated 14.6 today likely secondary to stress response from upper GI bleed, monitor    AF (atrial fibrillation) (HCC)- (present on admission)  Assessment & Plan  Long history of, rate well controlled  Hold Eliquis    Sepsis (HCC)- (present on admission)  Assessment & Plan  This is Sepsis Present on admission    Essential hypertension- (present on admission)  Assessment & Plan  Hold ant-htn meds in the light of active GI blled    Liver lesion  Assessment & Plan  Need further work-up with MRI liver protocol  GI evaluated        Hypokalemia- (present on admission)  Assessment & Plan  improved    History of transcatheter aortic valve replacement (TAVR)- (present on admission)  Assessment & Plan  Hx of  Last echo 2021 revealed a normal EF with normally functioning TAVR    Toxic metabolic encephalopathy- (present on admission)  Assessment & Plan  Acute toxic, metabolic encephalopathy secondary to sepsis   CT head with no acute process    Resolved on baseline    Hypocalcemia- (present on admission)  Assessment & Plan  Corrected calcium for albumin above  8.5         VTE prophylaxis: scd      Patient is critically ill.   The patient continues to have: Upper GI bleed/hematemesis  The vital organ system that is affected is the: GI system  If untreated there is a high chance of deterioration into: Hemorrhagic shock and death, patient did receive PRBC transfusion  And eventually death.   The critical care that I am providing today is: 40 min  The critical that has been undertaken is medically complex.   There has been no overlap in critical care time.   Critical Care Time not including procedures: 40     Patient has been monitored, prior to to EGD, patient medicine has been stable.  I discussed the plan of care with GI physician, noted to have 2 active site of bleeding,  started on PPI drip, 1 unit of PRBC transfusion was ordered along with IV fluid.    I discussed the case with intensivist for close monitoring considering hemodynamic instability post EGD and frequent monitoring of hemoglobin/hematocrit.    He is at high risk of rebleeding, if patient bleeds, obtain stat CTA of the abdomen and call IR

## 2023-04-27 NOTE — CARE PLAN
The patient is Stable - Low risk of patient condition declining or worsening    Shift Goals  Clinical Goals: IV ABX,maintain skin integrity  Patient Goals: Rest  Family Goals: RAFAT    Progress made toward(s) clinical / shift goals:  denies pain.     Patient is not progressing towards the following goals:Pt refuses Q2 turn, pt educated on the importance  of maintaining skin integrity. Barrier cream applied to coccyx, wound care consult order in [facility]. Pt will remain free from falls.

## 2023-04-27 NOTE — CARE PLAN
The patient is Stable - Low risk of patient condition declining or worsening    Shift Goals  Clinical Goals: IV abx, MRI, rest  Patient Goals: MRI, rest  Family Goals: RAFAT    Progress made toward(s) clinical / shift goals: IV abx infusing, pt resting in bed w/ no c/o pain at this time. MRI ordered.     Problem: Knowledge Deficit - Standard  Goal: Patient and family/care givers will demonstrate understanding of plan of care, disease process/condition, diagnostic tests and medications  Outcome: Progressing     Problem: Hemodynamics  Goal: Patient's hemodynamics, fluid balance and neurologic status will be stable or improve  Outcome: Progressing     Problem: Fluid Volume  Goal: Fluid volume balance will be maintained  Outcome: Progressing     Problem: Pain - Standard  Goal: Alleviation of pain or a reduction in pain to the patient’s comfort goal  Outcome: Progressing     Problem: Nutrition  Goal: Patient's nutritional and fluid intake will be adequate or improve  Outcome: Progressing     Problem: Gastrointestinal Irritability  Goal: Nausea and vomiting will be absent or improve  Outcome: Progressing

## 2023-04-27 NOTE — PROGRESS NOTES
Pt is A&Ox4, denies any pain. Pt has 1 episode of a small amount  bloody emesis. Dr. Thao notified. VS 91/40, HR 64, Spo2 93% on RA. Abdomen is tender, bowel sounds active x4 qds. Call light within reach, safety measures maintained Hourly rounding in place.

## 2023-04-27 NOTE — PROGRESS NOTES
Pt is A&Ox4, RA. All medications taken and tolerated, no c/o pain at this time. IV abx infusing at bedside. Pt resting in bed w/ call light and belongings in reach, bed alarm on, hourly rounding in place. No additional needs at this time.

## 2023-04-27 NOTE — ANESTHESIA PROCEDURE NOTES
Airway    Date/Time: 4/27/2023 1:34 PM  Performed by: Shalini Mckeon M.D.  Authorized by: Shalini Mckeon M.D.     Location:  OR  Urgency:  Elective  Indications for Airway Management:  Anesthesia      Spontaneous Ventilation: absent    Sedation Level:  Deep  Preoxygenated: Yes    Patient Position:  Sniffing  Mask Difficulty Assessment:  0 - not attempted  Final Airway Type:  Endotracheal airway  Final Endotracheal Airway:  ETT  Cuffed: Yes    Technique Used for Successful ETT Placement:  Direct laryngoscopy  Devices/Methods Used in Placement:  Cricoid pressure    Insertion Site:  Oral  Blade Type:  Pierce  Laryngoscope Blade/Videolaryngoscope Blade Size:  4  ETT Size (mm):  7.5  Measured from:  Teeth  ETT to Teeth (cm):  24  Placement Verified by: auscultation and capnometry    Cormack-Lehane Classification:  Grade I - full view of glottis  Number of Attempts at Approach:  1

## 2023-04-28 PROBLEM — R65.20 SEVERE SEPSIS (HCC): Status: ACTIVE | Noted: 2023-04-18

## 2023-04-28 LAB
AFP-TM SERPL-MCNC: 2 NG/ML (ref 0–9)
ALBUMIN SERPL BCP-MCNC: 1.7 G/DL (ref 3.2–4.9)
ALBUMIN/GLOB SERPL: 0.6 G/DL
ALP SERPL-CCNC: 73 U/L (ref 30–99)
ALT SERPL-CCNC: 10 U/L (ref 2–50)
ANION GAP SERPL CALC-SCNC: 9 MMOL/L (ref 7–16)
AST SERPL-CCNC: 17 U/L (ref 12–45)
BARCODED ABORH UBTYP: 6200
BARCODED PRD CODE UBPRD: NORMAL
BARCODED UNIT NUM UBUNT: NORMAL
BASOPHILS # BLD AUTO: 0.2 % (ref 0–1.8)
BASOPHILS # BLD: 0.02 K/UL (ref 0–0.12)
BILIRUB SERPL-MCNC: 0.3 MG/DL (ref 0.1–1.5)
BUN SERPL-MCNC: 48 MG/DL (ref 8–22)
CALCIUM ALBUM COR SERPL-MCNC: 8.8 MG/DL (ref 8.5–10.5)
CALCIUM SERPL-MCNC: 7 MG/DL (ref 8.5–10.5)
CFT BLD TEG: 5.9 MIN (ref 4.6–9.1)
CFT P HPASE BLD TEG: 5.2 MIN (ref 4.3–8.3)
CHLORIDE SERPL-SCNC: 110 MMOL/L (ref 96–112)
CLOT ANGLE BLD TEG: 78.7 DEGREES (ref 63–78)
CLOT LYSIS 30M P MA LENFR BLD TEG: 0.5 % (ref 0–2.6)
CO2 SERPL-SCNC: 21 MMOL/L (ref 20–33)
COMPONENT R 8504R: NORMAL
CREAT SERPL-MCNC: 0.83 MG/DL (ref 0.5–1.4)
CT.EXTRINSIC BLD ROTEM: 0.8 MIN (ref 0.8–2.1)
EOSINOPHIL # BLD AUTO: 0 K/UL (ref 0–0.51)
EOSINOPHIL NFR BLD: 0 % (ref 0–6.9)
ERYTHROCYTE [DISTWIDTH] IN BLOOD BY AUTOMATED COUNT: 56.1 FL (ref 35.9–50)
GFR SERPLBLD CREATININE-BSD FMLA CKD-EPI: 87 ML/MIN/1.73 M 2
GLOBULIN SER CALC-MCNC: 2.9 G/DL (ref 1.9–3.5)
GLUCOSE SERPL-MCNC: 149 MG/DL (ref 65–99)
HCT VFR BLD AUTO: 22.7 % (ref 42–52)
HCT VFR BLD AUTO: 24.2 % (ref 42–52)
HCT VFR BLD AUTO: 24.2 % (ref 42–52)
HCT VFR BLD AUTO: 24.3 % (ref 42–52)
HCT VFR BLD AUTO: 24.3 % (ref 42–52)
HCT VFR BLD AUTO: 26.4 % (ref 42–52)
HGB BLD-MCNC: 6.9 G/DL (ref 14–18)
HGB BLD-MCNC: 7.4 G/DL (ref 14–18)
HGB BLD-MCNC: 7.6 G/DL (ref 14–18)
HGB BLD-MCNC: 7.7 G/DL (ref 14–18)
HGB BLD-MCNC: 7.9 G/DL (ref 14–18)
HGB BLD-MCNC: 8.2 G/DL (ref 14–18)
IMM GRANULOCYTES # BLD AUTO: 0.24 K/UL (ref 0–0.11)
IMM GRANULOCYTES NFR BLD AUTO: 1.9 % (ref 0–0.9)
LYMPHOCYTES # BLD AUTO: 1.37 K/UL (ref 1–4.8)
LYMPHOCYTES NFR BLD: 10.6 % (ref 22–41)
MAGNESIUM SERPL-MCNC: 1.8 MG/DL (ref 1.5–2.5)
MCF BLD TEG: 65.9 MM (ref 52–69)
MCF.PLATELET INHIB BLD ROTEM: 34.9 MM (ref 15–32)
MCH RBC QN AUTO: 28.2 PG (ref 27–33)
MCHC RBC AUTO-ENTMCNC: 30.5 G/DL (ref 33.7–35.3)
MCV RBC AUTO: 92.7 FL (ref 81.4–97.8)
MONOCYTES # BLD AUTO: 0.54 K/UL (ref 0–0.85)
MONOCYTES NFR BLD AUTO: 4.2 % (ref 0–13.4)
NEUTROPHILS # BLD AUTO: 10.79 K/UL (ref 1.82–7.42)
NEUTROPHILS NFR BLD: 83.1 % (ref 44–72)
NRBC # BLD AUTO: 0 K/UL
NRBC BLD-RTO: 0 /100 WBC
PA AA BLD-ACNC: 3.5 % (ref 0–11)
PA ADP BLD-ACNC: 29.2 % (ref 0–17)
PHOSPHATE SERPL-MCNC: 2.1 MG/DL (ref 2.5–4.5)
PLATELET # BLD AUTO: 278 K/UL (ref 164–446)
PMV BLD AUTO: 10.5 FL (ref 9–12.9)
POTASSIUM SERPL-SCNC: 3.8 MMOL/L (ref 3.6–5.5)
PRODUCT TYPE UPROD: NORMAL
PROT SERPL-MCNC: 4.6 G/DL (ref 6–8.2)
RBC # BLD AUTO: 2.62 M/UL (ref 4.7–6.1)
SODIUM SERPL-SCNC: 140 MMOL/L (ref 135–145)
TEG ALGORITHM TGALG: ABNORMAL
UNIT STATUS USTAT: NORMAL
WBC # BLD AUTO: 13 K/UL (ref 4.8–10.8)

## 2023-04-28 PROCEDURE — 36430 TRANSFUSION BLD/BLD COMPNT: CPT

## 2023-04-28 PROCEDURE — 700111 HCHG RX REV CODE 636 W/ 250 OVERRIDE (IP): Performed by: INTERNAL MEDICINE

## 2023-04-28 PROCEDURE — 80053 COMPREHEN METABOLIC PANEL: CPT

## 2023-04-28 PROCEDURE — 770022 HCHG ROOM/CARE - ICU (200)

## 2023-04-28 PROCEDURE — 85384 FIBRINOGEN ACTIVITY: CPT

## 2023-04-28 PROCEDURE — 700105 HCHG RX REV CODE 258: Performed by: INTERNAL MEDICINE

## 2023-04-28 PROCEDURE — 700105 HCHG RX REV CODE 258: Performed by: HOSPITALIST

## 2023-04-28 PROCEDURE — 85018 HEMOGLOBIN: CPT | Mod: 91

## 2023-04-28 PROCEDURE — 85347 COAGULATION TIME ACTIVATED: CPT

## 2023-04-28 PROCEDURE — C9113 INJ PANTOPRAZOLE SODIUM, VIA: HCPCS | Performed by: HOSPITALIST

## 2023-04-28 PROCEDURE — 700102 HCHG RX REV CODE 250 W/ 637 OVERRIDE(OP): Performed by: HOSPITALIST

## 2023-04-28 PROCEDURE — 85025 COMPLETE CBC W/AUTO DIFF WBC: CPT

## 2023-04-28 PROCEDURE — P9016 RBC LEUKOCYTES REDUCED: HCPCS

## 2023-04-28 PROCEDURE — 86923 COMPATIBILITY TEST ELECTRIC: CPT

## 2023-04-28 PROCEDURE — 85014 HEMATOCRIT: CPT | Mod: 91

## 2023-04-28 PROCEDURE — A9270 NON-COVERED ITEM OR SERVICE: HCPCS | Performed by: HOSPITALIST

## 2023-04-28 PROCEDURE — 99291 CRITICAL CARE FIRST HOUR: CPT | Performed by: INTERNAL MEDICINE

## 2023-04-28 PROCEDURE — 85576 BLOOD PLATELET AGGREGATION: CPT | Mod: 91

## 2023-04-28 PROCEDURE — 700101 HCHG RX REV CODE 250: Performed by: INTERNAL MEDICINE

## 2023-04-28 PROCEDURE — 700111 HCHG RX REV CODE 636 W/ 250 OVERRIDE (IP): Performed by: HOSPITALIST

## 2023-04-28 PROCEDURE — 83735 ASSAY OF MAGNESIUM: CPT

## 2023-04-28 PROCEDURE — 84100 ASSAY OF PHOSPHORUS: CPT

## 2023-04-28 RX ORDER — MAGNESIUM SULFATE HEPTAHYDRATE 40 MG/ML
2 INJECTION, SOLUTION INTRAVENOUS ONCE
Status: COMPLETED | OUTPATIENT
Start: 2023-04-28 | End: 2023-04-28

## 2023-04-28 RX ADMIN — POTASSIUM PHOSPHATE, MONOBASIC AND POTASSIUM PHOSPHATE, DIBASIC 15 MMOL: 224; 236 INJECTION, SOLUTION, CONCENTRATE INTRAVENOUS at 08:59

## 2023-04-28 RX ADMIN — DOCUSATE SODIUM 50 MG AND SENNOSIDES 8.6 MG 2 TABLET: 8.6; 5 TABLET, FILM COATED ORAL at 18:02

## 2023-04-28 RX ADMIN — Medication 1 APPLICATOR: at 06:00

## 2023-04-28 RX ADMIN — SODIUM CHLORIDE 8 MG/HR: 9 INJECTION, SOLUTION INTRAVENOUS at 20:50

## 2023-04-28 RX ADMIN — AMPICILLIN AND SULBACTAM 3 G: 1; 2 INJECTION, POWDER, FOR SOLUTION INTRAMUSCULAR; INTRAVENOUS at 05:25

## 2023-04-28 RX ADMIN — AMPICILLIN AND SULBACTAM 3 G: 1; 2 INJECTION, POWDER, FOR SOLUTION INTRAMUSCULAR; INTRAVENOUS at 18:03

## 2023-04-28 RX ADMIN — Medication 1 APPLICATOR: at 18:04

## 2023-04-28 RX ADMIN — AMPICILLIN AND SULBACTAM 3 G: 1; 2 INJECTION, POWDER, FOR SOLUTION INTRAMUSCULAR; INTRAVENOUS at 23:25

## 2023-04-28 RX ADMIN — AMPICILLIN AND SULBACTAM 3 G: 1; 2 INJECTION, POWDER, FOR SOLUTION INTRAMUSCULAR; INTRAVENOUS at 13:28

## 2023-04-28 RX ADMIN — COLLAGENASE SANTYL: 250 OINTMENT TOPICAL at 05:25

## 2023-04-28 RX ADMIN — SODIUM CHLORIDE 8 MG/HR: 9 INJECTION, SOLUTION INTRAVENOUS at 11:19

## 2023-04-28 RX ADMIN — MAGNESIUM SULFATE HEPTAHYDRATE 2 G: 40 INJECTION, SOLUTION INTRAVENOUS at 09:02

## 2023-04-28 RX ADMIN — SODIUM CHLORIDE 8 MG/HR: 9 INJECTION, SOLUTION INTRAVENOUS at 01:31

## 2023-04-28 ASSESSMENT — ENCOUNTER SYMPTOMS
BLURRED VISION: 0
VOMITING: 0
CHILLS: 0
FEVER: 0
DEPRESSION: 0
NAUSEA: 0
DIARRHEA: 0
CONSTIPATION: 0
WEAKNESS: 1
SHORTNESS OF BREATH: 0
BLOOD IN STOOL: 0
ABDOMINAL PAIN: 0
DIZZINESS: 0
HEARTBURN: 0
COUGH: 0
BACK PAIN: 0

## 2023-04-28 ASSESSMENT — PAIN DESCRIPTION - PAIN TYPE
TYPE: ACUTE PAIN

## 2023-04-28 ASSESSMENT — FIBROSIS 4 INDEX: FIB4 SCORE: 1.61

## 2023-04-28 NOTE — CARE PLAN
Problem: Knowledge Deficit - Standard  Goal: Patient and family/care givers will demonstrate understanding of plan of care, disease process/condition, diagnostic tests and medications  Outcome: Progressing     Problem: Hemodynamics  Goal: Patient's hemodynamics, fluid balance and neurologic status will be stable or improve  Outcome: Progressing     Problem: Fluid Volume  Goal: Fluid volume balance will be maintained  Outcome: Progressing     Problem: Urinary - Renal Perfusion  Goal: Ability to achieve and maintain adequate renal perfusion and functioning will improve  Outcome: Progressing     Problem: Respiratory  Goal: Patient will achieve/maintain optimum respiratory ventilation and gas exchange  Outcome: Progressing     Problem: Mechanical Ventilation  Goal: Safe management of artificial airway and ventilation  Outcome: Progressing  Goal: Successful weaning off mechanical ventilator, spontaneously maintains adequate gas exchange  Outcome: Progressing  Goal: Patient will be able to express needs and understand communication  Outcome: Progressing     Problem: Physical Regulation  Goal: Diagnostic test results will improve  Outcome: Progressing  Goal: Signs and symptoms of infection will decrease  Outcome: Progressing     Problem: Fall Risk  Goal: Patient will remain free from falls  Outcome: Progressing     Problem: Skin Integrity  Goal: Skin integrity is maintained or improved  Outcome: Progressing     Problem: Pain - Standard  Goal: Alleviation of pain or a reduction in pain to the patient’s comfort goal  Outcome: Progressing     Problem: Nutrition  Goal: Patient's nutritional and fluid intake will be adequate or improve  Outcome: Progressing     Problem: Gastrointestinal Irritability  Goal: Nausea and vomiting will be absent or improve  Outcome: Progressing     The patient is Watcher - Medium risk of patient condition declining or worsening    Shift Goals  Clinical Goals: Hemodynamic stability, trend H&H labs  every 4 hours  Patient Goals: Rest  Family Goals: N/A    Progress made toward(s) clinical / shift goals:  Labs every 4 hours, will notify MD of critical values, monitoring vital signs     Patient is not progressing towards the following goals:

## 2023-04-28 NOTE — PROGRESS NOTES
Patient arrived to -910 from [facility] PACU. Dr. Vega at bedside.     4 Eyes Skin Assessment Completed by Lyla, RN and QUENTIN Bergman.    Head WDL  Ears WDL  Nose WDL  Mouth WDL  Neck WDL  Breast/Chest Bruising  Shoulder Blades WDL  Spine WDL  (R) Arm/Elbow/Hand Bruising  (L) Arm/Elbow/Hand Bruising  Abdomen Bruising  Groin Excoriation  Scrotum/Coccyx/Buttocks Redness, Non-Blanching, Excoriation, and Discoloration  (R) Leg Shiny Open wound shin, wound care following  (L) Leg Shiny Open wounds posterior calf, wound care following  (R) Heel/Foot/Toe Redness, Non-Blanching, and Boggy  (L) Heel/Foot/Toe Redness, Non-Blanching, and Boggy left second toe open wound, wound care following       Devices In Places ECG, Blood Pressure Cuff, Pulse Ox, Rosales, SCD's, and Oxy Mask    Interventions In Place Sacral Mepilex, Heel Float Boots, TAP System, Pillows, Q2 Turns, Low Air Loss Mattress, and Barrier Cream    Possible Skin Injury Yes    Pictures Uploaded Into Epic Yes  Wound Consult Placed Yes  RN Wound Prevention Protocol Ordered Yes

## 2023-04-28 NOTE — PROGRESS NOTES
"Critical Care Progress Note    Date of admission  4/18/2023    Chief Complaint  \"83 y.o. male who presented 4/18/2023 Streptococcus group G bacteremia attributed to cellulitis, ID consulted, cultures negative on repeat, currently on Unasyn.  His presenting symptoms were generalized weakness and fatigue.  In the course of the work-up for his sepsis/bacteremia he was found to multiple hypodense hepatic lesions measuring up to 7.9 cm of unclear etiology.     Has a past medical history of morbid obesity, aortic stenosis status post TAVR, heart failure with preserved ejection fraction, s/p micrapacer, atrial fibrillation and secondary hypercoagulable state on Eliquis.     He was planned for discharge yesterday but early morning developed massive hematemesis.  His Eliquis was reversed with Kcentra.  He was emergently taken for an EGD by gastroenterology which found severe grade D esophagitis in the lower esophagus with blood actively spurting from an arterial vessel, which was controlled with a Hemoclip and application of Hemospray.  Stomach mucosa was not able to be visualized studies it was filled with blood clots and food.  The duodenum showed a large 3 cm cratered duodenal ulcer with a large adherent clot which was also treated with Hemospray.       During the EGD he was hypotensive requiring vasopressor support.  In the PACU he remained hypotensive and critical care consultation was requested for transfer to ICU for higher level of care.\"     Hospital Course  4/27 admitted to the ICU in hemorrhage shock  4/28 ongoing blood loss overnight, s/p 1 u prbc    Interval Problem Update  Reviewed last 24 hour events:  Awake and alert  Afebrile  SBP 70s-90s  VTE ppx contraindicated  PPI  Unasyn for strep bacteremia    Review of Systems  Review of Systems   Unable to perform ROS: Critical illness      Vital Signs for last 24 hours   Temp:  [36.4 °C (97.6 °F)-37.2 °C (99 °F)] 37.2 °C (99 °F)  Pulse:  [60-67] 63  Resp:  [11-23] " 17  BP: ()/(40-55) 98/50  SpO2:  [92 %-100 %] 96 %    Hemodynamic parameters for last 24 hours       Respiratory Information for the last 24 hours       Physical Exam   Physical Exam  Vitals and nursing note reviewed.   HENT:      Head: Normocephalic and atraumatic.      Right Ear: External ear normal.      Left Ear: External ear normal.      Nose: Nose normal.      Mouth/Throat:      Mouth: Mucous membranes are moist.   Eyes:      Pupils: Pupils are equal, round, and reactive to light.   Cardiovascular:      Rate and Rhythm: Normal rate.      Pulses: Normal pulses.   Pulmonary:      Effort: Pulmonary effort is normal. No respiratory distress.   Abdominal:      General: Abdomen is flat. There is no distension.      Palpations: Abdomen is soft.      Tenderness: There is no abdominal tenderness. There is no guarding or rebound.   Musculoskeletal:      Right lower leg: Edema present.      Left lower leg: Edema present.   Skin:     General: Skin is warm and dry.      Capillary Refill: Capillary refill takes less than 2 seconds.      Coloration: Skin is pale.   Neurological:      Mental Status: He is alert and oriented to person, place, and time.      Motor: No weakness.   Psychiatric:         Mood and Affect: Mood normal.       Medications  Current Facility-Administered Medications   Medication Dose Route Frequency Provider Last Rate Last Admin    potassium phosphate 15 mmol in dextrose 5% 250 mL ivpb  15 mmol Intravenous Once Roger Joya M.D. 62.5 mL/hr at 04/28/23 0859 15 mmol at 04/28/23 0859    magnesium sulfate IVPB premix 2 g  2 g Intravenous Once Roger Joya M.D. 25 mL/hr at 04/28/23 0902 2 g at 04/28/23 0902    pantoprazole (Protonix) 80 mg in  mL Infusion  8 mg/hr Intravenous Continuous Marilyn Moreira M.D. 25 mL/hr at 04/28/23 0708 8 mg/hr at 04/28/23 0708    Nozin nasal  swab  1 Applicator Each Nostril BID Kedar Vega M.D.   1 Applicator at 04/28/23 0600    collagenase  (SANTYL)   Topical DAILY Pretty Parry, A.P.R.N.   Given at 04/28/23 0525    thiamine (Vitamin B-1) tablet 100 mg  100 mg Oral DAILY Blayne Mendoza M.D.   100 mg at 04/27/23 0529    oxyCODONE immediate-release (ROXICODONE) tablet 2.5 mg  2.5 mg Oral Q4HRS PRN Goldie Ramires, A.P.R.N.   2.5 mg at 04/27/23 0529    melatonin tablet 5 mg  5 mg Oral HS PRN Goldie Zhengkinson, A.P.R.N.   5 mg at 04/20/23 0039    ampicillin/sulbactam (UNASYN) 3 g in  mL IVPB  3 g Intravenous Q6HRS Miguel Gandara M.D.   Stopped at 04/28/23 0555    lactated ringers infusion (BOLUS)  1,000 mL Intravenous Once PRN Stepan Saleh M.D.        rosuvastatin (CRESTOR) tablet 40 mg  40 mg Oral DAILY Stepan Saleh M.D.   40 mg at 04/27/23 0530    senna-docusate (PERICOLACE or SENOKOT S) 8.6-50 MG per tablet 2 Tablet  2 Tablet Oral BID Stepan Saleh M.D.   2 Tablet at 04/26/23 1718    And    polyethylene glycol/lytes (MIRALAX) PACKET 1 Packet  1 Packet Oral QDAY PRN Stepan Saleh M.D.        And    magnesium hydroxide (MILK OF MAGNESIA) suspension 30 mL  30 mL Oral QDAY PRN Stepan Saleh M.D.        And    bisacodyl (DULCOLAX) suppository 10 mg  10 mg Rectal QDAY PRN Stepan Saleh M.D.        acetaminophen (Tylenol) tablet 650 mg  650 mg Oral Q6HRS PRN Stepan Saleh M.D.   650 mg at 04/20/23 1214    ondansetron (ZOFRAN) syringe/vial injection 4 mg  4 mg Intravenous Q4HRS PRN Stepan Saleh M.D.   4 mg at 04/23/23 2312    ondansetron (ZOFRAN ODT) dispertab 4 mg  4 mg Oral Q4HRS PRN Stepan Saleh M.D.   4 mg at 04/24/23 0916       Fluids    Intake/Output Summary (Last 24 hours) at 4/28/2023 1015  Last data filed at 4/28/2023 0600  Gross per 24 hour   Intake 5128.91 ml   Output 1650 ml   Net 3478.91 ml       Laboratory          Recent Labs     04/26/23  0227 04/27/23  0949 04/28/23  0430   SODIUM 137 139 140   POTASSIUM 3.6 3.7 3.8   CHLORIDE 106 106 110   CO2 22 23 21   BUN 26* 37* 48*   CREATININE 0.91 0.84 0.83   MAGNESIUM 2.0   --  1.8   PHOSPHORUS 1.9* 2.0* 2.1*   CALCIUM 7.6* 7.4* 7.0*     Recent Labs     04/26/23 0227 04/27/23  0949 04/28/23  0430   ALTSGPT  --   --  10   ASTSGOT  --   --  17   ALKPHOSPHAT  --   --  73   TBILIRUBIN  --   --  0.3   GLUCOSE 119* 124* 149*     Recent Labs     04/26/23 0227 04/27/23  0949 04/28/23  0430   WBC 10.7 14.6* 13.0*   NEUTSPOLYS  --   --  83.10*   LYMPHOCYTES  --   --  10.60*   MONOCYTES  --   --  4.20   EOSINOPHILS  --   --  0.00   BASOPHILS  --   --  0.20   ASTSGOT  --   --  17   ALTSGPT  --   --  10   ALKPHOSPHAT  --   --  73   TBILIRUBIN  --   --  0.3     Recent Labs     04/26/23 0227 04/27/23  0949 04/27/23  1133 04/27/23  1441 04/28/23  0210 04/28/23  0430 04/28/23  0600   RBC 3.89* 3.17*  --   --   --  2.62*  --    HEMOGLOBIN 10.9* 9.0* 8.9*   < > 6.9* 7.4* 7.9*   HEMATOCRIT 35.7* 29.7* 28.8*   < > 22.7* 24.3* 24.3*   PLATELETCT 346 351  --   --   --  278  --    PROTHROMBTM  --   --  28.3*  --   --   --   --    APTT  --   --  37.7*  --   --   --   --    INR  --   --  2.76*  --   --   --   --     < > = values in this interval not displayed.       Imaging  X-Ray:  I have personally reviewed the images and compared with prior images.    Assessment/Plan  * Acute upper GI bleed- (present on admission)  Assessment & Plan  Due to severe esophagitis with bleeding arterial vessel s/p hemoclips and hemospray, and duodenal ulcer with adherent clot status post Hemospray 4/27  S/p Kcentra     PPI infusion   Vasopressors as needed to maintain MAP 60-65  Conservative transfusion protocol  If UGIB becomes brisk will pursue stat CTA and IR embolization    Class 2 severe obesity with serious comorbidity in adult (HCC)  Assessment & Plan  Patient would benefit from counseling when stabilized about the importance of weight loss for long-term health risk reduction.     Liver lesion  Assessment & Plan  Concerning for malignancy, MRI liver protocol ordered and pending    Chronic heart failure with preserved  ejection fraction ([facility])  Assessment & Plan  Holding antihypertensives/diuretics in setting of GIB  Resume when hemodynamics stabilize    PAD (peripheral artery disease) ([facility])  Assessment & Plan  DOMENIC noted  [facility]    Bacteremia  Assessment & Plan  Group G strep 2/2 cellulitis  No e/o vegetation on TTE  Cont unasyn    AF (atrial fibrillation) ([facility])- (present on admission)  Assessment & Plan  History of AF; currently V-paced  Hold BB in setting of GIB/hemorrhage  Hold anticoagulation  Telemetry    History of transcatheter aortic valve replacement (TAVR)- (present on admission)  Assessment & Plan  LVEF 50%, reduced RV SF, normal transvalvular gradients across TAVR.    Severe sepsis ([facility])  Assessment & Plan  This is Severe Sepsis Present on admission  SIRS criteria identified on my evaluation include: Tachypnea, with respirations greater than 20 per minute and Leukocytosis, with WBC greater than 12,000  Clinical indicators of end organ dysfunction include Lactate >2 mmol/L (18.0 mg/dL)  Source is bacteremia  Sepsis protocol initiated  Crystalloid Fluid Administration: Fluid resuscitation ordered per standard protocol - 30 mL/kg per current or ideal body weight  IV antibiotics as appropriate for source of sepsis  Reassessment: I have reassessed the patient's hemodynamic status    Blood cultures + strep  Unasyn    Type 2 diabetes mellitus ([facility])- (present on admission)  Assessment & Plan  Goal glucose 140-180    Essential hypertension- (present on admission)  Assessment & Plan  Reintroduce oral antihypertensives when clinically appropriate         VTE:  Contraindicated  Ulcer: PPI  Lines: Central Line  Ongoing indication addressed and Rosales Catheter  Ongoing indication addressed    I have performed a physical exam and reviewed and updated ROS and Plan today (4/28/2023). In review of yesterday's note (4/27/2023), there are no changes except as documented above.     Discussed patient condition and risk  of morbidity and/or mortality with RN, RT, Pharmacy, Charge nurse / hot rounds, and Patient  The patient remains critically ill.  Critical care time = 51 minutes in directly providing and coordinating critical care and extensive data review.  No time overlap and excludes procedures.

## 2023-04-28 NOTE — CARE PLAN
The patient is Watcher - Medium risk of patient condition declining or worsening    Shift Goals  Clinical Goals: Q 4 hours hgb/hct, hemodynamic stability, pain managment, comfort  Patient Goals: drink water, eat food  Family Goals: N/A    Problem: Knowledge Deficit - Standard  Goal: Patient and family/care givers will demonstrate understanding of plan of care, disease process/condition, diagnostic tests and medications  Outcome: Progressing     Problem: Hemodynamics  Goal: Patient's hemodynamics, fluid balance and neurologic status will be stable or improve  Outcome: Progressing     Problem: Respiratory  Goal: Patient will achieve/maintain optimum respiratory ventilation and gas exchange  Outcome: Progressing     Problem: Skin Integrity  Goal: Skin integrity is maintained or improved  Outcome: Not Met  Note: Active wounds being followed by wound RNs and daily assessments by nursing     Problem: Pain - Standard  Goal: Alleviation of pain or a reduction in pain to the patient’s comfort goal  Outcome: Not Met  Note: Will address with MD in rounds regarding switching from PO to IVP medications due to strict NPO status      Problem: Gastrointestinal Irritability  Goal: Nausea and vomiting will be absent or improve  Outcome: Progressing

## 2023-04-28 NOTE — CARE PLAN
The patient is Unstable - High likelihood or risk of patient condition declining or worsening    Shift Goals  Clinical Goals: hemodynamic stability, binh H/H q4 hrs  Patient Goals: rest, eat, PT and [facility]  Family Goals: N/A      Problem: Knowledge Deficit - Standard  Goal: Patient and family/care givers will demonstrate understanding of plan of care, disease process/condition, diagnostic tests and medications  Outcome: Progressing     Problem: Hemodynamics  Goal: Patient's hemodynamics, fluid balance and neurologic status will be stable or improve  Outcome: Progressing     Problem: Fluid Volume  Goal: Fluid volume balance will be maintained  Outcome: Progressing     Problem: Urinary - Renal Perfusion  Goal: Ability to achieve and maintain adequate renal perfusion and functioning will improve  Outcome: Progressing     Problem: Respiratory  Goal: Patient will achieve/maintain optimum respiratory ventilation and gas exchange  Outcome: Progressing     Problem: Fall Risk  Goal: Patient will remain free from falls  Outcome: Progressing     Problem: Skin Integrity  Goal: Skin integrity is maintained or improved  Outcome: Progressing     Problem: Pain - Standard  Goal: Alleviation of pain or a reduction in pain to the patient’s comfort goal  Outcome: Progressing

## 2023-04-28 NOTE — ASSESSMENT & PLAN NOTE
This is Severe Sepsis Present on admission  SIRS criteria identified on my evaluation include: Tachypnea, with respirations greater than 20 per minute and Leukocytosis, with WBC greater than 12,000  Clinical indicators of end organ dysfunction include Lactate >2 mmol/L (18.0 mg/dL)  Source is bacteremia  Sepsis protocol initiated  Crystalloid Fluid Administration: Fluid resuscitation ordered per standard protocol - 30 mL/kg per current or ideal body weight  IV antibiotics as appropriate for source of sepsis  Reassessment: I have reassessed the patient's hemodynamic status    Blood cultures + strep  Unasyn

## 2023-04-28 NOTE — ASSESSMENT & PLAN NOTE
History of AF; currently V-paced  Hold BB in setting of GIB/hemorrhage  Hold anticoagulation  Telemetry

## 2023-04-28 NOTE — PROGRESS NOTES
..Gastroenterology Progress Note               Author:  MIRYAM Menendez Date & Time Created: 4/28/2023 8:18 AM       Patient ID:  Name:             Kedar Woods  YOB: 1940  Age:                 83 y.o.  male  MRN:               [id number]        Medical Decision Making, by Problem:  Active Hospital Problems    Diagnosis     Acute upper GI bleed [K92.2]     Class 2 severe obesity with serious comorbidity in adult (HCC) [E66.01]     Leg wound, left [S81.802A]     Leg wound, right [S81.801A]     Liver lesion [K76.9]     PAD (peripheral artery disease) (HCC) [I73.9]     Chronic heart failure with preserved ejection fraction (HCC) [I50.32]     ADINA (acute kidney injury) ([facility]) [N17.9]     Cellulitis of left lower extremity [L03.116]     Bacteremia [R78.81]     Severe sepsis (HCC) [A41.9, R65.20]     Hypocalcemia [E83.51]     Toxic metabolic encephalopathy [G92.8]     History of transcatheter aortic valve replacement (TAVR) [Z95.2]     Hyponatremia [E87.1]     Hypokalemia [E87.6]     AF (atrial fibrillation) ([facility]) [I48.91]     Essential hypertension [I10]     Type 2 diabetes mellitus ([facility]) [E11.9]            Presenting Chief Complaint:  Hematemesis      Interval History:  Status post emergent EGD 4/27:   Large amount of clots were noted in the esophagus. The clots were suctioned or pushed into the stomach. The distal esophagus revealed LA grade D esophagitis with blood actively spurting. The bleeding was controlled with a single Hemoclip and application of Hemospray.  The stomach contained a large amount of blood clots and food residue/ fluid. The stomach could not be cleared due to the presence of food residue. The antrum appeared normal.  A large 3cm cratered duodenal ulcer was noted in the sweep. A large clot was adherent to the ulcer. The ulcer was treated with hemospray.    4/28: Patient seen and examined.  Reports feeling better, is thirsty and wants to drink water.  No  further hematemesis, denies melena or hematochezia.  Had a bowel movement this morning that was brown in color.     Blood pressure soft, 90s over 50s on room air.  Required 1 unit PRBC overnight for hemoglobin 6.9 increased this morning to 7.9, BUN increased to 48 from 37.      Hospital Medications:  Current Facility-Administered Medications   Medication Dose Frequency Provider Last Rate Last Admin    pantoprazole (Protonix) 80 mg in  mL Infusion  8 mg/hr Continuous Marilyn Moreira M.D. 25 mL/hr at 04/28/23 0708 8 mg/hr at 04/28/23 0708    Nozin nasal  swab  1 Applicator BID Kedar Vega M.D.   1 Applicator at 04/28/23 0600    collagenase (SANTYL)   DAILY Pretty Parry, A.P.R.N.   Given at 04/28/23 0525    thiamine (Vitamin B-1) tablet 100 mg  100 mg DAILY Blayne Mendoza M.D.   100 mg at 04/27/23 0529    oxyCODONE immediate-release (ROXICODONE) tablet 2.5 mg  2.5 mg Q4HRS PRN Goldie Zhengkinson, A.P.R.N.   2.5 mg at 04/27/23 0529    melatonin tablet 5 mg  5 mg HS PRN Goldie Ramires, A.P.R.N.   5 mg at 04/20/23 0039    ampicillin/sulbactam (UNASYN) 3 g in  mL IVPB  3 g Q6HRS Miguel Gandara M.D.   Stopped at 04/28/23 0555    lactated ringers infusion (BOLUS)  1,000 mL Once PRN Stepan Saleh M.D.        rosuvastatin (CRESTOR) tablet 40 mg  40 mg DAILY Stepan Saleh M.D.   40 mg at 04/27/23 0530    senna-docusate (PERICOLACE or SENOKOT S) 8.6-50 MG per tablet 2 Tablet  2 Tablet BID Stepan Saleh M.D.   2 Tablet at 04/26/23 1718    And    polyethylene glycol/lytes (MIRALAX) PACKET 1 Packet  1 Packet QDAY PRN Stepan Saleh M.D.        And    magnesium hydroxide (MILK OF MAGNESIA) suspension 30 mL  30 mL QDAY PRN Stepan Saleh M.D.        And    bisacodyl (DULCOLAX) suppository 10 mg  10 mg QDAY PRN Stepan Saleh M.D.        acetaminophen (Tylenol) tablet 650 mg  650 mg Q6HRS PRN Stepan Saleh M.D.   650 mg at 04/20/23 1214    ondansetron (ZOFRAN) syringe/vial injection 4 mg  4  "mg Q4HRS PRN Stepan Saleh M.D.   4 mg at 04/23/23 2312    ondansetron (ZOFRAN ODT) dispertab 4 mg  4 mg Q4HRS PRN Stepan Saleh M.D.   4 mg at 04/24/23 0916   Last reviewed on 4/27/2023  1:13 PM by Gracie Owens R.N.       Review of Systems:  Review of Systems   Constitutional:  Negative for chills, fever and malaise/fatigue.   HENT:  Negative for hearing loss.    Eyes:  Negative for blurred vision.   Respiratory:  Negative for cough and shortness of breath.    Cardiovascular:  Negative for chest pain and leg swelling.   Gastrointestinal:  Negative for abdominal pain, blood in stool, constipation, diarrhea, heartburn, melena, nausea and vomiting.   Genitourinary:  Negative for dysuria.   Musculoskeletal:  Negative for back pain.   Skin:  Negative for rash.   Neurological:  Positive for weakness. Negative for dizziness.   Psychiatric/Behavioral:  Negative for depression.    All other systems reviewed and are negative.      Vital signs:  Weight/BMI: Body mass index is 32.87 kg/m².  BP 98/50   Pulse 63   Temp 37.2 °C (99 °F)   Resp 17   Ht 1.854 m (6' 1\")   Wt 113 kg (249 lb 1.9 oz)   SpO2 96%   Vitals:    04/28/23 0417 04/28/23 0432 04/28/23 0500 04/28/23 0600   BP: 107/53 106/52 95/42 98/50   Pulse: 64 66 64 63   Resp: 13 19 17 17   Temp:       TempSrc:       SpO2: 95% 95% 94% 96%   Weight:       Height:         Oxygen Therapy:  Pulse Oximetry: 96 %, O2 (LPM): 1, O2 Delivery Device: Room air w/o2 available    Intake/Output Summary (Last 24 hours) at 4/28/2023 0818  Last data filed at 4/28/2023 0600  Gross per 24 hour   Intake 5248.91 ml   Output 1650 ml   Net 3598.91 ml         Physical Exam:  Physical Exam  Vitals and nursing note reviewed.   Constitutional:       General: He is not in acute distress.     Appearance: He is obese. He is ill-appearing.   HENT:      Head: Normocephalic and atraumatic.      Right Ear: External ear normal.      Left Ear: External ear normal.      Nose: Nose normal.      " Mouth/Throat:      Mouth: Mucous membranes are moist.      Pharynx: Oropharynx is clear.   Eyes:      General: No scleral icterus.  Cardiovascular:      Rate and Rhythm: Normal rate and regular rhythm.      Pulses: Normal pulses.      Heart sounds: Normal heart sounds.   Pulmonary:      Effort: Pulmonary effort is normal.      Breath sounds: Normal breath sounds.   Abdominal:      General: Abdomen is flat. Bowel sounds are normal. There is no distension.      Palpations: Abdomen is soft.   Musculoskeletal:         General: Normal range of motion.      Cervical back: Normal range of motion and neck supple.      Right lower leg: Edema present.      Left lower leg: Edema present.      Comments: Scattered ecchymosis and bruising   Skin:     General: Skin is warm.      Capillary Refill: Capillary refill takes less than 2 seconds.      Coloration: Skin is pale.   Neurological:      Mental Status: He is alert and oriented to person, place, and time.   Psychiatric:         Mood and Affect: Mood normal.         Behavior: Behavior normal.           Labs:  Recent Labs     04/26/23 0227 04/27/23 0949 04/28/23  0430   SODIUM 137 139 140   POTASSIUM 3.6 3.7 3.8   CHLORIDE 106 106 110   CO2 22 23 21   BUN 26* 37* 48*   CREATININE 0.91 0.84 0.83   MAGNESIUM 2.0  --  1.8   PHOSPHORUS 1.9* 2.0* 2.1*   CALCIUM 7.6* 7.4* 7.0*     Recent Labs     04/26/23 0227 04/27/23  0949 04/28/23  0430   ALTSGPT  --   --  10   ASTSGOT  --   --  17   ALKPHOSPHAT  --   --  73   TBILIRUBIN  --   --  0.3   GLUCOSE 119* 124* 149*     Recent Labs     04/26/23 0227 04/27/23  0949 04/28/23  0430   WBC 10.7 14.6* 13.0*   NEUTSPOLYS  --   --  83.10*   LYMPHOCYTES  --   --  10.60*   MONOCYTES  --   --  4.20   EOSINOPHILS  --   --  0.00   BASOPHILS  --   --  0.20   ASTSGOT  --   --  17   ALTSGPT  --   --  10   ALKPHOSPHAT  --   --  73   TBILIRUBIN  --   --  0.3     Recent Labs     04/26/23 0227 04/27/23  0949 04/27/23  1133 04/27/23  1441 04/28/23  0212  04/28/23  0430 04/28/23  0600   RBC 3.89* 3.17*  --   --   --  2.62*  --    HEMOGLOBIN 10.9* 9.0* 8.9*   < > 6.9* 7.4* 7.9*   HEMATOCRIT 35.7* 29.7* 28.8*   < > 22.7* 24.3* 24.3*   PLATELETCT 346 351  --   --   --  278  --    PROTHROMBTM  --   --  28.3*  --   --   --   --    APTT  --   --  37.7*  --   --   --   --    INR  --   --  2.76*  --   --   --   --     < > = values in this interval not displayed.     Recent Results (from the past 24 hour(s))   CBC WITHOUT DIFFERENTIAL    Collection Time: 04/27/23  9:49 AM   Result Value Ref Range    WBC 14.6 (H) 4.8 - 10.8 K/uL    RBC 3.17 (L) 4.70 - 6.10 M/uL    Hemoglobin 9.0 (L) 14.0 - 18.0 g/dL    Hematocrit 29.7 (L) 42.0 - 52.0 %    MCV 93.7 81.4 - 97.8 fL    MCH 28.4 27.0 - 33.0 pg    MCHC 30.3 (L) 33.7 - 35.3 g/dL    RDW 57.5 (H) 35.9 - 50.0 fL    Platelet Count 351 164 - 446 K/uL    MPV 10.7 9.0 - 12.9 fL   Renal Function Panel    Collection Time: 04/27/23  9:49 AM   Result Value Ref Range    Sodium 139 135 - 145 mmol/L    Potassium 3.7 3.6 - 5.5 mmol/L    Chloride 106 96 - 112 mmol/L    Co2 23 20 - 33 mmol/L    Glucose 124 (H) 65 - 99 mg/dL    Creatinine 0.84 0.50 - 1.40 mg/dL    Bun 37 (H) 8 - 22 mg/dL    Calcium 7.4 (L) 8.5 - 10.5 mg/dL    Phosphorus 2.0 (L) 2.5 - 4.5 mg/dL    Albumin 2.0 (L) 3.2 - 4.9 g/dL   CORRECTED CALCIUM    Collection Time: 04/27/23  9:49 AM   Result Value Ref Range    Correct Calcium 9.0 8.5 - 10.5 mg/dL   ESTIMATED GFR    Collection Time: 04/27/23  9:49 AM   Result Value Ref Range    GFR (CKD-EPI) 87 >60 mL/min/1.73 m 2   ABO Rh Confirm    Collection Time: 04/27/23  9:49 AM   Result Value Ref Range    ABO Rh Confirm A POS    HEMOGLOBIN AND HEMATOCRIT    Collection Time: 04/27/23 11:33 AM   Result Value Ref Range    Hemoglobin 8.9 (L) 14.0 - 18.0 g/dL    Hematocrit 28.8 (L) 42.0 - 52.0 %   Prothrombin Time    Collection Time: 04/27/23 11:33 AM   Result Value Ref Range    PT 28.3 (H) 12.0 - 14.6 sec    INR 2.76 (H) 0.87 - 1.13   APTT     Collection Time: 04/27/23 11:33 AM   Result Value Ref Range    APTT 37.7 (H) 24.7 - 36.0 sec   COD - Adult (Type and Screen)    Collection Time: 04/27/23 11:33 AM   Result Value Ref Range    ABO Grouping Only A     Rh Grouping Only POS     Antibody Screen-Cod NEG    COMPONENT CELLULAR    Collection Time: 04/27/23 11:33 AM   Result Value Ref Range    Component R       R99                 Red Cells, LR       [id number]   issued       04/28/23   03:16      Product Type R99     Dispense Status issued     Unit Number (Barcoded) [id number]     Product Code (Barcoded) F2345P36     Blood Type (Barcoded) 6200    HEMOGLOBIN AND HEMATOCRIT    Collection Time: 04/27/23  2:41 PM   Result Value Ref Range    Hemoglobin 7.7 (L) 14.0 - 18.0 g/dL    Hematocrit 25.2 (L) 42.0 - 52.0 %   PLATELET MAPPING WITH BASIC TEG    Collection Time: 04/27/23  2:41 PM   Result Value Ref Range    Reaction Time Initial-R 6.0 4.6 - 9.1 min    React Time Initial Hep 6.7 4.3 - 8.3 min    Clot Kinetics-K 0.8 0.8 - 2.1 min    Clot Angle-Angle 77.2 63.0 - 78.0 degrees    Maximum Clot Strength-MA 66.6 52.0 - 69.0 mm    TEG Functional Fibrinogen(MA) 37.7 (H) 15.0 - 32.0 mm    Lysis 30 minutes-LY30 0.1 0.0 - 2.6 %    % Inhibition ADP 54.6 (H) 0.0 - 17.0 %    % Inhibition AA 2.7 0.0 - 11.0 %    TEG Algorithm Link Algorithm    HEMOGLOBIN AND HEMATOCRIT    Collection Time: 04/27/23  6:28 PM   Result Value Ref Range    Hemoglobin 7.5 (L) 14.0 - 18.0 g/dL    Hematocrit 24.8 (L) 42.0 - 52.0 %   HEMOGLOBIN AND HEMATOCRIT    Collection Time: 04/27/23 10:00 PM   Result Value Ref Range    Hemoglobin 7.4 (L) 14.0 - 18.0 g/dL    Hematocrit 23.9 (L) 42.0 - 52.0 %   HEMOGLOBIN AND HEMATOCRIT    Collection Time: 04/28/23  2:10 AM   Result Value Ref Range    Hemoglobin 6.9 (L) 14.0 - 18.0 g/dL    Hematocrit 22.7 (L) 42.0 - 52.0 %   Comp Metabolic Panel    Collection Time: 04/28/23  4:30 AM   Result Value Ref Range    Sodium 140 135 - 145 mmol/L    Potassium 3.8 3.6 -  5.5 mmol/L    Chloride 110 96 - 112 mmol/L    Co2 21 20 - 33 mmol/L    Anion Gap 9.0 7.0 - 16.0    Glucose 149 (H) 65 - 99 mg/dL    Bun 48 (H) 8 - 22 mg/dL    Creatinine 0.83 0.50 - 1.40 mg/dL    Calcium 7.0 (L) 8.5 - 10.5 mg/dL    AST(SGOT) 17 12 - 45 U/L    ALT(SGPT) 10 2 - 50 U/L    Alkaline Phosphatase 73 30 - 99 U/L    Total Bilirubin 0.3 0.1 - 1.5 mg/dL    Albumin 1.7 (L) 3.2 - 4.9 g/dL    Total Protein 4.6 (L) 6.0 - 8.2 g/dL    Globulin 2.9 1.9 - 3.5 g/dL    A-G Ratio 0.6 g/dL   MAGNESIUM    Collection Time: 04/28/23  4:30 AM   Result Value Ref Range    Magnesium 1.8 1.5 - 2.5 mg/dL   PHOSPHORUS    Collection Time: 04/28/23  4:30 AM   Result Value Ref Range    Phosphorus 2.1 (L) 2.5 - 4.5 mg/dL   CBC WITH DIFFERENTIAL    Collection Time: 04/28/23  4:30 AM   Result Value Ref Range    WBC 13.0 (H) 4.8 - 10.8 K/uL    RBC 2.62 (L) 4.70 - 6.10 M/uL    Hemoglobin 7.4 (L) 14.0 - 18.0 g/dL    Hematocrit 24.3 (L) 42.0 - 52.0 %    MCV 92.7 81.4 - 97.8 fL    MCH 28.2 27.0 - 33.0 pg    MCHC 30.5 (L) 33.7 - 35.3 g/dL    RDW 56.1 (H) 35.9 - 50.0 fL    Platelet Count 278 164 - 446 K/uL    MPV 10.5 9.0 - 12.9 fL    Neutrophils-Polys 83.10 (H) 44.00 - 72.00 %    Lymphocytes 10.60 (L) 22.00 - 41.00 %    Monocytes 4.20 0.00 - 13.40 %    Eosinophils 0.00 0.00 - 6.90 %    Basophils 0.20 0.00 - 1.80 %    Immature Granulocytes 1.90 (H) 0.00 - 0.90 %    Nucleated RBC 0.00 /100 WBC    Neutrophils (Absolute) 10.79 (H) 1.82 - 7.42 K/uL    Lymphs (Absolute) 1.37 1.00 - 4.80 K/uL    Monos (Absolute) 0.54 0.00 - 0.85 K/uL    Eos (Absolute) 0.00 0.00 - 0.51 K/uL    Baso (Absolute) 0.02 0.00 - 0.12 K/uL    Immature Granulocytes (abs) 0.24 (H) 0.00 - 0.11 K/uL    NRBC (Absolute) 0.00 K/uL   PLATELET MAPPING WITH BASIC TEG    Collection Time: 04/28/23  4:30 AM   Result Value Ref Range    Reaction Time Initial-R 5.9 4.6 - 9.1 min    React Time Initial Hep 5.2 4.3 - 8.3 min    Clot Kinetics-K 0.8 0.8 - 2.1 min    Clot Angle-Angle 78.7 (H) 63.0  - 78.0 degrees    Maximum Clot Strength-MA 65.9 52.0 - 69.0 mm    TEG Functional Fibrinogen(MA) 34.9 (H) 15.0 - 32.0 mm    Lysis 30 minutes-LY30 0.5 0.0 - 2.6 %    % Inhibition ADP 29.2 (H) 0.0 - 17.0 %    % Inhibition AA 3.5 0.0 - 11.0 %    TEG Algorithm Link Algorithm    CORRECTED CALCIUM    Collection Time: 04/28/23  4:30 AM   Result Value Ref Range    Correct Calcium 8.8 8.5 - 10.5 mg/dL   ESTIMATED GFR    Collection Time: 04/28/23  4:30 AM   Result Value Ref Range    GFR (CKD-EPI) 87 >60 mL/min/1.73 m 2   HEMOGLOBIN AND HEMATOCRIT    Collection Time: 04/28/23  6:00 AM   Result Value Ref Range    Hemoglobin 7.9 (L) 14.0 - 18.0 g/dL    Hematocrit 24.3 (L) 42.0 - 52.0 %       Radiology Review:  DX-CHEST-PORTABLE (1 VIEW)   Final Result      1.  Interval insertion of a central venous catheter which terminates with the tip projecting over the expected region of the mid to distal superior vena cava.   2.  Stable enlargement of the cardiomediastinal silhouette.   3.  New small to moderate bilateral pleural effusions with associated basilar atelectasis and/or consolidation.   4.  Mild interstitial edema.      EC-ECHOCARDIOGRAM COMPLETE W/O CONT   Final Result      US-EXTREMITY ARTERY LOWER UNILAT LEFT   Final Result      US-DOMENIC SINGLE LEVEL BILAT   Final Result      CT-CHEST,ABDOMEN,PELVIS WITH   Final Result      1.  No acute inflammatory process in the chest, abdomen or pelvis.   2.  Multiple hypodense hepatic regions measuring up to 7.9 cm. They are indeterminate. Areas of regional hepatic steatosis and active or treated metastases are in the differential. Correlate with history of cancer, prior studies and consider further    evaluation with contrast-enhanced MRI, liver protocol.   3.  Cholelithiasis.   4.  Nonobstructing right nephrolithiasis.   5.  Moderate amount of stool in the distal colon and rectum.   6.  Mildly enlarged left inguinal lymph node, statistically likely reactive. No other enlarged nodes  detected.         CT-HEAD W/O   Final Result      1.  Cerebral atrophy.      2.  White matter lucencies most consistent with small vessel ischemic change versus demyelination or gliosis.      3.  Otherwise, Head CT without contrast with no acute findings. No evidence of acute cerebral infarction, hemorrhage or mass lesion.         DX-CHEST-PORTABLE (1 VIEW)   Final Result      No acute cardiac or pulmonary abnormalities are identified.      MR-ABDOMEN-WITH & W/O    (Results Pending)         MDM (Data Review):   -Records reviewed and summarized in current documentation  -I personally reviewed and interpreted the laboratory results  -I personally reviewed the radiology images    Assessment/Recommendations:  Hematemesis.  Post hemorrhagic anemia  Grade D esophagitis  Duodenal ulcer  Elevated BUN  Liver mass on CT  Cholelithiasis  Right nephrolithiasis  History of TAVR  Atrial fibrillation on Eliquis, currently held  Hypertension  Diabetes type 2  HFpEF, on Entresto and Jardiance  Peripheral artery disease  Cellulitis      RECOMMENDATIONS:  The patient needs ICU level of care as he has a severe upper GI bleed with large amounts of blood in the stomach.   He was found to have two possible bleeding sources. He had active bleeding in the esophagus that was treated with a hemoclip and Hemospray and a large duodenal ulcer treated with Hemospray.  Closely observe the patient for active bleeding. If he re-bleeds, obtain a CTA and consider IR for embolization.  Okay for clear liquids today   IV PPI bid or drip.  Do not anticoagulate.  Monitor H/H Q6 hours and consider blood transfusion as his blood count is borderline and the patient has a large amount of blood in the stomach.    GI team will continue to follow.    Core Quality Measures   Reviewed items::  Labs, Medications and Radiology reports reviewed

## 2023-04-28 NOTE — ASSESSMENT & PLAN NOTE
Due to severe esophagitis with bleeding arterial vessel s/p hemoclips and hemospray, and duodenal ulcer with adherent clot status post Hemospray 4/27  S/p Kcentra     PPI infusion   Vasopressors as needed to maintain MAP 60-65  Conservative transfusion protocol  If UGIB becomes brisk will pursue stat CTA and IR embolization

## 2023-04-28 NOTE — ASSESSMENT & PLAN NOTE
Patient would benefit from counseling when stabilized about the importance of weight loss for long-term health risk reduction.

## 2023-04-29 LAB
ALBUMIN SERPL BCP-MCNC: 2 G/DL (ref 3.2–4.9)
ALBUMIN/GLOB SERPL: 0.7 G/DL
ALP SERPL-CCNC: 71 U/L (ref 30–99)
ALT SERPL-CCNC: 10 U/L (ref 2–50)
ANION GAP SERPL CALC-SCNC: 8 MMOL/L (ref 7–16)
AST SERPL-CCNC: 25 U/L (ref 12–45)
BASOPHILS # BLD AUTO: 0.2 % (ref 0–1.8)
BASOPHILS # BLD: 0.02 K/UL (ref 0–0.12)
BILIRUB SERPL-MCNC: 0.3 MG/DL (ref 0.1–1.5)
BUN SERPL-MCNC: 38 MG/DL (ref 8–22)
CALCIUM ALBUM COR SERPL-MCNC: 8.6 MG/DL (ref 8.5–10.5)
CALCIUM SERPL-MCNC: 7 MG/DL (ref 8.5–10.5)
CHLORIDE SERPL-SCNC: 115 MMOL/L (ref 96–112)
CO2 SERPL-SCNC: 22 MMOL/L (ref 20–33)
CREAT SERPL-MCNC: 0.8 MG/DL (ref 0.5–1.4)
EKG IMPRESSION: NORMAL
EOSINOPHIL # BLD AUTO: 0.1 K/UL (ref 0–0.51)
EOSINOPHIL NFR BLD: 0.8 % (ref 0–6.9)
ERYTHROCYTE [DISTWIDTH] IN BLOOD BY AUTOMATED COUNT: 56.3 FL (ref 35.9–50)
GFR SERPLBLD CREATININE-BSD FMLA CKD-EPI: 88 ML/MIN/1.73 M 2
GLOBULIN SER CALC-MCNC: 2.8 G/DL (ref 1.9–3.5)
GLUCOSE SERPL-MCNC: 107 MG/DL (ref 65–99)
HCT VFR BLD AUTO: 24.2 % (ref 42–52)
HCT VFR BLD AUTO: 24.7 % (ref 42–52)
HCT VFR BLD AUTO: 25.6 % (ref 42–52)
HGB BLD-MCNC: 7.6 G/DL (ref 14–18)
HGB BLD-MCNC: 7.7 G/DL (ref 14–18)
HGB BLD-MCNC: 7.8 G/DL (ref 14–18)
IMM GRANULOCYTES # BLD AUTO: 0.2 K/UL (ref 0–0.11)
IMM GRANULOCYTES NFR BLD AUTO: 1.6 % (ref 0–0.9)
LYMPHOCYTES # BLD AUTO: 2.1 K/UL (ref 1–4.8)
LYMPHOCYTES NFR BLD: 16.4 % (ref 22–41)
MAGNESIUM SERPL-MCNC: 2 MG/DL (ref 1.5–2.5)
MCH RBC QN AUTO: 28.8 PG (ref 27–33)
MCHC RBC AUTO-ENTMCNC: 31.4 G/DL (ref 33.7–35.3)
MCV RBC AUTO: 91.7 FL (ref 81.4–97.8)
MONOCYTES # BLD AUTO: 1.02 K/UL (ref 0–0.85)
MONOCYTES NFR BLD AUTO: 8 % (ref 0–13.4)
NEUTROPHILS # BLD AUTO: 9.37 K/UL (ref 1.82–7.42)
NEUTROPHILS NFR BLD: 73 % (ref 44–72)
NRBC # BLD AUTO: 0 K/UL
NRBC BLD-RTO: 0 /100 WBC
PHOSPHATE SERPL-MCNC: 1.9 MG/DL (ref 2.5–4.5)
PLATELET # BLD AUTO: 250 K/UL (ref 164–446)
PMV BLD AUTO: 10.7 FL (ref 9–12.9)
POTASSIUM SERPL-SCNC: 3.5 MMOL/L (ref 3.6–5.5)
PROT SERPL-MCNC: 4.8 G/DL (ref 6–8.2)
RBC # BLD AUTO: 2.64 M/UL (ref 4.7–6.1)
SODIUM SERPL-SCNC: 145 MMOL/L (ref 135–145)
WBC # BLD AUTO: 12.8 K/UL (ref 4.8–10.8)

## 2023-04-29 PROCEDURE — 700111 HCHG RX REV CODE 636 W/ 250 OVERRIDE (IP): Performed by: INTERNAL MEDICINE

## 2023-04-29 PROCEDURE — C9113 INJ PANTOPRAZOLE SODIUM, VIA: HCPCS | Performed by: HOSPITALIST

## 2023-04-29 PROCEDURE — 83735 ASSAY OF MAGNESIUM: CPT

## 2023-04-29 PROCEDURE — 770020 HCHG ROOM/CARE - TELE (206)

## 2023-04-29 PROCEDURE — 93005 ELECTROCARDIOGRAM TRACING: CPT | Performed by: INTERNAL MEDICINE

## 2023-04-29 PROCEDURE — 84100 ASSAY OF PHOSPHORUS: CPT

## 2023-04-29 PROCEDURE — 85014 HEMATOCRIT: CPT | Mod: 91

## 2023-04-29 PROCEDURE — A9270 NON-COVERED ITEM OR SERVICE: HCPCS | Performed by: HOSPITALIST

## 2023-04-29 PROCEDURE — 36415 COLL VENOUS BLD VENIPUNCTURE: CPT

## 2023-04-29 PROCEDURE — 700102 HCHG RX REV CODE 250 W/ 637 OVERRIDE(OP): Performed by: STUDENT IN AN ORGANIZED HEALTH CARE EDUCATION/TRAINING PROGRAM

## 2023-04-29 PROCEDURE — 85025 COMPLETE CBC W/AUTO DIFF WBC: CPT

## 2023-04-29 PROCEDURE — 93010 ELECTROCARDIOGRAM REPORT: CPT | Performed by: INTERNAL MEDICINE

## 2023-04-29 PROCEDURE — 700111 HCHG RX REV CODE 636 W/ 250 OVERRIDE (IP): Performed by: HOSPITALIST

## 2023-04-29 PROCEDURE — 700102 HCHG RX REV CODE 250 W/ 637 OVERRIDE(OP): Performed by: HOSPITALIST

## 2023-04-29 PROCEDURE — A9270 NON-COVERED ITEM OR SERVICE: HCPCS | Performed by: STUDENT IN AN ORGANIZED HEALTH CARE EDUCATION/TRAINING PROGRAM

## 2023-04-29 PROCEDURE — 700101 HCHG RX REV CODE 250: Performed by: INTERNAL MEDICINE

## 2023-04-29 PROCEDURE — 700105 HCHG RX REV CODE 258: Performed by: INTERNAL MEDICINE

## 2023-04-29 PROCEDURE — C9113 INJ PANTOPRAZOLE SODIUM, VIA: HCPCS | Performed by: INTERNAL MEDICINE

## 2023-04-29 PROCEDURE — 700105 HCHG RX REV CODE 258: Performed by: HOSPITALIST

## 2023-04-29 PROCEDURE — 99233 SBSQ HOSP IP/OBS HIGH 50: CPT | Performed by: INTERNAL MEDICINE

## 2023-04-29 PROCEDURE — 80053 COMPREHEN METABOLIC PANEL: CPT

## 2023-04-29 PROCEDURE — 85018 HEMOGLOBIN: CPT | Mod: 91

## 2023-04-29 RX ORDER — FUROSEMIDE 10 MG/ML
20 INJECTION INTRAMUSCULAR; INTRAVENOUS ONCE
Status: COMPLETED | OUTPATIENT
Start: 2023-04-29 | End: 2023-04-29

## 2023-04-29 RX ORDER — PANTOPRAZOLE SODIUM 40 MG/10ML
40 INJECTION, POWDER, LYOPHILIZED, FOR SOLUTION INTRAVENOUS 2 TIMES DAILY
Status: DISCONTINUED | OUTPATIENT
Start: 2023-04-29 | End: 2023-05-01

## 2023-04-29 RX ADMIN — Medication 100 MG: at 05:27

## 2023-04-29 RX ADMIN — AMPICILLIN AND SULBACTAM 3 G: 1; 2 INJECTION, POWDER, FOR SOLUTION INTRAMUSCULAR; INTRAVENOUS at 05:27

## 2023-04-29 RX ADMIN — AMPICILLIN AND SULBACTAM 3 G: 1; 2 INJECTION, POWDER, FOR SOLUTION INTRAMUSCULAR; INTRAVENOUS at 18:06

## 2023-04-29 RX ADMIN — FUROSEMIDE 20 MG: 10 INJECTION, SOLUTION INTRAMUSCULAR; INTRAVENOUS at 09:59

## 2023-04-29 RX ADMIN — COLLAGENASE SANTYL: 250 OINTMENT TOPICAL at 06:00

## 2023-04-29 RX ADMIN — PANTOPRAZOLE SODIUM 40 MG: 40 INJECTION, POWDER, LYOPHILIZED, FOR SOLUTION INTRAVENOUS at 21:17

## 2023-04-29 RX ADMIN — AMPICILLIN AND SULBACTAM 3 G: 1; 2 INJECTION, POWDER, FOR SOLUTION INTRAMUSCULAR; INTRAVENOUS at 23:00

## 2023-04-29 RX ADMIN — AMPICILLIN AND SULBACTAM 3 G: 1; 2 INJECTION, POWDER, FOR SOLUTION INTRAMUSCULAR; INTRAVENOUS at 12:48

## 2023-04-29 RX ADMIN — POTASSIUM PHOSPHATE, MONOBASIC AND POTASSIUM PHOSPHATE, DIBASIC 30 MMOL: 224; 236 INJECTION, SOLUTION, CONCENTRATE INTRAVENOUS at 09:51

## 2023-04-29 RX ADMIN — ROSUVASTATIN CALCIUM 40 MG: 20 TABLET, FILM COATED ORAL at 05:26

## 2023-04-29 RX ADMIN — SODIUM CHLORIDE 8 MG/HR: 9 INJECTION, SOLUTION INTRAVENOUS at 06:13

## 2023-04-29 RX ADMIN — Medication 1 APPLICATOR: at 06:00

## 2023-04-29 ASSESSMENT — PAIN DESCRIPTION - PAIN TYPE
TYPE: ACUTE PAIN

## 2023-04-29 ASSESSMENT — ENCOUNTER SYMPTOMS
BLOOD IN STOOL: 0
CHILLS: 0
NAUSEA: 0
DIZZINESS: 0
COUGH: 0
DEPRESSION: 0
SHORTNESS OF BREATH: 0
ABDOMINAL PAIN: 0
DIARRHEA: 0
VOMITING: 0
HEARTBURN: 0
BACK PAIN: 0
WEAKNESS: 1
FEVER: 0
CONSTIPATION: 0
BLURRED VISION: 0

## 2023-04-29 ASSESSMENT — FIBROSIS 4 INDEX: FIB4 SCORE: 1.78

## 2023-04-29 NOTE — CARE PLAN
The patient is Stable - Low risk of patient condition declining or worsening    Shift Goals  Clinical Goals: stable hemoglobin  Patient Goals: ice cold water  Family Goals: NA    Progress made toward(s) clinical / shift goals: Updated on POC, up to bathroom one assist with no bloody stools, medicated per MAR.    Problem: Knowledge Deficit - Standard  Goal: Patient and family/care givers will demonstrate understanding of plan of care, disease process/condition, diagnostic tests and medications  Outcome: Progressing     Problem: Hemodynamics  Goal: Patient's hemodynamics, fluid balance and neurologic status will be stable or improve  Outcome: Progressing     Problem: Fluid Volume  Goal: Fluid volume balance will be maintained  Outcome: Progressing     Problem: Urinary - Renal Perfusion  Goal: Ability to achieve and maintain adequate renal perfusion and functioning will improve  Outcome: Progressing     Problem: Respiratory  Goal: Patient will achieve/maintain optimum respiratory ventilation and gas exchange  Outcome: Progressing

## 2023-04-29 NOTE — PROGRESS NOTES
4 Eyes Skin Assessment Completed by QUENTIN Dias and QUENTIN Hart.    Head WDL  Ears WDL  Nose WDL  Mouth WDL  Neck WDL  Breast/Chest WDL  Shoulder Blades WDL  Spine WDL  (R) Arm/Elbow/Hand Fragile skin  (L) Arm/Elbow/Hand Fragile skin  Abdomen WDL  Groin WDL  Scrotum/Coccyx/Buttocks Stage 1 pressure ulcer on sacrum, small skin tear above penis  (R) Leg Venous Ulcers  (L) Leg Venous ulcers  (R) Heel/Foot/Toe WDL  (L) Heel/Foot/Toe WDL          Devices In Places Tele Box and Blood Pressure Cuff      Interventions In Place Heel Mepilex, Pillows, Q2 Turns, and Heels Loaded W/Pillows    Possible Skin Injury Yes    Pictures Uploaded Into Epic N/A  Wound Consult Placed Yes  RN Wound Prevention Protocol Ordered Yes

## 2023-04-29 NOTE — PROGRESS NOTES
83 year old admitted with UGIB and hemorrhagic shock. His BP and hgb have stabilized since yesterday. He is awake, alert, reassuring vital signs and no longer bleeding. He is edematous after resuscitation in the setting of RV dysfunction; will give lasix 20 mg IVP x 1. He is ok to transfer out of the ICU as his bleeding and vitals have stabilized.     Roger Joya M.D.

## 2023-04-29 NOTE — PROGRESS NOTES
..Gastroenterology Progress Note               Author:  MIRYAM Menendez Date & Time Created: 4/29/2023 11:37 AM       Patient ID:  Name:             Kedar Woods  YOB: 1940  Age:                 83 y.o.  male  MRN:               [id number]        Medical Decision Making, by Problem:  Active Hospital Problems    Diagnosis     Acute upper GI bleed [K92.2]     Class 2 severe obesity with serious comorbidity in adult (HCC) [E66.01]     Leg wound, left [S81.802A]     Leg wound, right [S81.801A]     Liver lesion [K76.9]     PAD (peripheral artery disease) (HCC) [I73.9]     Chronic heart failure with preserved ejection fraction (HCC) [I50.32]     ADINA (acute kidney injury) ([facility]) [N17.9]     Cellulitis of left lower extremity [L03.116]     Bacteremia [R78.81]     Severe sepsis (HCC) [A41.9, R65.20]     Hypocalcemia [E83.51]     Toxic metabolic encephalopathy [G92.8]     History of transcatheter aortic valve replacement (TAVR) [Z95.2]     Hyponatremia [E87.1]     Hypokalemia [E87.6]     AF (atrial fibrillation) ([facility]) [I48.91]     Essential hypertension [I10]     Type 2 diabetes mellitus (HCC) [E11.9]            Presenting Chief Complaint:  Hematemesis      Interval History:  Status post emergent EGD 4/27:   Large amount of clots were noted in the esophagus. The clots were suctioned or pushed into the stomach. The distal esophagus revealed LA grade D esophagitis with blood actively spurting. The bleeding was controlled with a single Hemoclip and application of Hemospray.  The stomach contained a large amount of blood clots and food residue/ fluid. The stomach could not be cleared due to the presence of food residue. The antrum appeared normal.  A large 3cm cratered duodenal ulcer was noted in the sweep. A large clot was adherent to the ulcer. The ulcer was treated with hemospray.    4/29: Patient seen and examined.  Reports feeling significantly better no hematemesis, hematochezia, or  melena.  Hemoglobin stable at 7.8.    4/28: Patient seen and examined.  Reports feeling better, is thirsty and wants to drink water.  No further hematemesis, denies melena or hematochezia.  Had a bowel movement this morning that was brown in color.     Blood pressure soft, 90s over 50s on room air.  Required 1 unit PRBC overnight for hemoglobin 6.9 increased this morning to 7.9, BUN increased to 48 from 37.      Hospital Medications:  Current Facility-Administered Medications   Medication Dose Frequency Provider Last Rate Last Admin    potassium phosphate IVPB 30 mmol in 500 mL D5W (premix)  30 mmol Once Roger Joya M.D. 83.3 mL/hr at 04/29/23 0951 30 mmol at 04/29/23 0951    pantoprazole (Protonix) 80 mg in  mL Infusion  8 mg/hr Continuous Marilyn Moreira M.D. 25 mL/hr at 04/29/23 0613 8 mg/hr at 04/29/23 0613    Nozin nasal  swab  1 Applicator BID Kedar Vega M.D.   1 Applicator at 04/29/23 0600    collagenase (SANTYL)   DAILY Pretty Parry, A.P.R.N.   Given at 04/29/23 0600    thiamine (Vitamin B-1) tablet 100 mg  100 mg DAILY Blayne Mendoza M.D.   100 mg at 04/29/23 0527    oxyCODONE immediate-release (ROXICODONE) tablet 2.5 mg  2.5 mg Q4HRS PRN Goldie Ramires, A.P.R.N.   2.5 mg at 04/27/23 0529    melatonin tablet 5 mg  5 mg HS PRN Goldie Ramires, A.P.R.N.   5 mg at 04/20/23 0039    ampicillin/sulbactam (UNASYN) 3 g in  mL IVPB  3 g Q6HRS Miguel Gandara M.D.   Stopped at 04/29/23 0557    lactated ringers infusion (BOLUS)  1,000 mL Once PRN Stepan Saleh M.D.        rosuvastatin (CRESTOR) tablet 40 mg  40 mg DAILY Stepan Saleh M.D.   40 mg at 04/29/23 0526    senna-docusate (PERICOLACE or SENOKOT S) 8.6-50 MG per tablet 2 Tablet  2 Tablet BID Stepan Saleh M.D.   2 Tablet at 04/28/23 1802    And    polyethylene glycol/lytes (MIRALAX) PACKET 1 Packet  1 Packet QDAY PRN Stepan Saleh M.D.        And    magnesium hydroxide (MILK OF MAGNESIA) suspension 30 mL  30  "mL QDAY PRN Stepan Saleh M.D.        And    bisacodyl (DULCOLAX) suppository 10 mg  10 mg QDAY PRN Stepan Saleh M.D.        acetaminophen (Tylenol) tablet 650 mg  650 mg Q6HRS PRN Stepan Saleh M.D.   650 mg at 04/20/23 1214    ondansetron (ZOFRAN) syringe/vial injection 4 mg  4 mg Q4HRS PRN Stepan Saleh M.D.   4 mg at 04/23/23 2312    ondansetron (ZOFRAN ODT) dispertab 4 mg  4 mg Q4HRS PRN Stepan Saleh M.D.   4 mg at 04/24/23 0916   Last reviewed on 4/27/2023  1:13 PM by Gracie Owens R.N.       Review of Systems:  Review of Systems   Constitutional:  Negative for chills, fever and malaise/fatigue.   HENT:  Negative for hearing loss.    Eyes:  Negative for blurred vision.   Respiratory:  Negative for cough and shortness of breath.    Cardiovascular:  Negative for chest pain and leg swelling.   Gastrointestinal:  Negative for abdominal pain, blood in stool, constipation, diarrhea, heartburn, melena, nausea and vomiting.   Genitourinary:  Negative for dysuria.   Musculoskeletal:  Negative for back pain.   Skin:  Negative for rash.   Neurological:  Positive for weakness. Negative for dizziness.   Psychiatric/Behavioral:  Negative for depression.    All other systems reviewed and are negative.      Vital signs:  Weight/BMI: Body mass index is 31.42 kg/m².  /54   Pulse 66   Temp 37.2 °C (99 °F)   Resp (!) 24   Ht 1.854 m (6' 0.99\")   Wt 108 kg (238 lb 1.6 oz)   SpO2 98%   Vitals:    04/29/23 0700 04/29/23 0800 04/29/23 0900 04/29/23 1000   BP: 99/54 100/52 108/54 108/54   Pulse: 62 64 63 66   Resp: 15 17 15 (!) 24   Temp:       TempSrc:       SpO2: 92% 94% 94% 98%   Weight:       Height:         Oxygen Therapy:  Pulse Oximetry: 98 %, O2 Delivery Device: Room air w/o2 available    Intake/Output Summary (Last 24 hours) at 4/29/2023 1137  Last data filed at 4/29/2023 1000  Gross per 24 hour   Intake 2167.33 ml   Output 2170 ml   Net -2.67 ml           Physical Exam:  Physical Exam  Vitals and " nursing note reviewed.   Constitutional:       General: He is not in acute distress.     Appearance: He is obese. He is ill-appearing.   HENT:      Head: Normocephalic and atraumatic.      Right Ear: External ear normal.      Left Ear: External ear normal.      Nose: Nose normal.      Mouth/Throat:      Mouth: Mucous membranes are moist.      Pharynx: Oropharynx is clear.   Eyes:      General: No scleral icterus.  Cardiovascular:      Rate and Rhythm: Normal rate and regular rhythm.      Pulses: Normal pulses.      Heart sounds: Normal heart sounds.   Pulmonary:      Effort: Pulmonary effort is normal.      Breath sounds: Normal breath sounds.   Abdominal:      General: Abdomen is flat. Bowel sounds are normal. There is no distension.      Palpations: Abdomen is soft.   Musculoskeletal:         General: Normal range of motion.      Cervical back: Normal range of motion and neck supple.      Right lower leg: Edema present.      Left lower leg: Edema present.      Comments: Scattered ecchymosis and bruising   Skin:     General: Skin is warm.      Capillary Refill: Capillary refill takes less than 2 seconds.      Coloration: Skin is pale.   Neurological:      Mental Status: He is alert and oriented to person, place, and time.   Psychiatric:         Mood and Affect: Mood normal.         Behavior: Behavior normal.           Labs:  Recent Labs     04/27/23  0949 04/28/23 0430 04/29/23  0400   SODIUM 139 140 145   POTASSIUM 3.7 3.8 3.5*   CHLORIDE 106 110 115*   CO2 23 21 22   BUN 37* 48* 38*   CREATININE 0.84 0.83 0.80   MAGNESIUM  --  1.8 2.0   PHOSPHORUS 2.0* 2.1* 1.9*   CALCIUM 7.4* 7.0* 7.0*       Recent Labs     04/27/23  0949 04/28/23  0430 04/29/23  0400   ALTSGPT  --  10 10   ASTSGOT  --  17 25   ALKPHOSPHAT  --  73 71   TBILIRUBIN  --  0.3 0.3   GLUCOSE 124* 149* 107*       Recent Labs     04/27/23  0949 04/28/23  0430 04/29/23  0400   WBC 14.6* 13.0* 12.8*   NEUTSPOLYS  --  83.10* 73.00*   LYMPHOCYTES  --   10.60* 16.40*   MONOCYTES  --  4.20 8.00   EOSINOPHILS  --  0.00 0.80   BASOPHILS  --  0.20 0.20   ASTSGOT  --  17 25   ALTSGPT  --  10 10   ALKPHOSPHAT  --  73 71   TBILIRUBIN  --  0.3 0.3       Recent Labs     04/27/23  0949 04/27/23  1133 04/27/23  1441 04/28/23  0430 04/28/23  0600 04/28/23  2330 04/29/23  0400 04/29/23  1000   RBC 3.17*  --   --  2.62*  --   --  2.64*  --    HEMOGLOBIN 9.0* 8.9*   < > 7.4*   < > 7.6* 7.6* 7.8*   HEMATOCRIT 29.7* 28.8*   < > 24.3*   < > 24.2* 24.2* 25.6*   PLATELETCT 351  --   --  278  --   --  250  --    PROTHROMBTM  --  28.3*  --   --   --   --   --   --    APTT  --  37.7*  --   --   --   --   --   --    INR  --  2.76*  --   --   --   --   --   --     < > = values in this interval not displayed.       Recent Results (from the past 24 hour(s))   HEMOGLOBIN AND HEMATOCRIT    Collection Time: 04/28/23 12:09 PM   Result Value Ref Range    Hemoglobin 8.2 (L) 14.0 - 18.0 g/dL    Hematocrit 26.4 (L) 42.0 - 52.0 %   HEMOGLOBIN AND HEMATOCRIT    Collection Time: 04/28/23  6:28 PM   Result Value Ref Range    Hemoglobin 7.7 (L) 14.0 - 18.0 g/dL    Hematocrit 24.2 (L) 42.0 - 52.0 %   HEMOGLOBIN AND HEMATOCRIT    Collection Time: 04/28/23 11:30 PM   Result Value Ref Range    Hemoglobin 7.6 (L) 14.0 - 18.0 g/dL    Hematocrit 24.2 (L) 42.0 - 52.0 %   Comp Metabolic Panel    Collection Time: 04/29/23  4:00 AM   Result Value Ref Range    Sodium 145 135 - 145 mmol/L    Potassium 3.5 (L) 3.6 - 5.5 mmol/L    Chloride 115 (H) 96 - 112 mmol/L    Co2 22 20 - 33 mmol/L    Anion Gap 8.0 7.0 - 16.0    Glucose 107 (H) 65 - 99 mg/dL    Bun 38 (H) 8 - 22 mg/dL    Creatinine 0.80 0.50 - 1.40 mg/dL    Calcium 7.0 (L) 8.5 - 10.5 mg/dL    AST(SGOT) 25 12 - 45 U/L    ALT(SGPT) 10 2 - 50 U/L    Alkaline Phosphatase 71 30 - 99 U/L    Total Bilirubin 0.3 0.1 - 1.5 mg/dL    Albumin 2.0 (L) 3.2 - 4.9 g/dL    Total Protein 4.8 (L) 6.0 - 8.2 g/dL    Globulin 2.8 1.9 - 3.5 g/dL    A-G Ratio 0.7 g/dL   MAGNESIUM     Collection Time: 04/29/23  4:00 AM   Result Value Ref Range    Magnesium 2.0 1.5 - 2.5 mg/dL   PHOSPHORUS    Collection Time: 04/29/23  4:00 AM   Result Value Ref Range    Phosphorus 1.9 (L) 2.5 - 4.5 mg/dL   CBC WITH DIFFERENTIAL    Collection Time: 04/29/23  4:00 AM   Result Value Ref Range    WBC 12.8 (H) 4.8 - 10.8 K/uL    RBC 2.64 (L) 4.70 - 6.10 M/uL    Hemoglobin 7.6 (L) 14.0 - 18.0 g/dL    Hematocrit 24.2 (L) 42.0 - 52.0 %    MCV 91.7 81.4 - 97.8 fL    MCH 28.8 27.0 - 33.0 pg    MCHC 31.4 (L) 33.7 - 35.3 g/dL    RDW 56.3 (H) 35.9 - 50.0 fL    Platelet Count 250 164 - 446 K/uL    MPV 10.7 9.0 - 12.9 fL    Neutrophils-Polys 73.00 (H) 44.00 - 72.00 %    Lymphocytes 16.40 (L) 22.00 - 41.00 %    Monocytes 8.00 0.00 - 13.40 %    Eosinophils 0.80 0.00 - 6.90 %    Basophils 0.20 0.00 - 1.80 %    Immature Granulocytes 1.60 (H) 0.00 - 0.90 %    Nucleated RBC 0.00 /100 WBC    Neutrophils (Absolute) 9.37 (H) 1.82 - 7.42 K/uL    Lymphs (Absolute) 2.10 1.00 - 4.80 K/uL    Monos (Absolute) 1.02 (H) 0.00 - 0.85 K/uL    Eos (Absolute) 0.10 0.00 - 0.51 K/uL    Baso (Absolute) 0.02 0.00 - 0.12 K/uL    Immature Granulocytes (abs) 0.20 (H) 0.00 - 0.11 K/uL    NRBC (Absolute) 0.00 K/uL   CORRECTED CALCIUM    Collection Time: 04/29/23  4:00 AM   Result Value Ref Range    Correct Calcium 8.6 8.5 - 10.5 mg/dL   ESTIMATED GFR    Collection Time: 04/29/23  4:00 AM   Result Value Ref Range    GFR (CKD-EPI) 88 >60 mL/min/1.73 m 2   HEMOGLOBIN AND HEMATOCRIT    Collection Time: 04/29/23 10:00 AM   Result Value Ref Range    Hemoglobin 7.8 (L) 14.0 - 18.0 g/dL    Hematocrit 25.6 (L) 42.0 - 52.0 %       Radiology Review:  DX-CHEST-PORTABLE (1 VIEW)   Final Result      1.  Interval insertion of a central venous catheter which terminates with the tip projecting over the expected region of the mid to distal superior vena cava.   2.  Stable enlargement of the cardiomediastinal silhouette.   3.  New small to moderate bilateral pleural effusions  with associated basilar atelectasis and/or consolidation.   4.  Mild interstitial edema.      EC-ECHOCARDIOGRAM COMPLETE W/O CONT   Final Result      US-EXTREMITY ARTERY LOWER UNILAT LEFT   Final Result      US-DOMENIC SINGLE LEVEL BILAT   Final Result      CT-CHEST,ABDOMEN,PELVIS WITH   Final Result      1.  No acute inflammatory process in the chest, abdomen or pelvis.   2.  Multiple hypodense hepatic regions measuring up to 7.9 cm. They are indeterminate. Areas of regional hepatic steatosis and active or treated metastases are in the differential. Correlate with history of cancer, prior studies and consider further    evaluation with contrast-enhanced MRI, liver protocol.   3.  Cholelithiasis.   4.  Nonobstructing right nephrolithiasis.   5.  Moderate amount of stool in the distal colon and rectum.   6.  Mildly enlarged left inguinal lymph node, statistically likely reactive. No other enlarged nodes detected.         CT-HEAD W/O   Final Result      1.  Cerebral atrophy.      2.  White matter lucencies most consistent with small vessel ischemic change versus demyelination or gliosis.      3.  Otherwise, Head CT without contrast with no acute findings. No evidence of acute cerebral infarction, hemorrhage or mass lesion.         DX-CHEST-PORTABLE (1 VIEW)   Final Result      No acute cardiac or pulmonary abnormalities are identified.      MR-ABDOMEN-WITH & W/O    (Results Pending)         MDM (Data Review):   -Records reviewed and summarized in current documentation  -I personally reviewed and interpreted the laboratory results  -I personally reviewed the radiology images    Assessment/Recommendations:  Hematemesis.  Post hemorrhagic anemia  Grade D esophagitis  Duodenal ulcer  Elevated BUN  Liver mass on CT  Cholelithiasis  Right nephrolithiasis  History of TAVR  Atrial fibrillation on Eliquis, currently held  Hypertension  Diabetes type 2  HFpEF, on Entresto and Jardiance  Peripheral artery  disease  Cellulitis      RECOMMENDATIONS:  Continue to trend H&H and transfuse for hemoglobin less than 7  Recommend anemia work-up  May advance diet to full liquids today, then continue to advance as tolerated  If he rebleeds, obtain a CTA and consider IR for embolization  IV PPI twice daily    GI team signing off.  Please do not hesitate to contact us with any questions or concerns.    Core Quality Measures   Reviewed items::  Labs, Medications and Radiology reports reviewed

## 2023-04-29 NOTE — CARE PLAN
The patient is Watcher - Medium risk of patient condition declining or worsening    Shift Goals  Clinical Goals: trend H/H q4, pain management  Patient Goals: eat, d/c to [facility]  Family Goals: N/A      Problem: Knowledge Deficit - Standard  Goal: Patient and family/care givers will demonstrate understanding of plan of care, disease process/condition, diagnostic tests and medications  Outcome: Progressing     Problem: Hemodynamics  Goal: Patient's hemodynamics, fluid balance and neurologic status will be stable or improve  Outcome: Progressing     Problem: Fluid Volume  Goal: Fluid volume balance will be maintained  Outcome: Progressing     Problem: Urinary - Renal Perfusion  Goal: Ability to achieve and maintain adequate renal perfusion and functioning will improve  Outcome: Progressing     Problem: Respiratory  Goal: Patient will achieve/maintain optimum respiratory ventilation and gas exchange  Outcome: Progressing     Problem: Physical Regulation  Goal: Diagnostic test results will improve  Outcome: Progressing  Goal: Signs and symptoms of infection will decrease  Outcome: Progressing     Problem: Fall Risk  Goal: Patient will remain free from falls  Outcome: Progressing     Problem: Skin Integrity  Goal: Skin integrity is maintained or improved  Outcome: Progressing     Problem: Pain - Standard  Goal: Alleviation of pain or a reduction in pain to the patient’s comfort goal  Outcome: Progressing     Problem: Nutrition  Goal: Patient's nutritional and fluid intake will be adequate or improve  Outcome: Progressing     Problem: Gastrointestinal Irritability  Goal: Nausea and vomiting will be absent or improve  Outcome: Progressing

## 2023-04-29 NOTE — PROGRESS NOTES
Hospital Medicine Daily Progress Note    Date of Service  4/29/2023    Chief Complaint  Kedar Woods is a 83 y.o. male admitted 4/18/2023 with weakness    Hospital Course    This 83-year-old male with a past medical history significant for obesity, atrial fibrillation, secondary hypercoagulable state on Eliquis, AAA s/p TAVR, heart failure with preserved ejection fraction presented to ER on 4/18/2023 with a complaint of weakness.    Paramedics were called as he was too weak to get up and he was full dose when diverted to [facility].  Upon presentation his WBC count 20 5K, lower extremity cellulitis.  Blood cultures x2 was obtained, patient was started on broad-spectrum antibiotics including Vanco and Unasyn.    Blood culture on 4/19 with negative strep, repeat blood culture obtained on 4/20, infectious disease was consulted recommended continuing Augmentin 875/125 1 tablet p.o. twice daily for total of 14 days.  Recommended following up at twice daily clinic.    CT scan was obtained, noted to have 7.9 cm mass GI was consulted, recommended MRI for fetoprotein.  If MRI cannot be completed in time, patient can be discharged without MRI being completed, he will follow-up with the primary care's physician for repeat MRI    He underwent upper GI endoscopy on April 27, 2023 and he found to have large amount of clots in esophagus and also found to have esophagitis.  He also found to have ulcer and large blood clot in the stomach.  Stomach also contain large amount of blood clots and food.    Interval events    04/29/23  I evaluated and examined him at the bedside. he reported that he is not doing well.  When I asked him about pain he said he does not really pain but overall he does not feel well.  Currently he is hemodynamically stable pain  Current hemoglobin is stable 7.8 and hematocrit of 25.6 platelet count of 250  He found to have sodium of 145 and potassium of 3.5, corrected calcium is 8.6 found to have low  phosphorus.    I have discussed this patient's plan of care and discharge plan at IDT rounds today with Case Management, Nursing, Nursing leadership, and other members of the IDT team.    Consultants/Specialty  infectious disease    Code Status  Full Code    Disposition  Patient is  Not medically cleared  for discharge.   Anticipate discharge to  TBD .  I have placed the appropriate orders for post-discharge needs.    Review of Systems  Review of Systems   Constitutional:  Positive for malaise/fatigue. Negative for fever.   Cardiovascular:  Negative for chest pain.   Gastrointestinal:  Negative for abdominal pain, nausea and vomiting.   Neurological:  Positive for weakness.      Physical Exam  Temp:  [36.2 °C (97.2 °F)] 36.2 °C (97.2 °F)  Pulse:  [60-71] 70  Resp:  [14-45] 18  BP: ()/(48-56) 111/51  SpO2:  [89 %-98 %] 95 %    Physical Exam  Vitals reviewed.   Constitutional:       General: He is not in acute distress.     Appearance: He is obese. He is ill-appearing.   HENT:      Head: Normocephalic and atraumatic. No contusion.      Right Ear: External ear normal.      Left Ear: External ear normal.      Nose: Nose normal.      Mouth/Throat:      Pharynx: No oropharyngeal exudate.   Eyes:      General:         Right eye: No discharge.         Left eye: No discharge.      Pupils: Pupils are equal, round, and reactive to light.   Cardiovascular:      Rate and Rhythm: Normal rate and regular rhythm.      Heart sounds: No murmur heard.    No friction rub. No gallop.   Pulmonary:      Effort: Pulmonary effort is normal.      Breath sounds: No wheezing or rhonchi.   Abdominal:      General: Bowel sounds are normal. There is no distension.      Palpations: Abdomen is soft.      Tenderness: There is no abdominal tenderness. There is no rebound.   Musculoskeletal:         General: No swelling or tenderness. Normal range of motion.      Cervical back: No rigidity. No muscular tenderness.      Right lower leg: Edema  present.      Left lower leg: Edema present.   Skin:     General: Skin is warm and dry.      Coloration: Skin is not jaundiced.   Neurological:      General: No focal deficit present.      Mental Status: He is alert.      Cranial Nerves: No cranial nerve deficit.      Sensory: No sensory deficit.      Comments: Motor 4/5 in bilateral lower extremities   Psychiatric:         Mood and Affect: Mood normal.       Fluids    Intake/Output Summary (Last 24 hours) at 4/29/2023 1432  Last data filed at 4/29/2023 1300  Gross per 24 hour   Intake 1802.34 ml   Output 2820 ml   Net -1017.66 ml         Laboratory  Recent Labs     04/27/23  0949 04/27/23  1133 04/28/23  0430 04/28/23  0600 04/28/23  2330 04/29/23  0400 04/29/23  1000   WBC 14.6*  --  13.0*  --   --  12.8*  --    RBC 3.17*  --  2.62*  --   --  2.64*  --    HEMOGLOBIN 9.0*   < > 7.4*   < > 7.6* 7.6* 7.8*   HEMATOCRIT 29.7*   < > 24.3*   < > 24.2* 24.2* 25.6*   MCV 93.7  --  92.7  --   --  91.7  --    MCH 28.4  --  28.2  --   --  28.8  --    MCHC 30.3*  --  30.5*  --   --  31.4*  --    RDW 57.5*  --  56.1*  --   --  56.3*  --    PLATELETCT 351  --  278  --   --  250  --    MPV 10.7  --  10.5  --   --  10.7  --     < > = values in this interval not displayed.       Recent Labs     04/27/23  0949 04/28/23  0430 04/29/23  0400   SODIUM 139 140 145   POTASSIUM 3.7 3.8 3.5*   CHLORIDE 106 110 115*   CO2 23 21 22   GLUCOSE 124* 149* 107*   BUN 37* 48* 38*   CREATININE 0.84 0.83 0.80   CALCIUM 7.4* 7.0* 7.0*       Recent Labs     04/27/23  1133   APTT 37.7*   INR 2.76*                 Imaging  DX-CHEST-PORTABLE (1 VIEW)   Final Result      1.  Interval insertion of a central venous catheter which terminates with the tip projecting over the expected region of the mid to distal superior vena cava.   2.  Stable enlargement of the cardiomediastinal silhouette.   3.  New small to moderate bilateral pleural effusions with associated basilar atelectasis and/or consolidation.   4.   Mild interstitial edema.      EC-ECHOCARDIOGRAM COMPLETE W/O CONT   Final Result      US-EXTREMITY ARTERY LOWER UNILAT LEFT   Final Result      US-DOMENIC SINGLE LEVEL BILAT   Final Result      CT-CHEST,ABDOMEN,PELVIS WITH   Final Result      1.  No acute inflammatory process in the chest, abdomen or pelvis.   2.  Multiple hypodense hepatic regions measuring up to 7.9 cm. They are indeterminate. Areas of regional hepatic steatosis and active or treated metastases are in the differential. Correlate with history of cancer, prior studies and consider further    evaluation with contrast-enhanced MRI, liver protocol.   3.  Cholelithiasis.   4.  Nonobstructing right nephrolithiasis.   5.  Moderate amount of stool in the distal colon and rectum.   6.  Mildly enlarged left inguinal lymph node, statistically likely reactive. No other enlarged nodes detected.         CT-HEAD W/O   Final Result      1.  Cerebral atrophy.      2.  White matter lucencies most consistent with small vessel ischemic change versus demyelination or gliosis.      3.  Otherwise, Head CT without contrast with no acute findings. No evidence of acute cerebral infarction, hemorrhage or mass lesion.         DX-CHEST-PORTABLE (1 VIEW)   Final Result      No acute cardiac or pulmonary abnormalities are identified.      MR-ABDOMEN-WITH & W/O    (Results Pending)          Assessment/Plan  * Acute upper GI bleed- (present on admission)  Assessment & Plan  Patient was noted to have mild hematemesis early morning, later in the morning he was noted to have active hematemesis.   -- Patient was taking Eliquis, which has been held  Status post [facility], underwent endoscopy found to have active source of bleeding from the esophagus and renal ulcer.  It is noted that patient stomach full of blood   Monitor hemoglobin hematocrit  1 unit of PRBC transfusion has been ordered  Transferred to ICU on April 28, 2023.  Today patient transferred back to hospital service.  I discussed  plan of care with patient and bedside RN.    Liver lesion  Assessment & Plan  Need further work-up with MRI liver protocol  Outpatient follow-up with GI.      Chronic heart failure with preserved ejection fraction (HCC)  Assessment & Plan  Stable  Continue current medication.  Holding apixaban due to GI bleed.    PAD (peripheral artery disease) ([facility])  Assessment & Plan  DOMENIC noted  [facility]    ADINA (acute kidney injury) ([facility])  Assessment & Plan  Now resolved.    Bacteremia  Assessment & Plan  Blood culture positive for strep group G  Repeat cultures negative x 48-hour   Echo did not show any vegetation, infectious disease Dr. Gandara has evaluated, recommended Augmentin upon discharge.  Currently patient is on Unasyn  Continue IV Unasyn on April 29,023.    Cellulitis of left lower extremity  Assessment & Plan  White blood cell count is elevated.  Continue IV antibiotic Unasyn.    AF (atrial fibrillation) ([facility])- (present on admission)  Assessment & Plan  Long history of, rate well controlled  Holding Eliquis due to GI bleed.    Hypokalemia- (present on admission)  Assessment & Plan  Continue to monitor and replace as needed.  I ordered CMP for tomorrow morning.    Hyponatremia- (present on admission)  Assessment & Plan  Now resolved.    History of transcatheter aortic valve replacement (TAVR)- (present on admission)  Assessment & Plan  Hx of  Last echo 2021 revealed a normal EF with normally functioning TAVR    Toxic metabolic encephalopathy- (present on admission)  Assessment & Plan  Acute toxic, metabolic encephalopathy secondary to sepsis   CT head with no acute process    Now resolved.    Hypocalcemia- (present on admission)  Assessment & Plan  Corrected calcium is within normal limits.    Severe sepsis ([facility])  Assessment & Plan  This is Sepsis Present on admission    Type 2 diabetes mellitus (HCC)- (present on admission)  Assessment & Plan  Goal glucose  140-180  Last HbA1c was 6.6.  Ordered repeat HbA1c for tomorrow.    Essential hypertension- (present on admission)  Assessment & Plan  Hold ant-htn meds in the light of active GI blled       I personally discussed case with intensivist Dr. Joya regarding Mr. Woods current medical condition and plan of care Santos    I discussed plan of care with gastroenterology team.    Time spend: 57 minutes. > 50 % time spend providing counseling / co ordination of care.          VTE prophylaxis: scd

## 2023-04-30 LAB
ALBUMIN SERPL BCP-MCNC: 2.1 G/DL (ref 3.2–4.9)
ALBUMIN/GLOB SERPL: 0.7 G/DL
ALP SERPL-CCNC: 86 U/L (ref 30–99)
ALT SERPL-CCNC: 13 U/L (ref 2–50)
ANION GAP SERPL CALC-SCNC: 10 MMOL/L (ref 7–16)
AST SERPL-CCNC: 40 U/L (ref 12–45)
BASOPHILS # BLD AUTO: 0.2 % (ref 0–1.8)
BASOPHILS # BLD: 0.02 K/UL (ref 0–0.12)
BILIRUB SERPL-MCNC: 0.3 MG/DL (ref 0.1–1.5)
BUN SERPL-MCNC: 27 MG/DL (ref 8–22)
CALCIUM ALBUM COR SERPL-MCNC: 8.5 MG/DL (ref 8.5–10.5)
CALCIUM SERPL-MCNC: 7 MG/DL (ref 8.5–10.5)
CHLORIDE SERPL-SCNC: 108 MMOL/L (ref 96–112)
CO2 SERPL-SCNC: 23 MMOL/L (ref 20–33)
CREAT SERPL-MCNC: 0.86 MG/DL (ref 0.5–1.4)
EKG IMPRESSION: NORMAL
EOSINOPHIL # BLD AUTO: 0.14 K/UL (ref 0–0.51)
EOSINOPHIL NFR BLD: 1.2 % (ref 0–6.9)
ERYTHROCYTE [DISTWIDTH] IN BLOOD BY AUTOMATED COUNT: 57.1 FL (ref 35.9–50)
ERYTHROCYTE [DISTWIDTH] IN BLOOD BY AUTOMATED COUNT: 57.6 FL (ref 35.9–50)
EST. AVERAGE GLUCOSE BLD GHB EST-MCNC: 128 MG/DL
GFR SERPLBLD CREATININE-BSD FMLA CKD-EPI: 86 ML/MIN/1.73 M 2
GLOBULIN SER CALC-MCNC: 2.9 G/DL (ref 1.9–3.5)
GLUCOSE SERPL-MCNC: 101 MG/DL (ref 65–99)
HBA1C MFR BLD: 6.1 % (ref 4–5.6)
HCT VFR BLD AUTO: 24.6 % (ref 42–52)
HCT VFR BLD AUTO: 25 % (ref 42–52)
HCT VFR BLD AUTO: 25.6 % (ref 42–52)
HGB BLD-MCNC: 7.7 G/DL (ref 14–18)
HGB BLD-MCNC: 7.7 G/DL (ref 14–18)
HGB BLD-MCNC: 7.9 G/DL (ref 14–18)
IMM GRANULOCYTES # BLD AUTO: 0.16 K/UL (ref 0–0.11)
IMM GRANULOCYTES NFR BLD AUTO: 1.4 % (ref 0–0.9)
IRON SATN MFR SERPL: 18 % (ref 15–55)
IRON SERPL-MCNC: 28 UG/DL (ref 50–180)
LYMPHOCYTES # BLD AUTO: 1.88 K/UL (ref 1–4.8)
LYMPHOCYTES NFR BLD: 16.3 % (ref 22–41)
MAGNESIUM SERPL-MCNC: 1.9 MG/DL (ref 1.5–2.5)
MCH RBC QN AUTO: 28.5 PG (ref 27–33)
MCH RBC QN AUTO: 28.8 PG (ref 27–33)
MCHC RBC AUTO-ENTMCNC: 30.8 G/DL (ref 33.7–35.3)
MCHC RBC AUTO-ENTMCNC: 30.9 G/DL (ref 33.7–35.3)
MCV RBC AUTO: 92.4 FL (ref 81.4–97.8)
MCV RBC AUTO: 93.6 FL (ref 81.4–97.8)
MONOCYTES # BLD AUTO: 1.12 K/UL (ref 0–0.85)
MONOCYTES NFR BLD AUTO: 9.7 % (ref 0–13.4)
NEUTROPHILS # BLD AUTO: 8.19 K/UL (ref 1.82–7.42)
NEUTROPHILS NFR BLD: 71.2 % (ref 44–72)
NRBC # BLD AUTO: 0 K/UL
NRBC BLD-RTO: 0 /100 WBC
PHOSPHATE SERPL-MCNC: 2.9 MG/DL (ref 2.5–4.5)
PLATELET # BLD AUTO: 273 K/UL (ref 164–446)
PLATELET # BLD AUTO: 278 K/UL (ref 164–446)
PMV BLD AUTO: 10.8 FL (ref 9–12.9)
PMV BLD AUTO: 10.8 FL (ref 9–12.9)
POTASSIUM SERPL-SCNC: 3.3 MMOL/L (ref 3.6–5.5)
PROT SERPL-MCNC: 5 G/DL (ref 6–8.2)
RBC # BLD AUTO: 2.67 M/UL (ref 4.7–6.1)
RBC # BLD AUTO: 2.77 M/UL (ref 4.7–6.1)
SODIUM SERPL-SCNC: 141 MMOL/L (ref 135–145)
TIBC SERPL-MCNC: 152 UG/DL (ref 250–450)
UIBC SERPL-MCNC: 124 UG/DL (ref 110–370)
VIT B12 SERPL-MCNC: 826 PG/ML (ref 211–911)
WBC # BLD AUTO: 10.7 K/UL (ref 4.8–10.8)
WBC # BLD AUTO: 11.5 K/UL (ref 4.8–10.8)

## 2023-04-30 PROCEDURE — 700102 HCHG RX REV CODE 250 W/ 637 OVERRIDE(OP): Performed by: STUDENT IN AN ORGANIZED HEALTH CARE EDUCATION/TRAINING PROGRAM

## 2023-04-30 PROCEDURE — 80053 COMPREHEN METABOLIC PANEL: CPT

## 2023-04-30 PROCEDURE — 82607 VITAMIN B-12: CPT

## 2023-04-30 PROCEDURE — 700111 HCHG RX REV CODE 636 W/ 250 OVERRIDE (IP): Performed by: INTERNAL MEDICINE

## 2023-04-30 PROCEDURE — 85027 COMPLETE CBC AUTOMATED: CPT

## 2023-04-30 PROCEDURE — 770020 HCHG ROOM/CARE - TELE (206)

## 2023-04-30 PROCEDURE — 700105 HCHG RX REV CODE 258: Performed by: INTERNAL MEDICINE

## 2023-04-30 PROCEDURE — 84100 ASSAY OF PHOSPHORUS: CPT

## 2023-04-30 PROCEDURE — 85046 RETICYTE/HGB CONCENTRATE: CPT

## 2023-04-30 PROCEDURE — 83540 ASSAY OF IRON: CPT

## 2023-04-30 PROCEDURE — 85025 COMPLETE CBC W/AUTO DIFF WBC: CPT

## 2023-04-30 PROCEDURE — 83735 ASSAY OF MAGNESIUM: CPT

## 2023-04-30 PROCEDURE — 85014 HEMATOCRIT: CPT

## 2023-04-30 PROCEDURE — 36415 COLL VENOUS BLD VENIPUNCTURE: CPT

## 2023-04-30 PROCEDURE — C9113 INJ PANTOPRAZOLE SODIUM, VIA: HCPCS | Performed by: INTERNAL MEDICINE

## 2023-04-30 PROCEDURE — 85018 HEMOGLOBIN: CPT

## 2023-04-30 PROCEDURE — 83550 IRON BINDING TEST: CPT

## 2023-04-30 PROCEDURE — A9270 NON-COVERED ITEM OR SERVICE: HCPCS | Performed by: STUDENT IN AN ORGANIZED HEALTH CARE EDUCATION/TRAINING PROGRAM

## 2023-04-30 PROCEDURE — 83036 HEMOGLOBIN GLYCOSYLATED A1C: CPT

## 2023-04-30 PROCEDURE — 700102 HCHG RX REV CODE 250 W/ 637 OVERRIDE(OP)

## 2023-04-30 PROCEDURE — 700102 HCHG RX REV CODE 250 W/ 637 OVERRIDE(OP): Performed by: HOSPITALIST

## 2023-04-30 PROCEDURE — A9270 NON-COVERED ITEM OR SERVICE: HCPCS | Performed by: HOSPITALIST

## 2023-04-30 PROCEDURE — A9270 NON-COVERED ITEM OR SERVICE: HCPCS

## 2023-04-30 PROCEDURE — 99233 SBSQ HOSP IP/OBS HIGH 50: CPT | Performed by: STUDENT IN AN ORGANIZED HEALTH CARE EDUCATION/TRAINING PROGRAM

## 2023-04-30 PROCEDURE — 93010 ELECTROCARDIOGRAM REPORT: CPT | Performed by: INTERNAL MEDICINE

## 2023-04-30 RX ADMIN — AMPICILLIN AND SULBACTAM 3 G: 1; 2 INJECTION, POWDER, FOR SOLUTION INTRAMUSCULAR; INTRAVENOUS at 23:00

## 2023-04-30 RX ADMIN — PANTOPRAZOLE SODIUM 40 MG: 40 INJECTION, POWDER, LYOPHILIZED, FOR SOLUTION INTRAVENOUS at 05:50

## 2023-04-30 RX ADMIN — AMPICILLIN AND SULBACTAM 3 G: 1; 2 INJECTION, POWDER, FOR SOLUTION INTRAMUSCULAR; INTRAVENOUS at 16:55

## 2023-04-30 RX ADMIN — AMPICILLIN AND SULBACTAM 3 G: 1; 2 INJECTION, POWDER, FOR SOLUTION INTRAMUSCULAR; INTRAVENOUS at 05:49

## 2023-04-30 RX ADMIN — Medication 100 MG: at 05:50

## 2023-04-30 RX ADMIN — PANTOPRAZOLE SODIUM 40 MG: 40 INJECTION, POWDER, LYOPHILIZED, FOR SOLUTION INTRAVENOUS at 16:53

## 2023-04-30 RX ADMIN — DOCUSATE SODIUM 50 MG AND SENNOSIDES 8.6 MG 2 TABLET: 8.6; 5 TABLET, FILM COATED ORAL at 16:54

## 2023-04-30 RX ADMIN — ROSUVASTATIN CALCIUM 40 MG: 20 TABLET, FILM COATED ORAL at 05:50

## 2023-04-30 RX ADMIN — COLLAGENASE SANTYL: 250 OINTMENT TOPICAL at 07:52

## 2023-04-30 RX ADMIN — AMPICILLIN AND SULBACTAM 3 G: 1; 2 INJECTION, POWDER, FOR SOLUTION INTRAMUSCULAR; INTRAVENOUS at 12:28

## 2023-04-30 ASSESSMENT — CHA2DS2 SCORE
SEX: MALE
PRIOR STROKE OR TIA OR THROMBOEMBOLISM: NO
DIABETES: YES
VASCULAR DISEASE: NO
HYPERTENSION: YES
CHA2DS2 VASC SCORE: 5
AGE 75 OR GREATER: YES
CHF OR LEFT VENTRICULAR DYSFUNCTION: YES
AGE 65 TO 74: NO

## 2023-04-30 ASSESSMENT — FIBROSIS 4 INDEX: FIB4 SCORE: 3.31

## 2023-04-30 ASSESSMENT — ENCOUNTER SYMPTOMS
NAUSEA: 0
VOMITING: 0
FEVER: 0
ABDOMINAL PAIN: 0
WEAKNESS: 1

## 2023-04-30 ASSESSMENT — PAIN DESCRIPTION - PAIN TYPE
TYPE: ACUTE PAIN

## 2023-05-01 PROBLEM — I44.2 COMPLETE HEART BLOCK BY ELECTROCARDIOGRAM (HCC): Status: ACTIVE | Noted: 2023-05-01

## 2023-05-01 LAB
ANION GAP SERPL CALC-SCNC: 12 MMOL/L (ref 7–16)
BUN SERPL-MCNC: 13 MG/DL (ref 8–22)
CALCIUM SERPL-MCNC: 7.1 MG/DL (ref 8.5–10.5)
CHLORIDE SERPL-SCNC: 108 MMOL/L (ref 96–112)
CO2 SERPL-SCNC: 21 MMOL/L (ref 20–33)
CREAT SERPL-MCNC: 0.76 MG/DL (ref 0.5–1.4)
EKG IMPRESSION: NORMAL
ERYTHROCYTE [DISTWIDTH] IN BLOOD BY AUTOMATED COUNT: 57.5 FL (ref 35.9–50)
ERYTHROCYTE [DISTWIDTH] IN BLOOD BY AUTOMATED COUNT: 59.7 FL (ref 35.9–50)
GFR SERPLBLD CREATININE-BSD FMLA CKD-EPI: 89 ML/MIN/1.73 M 2
GLUCOSE BLD STRIP.AUTO-MCNC: 116 MG/DL (ref 65–99)
GLUCOSE SERPL-MCNC: 114 MG/DL (ref 65–99)
HCT VFR BLD AUTO: 24.7 % (ref 42–52)
HCT VFR BLD AUTO: 26.3 % (ref 42–52)
HGB BLD-MCNC: 7.6 G/DL (ref 14–18)
HGB BLD-MCNC: 7.9 G/DL (ref 14–18)
HGB RETIC QN AUTO: 31.6 PG/CELL (ref 29–35)
IMM RETICS NFR: 34.5 % (ref 9.3–17.4)
MCH RBC QN AUTO: 28.6 PG (ref 27–33)
MCH RBC QN AUTO: 28.8 PG (ref 27–33)
MCHC RBC AUTO-ENTMCNC: 30 G/DL (ref 33.7–35.3)
MCHC RBC AUTO-ENTMCNC: 30.8 G/DL (ref 33.7–35.3)
MCV RBC AUTO: 93.6 FL (ref 81.4–97.8)
MCV RBC AUTO: 95.3 FL (ref 81.4–97.8)
PHOSPHATE SERPL-MCNC: 2.4 MG/DL (ref 2.5–4.5)
PLATELET # BLD AUTO: 288 K/UL (ref 164–446)
PLATELET # BLD AUTO: 311 K/UL (ref 164–446)
PMV BLD AUTO: 10.8 FL (ref 9–12.9)
PMV BLD AUTO: 10.9 FL (ref 9–12.9)
POTASSIUM SERPL-SCNC: 3.2 MMOL/L (ref 3.6–5.5)
RBC # BLD AUTO: 2.64 M/UL (ref 4.7–6.1)
RBC # BLD AUTO: 2.76 M/UL (ref 4.7–6.1)
RETICS # AUTO: 0.15 M/UL (ref 0.04–0.06)
RETICS/RBC NFR: 5.5 % (ref 0.8–2.1)
SODIUM SERPL-SCNC: 141 MMOL/L (ref 135–145)
WBC # BLD AUTO: 11.2 K/UL (ref 4.8–10.8)
WBC # BLD AUTO: 11.7 K/UL (ref 4.8–10.8)

## 2023-05-01 PROCEDURE — 700105 HCHG RX REV CODE 258: Performed by: STUDENT IN AN ORGANIZED HEALTH CARE EDUCATION/TRAINING PROGRAM

## 2023-05-01 PROCEDURE — 99222 1ST HOSP IP/OBS MODERATE 55: CPT | Performed by: INTERNAL MEDICINE

## 2023-05-01 PROCEDURE — 700102 HCHG RX REV CODE 250 W/ 637 OVERRIDE(OP): Performed by: HOSPITALIST

## 2023-05-01 PROCEDURE — 700102 HCHG RX REV CODE 250 W/ 637 OVERRIDE(OP): Performed by: STUDENT IN AN ORGANIZED HEALTH CARE EDUCATION/TRAINING PROGRAM

## 2023-05-01 PROCEDURE — 85027 COMPLETE CBC AUTOMATED: CPT

## 2023-05-01 PROCEDURE — 82962 GLUCOSE BLOOD TEST: CPT

## 2023-05-01 PROCEDURE — 93010 ELECTROCARDIOGRAM REPORT: CPT | Performed by: INTERNAL MEDICINE

## 2023-05-01 PROCEDURE — 700105 HCHG RX REV CODE 258: Performed by: INTERNAL MEDICINE

## 2023-05-01 PROCEDURE — 36415 COLL VENOUS BLD VENIPUNCTURE: CPT

## 2023-05-01 PROCEDURE — 80048 BASIC METABOLIC PNL TOTAL CA: CPT

## 2023-05-01 PROCEDURE — 97530 THERAPEUTIC ACTIVITIES: CPT

## 2023-05-01 PROCEDURE — 93005 ELECTROCARDIOGRAM TRACING: CPT | Performed by: STUDENT IN AN ORGANIZED HEALTH CARE EDUCATION/TRAINING PROGRAM

## 2023-05-01 PROCEDURE — 700111 HCHG RX REV CODE 636 W/ 250 OVERRIDE (IP): Performed by: INTERNAL MEDICINE

## 2023-05-01 PROCEDURE — 770020 HCHG ROOM/CARE - TELE (206)

## 2023-05-01 PROCEDURE — A9270 NON-COVERED ITEM OR SERVICE: HCPCS | Performed by: HOSPITALIST

## 2023-05-01 PROCEDURE — 84100 ASSAY OF PHOSPHORUS: CPT

## 2023-05-01 PROCEDURE — 700111 HCHG RX REV CODE 636 W/ 250 OVERRIDE (IP): Performed by: STUDENT IN AN ORGANIZED HEALTH CARE EDUCATION/TRAINING PROGRAM

## 2023-05-01 PROCEDURE — A9270 NON-COVERED ITEM OR SERVICE: HCPCS | Performed by: STUDENT IN AN ORGANIZED HEALTH CARE EDUCATION/TRAINING PROGRAM

## 2023-05-01 PROCEDURE — C9113 INJ PANTOPRAZOLE SODIUM, VIA: HCPCS | Performed by: INTERNAL MEDICINE

## 2023-05-01 PROCEDURE — 99233 SBSQ HOSP IP/OBS HIGH 50: CPT | Performed by: STUDENT IN AN ORGANIZED HEALTH CARE EDUCATION/TRAINING PROGRAM

## 2023-05-01 RX ORDER — POTASSIUM CHLORIDE 20 MEQ/1
60 TABLET, EXTENDED RELEASE ORAL ONCE
Status: COMPLETED | OUTPATIENT
Start: 2023-05-01 | End: 2023-05-01

## 2023-05-01 RX ORDER — OMEPRAZOLE 20 MG/1
20 CAPSULE, DELAYED RELEASE ORAL 2 TIMES DAILY
Status: DISCONTINUED | OUTPATIENT
Start: 2023-05-01 | End: 2023-05-02 | Stop reason: HOSPADM

## 2023-05-01 RX ADMIN — ROSUVASTATIN CALCIUM 40 MG: 20 TABLET, FILM COATED ORAL at 04:46

## 2023-05-01 RX ADMIN — Medication 100 MG: at 06:00

## 2023-05-01 RX ADMIN — PANTOPRAZOLE SODIUM 40 MG: 40 INJECTION, POWDER, LYOPHILIZED, FOR SOLUTION INTRAVENOUS at 04:46

## 2023-05-01 RX ADMIN — POTASSIUM CHLORIDE 60 MEQ: 1500 TABLET, EXTENDED RELEASE ORAL at 20:41

## 2023-05-01 RX ADMIN — COLLAGENASE SANTYL: 250 OINTMENT TOPICAL at 10:33

## 2023-05-01 RX ADMIN — SODIUM CHLORIDE 250 MG: 9 INJECTION, SOLUTION INTRAVENOUS at 17:23

## 2023-05-01 RX ADMIN — AMPICILLIN AND SULBACTAM 3 G: 1; 2 INJECTION, POWDER, FOR SOLUTION INTRAMUSCULAR; INTRAVENOUS at 11:34

## 2023-05-01 RX ADMIN — AMPICILLIN AND SULBACTAM 3 G: 1; 2 INJECTION, POWDER, FOR SOLUTION INTRAMUSCULAR; INTRAVENOUS at 04:51

## 2023-05-01 RX ADMIN — AMPICILLIN AND SULBACTAM 3 G: 1; 2 INJECTION, POWDER, FOR SOLUTION INTRAMUSCULAR; INTRAVENOUS at 23:43

## 2023-05-01 RX ADMIN — DOCUSATE SODIUM 50 MG AND SENNOSIDES 8.6 MG 2 TABLET: 8.6; 5 TABLET, FILM COATED ORAL at 04:46

## 2023-05-01 RX ADMIN — AMPICILLIN AND SULBACTAM 3 G: 1; 2 INJECTION, POWDER, FOR SOLUTION INTRAMUSCULAR; INTRAVENOUS at 17:20

## 2023-05-01 RX ADMIN — OMEPRAZOLE 20 MG: 20 CAPSULE, DELAYED RELEASE ORAL at 17:17

## 2023-05-01 ASSESSMENT — ENCOUNTER SYMPTOMS
FATIGUE: 1
VOMITING: 0
FEVER: 0
ABDOMINAL PAIN: 0
WEAKNESS: 1
NAUSEA: 0

## 2023-05-01 ASSESSMENT — COGNITIVE AND FUNCTIONAL STATUS - GENERAL
CLIMB 3 TO 5 STEPS WITH RAILING: A LOT
TURNING FROM BACK TO SIDE WHILE IN FLAT BAD: A LITTLE
SUGGESTED CMS G CODE MODIFIER MOBILITY: CK
STANDING UP FROM CHAIR USING ARMS: A LITTLE
MOVING TO AND FROM BED TO CHAIR: A LITTLE
MOVING FROM LYING ON BACK TO SITTING ON SIDE OF FLAT BED: UNABLE
WALKING IN HOSPITAL ROOM: A LITTLE
MOBILITY SCORE: 15

## 2023-05-01 ASSESSMENT — GAIT ASSESSMENTS: GAIT LEVEL OF ASSIST: REFUSED

## 2023-05-01 ASSESSMENT — FIBROSIS 4 INDEX: FIB4 SCORE: 2.96

## 2023-05-01 ASSESSMENT — PAIN DESCRIPTION - PAIN TYPE: TYPE: ACUTE PAIN

## 2023-05-01 NOTE — DISCHARGE PLANNING
Case Management Discharge Planning    Admission Date: 4/18/2023  GMLOS: 5  ALOS: 13    6-Clicks ADL Score: 14  6-Clicks Mobility Score: 15  PT and/or OT Eval ordered: Yes  Post-acute Referrals Ordered: Yes  Post-acute Choice Obtained: Yes  Has referral(s) been sent to post-acute provider:  Yes      Anticipated Discharge Dispo: Discharge Disposition: D/T to SNF with Medicare cert in anticipation of skilled care (03)  Discharge Address: Home address ([address] [place] [address] [address] [facility])  Discharge Contact Phone Number: [phone]    DME Needed: No    Action(s) Taken: Updated Provider/Nurse on Discharge Plan,  Lsw noted 3 chosen SNFs declined. Lsw met w/ pt to explain and acquire more choices then faxed to [facility] accordingly.    Later, Lsw received update from RUPERTO, and pt has been accepted to [facility]. He can d/c tomorrow at 1pm.    LSw started transport packet and faxed to delilah for bright TERYR tx on Tuesday at 1pm to SNF above.    Later, MD indicated pt is not medically cleared for d/c.    Lsw updated DPA who updated SNF.    LSw sent second voalte to delilah to please place on [facility] for later tx to SNF once medically cleared.     Escalations Completed: Provider    Medically Clear: No    Next Steps: continue to follow    Barriers to Discharge: Medical clearance    Is the patient up for discharge tomorrow: No

## 2023-05-01 NOTE — THERAPY
Physical Therapy   Daily Treatment     Patient Name: Kedar Woods  Age:  83 y.o., Sex:  male  Medical Record #: [id number]  Today's Date: 5/1/2023     Precautions  Precautions: (P) Fall Risk    Assessment    Pt tolerated session fairly. He performed bed mobility with MIN A and performed one stand pivot transfer with FWW and CGA. Pt was unwilling to perform any additional upright mobility due to fear of increased foot pain without offloading shoes.     Plan    Treatment Plan Status: (P) Continue Current Treatment Plan  Type of Treatment: Therapeutic Activities, Stair Training, Gait Training  Treatment Frequency: 3 Times per Week  Treatment Duration: Until Therapy Goals Met    DC Equipment Recommendations: (P) Front-Wheel Walker  Discharge Recommendations: (P) Recommend post-acute placement for additional physical therapy services prior to discharge home      Subjective    Pt was willing to participate in therapy session. However unwilling to ambulate without either offloading shoes or his slippers. Therapist searched throughout the room and was only able to find one offloading shoe. He refused ambulation due to fear of increased foot pain.      Objective     05/01/23 1056   Charge Group   PT Therapeutic Activities (Units) 1   Total Time Spent   PT Therapeutic Activities Time Spent (Mins) 16   Precautions   Precautions Fall Risk   Balance   Sitting Balance (Static) Fair +   Sitting Balance (Dynamic) Fair +   Standing Balance (Static) Fair -   Standing Balance (Dynamic) Fair -   Weight Shift Sitting Fair   Weight Shift Standing Fair   Bed Mobility    Supine to Sit Minimal Assist   Gait Analysis   Gait Level Of Assist Refused   Comments Pt refused ambulation due to fear of increased foot pain from lack of offloading shoes.   Functional Mobility   Sit to Stand Contact Guard Assist   Bed, Chair, Wheelchair Transfer Contact Guard Assist   How much difficulty does the patient currently have...   Turning over in bed  (including adjusting bedclothes, sheets and blankets)? 3   Sitting down on and standing up from a chair with arms (e.g., wheelchair, bedside commode, etc.) 1   Moving from lying on back to sitting on the side of the bed? 3   How much help from another person does the patient currently need...   Moving to and from a bed to a chair (including a wheelchair)? 3   Need to walk in a hospital room? 3   Climbing 3-5 steps with a railing? 2   6 clicks Mobility Score 15   Short Term Goals    Short Term Goal # 1 Pt to move sit to/from stand w/ spv in 6 visits to improve fxl indep   Goal Outcome # 1 Progressing as expected   Short Term Goal # 2 Pt to ambulate 75 ft w/ fww and spv in 6 visits to improve fxl indep   Goal Outcome # 2 Progressing as expected   Short Term Goal # 3 Pt to move up/down 3 steps w/ spv in 6 visits to access his home (if pt to d/c directly home)   Goal Outcome # 3 Goal not met   Education Group   Role of Physical Therapist Patient Response Patient;Acceptance;Explanation;Verbal Demonstration   Physical Therapy Treatment Plan   Physical Therapy Treatment Plan Continue Current Treatment Plan   Anticipated Discharge Equipment and Recommendations   DC Equipment Recommendations Front-Wheel Walker   Discharge Recommendations Recommend post-acute placement for additional physical therapy services prior to discharge home   Interdisciplinary Plan of Care Collaboration   IDT Collaboration with  Nursing   Patient Position at End of Therapy Seated;Chair Alarm On;Phone within Reach;Tray Table within Reach;Call Light within Reach   Session Information   Date / Session Number  5/1-3 (1/3, 5/3)

## 2023-05-01 NOTE — ASSESSMENT & PLAN NOTE
Patient with complete heart block as evidenced on EKG with junctional escape rhythm.  Appreciate cardiology recommendations.  Patient may require device implantation.  Continue to monitor on telemetry at present.

## 2023-05-01 NOTE — DISCHARGE PLANNING
1214  Agency/Facility Name: [facility]   Outcome: DPA left VM regarding referral, and if Pt was previously at facility. DPA awaiting call back.     1415  Received Choice form at 1400  Agency/Facility Name: [facility] blanket [place]   Referral sent per Choice form @ 1415     1448  Agency/Facility Name: [facility]   Spoke To: Taiwo   Outcome: DPA called regarding referral, per Taiwo referral still under review and will contact DPA with updates.     1515  Agency/Facility Name: [facility]   Spoke To: Taiwo   Outcome: Taiwo called back to inform DPA that facility is capped out on [facility] insurance and Pt can be accepted with his Medicare insurance but will need to pay $100+ a day for doctor visits.     1521  Agency/Facility Name: [facility]   Spoke To: Tammy   Outcome: DPA called for bed availability, and [facility] has a bed available tomorrow 5/2 and requesting CM HERNAN to set up transport at 1300.     BELGICA BECERRA notified.     1545  Agency/Facility Name: [facility]   Spoke To: Tammy   Outcome: DPA called [facility] to notify Pt not [facility] today. [facility] will hold bed for Pt. DPA to contact [facility] with updates.

## 2023-05-01 NOTE — CARE PLAN
Afib/Aflutter    Problem: Knowledge Deficit - Standard  Goal: Patient and family/care givers will demonstrate understanding of plan of care, disease process/condition, diagnostic tests and medications  Outcome: Progressing     Problem: Hemodynamics  Goal: Patient's hemodynamics, fluid balance and neurologic status will be stable or improve  Outcome: Progressing     Problem: Fluid Volume  Goal: Fluid volume balance will be maintained  Outcome: Progressing     Problem: Urinary - Renal Perfusion  Goal: Ability to achieve and maintain adequate renal perfusion and functioning will improve  Outcome: Progressing     Problem: Respiratory  Goal: Patient will achieve/maintain optimum respiratory ventilation and gas exchange  Outcome: Progressing     Problem: Fall Risk  Goal: Patient will remain free from falls  Outcome: Progressing

## 2023-05-01 NOTE — CONSULTS
Cardiology Initial Consultation    Date of Service  5/1/2023    Referring Physician  Kedar Rosas M.D.    Reason for Consultation  Complete heart block    History of Presenting Illness  Manuel Woods is a 83 y.o. male with a past medical history of TAVR for severe aortic stenosis 2019, Micra PPM 2019, chronic atrial flutter, NOAC with apixaban, HFpEF, AAA, HTN, DM 2, HLD, hypothyroidism who presented 4/18/2023 with weakness.    [facility]  He had his cardiac procedures done at [place], [place].    Initial concern was for infection related to lower extremity cellulitis.  Blood cultures were negative.  Initially started on broad-spectrum antibiotics with vancomycin and Unasyn de-escalated to Augmentin per ID recommendations.  Abdominal CT scan showed 7.9 cm GI mass.  Seen by GI underwent endoscopy found to have esophagitis, gastric ulcer and large amounts of blood clots.  Transfusions were required.    EKG today was concerning for sinus tachycardia and complete heart block.    Patient lives alone.  Claims he is independent and is compliant with his medications.  He does not recall having to have a coronary intervention prior to his TAVR.  Currently denies chest pain or shortness of breath and no palpitations.  He cannot recall the specific details of his cardiac procedures stating that he had a valve scraped out and a little robot put in at [facility] in 2019.    Review of Systems  Review of Systems   Constitutional:  Positive for fatigue.   Neurological:  Positive for weakness.     Past Medical History   has a past medical history of Hypertension.    Surgical History   has a past surgical history that includes pr upper gi endoscopy,diagnosis (4/27/2023).    Family History  No family history of premature heart disease.    Social History   reports that he has never smoked. He has never used smokeless tobacco. He reports current alcohol use. He  reports that he does not use drugs.    Medications  Prior to Admission Medications   Prescriptions Last Dose Informant Patient Reported? Taking?   ARTIFICIAL SALIVA MT PRN at PRN Patient's Home Pharmacy Yes Yes   Sig: Use 1 Application. in the mouth or throat as needed (Dry mouth).   Empagliflozin (JARDIANCE) 25 MG Tab UNK at [facility] Patient's Home Pharmacy Yes Yes   Sig: Take 12.5 mg by mouth every day.   Magnesium Oxide 420 (252 Mg) MG Tab UNK at [facility] Patient's Home Pharmacy Yes No   Sig: Take 420 mg by mouth every day.   Melatonin 3 MG Cap 4/17/2023 at PM Patient's Home Pharmacy Yes No   Sig: Take 12 mg by mouth at bedtime.   acetaminophen (TYLENOL) 325 MG Tab PRN at PRN Patient's Home Pharmacy Yes Yes   Sig: Take 650 mg by mouth 3 times a day as needed. Indications: Pain   apixaban (ELIQUIS) 5mg Tab 4/18/2023 at AM Patient's Home Pharmacy Yes No   Sig: Take 5 mg by mouth 2 Times a Day.   aspirin EC (ECOTRIN) 81 MG Tablet Delayed Response UNK at [facility] Patient's Home Pharmacy Yes Yes   Sig: Take 81 mg by mouth every day.   bumetanide (BUMEX) 0.5 MG Tab UNK at [facility] Patient's Home Pharmacy Yes No   Sig: Take 0.5 mg by mouth every day.   cephALEXin (KEFLEX) 500 MG Cap COMPLETED at COMPLETED Patient's Home Pharmacy Yes No   Sig: Take 1,000 mg by mouth 3 times a day. 9 day course   diclofenac sodium 1 % Gel PRN at PRN Patient's Home Pharmacy Yes No   Sig: Apply 4 g topically 4 times a day as needed (Apply's on both knees and back for pain). Indications: Joint Damage causing Pain and Loss of Function   famotidine (PEPCID) 20 MG Tab 4/17/2023 at PM Patient's Home Pharmacy Yes Yes   Sig: Take 20 mg by mouth every evening.   fluconazole (DIFLUCAN) 200 MG Tab UNK at [facility] Patient's Home Pharmacy Yes No   Sig: Take 200 mg by mouth every 7 days.   gabapentin (NEURONTIN) 300 MG Cap 4/18/2023 at AM Patient's Home Pharmacy Yes No   Sig: Take 600 mg by mouth 3 times a day.   metoprolol SR (TOPROL XL) 25 MG TABLET SR 24 HR UNK at [facility]  Patient's Home Pharmacy Yes Yes   Sig: Take 12.5 mg by mouth every day.   miconazole (MICOTIN) 2 % Cream 4/18/2023 at AM Patient's Home Pharmacy Yes Yes   Sig: Apply 1 Application. topically 2 times a day.   polyethylene glycol/lytes (MIRALAX) 17 g Pack UNK at [facility] Patient's Home Pharmacy Yes No   Sig: Take 17 g by mouth every 48 hours.   potassium chloride SA (KDUR) 20 MEQ Tab CR UNK at [facility] Patient's Home Pharmacy Yes No   Sig: Take 20 mEq by mouth every day.   rosuvastatin (CRESTOR) 40 MG tablet UNK at [facility] Patient's Home Pharmacy Yes Yes   Sig: Take 40 mg by mouth every day.   sacubitril-valsartan (ENTRESTO) 24-26 MG Tab 4/18/2023 at AM Patient's Home Pharmacy Yes Yes   Sig: Take 0.5 Tablets by mouth 2 times a day.   sennosides (SENOKOT) 8.6 MG Tab UNK at [facility] Patient's Home Pharmacy Yes No   Sig: Take 8.6 mg by mouth 2 times a day as needed. Indications: Constipation   sildenafil citrate (VIAGRA) 100 MG tablet UNK at [facility] Patient's Home Pharmacy Yes Yes   Sig: Take 50 mg by mouth 1 time a day as needed for Erectile Dysfunction.   trospium (SANCTURA) 20 MG Tab UNK at [facility] Patient's Home Pharmacy Yes Yes   Sig: Take 20 mg by mouth every day.      Facility-Administered Medications: None       Allergies  Allergies   Allergen Reactions    Codeine Nausea       Vital signs in last 24 hours  Temp:  [36.5 °C (97.7 °F)-37.1 °C (98.8 °F)] 36.7 °C (98.1 °F)  Pulse:  [60-68] 65  Resp:  [16-18] 18  BP: (105-116)/(44-53) 110/53  SpO2:  [87 %-95 %] 95 %    Physical Exam  Physical Exam  Constitutional:       General: He is not in acute distress.     Comments: Frail.  Elderly.   HENT:      Head: Normocephalic.   Eyes:      General: No scleral icterus.     Conjunctiva/sclera: Conjunctivae normal.      Pupils: Pupils are equal, round, and reactive to light.   Neck:      Comments: Normal jugular venous pressure.  Cardiovascular:      Rate and Rhythm: Normal rate and regular rhythm.      Pulses:           Carotid pulses are 1+ on the  right side and 1+ on the left side.       Radial pulses are 1+ on the right side and 1+ on the left side.        Posterior tibial pulses are 1+ on the right side and 1+ on the left side.      Heart sounds: S1 normal and S2 normal. No murmur heard.    No friction rub. No gallop.   Pulmonary:      Effort: Pulmonary effort is normal.      Breath sounds: Normal breath sounds. No wheezing, rhonchi or rales.   Abdominal:      General: Bowel sounds are normal.      Palpations: Abdomen is soft.      Tenderness: There is no abdominal tenderness.      Comments: Protuberant.   Musculoskeletal:      Right lower leg: No edema.      Left lower leg: No edema.      Comments: Erythema.   Skin:     General: Skin is warm and dry.      Nails: There is no clubbing.   Neurological:      Mental Status: He is alert and oriented to person, place, and time.   Psychiatric:         Behavior: Behavior normal.       Lab Review  Lab Results   Component Value Date/Time    WBC 11.7 (H) 05/01/2023 09:10 AM    RBC 2.76 (L) 05/01/2023 09:10 AM    HEMOGLOBIN 7.9 (L) 05/01/2023 09:10 AM    HEMATOCRIT 26.3 (L) 05/01/2023 09:10 AM    MCV 95.3 05/01/2023 09:10 AM    MCH 28.6 05/01/2023 09:10 AM    MCHC 30.0 (L) 05/01/2023 09:10 AM    MPV 10.9 05/01/2023 09:10 AM      Lab Results   Component Value Date/Time    SODIUM 141 04/30/2023 12:18 AM    POTASSIUM 3.3 (L) 04/30/2023 12:18 AM    CHLORIDE 108 04/30/2023 12:18 AM    CO2 23 04/30/2023 12:18 AM    GLUCOSE 101 (H) 04/30/2023 12:18 AM    BUN 27 (H) 04/30/2023 12:18 AM    CREATININE 0.86 04/30/2023 12:18 AM      Lab Results   Component Value Date/Time    ASTSGOT 40 04/30/2023 12:18 AM    ALTSGPT 13 04/30/2023 12:18 AM     Lab Results   Component Value Date/Time    CHOLSTRLTOT 159 12/24/2019 02:50 AM     (H) 12/24/2019 02:50 AM    HDL 25 (A) 12/24/2019 02:50 AM    TRIGLYCERIDE 139 12/24/2019 02:50 AM    TROPONINT 60 (H) 04/18/2023 12:22 PM       No results for input(s): NTPROBNP in the last 72  hours.    Cardiac Imaging and Procedures Review  EKG:  My personal interpretation of the EKG dated 5/1/2023 is atrial flutter, ventricular paced rhythm    Echocardiogram: 4/21/2023  Compared to the images of the prior study on 06/23/2021 - there is now   reduced ejection fraction and right ventricular dysfunction.  Mildly reduced left ventricular systolic function.  The left ventricular ejection fraction is visually estimated to be 50%.  Diastolic function is difficult to assess with arrhythmia.  Reduced right ventricular systolic function.  Known TAVR aortic valve that is functioning normally with normal   transvalvular gradients.  Mild tricuspid regurgitation.  Estimated right ventricular systolic pressure is 50 mmHg.  The ascending aorta is dilated with a diameter of 4.0 cm.  Left pleural effusion present.    Imaging  Chest X-Ray: 4/27/2023  1.  Interval insertion of a central venous catheter which terminates with the tip projecting over the expected region of the mid to distal superior vena cava.  2.  Stable enlargement of the cardiomediastinal silhouette.  3.  New small to moderate bilateral pleural effusions with associated basilar atelectasis and/or consolidation.  4.  Mild interstitial edema.    Assessment/Plan  PPM, Micra leadless device  TAVR for severe AS  Atrial flutter, chronic  Prior OAC on apixaban  HFpEF  GI bleed requiring transfusions.  Anemia  Cellulitis, lower extremity  AAA  HTN  DM2  HLD  Hypothyroidism  Hypoalbuminemia  General frailty    Mentation Discussion   I reviewed his EKGs and rhythm strips which is consistent with atrial flutter and known Micra leadless device  In view of GI bleed and [id number] score of 6 would recommend EP referral for AINSLEY Watchman device  Currently anticoagulation on hold  Optimize HFpEF with GDMT (empagliflozin, spironolactone, metoprolol, ACE/ARB) has clinical, renal and hemodynamic status allows  No additional cardiac recommendations at this time.  Discussed  treatment plan and findings with Kedar Rosas MD, hospitalist    Please contact me with any questions.    Michael Braswell M.D.   Cardiologist, [facility]  [phone]

## 2023-05-01 NOTE — PROGRESS NOTES
Hospital Medicine Daily Progress Note    Date of Service  5/1/2023    Chief Complaint  Kedar Woods is a 83 y.o. male admitted 4/18/2023 with weakness    Hospital Course    This 83-year-old male with a past medical history significant for obesity, atrial fibrillation, secondary hypercoagulable state on Eliquis, AAA s/p TAVR, heart failure with preserved ejection fraction presented to ER on 4/18/2023 with a complaint of weakness.    Paramedics were called as he was too weak to get up and he was full dose when diverted to [facility].  Upon presentation his WBC count 20 5K, lower extremity cellulitis.  Blood cultures x2 was obtained, patient was started on broad-spectrum antibiotics including Vanco and Unasyn.    Blood culture on 4/19 with negative strep, repeat blood culture obtained on 4/20, infectious disease was consulted recommended continuing Augmentin 875/125 1 tablet p.o. twice daily for total of 14 days.  Recommended following up at twice daily clinic.    CT scan was obtained, noted to have 7.9 cm mass GI was consulted, recommended MRI for fetoprotein.  If MRI cannot be completed in time, patient can be discharged without MRI being completed, he will follow-up with the primary care's physician for repeat MRI    He underwent upper GI endoscopy on April 27, 2023 and he found to have large amount of clots in esophagus and also found to have esophagitis.  He also found to have ulcer and large blood clot in the stomach.  Stomach also contain large amount of blood clots and food.    Interval events    04/29/23  I evaluated and examined him at the bedside. he reported that he is not doing well.  When I asked him about pain he said he does not really pain but overall he does not feel well.  Currently he is hemodynamically stable pain  Current hemoglobin is stable 7.8 and hematocrit of 25.6 platelet count of 250  He found to have sodium of 145 and potassium of 3.5, corrected calcium is 8.6 found to have low  phosphorus.  4/30/2023:  Patient having stabilization of hemoglobin levels.  We will continue to trend for the next 24 hours as per GI recommendations.  Have initiated iron studies have also gotten vitamin B12 level.  5/1/2023:  Patient seen and evaluated bedside appreciate cardiology EKG read patient does have evidence of complete heart block with junctional escape.  Have placed cardiology consultation as appropriate.  Await further recommendations from cardiology patient may require device implantation.  Repeat labs at present.  Replete potassium to a level of 4 maintain magnesium 2 at least 2.  Furthermore patient is low in iron have ordered total body iron replacement.  Addendum: Appreciate cardiology recommendations patient does have implanted Micra device and is paced.  This was previously read on EKG.  Cardiology getting device interrogated.  Appreciate the support.  I have discussed this patient's plan of care and discharge plan at IDT rounds today with Case Management, Nursing, Nursing leadership, and other members of the IDT team.    Consultants/Specialty  cardiology and infectious disease    Code Status  Full Code    Disposition  Patient is  Not medically cleared  for discharge.   Anticipate discharge to to skilled nursing facility.  I have placed the appropriate orders for post-discharge needs.    Review of Systems  Review of Systems   Constitutional:  Positive for malaise/fatigue. Negative for fever.   Cardiovascular:  Negative for chest pain.   Gastrointestinal:  Negative for abdominal pain, nausea and vomiting.   Neurological:  Positive for weakness.      Physical Exam  Temp:  [36.5 °C (97.7 °F)-37.1 °C (98.8 °F)] 36.7 °C (98.1 °F)  Pulse:  [60-68] 65  Resp:  [16-18] 18  BP: (105-116)/(44-53) 110/53  SpO2:  [87 %-95 %] 95 %    Physical Exam  Vitals reviewed.   Constitutional:       General: He is not in acute distress.     Appearance: He is obese. He is ill-appearing.   HENT:      Head: Normocephalic  and atraumatic. No contusion.      Right Ear: External ear normal.      Left Ear: External ear normal.      Nose: Nose normal.      Mouth/Throat:      Pharynx: No oropharyngeal exudate.   Eyes:      General:         Right eye: No discharge.         Left eye: No discharge.      Pupils: Pupils are equal, round, and reactive to light.   Cardiovascular:      Rate and Rhythm: Normal rate and regular rhythm.      Heart sounds: No murmur heard.    No friction rub. No gallop.   Pulmonary:      Effort: Pulmonary effort is normal.      Breath sounds: No wheezing or rhonchi.   Abdominal:      General: Bowel sounds are normal. There is no distension.      Palpations: Abdomen is soft.      Tenderness: There is no abdominal tenderness. There is no rebound.   Musculoskeletal:         General: No swelling or tenderness. Normal range of motion.      Cervical back: No rigidity. No muscular tenderness.      Right lower leg: Edema present.      Left lower leg: Edema present.   Skin:     General: Skin is warm and dry.      Coloration: Skin is not jaundiced.   Neurological:      General: No focal deficit present.      Mental Status: He is alert.      Cranial Nerves: No cranial nerve deficit.      Sensory: No sensory deficit.      Comments: Motor 4/5 in bilateral lower extremities   Psychiatric:         Mood and Affect: Mood normal.       Fluids    Intake/Output Summary (Last 24 hours) at 5/1/2023 1629  Last data filed at 5/1/2023 0255  Gross per 24 hour   Intake 740 ml   Output 800 ml   Net -60 ml       Laboratory  Recent Labs     04/30/23  1743 05/01/23  0108 05/01/23  0910   WBC 10.7 11.2* 11.7*   RBC 2.67* 2.64* 2.76*   HEMOGLOBIN 7.7* 7.6* 7.9*   HEMATOCRIT 25.0* 24.7* 26.3*   MCV 93.6 93.6 95.3   MCH 28.8 28.8 28.6   MCHC 30.8* 30.8* 30.0*   RDW 57.6* 57.5* 59.7*   PLATELETCT 278 288 311   MPV 10.8 10.8 10.9     Recent Labs     04/29/23  0400 04/30/23  0018   SODIUM 145 141   POTASSIUM 3.5* 3.3*   CHLORIDE 115* 108   CO2 22 23    GLUCOSE 107* 101*   BUN 38* 27*   CREATININE 0.80 0.86   CALCIUM 7.0* 7.0*                     Imaging  DX-CHEST-PORTABLE (1 VIEW)   Final Result      1.  Interval insertion of a central venous catheter which terminates with the tip projecting over the expected region of the mid to distal superior vena cava.   2.  Stable enlargement of the cardiomediastinal silhouette.   3.  New small to moderate bilateral pleural effusions with associated basilar atelectasis and/or consolidation.   4.  Mild interstitial edema.      EC-ECHOCARDIOGRAM COMPLETE W/O CONT   Final Result      US-EXTREMITY ARTERY LOWER UNILAT LEFT   Final Result      US-DOMENIC SINGLE LEVEL BILAT   Final Result      CT-CHEST,ABDOMEN,PELVIS WITH   Final Result      1.  No acute inflammatory process in the chest, abdomen or pelvis.   2.  Multiple hypodense hepatic regions measuring up to 7.9 cm. They are indeterminate. Areas of regional hepatic steatosis and active or treated metastases are in the differential. Correlate with history of cancer, prior studies and consider further    evaluation with contrast-enhanced MRI, liver protocol.   3.  Cholelithiasis.   4.  Nonobstructing right nephrolithiasis.   5.  Moderate amount of stool in the distal colon and rectum.   6.  Mildly enlarged left inguinal lymph node, statistically likely reactive. No other enlarged nodes detected.         CT-HEAD W/O   Final Result      1.  Cerebral atrophy.      2.  White matter lucencies most consistent with small vessel ischemic change versus demyelination or gliosis.      3.  Otherwise, Head CT without contrast with no acute findings. No evidence of acute cerebral infarction, hemorrhage or mass lesion.         DX-CHEST-PORTABLE (1 VIEW)   Final Result      No acute cardiac or pulmonary abnormalities are identified.             Assessment/Plan  * Acute upper GI bleed- (present on admission)  Assessment & Plan  Patient was noted to have mild hematemesis early morning, later in the  morning he was noted to have active hematemesis.   -- Patient was taking Eliquis, which has been held  Status post [facility], underwent endoscopy found to have active source of bleeding from the esophagus and renal ulcer.  It is noted that patient stomach full of blood   Monitor hemoglobin hematocrit  1 unit of PRBC transfusion has been ordered  Transferred to ICU on April 28, 2023.  Today patient transferred back to hospital service.  I discussed plan of care with patient and bedside RN.    Complete heart block by electrocardiogram ([facility])  Assessment & Plan  Patient with complete heart block as evidenced on EKG with junctional escape rhythm.  Appreciate cardiology recommendations.  Patient may require device implantation.  Continue to monitor on telemetry at present.    Liver lesion  Assessment & Plan  Need further work-up with MRI liver protocol  Outpatient follow-up with GI.      Chronic heart failure with preserved ejection fraction ([facility])  Assessment & Plan  Stable  Continue current medication.  Holding apixaban due to GI bleed.    PAD (peripheral artery disease) ([facility])  Assessment & Plan  DOMENIC noted  [facility]    ADINA (acute kidney injury) ([facility])  Assessment & Plan  Now resolved.    Bacteremia  Assessment & Plan  Blood culture positive for strep group G  Repeat cultures negative x 48-hour   Echo did not show any vegetation, infectious disease Dr. Gandara has evaluated, recommended Augmentin upon discharge.  Currently patient is on Unasyn  Continue IV Unasyn on April 29,023.    Cellulitis of left lower extremity  Assessment & Plan  White blood cell count is elevated.  Continue IV antibiotic Unasyn.    AF (atrial fibrillation) ([facility])- (present on admission)  Assessment & Plan  Long history of, rate well controlled  Holding Eliquis due to GI bleed.    Hypokalemia- (present on admission)  Assessment & Plan  Continue to monitor and replace as needed.  I ordered CMP for  tomorrow morning.    Hyponatremia- (present on admission)  Assessment & Plan  Now resolved.    History of transcatheter aortic valve replacement (TAVR)- (present on admission)  Assessment & Plan  Hx of  Last echo 2021 revealed a normal EF with normally functioning TAVR    Toxic metabolic encephalopathy- (present on admission)  Assessment & Plan  Acute toxic, metabolic encephalopathy secondary to sepsis   CT head with no acute process    Now resolved.    Hypocalcemia- (present on admission)  Assessment & Plan  Corrected calcium is within normal limits.    Severe sepsis (HCC)  Assessment & Plan  This is Sepsis Present on admission    Type 2 diabetes mellitus (HCC)- (present on admission)  Assessment & Plan  Goal glucose 140-180  Last HbA1c was 6.6.  Ordered repeat HbA1c for tomorrow.    Essential hypertension- (present on admission)  Assessment & Plan  Hold ant-htn meds in the light of active GI blled       Greater than 51 minutes spent prepping to see patient (e.g. review of tests) obtaining and/or reviewing separately obtained history. Performing a medically appropriate examination and/ evaluation.  Counseling and educating the patient/family/caregiver.  Ordering medications, tests, or procedures.  Referring and communicating with other health care professionals.  Documenting clinical information in EPIC.  Independently interpreting results and communicating results to patient/family/caregiver.  Care coordination.    Please note that this dictation was created using voice recognition software. I have made every reasonable attempt to correct obvious errors, but I expect that there are errors of grammar and possibly context that I did not discover before finalizing the note.                VTE prophylaxis: scd

## 2023-05-01 NOTE — DISCHARGE PLANNING
DC Transport Scheduled     Received request at: 5/1/2023 at 1548     Transport Company Scheduled:  [employer]  Spoke with VERO at [facility] to schedule transport.     Scheduled Date: WILL CALL  Scheduled Time: WILL CALL      Destination: [facility] at [address] [place]      Notified care team of scheduled transport via Vo[employer].      If there are any changes needed to the DC transportation scheduled, please contact [facility] at ext. [phone] between the hours of 8211-4068 Mon-Fri. If outside those hours, contact the ED Case Manager at [phone]. [phone].

## 2023-05-01 NOTE — PROGRESS NOTES
Hospital Medicine Daily Progress Note    Date of Service  4/30/2023    Chief Complaint  Kedar Woods is a 83 y.o. male admitted 4/18/2023 with weakness    Hospital Course    This 83-year-old male with a past medical history significant for obesity, atrial fibrillation, secondary hypercoagulable state on Eliquis, AAA s/p TAVR, heart failure with preserved ejection fraction presented to ER on 4/18/2023 with a complaint of weakness.    Paramedics were called as he was too weak to get up and he was full dose when diverted to [facility].  Upon presentation his WBC count 20 5K, lower extremity cellulitis.  Blood cultures x2 was obtained, patient was started on broad-spectrum antibiotics including Vanco and Unasyn.    Blood culture on 4/19 with negative strep, repeat blood culture obtained on 4/20, infectious disease was consulted recommended continuing Augmentin 875/125 1 tablet p.o. twice daily for total of 14 days.  Recommended following up at twice daily clinic.    CT scan was obtained, noted to have 7.9 cm mass GI was consulted, recommended MRI for fetoprotein.  If MRI cannot be completed in time, patient can be discharged without MRI being completed, he will follow-up with the primary care's physician for repeat MRI    He underwent upper GI endoscopy on April 27, 2023 and he found to have large amount of clots in esophagus and also found to have esophagitis.  He also found to have ulcer and large blood clot in the stomach.  Stomach also contain large amount of blood clots and food.    Interval events    04/29/23  I evaluated and examined him at the bedside. he reported that he is not doing well.  When I asked him about pain he said he does not really pain but overall he does not feel well.  Currently he is hemodynamically stable pain  Current hemoglobin is stable 7.8 and hematocrit of 25.6 platelet count of 250  He found to have sodium of 145 and potassium of 3.5, corrected calcium is 8.6 found to have low  phosphorus.  4/30/2023:  Patient having stabilization of hemoglobin levels.  We will continue to trend for the next 24 hours as per GI recommendations.  Have initiated iron studies have also gotten vitamin B12 level.  I have discussed this patient's plan of care and discharge plan at IDT rounds today with Case Management, Nursing, Nursing leadership, and other members of the IDT team.    Consultants/Specialty  infectious disease    Code Status  Full Code    Disposition  Patient is  Not medically cleared  for discharge.   Anticipate discharge to  TBD .  I have placed the appropriate orders for post-discharge needs.    Review of Systems  Review of Systems   Constitutional:  Positive for malaise/fatigue. Negative for fever.   Cardiovascular:  Negative for chest pain.   Gastrointestinal:  Negative for abdominal pain, nausea and vomiting.   Neurological:  Positive for weakness.      Physical Exam  Temp:  [36.1 °C (97 °F)-36.7 °C (98.1 °F)] 36.7 °C (98.1 °F)  Pulse:  [63-95] 63  Resp:  [18-20] 18  BP: (105-111)/(44-51) 105/44  SpO2:  [90 %-96 %] 90 %    Physical Exam  Vitals reviewed.   Constitutional:       General: He is not in acute distress.     Appearance: He is obese. He is ill-appearing.   HENT:      Head: Normocephalic and atraumatic. No contusion.      Right Ear: External ear normal.      Left Ear: External ear normal.      Nose: Nose normal.      Mouth/Throat:      Pharynx: No oropharyngeal exudate.   Eyes:      General:         Right eye: No discharge.         Left eye: No discharge.      Pupils: Pupils are equal, round, and reactive to light.   Cardiovascular:      Rate and Rhythm: Normal rate and regular rhythm.      Heart sounds: No murmur heard.    No friction rub. No gallop.   Pulmonary:      Effort: Pulmonary effort is normal.      Breath sounds: No wheezing or rhonchi.   Abdominal:      General: Bowel sounds are normal. There is no distension.      Palpations: Abdomen is soft.      Tenderness: There is  no abdominal tenderness. There is no rebound.   Musculoskeletal:         General: No swelling or tenderness. Normal range of motion.      Cervical back: No rigidity. No muscular tenderness.      Right lower leg: Edema present.      Left lower leg: Edema present.   Skin:     General: Skin is warm and dry.      Coloration: Skin is not jaundiced.   Neurological:      General: No focal deficit present.      Mental Status: He is alert.      Cranial Nerves: No cranial nerve deficit.      Sensory: No sensory deficit.      Comments: Motor 4/5 in bilateral lower extremities   Psychiatric:         Mood and Affect: Mood normal.       Fluids  No intake or output data in the 24 hours ending 04/30/23 1712      Laboratory  Recent Labs     04/28/23  0430 04/28/23  0600 04/29/23  0400 04/29/23  1000 04/29/23  1801 04/30/23  0018 04/30/23  0544   WBC 13.0*  --  12.8*  --   --  11.5*  --    RBC 2.62*  --  2.64*  --   --  2.77*  --    HEMOGLOBIN 7.4*   < > 7.6*   < > 7.7* 7.9* 7.7*   HEMATOCRIT 24.3*   < > 24.2*   < > 24.7* 25.6* 24.6*   MCV 92.7  --  91.7  --   --  92.4  --    MCH 28.2  --  28.8  --   --  28.5  --    MCHC 30.5*  --  31.4*  --   --  30.9*  --    RDW 56.1*  --  56.3*  --   --  57.1*  --    PLATELETCT 278  --  250  --   --  273  --    MPV 10.5  --  10.7  --   --  10.8  --     < > = values in this interval not displayed.       Recent Labs     04/28/23  0430 04/29/23  0400 04/30/23  0018   SODIUM 140 145 141   POTASSIUM 3.8 3.5* 3.3*   CHLORIDE 110 115* 108   CO2 21 22 23   GLUCOSE 149* 107* 101*   BUN 48* 38* 27*   CREATININE 0.83 0.80 0.86   CALCIUM 7.0* 7.0* 7.0*                       Imaging  DX-CHEST-PORTABLE (1 VIEW)   Final Result      1.  Interval insertion of a central venous catheter which terminates with the tip projecting over the expected region of the mid to distal superior vena cava.   2.  Stable enlargement of the cardiomediastinal silhouette.   3.  New small to moderate bilateral pleural effusions with  associated basilar atelectasis and/or consolidation.   4.  Mild interstitial edema.      EC-ECHOCARDIOGRAM COMPLETE W/O CONT   Final Result      US-EXTREMITY ARTERY LOWER UNILAT LEFT   Final Result      US-DOMENIC SINGLE LEVEL BILAT   Final Result      CT-CHEST,ABDOMEN,PELVIS WITH   Final Result      1.  No acute inflammatory process in the chest, abdomen or pelvis.   2.  Multiple hypodense hepatic regions measuring up to 7.9 cm. They are indeterminate. Areas of regional hepatic steatosis and active or treated metastases are in the differential. Correlate with history of cancer, prior studies and consider further    evaluation with contrast-enhanced MRI, liver protocol.   3.  Cholelithiasis.   4.  Nonobstructing right nephrolithiasis.   5.  Moderate amount of stool in the distal colon and rectum.   6.  Mildly enlarged left inguinal lymph node, statistically likely reactive. No other enlarged nodes detected.         CT-HEAD W/O   Final Result      1.  Cerebral atrophy.      2.  White matter lucencies most consistent with small vessel ischemic change versus demyelination or gliosis.      3.  Otherwise, Head CT without contrast with no acute findings. No evidence of acute cerebral infarction, hemorrhage or mass lesion.         DX-CHEST-PORTABLE (1 VIEW)   Final Result      No acute cardiac or pulmonary abnormalities are identified.             Assessment/Plan  * Acute upper GI bleed- (present on admission)  Assessment & Plan  Patient was noted to have mild hematemesis early morning, later in the morning he was noted to have active hematemesis.   -- Patient was taking Eliquis, which has been held  Status post [facility], underwent endoscopy found to have active source of bleeding from the esophagus and renal ulcer.  It is noted that patient stomach full of blood   Monitor hemoglobin hematocrit  1 unit of PRBC transfusion has been ordered  Transferred to ICU on April 28, 2023.  Today patient transferred back to hospital  service.  I discussed plan of care with patient and bedside RN.    Liver lesion  Assessment & Plan  Need further work-up with MRI liver protocol  Outpatient follow-up with GI.      Chronic heart failure with preserved ejection fraction (HCC)  Assessment & Plan  Stable  Continue current medication.  Holding apixaban due to GI bleed.    PAD (peripheral artery disease) ([facility])  Assessment & Plan  DOMENIC noted  [facility]    ADINA (acute kidney injury) ([facility])  Assessment & Plan  Now resolved.    Bacteremia  Assessment & Plan  Blood culture positive for strep group G  Repeat cultures negative x 48-hour   Echo did not show any vegetation, infectious disease Dr. Gandara has evaluated, recommended Augmentin upon discharge.  Currently patient is on Unasyn  Continue IV Unasyn on April 29,023.    Cellulitis of left lower extremity  Assessment & Plan  White blood cell count is elevated.  Continue IV antibiotic Unasyn.    AF (atrial fibrillation) ([facility])- (present on admission)  Assessment & Plan  Long history of, rate well controlled  Holding Eliquis due to GI bleed.    Hypokalemia- (present on admission)  Assessment & Plan  Continue to monitor and replace as needed.  I ordered CMP for tomorrow morning.    Hyponatremia- (present on admission)  Assessment & Plan  Now resolved.    History of transcatheter aortic valve replacement (TAVR)- (present on admission)  Assessment & Plan  Hx of  Last echo 2021 revealed a normal EF with normally functioning TAVR    Toxic metabolic encephalopathy- (present on admission)  Assessment & Plan  Acute toxic, metabolic encephalopathy secondary to sepsis   CT head with no acute process    Now resolved.    Hypocalcemia- (present on admission)  Assessment & Plan  Corrected calcium is within normal limits.    Severe sepsis ([facility])  Assessment & Plan  This is Sepsis Present on admission    Type 2 diabetes mellitus ([facility])- (present on admission)  Assessment &  Plan  Goal glucose 140-180  Last HbA1c was 6.6.  Ordered repeat HbA1c for tomorrow.    Essential hypertension- (present on admission)  Assessment & Plan  Hold ant-htn meds in the light of active GI blled       Greater than 51 minutes spent prepping to see patient (e.g. review of tests) obtaining and/or reviewing separately obtained history. Performing a medically appropriate examination and/ evaluation.  Counseling and educating the patient/family/caregiver.  Ordering medications, tests, or procedures.  Referring and communicating with other health care professionals.  Documenting clinical information in EPIC.  Independently interpreting results and communicating results to patient/family/caregiver.  Care coordination.    Please note that this dictation was created using voice recognition software. I have made every reasonable attempt to correct obvious errors, but I expect that there are errors of grammar and possibly context that I did not discover before finalizing the note.            VTE prophylaxis: scd

## 2023-05-02 VITALS
TEMPERATURE: 98.6 F | RESPIRATION RATE: 19 BRPM | HEIGHT: 73 IN | HEART RATE: 67 BPM | DIASTOLIC BLOOD PRESSURE: 44 MMHG | OXYGEN SATURATION: 93 % | SYSTOLIC BLOOD PRESSURE: 106 MMHG | BODY MASS INDEX: 31.56 KG/M2 | WEIGHT: 238.1 LBS

## 2023-05-02 PROBLEM — E83.39 HYPOPHOSPHATEMIA: Status: ACTIVE | Noted: 2023-05-02

## 2023-05-02 LAB
ANION GAP SERPL CALC-SCNC: 8 MMOL/L (ref 7–16)
BUN SERPL-MCNC: 15 MG/DL (ref 8–22)
CALCIUM SERPL-MCNC: 7 MG/DL (ref 8.5–10.5)
CHLORIDE SERPL-SCNC: 110 MMOL/L (ref 96–112)
CO2 SERPL-SCNC: 22 MMOL/L (ref 20–33)
CREAT SERPL-MCNC: 0.73 MG/DL (ref 0.5–1.4)
ERYTHROCYTE [DISTWIDTH] IN BLOOD BY AUTOMATED COUNT: 58.8 FL (ref 35.9–50)
GFR SERPLBLD CREATININE-BSD FMLA CKD-EPI: 90 ML/MIN/1.73 M 2
GLUCOSE SERPL-MCNC: 112 MG/DL (ref 65–99)
HCT VFR BLD AUTO: 24.7 % (ref 42–52)
HGB BLD-MCNC: 7.7 G/DL (ref 14–18)
MCH RBC QN AUTO: 28.9 PG (ref 27–33)
MCHC RBC AUTO-ENTMCNC: 31.2 G/DL (ref 33.7–35.3)
MCV RBC AUTO: 92.9 FL (ref 81.4–97.8)
PHOSPHATE SERPL-MCNC: 2.1 MG/DL (ref 2.5–4.5)
PLATELET # BLD AUTO: 313 K/UL (ref 164–446)
PMV BLD AUTO: 11.1 FL (ref 9–12.9)
POTASSIUM SERPL-SCNC: 3.7 MMOL/L (ref 3.6–5.5)
RBC # BLD AUTO: 2.66 M/UL (ref 4.7–6.1)
SODIUM SERPL-SCNC: 140 MMOL/L (ref 135–145)
WBC # BLD AUTO: 11.2 K/UL (ref 4.8–10.8)

## 2023-05-02 PROCEDURE — 700102 HCHG RX REV CODE 250 W/ 637 OVERRIDE(OP): Performed by: STUDENT IN AN ORGANIZED HEALTH CARE EDUCATION/TRAINING PROGRAM

## 2023-05-02 PROCEDURE — A9270 NON-COVERED ITEM OR SERVICE: HCPCS | Performed by: STUDENT IN AN ORGANIZED HEALTH CARE EDUCATION/TRAINING PROGRAM

## 2023-05-02 PROCEDURE — A9270 NON-COVERED ITEM OR SERVICE: HCPCS

## 2023-05-02 PROCEDURE — 700102 HCHG RX REV CODE 250 W/ 637 OVERRIDE(OP): Performed by: HOSPITALIST

## 2023-05-02 PROCEDURE — 700105 HCHG RX REV CODE 258: Performed by: STUDENT IN AN ORGANIZED HEALTH CARE EDUCATION/TRAINING PROGRAM

## 2023-05-02 PROCEDURE — 85027 COMPLETE CBC AUTOMATED: CPT

## 2023-05-02 PROCEDURE — 99239 HOSP IP/OBS DSCHRG MGMT >30: CPT | Performed by: HOSPITALIST

## 2023-05-02 PROCEDURE — 84100 ASSAY OF PHOSPHORUS: CPT

## 2023-05-02 PROCEDURE — A9270 NON-COVERED ITEM OR SERVICE: HCPCS | Performed by: HOSPITALIST

## 2023-05-02 PROCEDURE — 97602 WOUND(S) CARE NON-SELECTIVE: CPT

## 2023-05-02 PROCEDURE — 700105 HCHG RX REV CODE 258: Performed by: INTERNAL MEDICINE

## 2023-05-02 PROCEDURE — 700111 HCHG RX REV CODE 636 W/ 250 OVERRIDE (IP): Performed by: INTERNAL MEDICINE

## 2023-05-02 PROCEDURE — 700111 HCHG RX REV CODE 636 W/ 250 OVERRIDE (IP): Performed by: STUDENT IN AN ORGANIZED HEALTH CARE EDUCATION/TRAINING PROGRAM

## 2023-05-02 PROCEDURE — 80048 BASIC METABOLIC PNL TOTAL CA: CPT

## 2023-05-02 PROCEDURE — 36415 COLL VENOUS BLD VENIPUNCTURE: CPT

## 2023-05-02 PROCEDURE — 700102 HCHG RX REV CODE 250 W/ 637 OVERRIDE(OP)

## 2023-05-02 RX ORDER — LANOLIN ALCOHOL/MO/W.PET/CERES
100 CREAM (GRAM) TOPICAL DAILY
Qty: 14 TABLET | Status: SHIPPED
Start: 2023-05-02 | End: 2023-05-09

## 2023-05-02 RX ORDER — AMOXICILLIN 250 MG
2 CAPSULE ORAL 2 TIMES DAILY
Qty: 30 TABLET | Refills: 0 | Status: SHIPPED
Start: 2023-05-02

## 2023-05-02 RX ORDER — POLYETHYLENE GLYCOL 3350 17 G/17G
17 POWDER, FOR SOLUTION ORAL
Qty: 30 EACH | Refills: 3 | Status: SHIPPED
Start: 2023-05-02

## 2023-05-02 RX ORDER — AMOXICILLIN AND CLAVULANATE POTASSIUM 875; 125 MG/1; MG/1
1 TABLET, FILM COATED ORAL 2 TIMES DAILY
Qty: 2 TABLET | Refills: 0 | Status: ACTIVE | DISCHARGE
Start: 2023-05-02 | End: 2023-05-03

## 2023-05-02 RX ORDER — ASCORBIC ACID 500 MG
500 TABLET ORAL DAILY
Qty: 30 TABLET | Refills: 2 | Status: SHIPPED
Start: 2023-05-02

## 2023-05-02 RX ORDER — OMEPRAZOLE 20 MG/1
20 CAPSULE, DELAYED RELEASE ORAL 2 TIMES DAILY
Qty: 30 CAPSULE | Refills: 2 | Status: SHIPPED
Start: 2023-05-02 | End: 2023-05-16

## 2023-05-02 RX ORDER — LANOLIN ALCOHOL/MO/W.PET/CERES
325 CREAM (GRAM) TOPICAL 2 TIMES DAILY
Qty: 60 TABLET | Refills: 2 | Status: SHIPPED
Start: 2023-05-02

## 2023-05-02 RX ADMIN — OXYCODONE 2.5 MG: 5 TABLET ORAL at 02:28

## 2023-05-02 RX ADMIN — Medication 100 MG: at 05:37

## 2023-05-02 RX ADMIN — ROSUVASTATIN CALCIUM 40 MG: 20 TABLET, FILM COATED ORAL at 05:37

## 2023-05-02 RX ADMIN — OMEPRAZOLE 20 MG: 20 CAPSULE, DELAYED RELEASE ORAL at 05:37

## 2023-05-02 RX ADMIN — SODIUM CHLORIDE 250 MG: 9 INJECTION, SOLUTION INTRAVENOUS at 05:41

## 2023-05-02 RX ADMIN — DIBASIC SODIUM PHOSPHATE, MONOBASIC POTASSIUM PHOSPHATE AND MONOBASIC SODIUM PHOSPHATE 250 MG: 852; 155; 130 TABLET ORAL at 12:20

## 2023-05-02 RX ADMIN — ACETAMINOPHEN 650 MG: 325 TABLET, FILM COATED ORAL at 05:28

## 2023-05-02 RX ADMIN — AMPICILLIN AND SULBACTAM 3 G: 1; 2 INJECTION, POWDER, FOR SOLUTION INTRAMUSCULAR; INTRAVENOUS at 12:20

## 2023-05-02 RX ADMIN — AMPICILLIN AND SULBACTAM 3 G: 1; 2 INJECTION, POWDER, FOR SOLUTION INTRAMUSCULAR; INTRAVENOUS at 05:33

## 2023-05-02 ASSESSMENT — PAIN DESCRIPTION - PAIN TYPE: TYPE: ACUTE PAIN

## 2023-05-02 NOTE — CARE PLAN
The patient is Stable - Low risk of patient condition declining or worsening    Shift Goals  Clinical Goals: wound care, abx, monitor for bleeding  Patient Goals: sleep  Family Goals: NA    Progress made toward(s) clinical / shift goals:    Problem: Knowledge Deficit - Standard  Goal: Patient and family/care givers will demonstrate understanding of plan of care, disease process/condition, diagnostic tests and medications  Outcome: Progressing     Problem: Hemodynamics  Goal: Patient's hemodynamics, fluid balance and neurologic status will be stable or improve  Outcome: Progressing     Problem: Skin Integrity  Goal: Skin integrity is maintained or improved  Outcome: Progressing     Problem: Pain - Standard  Goal: Alleviation of pain or a reduction in pain to the patient’s comfort goal  Outcome: Progressing       Patient is not progressing towards the following goals:

## 2023-05-02 NOTE — DISCHARGE PLANNING
1145  Agency/Facility Name: [facility]   Spoke To: Tammy   Outcome: DPA called to notify Tammy that Pt is now medically cleared. Tammy requesting [facility] to set up transport at 1600.

## 2023-05-02 NOTE — DIETARY
Nutrition Services Brief Update:    Day 14 of admit.  Kedar Woods is a 83 y.o. male with admitting DX of Sepsis and Acute upper GI bleed.     Pt is on full liquids which were advanced on 4/30 and pt is eating mainly 50-75% of documented meals.Previously pt did not want Boost supplement shakes and intake appears to be adequate. Wound team noted DTPI to bilateral sacrum. Pt is on RA, +1 mild pitting edema to R/LLE. Last BM: 5/1    Problem: Nutritional:  Goal: Achieve adequate nutritional intake  Description: Patient will consume 50% of meals  Outcome: Progressing    RD will monitor PO intake for consistency.

## 2023-05-02 NOTE — WOUND TEAM
[facility]  Inpatient Services  Wound and Skin Care Evaluation    Admission Date: 4/18/2023     Last order of IP CONSULT TO WOUND CARE was found on 4/18/2023 from Hospital Encounter on 4/18/2023     HPI, PMH, SH: Reviewed    Past Surgical History:   Procedure Laterality Date    MI UPPER GI ENDOSCOPY,DIAGNOSIS  4/27/2023    Procedure: GASTROSCOPY;  Surgeon: Shobha Turner M.D.;  Location: SURGERY SAME DAY [facility];  Service: Gastroenterology     Social History     Tobacco Use    Smoking status: Never    Smokeless tobacco: Never   Substance Use Topics    Alcohol use: Yes     Chief Complaint   Patient presents with    Weakness     Diagnosis: Sepsis (HCC) [A41.9]    Unit where seen by Wound Team: [id number]     WOUND CONSULT/FOLLOW UP RELATED TO:  Sacrum; BLE; Feet; pannus; groin: B Hips     WOUND HISTORY:  H&P: 'Kedar Woods is a 83 y.o. male who presented 4/18/2023 with weakness.  Mr. 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  Today paramedics were called as he was too weak to get up and the [facility] was full so he was diverted here.  In the emergency room he was found to have a white blood cell count of 25,000 and lower extremity cellulitis.  [facility] was contacted and apparently he just finished a course of Keflex for the cellulitis.  In the emergency room urinalysis and chest x-ray are negative.  Abdominal exam is benign.  Presumptive etiology of his sepsis is cellulitis and he will be treated with IV Unasyn and vancomycin in setting emergency room he has a modest historian cephalopathic.  We do not have any friends nor family to corroborate his review of systems and history of present illness.'    WOUND ASSESSMENT/LDA     Wound 04/18/23 Venous Ulcer Leg Posterior;Lateral Left POA (Active)   Wound Image    05/02/23 1215   Site Assessment Red;Non-healing;Pink 05/02/23 1215   Periwound Assessment Pink;Clean;Dry;Intact 05/02/23 1215   Margins Attached edges;Defined edges 05/02/23 1215   Closure Secondary intention 05/02/23 1215   Drainage Amount Small 05/02/23 1215   Drainage Description Serosanguineous 05/02/23 1215   Treatments Cleansed;Offloading;Tape change 05/02/23 1215   Wound Cleansing Approved Wound Cleanser 05/02/23 1215   Periwound Protectant Skin Protectant Wipes to Periwound 05/02/23 1215   Dressing Cleansing/Solutions Not Applicable 05/02/23 1215   Dressing Options Hydrofera Blue Ready;Mepilex 05/02/23 1215   Dressing Changed Changed 05/02/23 1215   Dressing Status Clean;Dry;Intact 05/02/23 1215   Dressing Change/Treatment Frequency Every 72 hrs, and As Needed 05/02/23 1215   NEXT Dressing Change/Treatment Date 05/05/23 05/02/23 1215   NEXT Weekly Photo (Inpatient Only) 05/09/23 05/02/23 1215   Non-staged Wound Description Full thickness 05/02/23 1215   Wound Length (cm) 12 cm 04/19/23 1100   Wound Width (cm) 3 cm 04/19/23 1100   Wound Depth (cm) 0.1 cm 04/19/23 1100   Wound Surface Area (cm^2) 36 cm^2 04/19/23 1100   Wound Volume (cm^3) 3.6 cm^3 04/19/23 1100   Wound Bed Eschar (%) 100 % 04/19/23 1100   Shape Irregular 04/26/23 1330   Wound Odor None 04/26/23 1330   WOUND NURSE ONLY - Time Spent with Patient (mins) 45 04/26/23 1330       Wound 04/18/23 Venous Ulcer Leg Lateral Right POA (Active)   Wound Image   05/02/23 1215   Site Assessment Red;Pink;Drainage 05/02/23 1215   Periwound Assessment Pink;Clean;Dry;Intact 05/02/23 1215   Margins Attached edges;Defined edges 05/02/23 1215   Closure Secondary intention 05/02/23 1215   Drainage Amount Small 05/02/23 1215   Drainage Description Serosanguineous 05/02/23 1215   Treatments Cleansed;Site care;Tape change;Offloading 05/02/23 1215   Wound Cleansing Approved Wound Cleanser 05/02/23 1215   Periwound Protectant Skin Protectant Wipes to Periwound 05/02/23  1215   Dressing Cleansing/Solutions Not Applicable 05/02/23 1215   Dressing Options Hydrofera Blue Ready;Mepilex 05/02/23 1215   Dressing Changed Changed 05/02/23 1215   Dressing Status Clean;Dry;Intact 05/02/23 1215   Dressing Change/Treatment Frequency Every 72 hrs, and As Needed 05/02/23 1215   NEXT Dressing Change/Treatment Date 05/05/23 05/02/23 1215   NEXT Weekly Photo (Inpatient Only) 05/09/23 05/02/23 1215   Non-staged Wound Description Full thickness 05/02/23 1215   Wound Length (cm) 10 cm 04/19/23 1100   Wound Width (cm) 1.5 cm 04/19/23 1100   Wound Depth (cm) 0.1 cm 04/19/23 1100   Wound Surface Area (cm^2) 15 cm^2 04/19/23 1100   Wound Volume (cm^3) 1.5 cm^3 04/19/23 1100   Wound Bed Granulation (%) 80 % 04/19/23 1100   Wound Bed Slough (%) 20 % 04/19/23 1100   Shape Irregular 04/26/23 1330   Wound Odor None 04/26/23 1330   Exposed Structures None 04/26/23 1330   WOUND NURSE ONLY - Time Spent with Patient (mins) 30 04/19/23 1100   Wound 04/18/23 Pressure Injury Sacrum Bilateral POA DTI w/moisture associated dermatitis (Active)   Wound Image   05/02/23 1215   Site Assessment Red;[place] 05/02/23 1215   Periwound Assessment Denuded;Excoriated;Red 05/02/23 1215   Margins Attached edges 05/02/23 1215   Closure Open to air 05/02/23 1215   Drainage Amount None 05/02/23 1215   Drainage Description Serosanguineous 04/28/23 2000   Treatments Site care 05/02/23 1215   Wound Cleansing Foam Cleanser/Washcloth 05/02/23 1215   Periwound Protectant Barrier Paste 05/02/23 1215   Dressing Cleansing/Solutions Not Applicable 05/02/23 1215   Dressing Options Open to Air 05/02/23 1215   Dressing Changed Changed 05/02/23 1215   Dressing Status Clean;Dry;Intact 05/02/23 1215   Dressing Change/Treatment Frequency Every Shift, and As Needed 05/02/23 1215   NEXT Dressing Change/Treatment Date 05/02/23 05/02/23 1215   NEXT Weekly Photo (Inpatient Only) 05/09/23 05/02/23 1215   WOUND NURSE ONLY - Pressure Injury Stage DTPI 05/02/23  1215   Wound Length (cm) 20 cm 04/27/23 1000   Wound Width (cm) 20 cm 04/27/23 1000   Wound Depth (cm) 0.05 cm 04/27/23 1000   Wound Surface Area (cm^2) 400 cm^2 04/27/23 1000   Wound Volume (cm^3) 20 cm^3 04/27/23 1000   Wound Healing % 0 04/27/23 1000   Wound Bed Granulation (%) 10 % 04/27/23 1000   Wound Odor None 04/27/23 1000   Exposed Structures None 05/02/23 1215   WOUND NURSE ONLY - Time Spent with Patient (mins) 30 04/27/23 1000       Wound 04/18/23 Venous Ulcer Toe, 2nd Left POA (Active)   Wound Image   04/27/23 2000   Site Assessment Pink;Red 05/02/23 1215   Periwound Assessment Clean;Dry;Intact 05/02/23 1215   Margins Defined edges 05/02/23 1215   Closure Secondary intention 05/02/23 1215   Drainage Amount None 05/02/23 1215   Drainage Description RAFAT 04/28/23 2000   Treatments Cleansed;Site care;Tape change 05/02/23 1215   Wound Cleansing Approved Wound Cleanser 05/02/23 1215   Periwound Protectant Skin Protectant Wipes to Periwound 05/02/23 1215   Dressing Cleansing/Solutions Not Applicable 05/02/23 1215   Dressing Options Hydrofera Blue Ready;Hypafix Tape 05/02/23 1215   Dressing Changed Changed 05/02/23 1215   Dressing Status Clean;Dry;Intact 05/02/23 1215   Dressing Change/Treatment Frequency Every 72 hrs, and As Needed 05/02/23 1215   NEXT Dressing Change/Treatment Date 05/05/23 05/02/23 1215   NEXT Weekly Photo (Inpatient Only) 05/09/23 05/02/23 1215   Non-staged Wound Description Full thickness 05/02/23 1215   Wound Length (cm) 3.5 cm 04/19/23 1100   Wound Width (cm) 2 cm 04/19/23 1100   Wound Depth (cm) 0.1 cm 04/19/23 1100   Wound Surface Area (cm^2) 7 cm^2 04/19/23 1100   Wound Volume (cm^3) 0.7 cm^3 04/19/23 1100   Wound Bed Eschar (%) 100 % 04/19/23 1100   Shape Oval 04/26/23 1330   Wound Odor None 04/26/23 1330   WOUND NURSE ONLY - Time Spent with Patient (mins) 60 05/02/23 1215            Vascular: 6/23/21-                     RIGHT      Waveform            Systolic BPs (mmHg)                               106           Brachial   Triphasic                                Common Femoral   Triphasic                  0             Posterior Tibial   Triphasic                  261           Dorsalis Pedis                                            Peroneal                              2.46          DOMENIC                                            TBI                           LEFT   Waveform        Systolic BPs (mmHg)                              102           Brachial   Triphasic                                Common Femoral   Triphasic                  262           Posterior Tibial   Triphasic                  276           Dorsalis Pedis                                            Peroneal                              2.60          DOMENIC                                            TBI         Findings   Bilateral.    Doppler waveforms of the common femoral arteries are of high amplitude and    triphasic.    Doppler waveforms at the ankle are brisk and    triphasic/multiphasic/hyperemic.    Ankle pressures are not accurately measured due to calcification and    noncompressibility of the tibial vessels.    Toe pressures and toe-brachial indices are normal.   All toes PPG's waveforms are normal.     DOMENIC:   No results found. LPS ordered this admission    Lab Values:    Lab Results   Component Value Date/Time    WBC 11.2 (H) 05/02/2023 07:47 AM    RBC 2.66 (L) 05/02/2023 07:47 AM    HEMOGLOBIN 7.7 (L) 05/02/2023 07:47 AM    HEMATOCRIT 24.7 (L) 05/02/2023 07:47 AM    CREACTPROT 14.08 (H) 04/18/2023 12:22 PM    HBA1C 6.1 (H) 04/30/2023 12:18 AM        Culture Results show:  No results found for this or any previous visit (from the past 720 hour(s)).    Pain Level/Medicated:  None, Tolerated without pain medication       INTERVENTIONS BY WOUND TEAM:  Chart and images reviewed. Discussed with bedside RN. All areas of concern (based on picture review, LDA review and discussion with bedside RN) have been thoroughly  assessed. Documentation of areas based on significant findings. This RN in to assess patient. Performed standard wound care which includes appropriate positioning, dressing removal and non-selective debridement. Pictures and measurements obtained weekly if/when required.    LLE; Left 2nd Toe:  Preparation for Dressing removal: Dressing soaked with Removed without difficulty  Non-selectively Debrided with:  Wound cleanser   Sharp debridement: no  Tiffanie wound: Cleansed with Wound cleanser, Prepped with no sting  Primary Dressing: Santyl and Hydrofera Blue  Secondary (Outer) Dressing: Mepilex    RLE:  Preparation for Dressing removal: Dressing soaked with Removed without difficulty  Non-selectively Debrided with:  Wound cleanser   Sharp debridement: no  Tiffanie wound: Cleansed with Wound cleanser, Prepped with no sting  Primary Dressing: Hydrofera Blue  Secondary (Outer) Dressing: Mepilex    Sacrum:  Preparation for Dressing removal: open to air  Non-selectively Debrided with:  Wound cleanser   Sharp debridement: no  Tiffanie wound: Prepped with Barrier paste  Primary Dressing: open to air      Advanced Wound Care Discharge Planning  Number of Clinicians necessary to complete wound care: 1  Is patient requiring IV pain medications for dressing changes: no  Length of time for dressing change 30 min. (This does not include chart review, pre-medication time, set up, clean up or time spent charting.)    Interdisciplinary consultation: Patient, Bedside RN       EVALUATION / RATIONALE FOR TREATMENT:  Most Recent Date:  5/2/23: Patient BLE wounds appear similar to last assessment but are improving gradually with the hydrofera blue, so continue plan of care. Patient sacrum also improving but non-blanching area still present, continue with barrier paste.    4/19/23:   -Chronic venous ulcers to bilateral lateral lower legs and left 2nd toe with 100% intact adhered yellow eschar, santyl ordered and hydrofera blue applied. Hydrofera Blue  applied for the hydrophilic polyurethane foam which contains ethylene oxide used as a bactericidal, fungicidal, and sporicidal disinfectant. Hydrofera Blue also aids in maintaining a moist wound environment. The absorption properties of this dressing are important in collecting exudates and bacteria from the injured area. These harmful fluid secretions bind to the dressing removing it from the wound without the foam sticking to the wound causing more harm.  -Moisture associated dermatitis to pannus and groin. Fungal powder and interdry cloth to wick moisture and address fungal component.   -DTI to sacrum, moisture barrier paste applied and offloading measures in place.     Goals: Steady decrease in wound area and depth weekly.    WOUND TEAM PLAN OF CARE ([X] for frequency of wound follow up,):   Nursing to follow dressing orders written for wound care. Contact wound team if area fails to progress, deteriorates or with any questions/concerns if something comes up before next scheduled follow up (See below as to whether wound is following and frequency of wound follow up)  Dressing changes by wound team:                   Follow up 3 times weekly:                NPWT change 3 times weekly:     Follow up 1-2 times weekly:      Follow up Bi-Monthly:         Follow up Monthly (High Risk):                        Follow up as needed:   X  Other (explain):     NURSING PLAN OF CARE ORDERS (X):  Dressing changes: See Dressing Care orders: X  Skin care: See Skin Care orders: X  RN Prevention Protocol:   Rectal tube care: See Rectal Tube Care orders:   Other orders:    RSKIN:   CURRENTLY IN PLACE (X), APPLIED THIS VISIT (A), ORDERED (O):   Q shift Spike:  X  Q shift pressure point assessments:  X    Surface/Positioning X  Standard Mattress/Trauma Bed           Low Airloss   X       ICU Low Airloss   Bariatric SIL     Waffle cushion   O     Waffle Overlay  X        Reposition q 2 hours  X    TAPs Turning system   X  Z Thong  Pillow     Offloading/Redistribution X  Sacral Offloading Dressing (Silicone dressing)     Heel Offloading Dressing (Silicone dressing)   X      Heel float boots (Prevalon boot)             Float Heels off Bed with Pillows    X       Respiratory   Silicone O2 tubing         Gray Foam Ear protectors     Cannula fixation Device (Tender )          High flow offloading Clip    Elastic head band offloading device      Anchorfast                                                         Trach with Optifoam split foam             Containment/Moisture Prevention     Rectal tube or BMS    Purwick/Condom Cath        Rosales Catheter    Barrier wipes           Barrier paste       Antifungal tx      Interdry        Mobilization RAFAT      Up to chair        Ambulate      PT/OT     Anticipated discharge plans: TBD  LTACH:        SNF/Rehab:  X                Home Health Care:  X         Outpatient Wound Center:   X         Self/Family Care:        Other:                  Vac Discharge Needs:   Vac Discharge plan is purely a recommendation from wound team and not a requirement for discharge unless otherwise stated by physician.  Not Applicable Pt not on a wound vac:   X    Regular Vac while inpatient, alternative dressing at DC:        Regular Vac in use and continued at DC:            Reg. Vac w/ Skin Sub/Biologic in use. Will need to be changed 2x wkly:      Veraflo Vac while inpatient, ok to transition to Regular Vac on Discharge (Bedside RN to Clamp small instillation tubing at time of DC):           Veraflo Vac while inpatient, would benefit from remaining on Veraflo Vac upon discharge:

## 2023-05-02 NOTE — CARE PLAN
The patient is Stable - Low risk of patient condition declining or worsening    Shift Goals  Clinical Goals: Pain control  Patient Goals: Comfort  Family Goals: NA    Progress made toward(s) clinical / shift goals:    Problem: Knowledge Deficit - Standard  Goal: Patient and family/care givers will demonstrate understanding of plan of care, disease process/condition, diagnostic tests and medications  Outcome: Progressing     Problem: Fluid Volume  Goal: Fluid volume balance will be maintained  Outcome: Progressing     Problem: Physical Regulation  Goal: Diagnostic test results will improve  Outcome: Progressing       Patient is not progressing towards the following goals:

## 2023-05-02 NOTE — PROGRESS NOTES
IV and tele monitor removed. DC Instructions reviewed and signed. Pt awaiting transport to [facility].    Detail Level: Zone Detail Level: Generalized Detail Level: Detailed

## 2023-05-02 NOTE — DISCHARGE PLANNING
Case Management Discharge Planning    Admission Date: 4/18/2023  GMLOS: 5  ALOS: 14    6-Clicks ADL Score: 14  6-Clicks Mobility Score: 15  PT and/or OT Eval ordered: Yes  Post-acute Referrals Ordered: Yes  Post-acute Choice Obtained: Yes  Has referral(s) been sent to post-acute provider:  Yes      Anticipated Discharge Dispo: Discharge Disposition: D/T to SNF with Medicare cert in anticipation of skilled care (03)  Discharge Address: Home address ([address] [place] [address] [address] [facility])  Discharge Contact Phone Number: [phone]    DME Needed: No    Action(s) Taken: Updated Provider/Nurse on Discharge Plan,  Lsw attended 2nd rounds, and pt is medically cleared to d/c to SNF now. Md signed cobra.    DPA advised pt can transport at 4pm, per SNF.    Lsw contacted [facility] and they took pt's gurney transport off of will call. Pt will be p/u at 4pm tonight.    Lsw sent voalte to MD and received d/c summary.       Lsw acquired pt's signature on cobra and left on locked cabinet outside pt's room for bedside RN. LSw updated the CNA trainee.    Lsw sent voalte to RN to update as above.    Escalations Completed: None    Medically Clear: Yes    Next Steps: none    Barriers to Discharge: None    Is the patient up for discharge tomorrow: No

## 2023-05-29 ENCOUNTER — HOSPITAL ENCOUNTER (OUTPATIENT)
Facility: MEDICAL CENTER | Age: 83
End: 2023-05-29
Attending: INTERNAL MEDICINE
Payer: COMMERCIAL

## 2023-05-29 LAB
APPEARANCE UR: ABNORMAL
BACTERIA #/AREA URNS HPF: ABNORMAL /HPF
BILIRUB UR QL STRIP.AUTO: NEGATIVE
COLOR UR: YELLOW
EPI CELLS #/AREA URNS HPF: NEGATIVE /HPF
GLUCOSE UR STRIP.AUTO-MCNC: 250 MG/DL
KETONES UR STRIP.AUTO-MCNC: NEGATIVE MG/DL
LEUKOCYTE ESTERASE UR QL STRIP.AUTO: ABNORMAL
MICRO URNS: ABNORMAL
MUCOUS THREADS #/AREA URNS HPF: ABNORMAL /HPF
NITRITE UR QL STRIP.AUTO: NEGATIVE
PH UR STRIP.AUTO: 8.5 [PH] (ref 5–8)
PROT UR QL STRIP: 300 MG/DL
RBC # URNS HPF: ABNORMAL /HPF
RBC UR QL AUTO: ABNORMAL
SP GR UR STRIP.AUTO: 1.02
UROBILINOGEN UR STRIP.AUTO-MCNC: 0.2 MG/DL
WBC #/AREA URNS HPF: ABNORMAL /HPF

## 2023-06-01 LAB
BACTERIA UR CULT: ABNORMAL
BACTERIA UR CULT: ABNORMAL
SIGNIFICANT IND 70042: ABNORMAL
SITE SITE: ABNORMAL
SOURCE SOURCE: ABNORMAL

## 2023-06-16 ENCOUNTER — HOSPITAL ENCOUNTER (OUTPATIENT)
Facility: MEDICAL CENTER | Age: 83
End: 2023-06-16
Attending: INTERNAL MEDICINE
Payer: COMMERCIAL

## 2023-06-17 ENCOUNTER — HOSPITAL ENCOUNTER (OUTPATIENT)
Facility: MEDICAL CENTER | Age: 83
End: 2023-06-17
Attending: INTERNAL MEDICINE
Payer: COMMERCIAL

## 2023-06-17 LAB — AMMONIA PLAS-SCNC: 14 UMOL/L (ref 11–45)

## 2023-07-28 ENCOUNTER — HOSPITAL ENCOUNTER (OUTPATIENT)
Facility: MEDICAL CENTER | Age: 83
End: 2023-07-28
Attending: INTERNAL MEDICINE
Payer: COMMERCIAL

## 2023-07-29 LAB
GRAM STN SPEC: NORMAL
SIGNIFICANT IND 70042: NORMAL
SITE SITE: NORMAL
SOURCE SOURCE: NORMAL

## 2023-08-01 LAB
BACTERIA WND AEROBE CULT: ABNORMAL
BACTERIA WND AEROBE CULT: ABNORMAL
GRAM STN SPEC: ABNORMAL
SIGNIFICANT IND 70042: ABNORMAL
SITE SITE: ABNORMAL
SOURCE SOURCE: ABNORMAL

## 2024-01-25 NOTE — PROGRESS NOTES
Chief Complaint   Patient presents with    Follow up for fluid and results     155lb  2 weeks ago        Vitals:    01/25/24 0929   BP: 110/69   Pulse: 67   Resp: 20   Temp: 97.8 °F (36.6 °C)   SpO2: 97%   \"Have you been to the ER, urgent care clinic since your last visit?  Hospitalized since your last visit?\"    NO    “Have you seen or consulted any other health care providers outside of [facility] since your last visit?”    NO            [facility] Medicine Daily Progress Note    Date of Service  4/19/2023    Chief Complaint  Kedar Woods is a 83 y.o. male admitted 4/18/2023 with weakness    Hospital Course  No notes on file    Interval Problem Update  Patient was seen and examined at bedside.  No acute events overnight. Patient is resting comfortably in bed and in no acute distress.     LPS  IV unasyn  Bacteremia  cellulitis    I have discussed this patient's plan of care and discharge plan at IDT rounds today with Case Management, Nursing, Nursing leadership, and other members of the IDT team.    Code Status  Full Code    Disposition  Patient is not medically cleared for discharge.   Anticipate discharge to to home with close outpatient follow-up.  I have placed the appropriate orders for post-discharge needs.    Review of Systems  Review of Systems   Constitutional:  Positive for malaise/fatigue. Negative for chills and fever.   HENT:  Negative for congestion and sore throat.    Eyes:  Negative for blurred vision and double vision.   Respiratory:  Negative for cough and shortness of breath.    Cardiovascular:  Negative for chest pain and palpitations.   Gastrointestinal:  Negative for abdominal pain, diarrhea, nausea and vomiting.   Genitourinary:  Negative for dysuria and frequency.   Musculoskeletal:  Negative for back pain and falls.   Skin:  Positive for rash.   Neurological:  Negative for seizures and headaches.   Psychiatric/Behavioral:  Negative for hallucinations and substance abuse.       Physical Exam  Temp:  [36.3 °C (97.3 °F)-37 °C (98.6 °F)] 36.3 °C (97.3 °F)  Pulse:  [] 70  Resp:  [14-51] 15  BP: ()/() 98/52  SpO2:  [92 %-99 %] 96 %    Physical Exam  Vitals and nursing note reviewed.   Constitutional:       General: He is not in acute distress.     Appearance: He is obese. He is ill-appearing. He is not toxic-appearing.      Comments: conversational   HENT:      Head: Normocephalic.      Right Ear:  External ear normal.      Left Ear: External ear normal.      Nose: No congestion.      Mouth/Throat:      Pharynx: No oropharyngeal exudate.   Eyes:      General: No scleral icterus.     Pupils: Pupils are equal, round, and reactive to light.   Cardiovascular:      Rate and Rhythm: Normal rate and regular rhythm.      Heart sounds: No murmur heard.  Pulmonary:      Breath sounds: No wheezing.   Abdominal:      Palpations: Abdomen is soft.      Tenderness: There is no abdominal tenderness. There is no guarding or rebound.   Musculoskeletal:         General: No swelling or deformity.      Comments: Multiple wounds to left lower extremity including toes, shin   Skin:     Coloration: Skin is not jaundiced.      Findings: No bruising.   Neurological:      General: No focal deficit present.      Mental Status: He is alert.      Motor: No weakness.   Psychiatric:         Mood and Affect: Mood normal.         Behavior: Behavior normal.         Fluids    Intake/Output Summary (Last 24 hours) at 4/19/2023 1352  Last data filed at 4/19/2023 1300  Gross per 24 hour   Intake 8918.95 ml   Output 1375 ml   Net 7543.95 ml       Laboratory  Recent Labs     04/18/23  1222 04/18/23  2352   WBC 25.3* 22.8*   RBC 4.74 4.17*   HEMOGLOBIN 13.7* 11.7*   HEMATOCRIT 42.2 37.3*   MCV 89.0 89.4   MCH 28.9 28.1   MCHC 32.5* 31.4*   RDW 54.7* 55.8*   PLATELETCT 274 234   MPV 10.0 9.9     Recent Labs     04/18/23  1222 04/18/23  2352   SODIUM 134* 131*   POTASSIUM 3.4* 4.9   CHLORIDE 106 100   CO2 17* 17*   GLUCOSE 105* 144*   BUN 27* 36*   CREATININE 1.30 1.86*   CALCIUM 6.2* 8.0*                   Imaging  CT-CHEST,ABDOMEN,PELVIS WITH   Final Result      1.  No acute inflammatory process in the chest, abdomen or pelvis.   2.  Multiple hypodense hepatic regions measuring up to 7.9 cm. They are indeterminate. Areas of regional hepatic steatosis and active or treated metastases are in the differential. Correlate with history of cancer, prior  studies and consider further    evaluation with contrast-enhanced MRI, liver protocol.   3.  Cholelithiasis.   4.  Nonobstructing right nephrolithiasis.   5.  Moderate amount of stool in the distal colon and rectum.   6.  Mildly enlarged left inguinal lymph node, statistically likely reactive. No other enlarged nodes detected.         CT-HEAD W/O   Final Result      1.  Cerebral atrophy.      2.  White matter lucencies most consistent with small vessel ischemic change versus demyelination or gliosis.      3.  Otherwise, Head CT without contrast with no acute findings. No evidence of acute cerebral infarction, hemorrhage or mass lesion.         DX-CHEST-PORTABLE (1 VIEW)   Final Result      No acute cardiac or pulmonary abnormalities are identified.           Assessment/Plan  * Bacteremia  Assessment & Plan  Blood culture positive for strep  Continue IV unasyn  Repeat cultures tomorrow  Will order echo    Cellulitis of left lower extremity  Assessment & Plan  WBC 25.3 --> 22.8  Blood culture positive for strep  Continue IV unasyn  LPS recs  Repeat blood culture tomorrow    AF (atrial fibrillation) (HCC)- (present on admission)  Assessment & Plan  Long history of  Continue Eliquis for anticoagulation.    Hypokalemia- (present on admission)  Assessment & Plan  Potassium is low at 3.4  Replacement ordered    Hyponatremia- (present on admission)  Assessment & Plan  Low sodium at 134    History of transcatheter aortic valve replacement (TAVR)- (present on admission)  Assessment & Plan  Hx of  Last echo 2021 revealed a normal EF with normally functioning TAVR    Encephalopathy acute- (present on admission)  Assessment & Plan  Acute toxic, metabolic encephalopathy secondary to sepsis   CT head ordered.    Hypocalcemia- (present on admission)  Assessment & Plan  Adjusted calcium for albumin is low at 7.7  Check ionized calcium  2 grams calcium gluconate ordered  Start TUMS TID   Check calcium in the morning.    Essential  hypertension- (present on admission)  Assessment & Plan  Restart home Toprol with holding parameters  He is interestingly on Entresto and Jardiance outpatient which may be due to HFpEF ([facility])    Sepsis (HCC)- (present on admission)  Assessment & Plan  This is Sepsis Present on admission         VTE prophylaxis: SCDs/TEDs and therapeutic anticoagulation with eliquis    I have performed a physical exam and reviewed and updated ROS and Plan today (4/19/2023). In review of yesterday's note (4/18/2023), there are no changes except as documented above.

## 2024-12-13 NOTE — PROGRESS NOTES
Patient refused bed alarm, educated regarding importance of intervention, notified charge RN, Priti. Pt A&Ox4 with moderate fall risk and continues to refuse despite given education. Pt calls for assistance and verbalizes understanding given education.     Please stop drinking as this will worsen your abdominal pain and vomiting  Close followup w/ Dr Sosa as well as IM clinic, please call for appointments  Continue medications as previously prescribed

## (undated) DEVICE — TUBE CONNECTING SUCTION - CLEAR PLASTIC STERILE 72 IN (50EA/CA)

## (undated) DEVICE — TOWEL STOP TIMEOUT SAFETY FLAG (40EA/CA)

## (undated) DEVICE — BLOCK BITE ENDOSCOPIC 2809 - (100/BX) INTERMEDIATE

## (undated) DEVICE — CATHETER IV 20 GA X 1-1/4 ---SURG.& SDS ONLY--- (50EA/BX)

## (undated) DEVICE — SET EXTENSION WITH 2 PORTS (48EA/CA) ***PART #2C8610 IS A SUBSTITUTE*****

## (undated) DEVICE — CANISTER SUCTION RIGID RED 1500CC (40EA/CA)

## (undated) DEVICE — SET LEADWIRE 5 LEAD BEDSIDE DISPOSABLE ECG (1SET OF 5/EA)

## (undated) DEVICE — KIT CUSTOM PROCEDURE SINGLE FOR ENDO  (15/CA)

## (undated) DEVICE — Device

## (undated) DEVICE — HEMOSTAT ENDOSCOPIC HEMOSPRAY 7FR

## (undated) DEVICE — CANNULA O2 COMFORT SOFT EAR ADULT 7 FT TUBING (50/CA)

## (undated) DEVICE — FILM CASSETTE ENDO